# Patient Record
Sex: FEMALE | Race: WHITE | NOT HISPANIC OR LATINO | URBAN - METROPOLITAN AREA
[De-identification: names, ages, dates, MRNs, and addresses within clinical notes are randomized per-mention and may not be internally consistent; named-entity substitution may affect disease eponyms.]

---

## 2017-05-01 ENCOUNTER — OUTPATIENT (OUTPATIENT)
Dept: OUTPATIENT SERVICES | Facility: HOSPITAL | Age: 62
LOS: 1 days | End: 2017-05-01
Payer: MEDICAID

## 2017-05-09 DIAGNOSIS — R69 ILLNESS, UNSPECIFIED: ICD-10-CM

## 2017-07-01 ENCOUNTER — OUTPATIENT (OUTPATIENT)
Dept: OUTPATIENT SERVICES | Facility: HOSPITAL | Age: 62
LOS: 1 days | End: 2017-07-01

## 2018-02-06 DIAGNOSIS — R69 ILLNESS, UNSPECIFIED: ICD-10-CM

## 2018-06-01 PROCEDURE — G9005: CPT

## 2018-07-01 ENCOUNTER — OUTPATIENT (OUTPATIENT)
Dept: OUTPATIENT SERVICES | Facility: HOSPITAL | Age: 63
LOS: 1 days | End: 2018-07-01
Payer: MEDICARE

## 2018-07-13 DIAGNOSIS — Z71.89 OTHER SPECIFIED COUNSELING: ICD-10-CM

## 2019-04-01 ENCOUNTER — OUTPATIENT (OUTPATIENT)
Dept: OUTPATIENT SERVICES | Facility: HOSPITAL | Age: 64
LOS: 1 days | End: 2019-04-01
Payer: MEDICAID

## 2019-08-01 ENCOUNTER — OUTPATIENT (OUTPATIENT)
Dept: OUTPATIENT SERVICES | Facility: HOSPITAL | Age: 64
LOS: 1 days | End: 2019-08-01

## 2019-10-17 DIAGNOSIS — Z71.89 OTHER SPECIFIED COUNSELING: ICD-10-CM

## 2019-12-21 ENCOUNTER — INPATIENT (INPATIENT)
Facility: HOSPITAL | Age: 64
LOS: 2 days | Discharge: NURSING, REHAB OR LONG-TERM CARE | End: 2019-12-24
Attending: STUDENT IN AN ORGANIZED HEALTH CARE EDUCATION/TRAINING PROGRAM | Admitting: STUDENT IN AN ORGANIZED HEALTH CARE EDUCATION/TRAINING PROGRAM
Payer: MEDICARE

## 2019-12-21 VITALS
DIASTOLIC BLOOD PRESSURE: 67 MMHG | OXYGEN SATURATION: 91 % | RESPIRATION RATE: 16 BRPM | SYSTOLIC BLOOD PRESSURE: 107 MMHG | HEART RATE: 114 BPM | TEMPERATURE: 98 F

## 2019-12-21 DIAGNOSIS — R09.02 HYPOXEMIA: ICD-10-CM

## 2019-12-21 LAB
ALBUMIN SERPL ELPH-MCNC: 3.8 G/DL — SIGNIFICANT CHANGE UP (ref 3.3–5)
ALP SERPL-CCNC: 67 U/L — SIGNIFICANT CHANGE UP (ref 40–120)
ALT FLD-CCNC: 24 U/L — SIGNIFICANT CHANGE UP (ref 4–33)
ANION GAP SERPL CALC-SCNC: 16 MMO/L — HIGH (ref 7–14)
APPEARANCE UR: CLEAR — SIGNIFICANT CHANGE UP
AST SERPL-CCNC: 70 U/L — HIGH (ref 4–32)
B PERT DNA SPEC QL NAA+PROBE: NOT DETECTED — SIGNIFICANT CHANGE UP
BACTERIA # UR AUTO: SIGNIFICANT CHANGE UP
BASOPHILS # BLD AUTO: 0.06 K/UL — SIGNIFICANT CHANGE UP (ref 0–0.2)
BASOPHILS NFR BLD AUTO: 0.3 % — SIGNIFICANT CHANGE UP (ref 0–2)
BASOPHILS NFR SPEC: 0 % — SIGNIFICANT CHANGE UP (ref 0–2)
BILIRUB SERPL-MCNC: 0.5 MG/DL — SIGNIFICANT CHANGE UP (ref 0.2–1.2)
BILIRUB UR-MCNC: NEGATIVE — SIGNIFICANT CHANGE UP
BLOOD UR QL VISUAL: HIGH
BUN SERPL-MCNC: 16 MG/DL — SIGNIFICANT CHANGE UP (ref 7–23)
C PNEUM DNA SPEC QL NAA+PROBE: NOT DETECTED — SIGNIFICANT CHANGE UP
CALCIUM SERPL-MCNC: 9.5 MG/DL — SIGNIFICANT CHANGE UP (ref 8.4–10.5)
CHLORIDE SERPL-SCNC: 102 MMOL/L — SIGNIFICANT CHANGE UP (ref 98–107)
CO2 SERPL-SCNC: 21 MMOL/L — LOW (ref 22–31)
COLOR SPEC: YELLOW — SIGNIFICANT CHANGE UP
CREAT SERPL-MCNC: 0.72 MG/DL — SIGNIFICANT CHANGE UP (ref 0.5–1.3)
EOSINOPHIL # BLD AUTO: 0.01 K/UL — SIGNIFICANT CHANGE UP (ref 0–0.5)
EOSINOPHIL NFR BLD AUTO: 0.1 % — SIGNIFICANT CHANGE UP (ref 0–6)
EOSINOPHIL NFR FLD: 0 % — SIGNIFICANT CHANGE UP (ref 0–6)
FLUAV H1 2009 PAND RNA SPEC QL NAA+PROBE: NOT DETECTED — SIGNIFICANT CHANGE UP
FLUAV H1 RNA SPEC QL NAA+PROBE: NOT DETECTED — SIGNIFICANT CHANGE UP
FLUAV H3 RNA SPEC QL NAA+PROBE: NOT DETECTED — SIGNIFICANT CHANGE UP
FLUAV SUBTYP SPEC NAA+PROBE: NOT DETECTED — SIGNIFICANT CHANGE UP
FLUBV RNA SPEC QL NAA+PROBE: NOT DETECTED — SIGNIFICANT CHANGE UP
GLUCOSE SERPL-MCNC: 95 MG/DL — SIGNIFICANT CHANGE UP (ref 70–99)
GLUCOSE UR-MCNC: NEGATIVE — SIGNIFICANT CHANGE UP
HADV DNA SPEC QL NAA+PROBE: NOT DETECTED — SIGNIFICANT CHANGE UP
HCOV PNL SPEC NAA+PROBE: SIGNIFICANT CHANGE UP
HCT VFR BLD CALC: 50.7 % — HIGH (ref 34.5–45)
HGB BLD-MCNC: 15.6 G/DL — HIGH (ref 11.5–15.5)
HMPV RNA SPEC QL NAA+PROBE: NOT DETECTED — SIGNIFICANT CHANGE UP
HPIV1 RNA SPEC QL NAA+PROBE: NOT DETECTED — SIGNIFICANT CHANGE UP
HPIV2 RNA SPEC QL NAA+PROBE: NOT DETECTED — SIGNIFICANT CHANGE UP
HPIV3 RNA SPEC QL NAA+PROBE: NOT DETECTED — SIGNIFICANT CHANGE UP
HPIV4 RNA SPEC QL NAA+PROBE: NOT DETECTED — SIGNIFICANT CHANGE UP
HYALINE CASTS # UR AUTO: SIGNIFICANT CHANGE UP
IMM GRANULOCYTES NFR BLD AUTO: 0.3 % — SIGNIFICANT CHANGE UP (ref 0–1.5)
KETONES UR-MCNC: SIGNIFICANT CHANGE UP
LEUKOCYTE ESTERASE UR-ACNC: SIGNIFICANT CHANGE UP
LYMPHOCYTES # BLD AUTO: 35.3 % — SIGNIFICANT CHANGE UP (ref 13–44)
LYMPHOCYTES # BLD AUTO: 6.64 K/UL — HIGH (ref 1–3.3)
LYMPHOCYTES NFR SPEC AUTO: 44 % — SIGNIFICANT CHANGE UP (ref 13–44)
MAGNESIUM SERPL-MCNC: 1.9 MG/DL — SIGNIFICANT CHANGE UP (ref 1.6–2.6)
MANUAL SMEAR VERIFICATION: SIGNIFICANT CHANGE UP
MCHC RBC-ENTMCNC: 26.7 PG — LOW (ref 27–34)
MCHC RBC-ENTMCNC: 30.8 % — LOW (ref 32–36)
MCV RBC AUTO: 86.7 FL — SIGNIFICANT CHANGE UP (ref 80–100)
MICROCYTES BLD QL: SLIGHT — SIGNIFICANT CHANGE UP
MONOCYTES # BLD AUTO: 1.54 K/UL — HIGH (ref 0–0.9)
MONOCYTES NFR BLD AUTO: 8.2 % — SIGNIFICANT CHANGE UP (ref 2–14)
MONOCYTES NFR BLD: 3 % — SIGNIFICANT CHANGE UP (ref 2–9)
MUCOUS THREADS # UR AUTO: SIGNIFICANT CHANGE UP
NEUTROPHIL AB SER-ACNC: 53 % — SIGNIFICANT CHANGE UP (ref 43–77)
NEUTROPHILS # BLD AUTO: 10.5 K/UL — HIGH (ref 1.8–7.4)
NEUTROPHILS NFR BLD AUTO: 55.8 % — SIGNIFICANT CHANGE UP (ref 43–77)
NITRITE UR-MCNC: NEGATIVE — SIGNIFICANT CHANGE UP
NRBC # BLD: 0 /100WBC — SIGNIFICANT CHANGE UP
NRBC # FLD: 0 K/UL — SIGNIFICANT CHANGE UP (ref 0–0)
PH UR: 6 — SIGNIFICANT CHANGE UP (ref 5–8)
PHOSPHATE SERPL-MCNC: 3.2 MG/DL — SIGNIFICANT CHANGE UP (ref 2.5–4.5)
PLATELET # BLD AUTO: 289 K/UL — SIGNIFICANT CHANGE UP (ref 150–400)
PLATELET COUNT - ESTIMATE: NORMAL — SIGNIFICANT CHANGE UP
PMV BLD: 10.3 FL — SIGNIFICANT CHANGE UP (ref 7–13)
POTASSIUM SERPL-MCNC: 5.3 MMOL/L — SIGNIFICANT CHANGE UP (ref 3.5–5.3)
POTASSIUM SERPL-SCNC: 5.3 MMOL/L — SIGNIFICANT CHANGE UP (ref 3.5–5.3)
PROT SERPL-MCNC: 6.6 G/DL — SIGNIFICANT CHANGE UP (ref 6–8.3)
PROT UR-MCNC: 50 — SIGNIFICANT CHANGE UP
RBC # BLD: 5.85 M/UL — HIGH (ref 3.8–5.2)
RBC # FLD: 13.1 % — SIGNIFICANT CHANGE UP (ref 10.3–14.5)
RBC CASTS # UR COMP ASSIST: SIGNIFICANT CHANGE UP (ref 0–?)
RSV RNA SPEC QL NAA+PROBE: NOT DETECTED — SIGNIFICANT CHANGE UP
RV+EV RNA SPEC QL NAA+PROBE: NOT DETECTED — SIGNIFICANT CHANGE UP
SMUDGE CELLS # BLD: PRESENT — SIGNIFICANT CHANGE UP
SODIUM SERPL-SCNC: 139 MMOL/L — SIGNIFICANT CHANGE UP (ref 135–145)
SP GR SPEC: 1.03 — SIGNIFICANT CHANGE UP (ref 1–1.04)
SQUAMOUS # UR AUTO: SIGNIFICANT CHANGE UP
TROPONIN T, HIGH SENSITIVITY: 18 NG/L — SIGNIFICANT CHANGE UP (ref ?–14)
UROBILINOGEN FLD QL: NORMAL — SIGNIFICANT CHANGE UP
WBC # BLD: 18.81 K/UL — HIGH (ref 3.8–10.5)
WBC # FLD AUTO: 18.81 K/UL — HIGH (ref 3.8–10.5)
WBC UR QL: SIGNIFICANT CHANGE UP (ref 0–?)

## 2019-12-21 PROCEDURE — 70450 CT HEAD/BRAIN W/O DYE: CPT | Mod: 26

## 2019-12-21 PROCEDURE — 99223 1ST HOSP IP/OBS HIGH 75: CPT

## 2019-12-21 PROCEDURE — 71045 X-RAY EXAM CHEST 1 VIEW: CPT | Mod: 26

## 2019-12-21 RX ORDER — DEXTROSE 50 % IN WATER 50 %
25 SYRINGE (ML) INTRAVENOUS ONCE
Refills: 0 | Status: DISCONTINUED | OUTPATIENT
Start: 2019-12-21 | End: 2019-12-23

## 2019-12-21 RX ORDER — SODIUM CHLORIDE 9 MG/ML
1000 INJECTION, SOLUTION INTRAVENOUS
Refills: 0 | Status: DISCONTINUED | OUTPATIENT
Start: 2019-12-21 | End: 2019-12-23

## 2019-12-21 RX ORDER — CEFTRIAXONE 500 MG/1
1000 INJECTION, POWDER, FOR SOLUTION INTRAMUSCULAR; INTRAVENOUS ONCE
Refills: 0 | Status: COMPLETED | OUTPATIENT
Start: 2019-12-21 | End: 2019-12-21

## 2019-12-21 RX ORDER — SODIUM CHLORIDE 9 MG/ML
2000 INJECTION INTRAMUSCULAR; INTRAVENOUS; SUBCUTANEOUS ONCE
Refills: 0 | Status: COMPLETED | OUTPATIENT
Start: 2019-12-21 | End: 2019-12-21

## 2019-12-21 RX ORDER — DEXTROSE 50 % IN WATER 50 %
15 SYRINGE (ML) INTRAVENOUS ONCE
Refills: 0 | Status: DISCONTINUED | OUTPATIENT
Start: 2019-12-21 | End: 2019-12-23

## 2019-12-21 RX ORDER — INSULIN LISPRO 100/ML
VIAL (ML) SUBCUTANEOUS
Refills: 0 | Status: DISCONTINUED | OUTPATIENT
Start: 2019-12-21 | End: 2019-12-23

## 2019-12-21 RX ORDER — AZITHROMYCIN 500 MG/1
500 TABLET, FILM COATED ORAL ONCE
Refills: 0 | Status: COMPLETED | OUTPATIENT
Start: 2019-12-21 | End: 2019-12-21

## 2019-12-21 RX ORDER — TETANUS TOXOID, REDUCED DIPHTHERIA TOXOID AND ACELLULAR PERTUSSIS VACCINE, ADSORBED 5; 2.5; 8; 8; 2.5 [IU]/.5ML; [IU]/.5ML; UG/.5ML; UG/.5ML; UG/.5ML
0.5 SUSPENSION INTRAMUSCULAR ONCE
Refills: 0 | Status: COMPLETED | OUTPATIENT
Start: 2019-12-21 | End: 2019-12-21

## 2019-12-21 RX ORDER — INSULIN LISPRO 100/ML
VIAL (ML) SUBCUTANEOUS AT BEDTIME
Refills: 0 | Status: DISCONTINUED | OUTPATIENT
Start: 2019-12-21 | End: 2019-12-23

## 2019-12-21 RX ORDER — GLUCAGON INJECTION, SOLUTION 0.5 MG/.1ML
1 INJECTION, SOLUTION SUBCUTANEOUS ONCE
Refills: 0 | Status: DISCONTINUED | OUTPATIENT
Start: 2019-12-21 | End: 2019-12-23

## 2019-12-21 RX ORDER — DEXTROSE 50 % IN WATER 50 %
12.5 SYRINGE (ML) INTRAVENOUS ONCE
Refills: 0 | Status: DISCONTINUED | OUTPATIENT
Start: 2019-12-21 | End: 2019-12-23

## 2019-12-21 RX ADMIN — SODIUM CHLORIDE 2000 MILLILITER(S): 9 INJECTION INTRAMUSCULAR; INTRAVENOUS; SUBCUTANEOUS at 20:27

## 2019-12-21 RX ADMIN — CEFTRIAXONE 100 MILLIGRAM(S): 500 INJECTION, POWDER, FOR SOLUTION INTRAMUSCULAR; INTRAVENOUS at 20:21

## 2019-12-21 RX ADMIN — AZITHROMYCIN 255 MILLIGRAM(S): 500 TABLET, FILM COATED ORAL at 20:33

## 2019-12-21 RX ADMIN — CEFTRIAXONE 1000 MILLIGRAM(S): 500 INJECTION, POWDER, FOR SOLUTION INTRAMUSCULAR; INTRAVENOUS at 20:36

## 2019-12-21 RX ADMIN — TETANUS TOXOID, REDUCED DIPHTHERIA TOXOID AND ACELLULAR PERTUSSIS VACCINE, ADSORBED 0.5 MILLILITER(S): 5; 2.5; 8; 8; 2.5 SUSPENSION INTRAMUSCULAR at 20:33

## 2019-12-21 NOTE — H&P ADULT - NSHPSOCIALHISTORY_GEN_ALL_CORE
Lives alone  Independent ambulator  Current smoker, smoker 1/2 PPD since age 17  Denies any current EtOH or illicit substance use

## 2019-12-21 NOTE — H&P ADULT - PROBLEM SELECTOR PROBLEM 5
Type 2 diabetes mellitus without complication, without long-term current use of insulin Schizophrenia, unspecified type

## 2019-12-21 NOTE — H&P ADULT - PROBLEM SELECTOR PLAN 3
- History of COPD 2/2 smoking, still smoking, currently refusing medications for tobacco cessation  - No wheezing currently, was initially hypoxic in ED but on my examination she was sustaining her pulse ox in the mid 90s on RA throughout her interview  - Will restart her home advair (with therapeutic interchange) and proair prn - Leukocytosis to 18k, seems to have a possible baseline of 14-15k based on prior labs, currently no infectious complaints  - Did get empiric CTX/AZT in ED but will hold off on further abx for now  - BCx/UCx sent in ED, f/u results

## 2019-12-21 NOTE — H&P ADULT - PROBLEM SELECTOR PLAN 1
- 2 falls today, unclear etiology, possibly 2/2 cardiac though patient denies any cardiac complaints, will monitor on telemetry for now, will check a TTE, initial trop indeterminant at 18 but patient without any cardiac complaints, will repeat in AM with reattempt at new EKG in AM  - Falls might also be due to worsening dyskinesia from abrupt withdrawal of her geodon and amantadine, will restart and monitor for now

## 2019-12-21 NOTE — ED PROVIDER NOTE - NS ED ROS FT
General: denies fever, chills  HENT: denies nasal congestion, sore throat, rhinorrhea  Eyes: denies vision changes  CV: denies chest pain  Resp: denies difficulty breathing, cough  Abdominal: denies nausea, vomiting, diarrhea, abdominal pain, blood in stool, dark stool  : denies pain with urination  MSK: +recent trauma  Neuro: denies headaches, numbness, tingling, dizziness, lightheadedness.  Skin: denies new rashes  Endocrine: denies recent weight loss

## 2019-12-21 NOTE — H&P ADULT - PROBLEM SELECTOR PLAN 6
- SCDs for DVT ppx  - Ambulate with assistance, fall precautions - On metformin at home -> will hold for now  - VIJI, FS qAC, diabetic diet

## 2019-12-21 NOTE — H&P ADULT - PROBLEM SELECTOR PLAN 9
- Patient unsure of her medications, reconciled from OMR, will also have hospital pharmacy verify meds in AM

## 2019-12-21 NOTE — ED PROVIDER NOTE - PHYSICAL EXAMINATION
CONSTITUTIONAL: unkempt, elderly female, resting comfortably in no acute distress  HEAD: Normocephalic; atraumatic  EYES: Normal inspection, EOMI, PERRLA  ENMT: External appears normal; normal oropharynx  NECK: Supple; non-tender; no cervical lymphadenopathy  CARD: RRR; no audible murmurs, rubs, or gallops  RESP: No respiratory distress, lungs ctab/l  ABD: Soft, non-distended; non-tender; no rebound or guarding  EXT: No LE pitting edema or calf tenderness; distal pulses intact with good capillary refill  SKIN: Warm, dry, abrasions over knees b/l   MSK: no midline TTP, no step offs  NEURO: aaox3, CN II-IX intact, 5/5 strength b/l UE and LE, sensation intact in all extremities, no pronator drift, finger to nose intact, no disdiadokinesia, unsteady on feet

## 2019-12-21 NOTE — ED PROVIDER NOTE - PROGRESS NOTE DETAILS
Pt noted to be tremulous, hypoxic to 89% with good waveform; but w/o subjective complaints. Satting well 97% on 4L NC. Rectal temp afebrile but pt is tachycardic with leukocytosis will draw add'l cultures and give IVF, abx, likely admission. Reports her agency is [facility] [phone] and someone named Eric follows her case; this phone number did not go to any agency as far as I could tell. States her PMD is a  "[facility]" in [facility].  Luis F pgy2.

## 2019-12-21 NOTE — H&P ADULT - PROBLEM SELECTOR PLAN 7
1.  Name of PCP: Dr. Brandt Sanabria  2.  PCP Contacted on Admission: [ ] Y    [ ] N    3.  PCP contacted at Discharge: [ ] Y    [ ] N    [ ] N/A  4.  Post-Discharge Appointment Date and Location:  5.  Summary of Handoff given to PCP: - SCDs for DVT ppx  - Ambulate with assistance, fall precautions

## 2019-12-21 NOTE — H&P ADULT - PROBLEM SELECTOR PLAN 4
- Supposed to be on ziprasidone and amantadine, will restart both  - Consider psych eval in AM to see if her medications needs adjustments given her significant tardive dyskinesia - Supposed to be on ziprasidone and amantadine, will restart both  - Consider psych eval in AM to see if her medications need adjustments given her significant dyskinesia - History of COPD 2/2 smoking, still smoking, currently refusing medications for tobacco cessation  - No wheezing currently, was initially hypoxic in ED but on my examination she was sustaining her pulse ox in the mid 90s on RA throughout her interview  - Will restart her home advair (with therapeutic interchange) and proair prn

## 2019-12-21 NOTE — H&P ADULT - PROBLEM SELECTOR PLAN 2
- Unclear cause of the hypoxia on initial arrival to the ED, has history of COPD but not in exacerbation (no wheezing, no SOB, no new or worsening cough)  - Concern for PNA given leukocytosis but CXR clear and patient without infectious complaints (has a chronic cough but that hasn't worsened, no sputum production, no fevers/chills, no URI complaints)  - Concern for raised for possible PE given her tachycardia, Wells Score 4.5 placing her at a moderate risk, will check a d-dimer for further evaluation - Unclear cause of the hypoxia on initial arrival to the ED, has history of COPD but not in exacerbation (no wheezing, no SOB, no new or worsening cough)  - Concern for PNA given leukocytosis but CXR clear and patient without infectious complaints (has a chronic cough but that hasn't worsened, no sputum production, no fevers/chills, no URI complaints), RVP also negative  - Concern for raised for possible PE given her tachycardia, Wells Score 4.5 placing her at a moderate risk, will check a d-dimer for further evaluation -> D-Dimer resulted at 272 DDU which lower than the patient's age adjusted cut-off making VTE unlikely

## 2019-12-21 NOTE — ED PROVIDER NOTE - OBJECTIVE STATEMENT
Chasity Juarez MD: 63yo F with PMH of schizoaffective disorder, DM BIBEMS for fall x2. Pt is poor historian but states that she had mechanical fall at 3PM while tying her shoes and at 4PM while smoking outside. Neighbors called 911. Pt denies LOC, head trauma, blood thinners. Pt is denying pain, SOB, weakness, numbness, tingling, dysuria, HA, vision changes. States that she lives alone with aid that comes occasionally. Ambulates without assistance.

## 2019-12-21 NOTE — ED ADULT NURSE REASSESSMENT NOTE - NS ED NURSE REASSESS COMMENT FT1
Received report from RN. Pt resting in bed at this time. Breathing even and unlabored. Pt continuing to be placed on 4L/min nasal cannula-oxygen saturation at 97%. Pt denies chest pain and SOB at this time. Sinus tachycardia on the monitor. VS as noted. Will continue to monitor.

## 2019-12-21 NOTE — ED PROVIDER NOTE - CLINICAL SUMMARY MEDICAL DECISION MAKING FREE TEXT BOX
Chasity Juarez MD: 63yo F with PMH of schizoaffective disorder, DM BIBEMS for fall x2. Pt hemodynamically stable, afebrile. Pt endorses mechanical fall with no LOC or head trauma, but as pt is poor historian and unable to ambulate in ED, will obtain CT head, basic labs, EKG, UA/UC.

## 2019-12-21 NOTE — H&P ADULT - NSHPREVIEWOFSYSTEMS_GEN_ALL_CORE
REVIEW OF SYSTEMS:    CONSTITUTIONAL: No weakness, fevers or chills  EYES: No visual changes or eye discharge  ENT: No rhinorrhea or sore throat  NECK: No pain or stiffness  RESPIRATORY: No cough, wheezing, hemoptysis; No shortness of breath  CARDIOVASCULAR: No chest pain or palpitations; No lower extremity edema  GASTROINTESTINAL: No abdominal or epigastric pain. No nausea, vomiting, or hematemesis; No diarrhea or constipation. No melena or hematochezia.  BACK: No back pain, no flank pain  GENITOURINARY: No dysuria, frequency or hematuria  NEUROLOGICAL: No numbness or weakness  SKIN: No itching, burning, rashes, or lesions

## 2019-12-21 NOTE — H&P ADULT - PROBLEM SELECTOR PLAN 8
- Patient unsure of her medications, reconciled from OMR, will also have hospital pharmacy verify meds in AM 1.  Name of PCP: Dr. Brandt Sanabria  2.  PCP Contacted on Admission: [ ] Y    [ ] N    3.  PCP contacted at Discharge: [ ] Y    [ ] N    [ ] N/A  4.  Post-Discharge Appointment Date and Location:  5.  Summary of Handoff given to PCP:

## 2019-12-21 NOTE — H&P ADULT - NSHPPHYSICALEXAM_GEN_ALL_CORE
Vital Signs Last 24 Hrs  T(C): 36.8 (21 Dec 2019 23:50), Max: 37.2 (21 Dec 2019 19:33)  T(F): 98.2 (21 Dec 2019 23:50), Max: 98.9 (21 Dec 2019 19:33)  HR: 97 (21 Dec 2019 23:50) (97 - 114)  BP: 123/70 (21 Dec 2019 23:50) (107/67 - 123/70)  BP(mean): --  RR: 20 (21 Dec 2019 23:50) (16 - 20)  SpO2: 97% (21 Dec 2019 23:50) (91% - 98%)    GENERAL: No acute distress, well-developed  ENT: EOMI, PERRL, conjunctiva and sclera clear, Neck supple, No JVD, dry oral mucosa  CHEST/LUNG: Clear to auscultation bilaterally; No wheeze, equal breath sounds bilaterally   BACK: No spinal tenderness, no CVA TTP  HEART: Regular rate and rhythm; No murmurs, rubs, or gallops  ABDOMEN: Soft, Nontender, Nondistended; Bowel sounds present  EXTREMITIES: No clubbing, cyanosis, or edema  PSYCH: Nl behavior, flat affect  NEUROLOGY: AAOx3, non-focal, baseline dyskinesia of arms and legs that improve with movement  SKIN: Normal color, No rashes or lesions

## 2019-12-21 NOTE — ED PROVIDER NOTE - ATTENDING CONTRIBUTION TO CARE
64F with hx of schizoaffective disorder and DM per records, BIBEMS after neighbors witnessed her fall. patient states she fell twice. limited hx as patient is a very poor historian. denies any injuries but has scrapes on both knees. denies any pain. states she cares for herself and ambulates independently.     ***GEN - NAD; well appearing; A+O x2 ***HEAD - NC/AT ***EYES/NOSE - PERRL, left eye strabismus, EOMI, mucous membranes moist, no discharge ***THROAT: Oral cavity and pharynx normal. No inflammation, swelling, exudate, or lesions.  ***NECK: Neck supple  ***PULMONARY - CTA b/l, symmetric breath sounds. ***CARDIAC -s1s2, tachycardia, no M,G,R  ***ABDOMEN - +BS, ND, NT, soft, no guarding, no rebound, no masses   ***BACK - no CVA tenderness, Normal  spine ***EXTREMITIES - symmetric pulses, 2+ dp, capillary refill < 2 seconds, no clubbing, no cyanosis, no edema ***SKIN - abrasions to b/l knees  ***NEUROLOGIC - alert, CN 2-12 intact, ***PSYCH - calm and cooperative    MDM: 64F s/p fall. limited hx, will d/w SW to obtain more if possible. tachycardiac, hypoxic on RA to 89%. supplemental o2, cxr, cth, labs, rvp, abx.

## 2019-12-21 NOTE — H&P ADULT - NSICDXPASTMEDICALHX_GEN_ALL_CORE_FT
PAST MEDICAL HISTORY:  COPD without exacerbation     Diabetes     Obesity     Schizoaffective disorder

## 2019-12-21 NOTE — H&P ADULT - PROBLEM SELECTOR PLAN 5
- On metformin at home -> will hold for now  - VIJI, FS qAC, diabetic diet - Supposed to be on ziprasidone and amantadine, will restart both  - Consider psych eval in AM to see if her medications need adjustments given her significant dyskinesia

## 2019-12-21 NOTE — ED ADULT TRIAGE NOTE - CHIEF COMPLAINT QUOTE
Pt s/p fall  twice today, in her apartment and outside. with dizziness after falling , now resolved.    Denies head injury, chest pain, LOC

## 2019-12-21 NOTE — ED ADULT NURSE NOTE - OBJECTIVE STATEMENT
Pt to bed 26. Alert ad oriented x 3. Pt states that she had a mechanical fall today. Has no complaints at this time. IVL placed. Bloods drawn. Will continue to monitor.

## 2019-12-22 ENCOUNTER — TRANSCRIPTION ENCOUNTER (OUTPATIENT)
Age: 64
End: 2019-12-22

## 2019-12-22 DIAGNOSIS — Z02.9 ENCOUNTER FOR ADMINISTRATIVE EXAMINATIONS, UNSPECIFIED: ICD-10-CM

## 2019-12-22 DIAGNOSIS — R09.02 HYPOXEMIA: ICD-10-CM
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
## 2019-12-22 DIAGNOSIS — E11.9 TYPE 2 DIABETES MELLITUS WITHOUT COMPLICATIONS: ICD-10-CM

## 2019-12-22 DIAGNOSIS — Z29.9 ENCOUNTER FOR PROPHYLACTIC MEASURES, UNSPECIFIED: ICD-10-CM

## 2019-12-22 DIAGNOSIS — J44.9 CHRONIC OBSTRUCTIVE PULMONARY DISEASE, UNSPECIFIED: ICD-10-CM

## 2019-12-22 DIAGNOSIS — R55 SYNCOPE AND COLLAPSE: ICD-10-CM

## 2019-12-22 DIAGNOSIS — T79.6XXA TRAUMATIC ISCHEMIA OF MUSCLE, INITIAL ENCOUNTER: ICD-10-CM

## 2019-12-22 DIAGNOSIS — D72.829 ELEVATED WHITE BLOOD CELL COUNT, UNSPECIFIED: ICD-10-CM

## 2019-12-22 DIAGNOSIS — Z79.899 OTHER LONG TERM (CURRENT) DRUG THERAPY: ICD-10-CM

## 2019-12-22 DIAGNOSIS — F20.9 SCHIZOPHRENIA, UNSPECIFIED: ICD-10-CM

## 2019-12-22 DIAGNOSIS — R09.89 OTHER SPECIFIED SYMPTOMS AND SIGNS INVOLVING THE CIRCULATORY AND RESPIRATORY SYSTEMS: ICD-10-CM

## 2019-12-22 LAB
ANION GAP SERPL CALC-SCNC: 11 MMO/L — SIGNIFICANT CHANGE UP (ref 7–14)
APTT BLD: 28.3 SEC — SIGNIFICANT CHANGE UP (ref 27.5–36.3)
BUN SERPL-MCNC: 16 MG/DL — SIGNIFICANT CHANGE UP (ref 7–23)
CALCIUM SERPL-MCNC: 8.5 MG/DL — SIGNIFICANT CHANGE UP (ref 8.4–10.5)
CHLORIDE SERPL-SCNC: 104 MMOL/L — SIGNIFICANT CHANGE UP (ref 98–107)
CK MB BLD-MCNC: 1 — SIGNIFICANT CHANGE UP (ref 0–2.5)
CK MB BLD-MCNC: 23.37 NG/ML — HIGH (ref 1–4.7)
CK SERPL-CCNC: 2313 U/L — HIGH (ref 25–170)
CO2 SERPL-SCNC: 26 MMOL/L — SIGNIFICANT CHANGE UP (ref 22–31)
CREAT SERPL-MCNC: 0.77 MG/DL — SIGNIFICANT CHANGE UP (ref 0.5–1.3)
D DIMER BLD IA.RAPID-MCNC: 272 NG/ML — SIGNIFICANT CHANGE UP
GLUCOSE BLDC GLUCOMTR-MCNC: 85 MG/DL — SIGNIFICANT CHANGE UP (ref 70–99)
GLUCOSE BLDC GLUCOMTR-MCNC: 90 MG/DL — SIGNIFICANT CHANGE UP (ref 70–99)
GLUCOSE BLDC GLUCOMTR-MCNC: 96 MG/DL — SIGNIFICANT CHANGE UP (ref 70–99)
GLUCOSE BLDC GLUCOMTR-MCNC: 99 MG/DL — SIGNIFICANT CHANGE UP (ref 70–99)
GLUCOSE SERPL-MCNC: 118 MG/DL — HIGH (ref 70–99)
HBA1C BLD-MCNC: 5.9 % — HIGH (ref 4–5.6)
HCT VFR BLD CALC: 42 % — SIGNIFICANT CHANGE UP (ref 34.5–45)
HGB BLD-MCNC: 13.1 G/DL — SIGNIFICANT CHANGE UP (ref 11.5–15.5)
INR BLD: 1.04 — SIGNIFICANT CHANGE UP (ref 0.88–1.17)
MAGNESIUM SERPL-MCNC: 1.8 MG/DL — SIGNIFICANT CHANGE UP (ref 1.6–2.6)
MCHC RBC-ENTMCNC: 26.7 PG — LOW (ref 27–34)
MCHC RBC-ENTMCNC: 31.2 % — LOW (ref 32–36)
MCV RBC AUTO: 85.5 FL — SIGNIFICANT CHANGE UP (ref 80–100)
NRBC # FLD: 0 K/UL — SIGNIFICANT CHANGE UP (ref 0–0)
PHOSPHATE SERPL-MCNC: 3.6 MG/DL — SIGNIFICANT CHANGE UP (ref 2.5–4.5)
PLATELET # BLD AUTO: 236 K/UL — SIGNIFICANT CHANGE UP (ref 150–400)
PMV BLD: 9.9 FL — SIGNIFICANT CHANGE UP (ref 7–13)
POTASSIUM SERPL-MCNC: 3.3 MMOL/L — LOW (ref 3.5–5.3)
POTASSIUM SERPL-SCNC: 3.3 MMOL/L — LOW (ref 3.5–5.3)
PROTHROM AB SERPL-ACNC: 11.9 SEC — SIGNIFICANT CHANGE UP (ref 9.8–13.1)
RBC # BLD: 4.91 M/UL — SIGNIFICANT CHANGE UP (ref 3.8–5.2)
RBC # FLD: 13.1 % — SIGNIFICANT CHANGE UP (ref 10.3–14.5)
SODIUM SERPL-SCNC: 141 MMOL/L — SIGNIFICANT CHANGE UP (ref 135–145)
SPECIMEN SOURCE: SIGNIFICANT CHANGE UP
SPECIMEN SOURCE: SIGNIFICANT CHANGE UP
TROPONIN T, HIGH SENSITIVITY: 14 NG/L — SIGNIFICANT CHANGE UP (ref ?–14)
WBC # BLD: 11.45 K/UL — HIGH (ref 3.8–10.5)
WBC # FLD AUTO: 11.45 K/UL — HIGH (ref 3.8–10.5)

## 2019-12-22 PROCEDURE — 99233 SBSQ HOSP IP/OBS HIGH 50: CPT

## 2019-12-22 PROCEDURE — 93306 TTE W/DOPPLER COMPLETE: CPT | Mod: 26

## 2019-12-22 RX ORDER — ALBUTEROL 90 UG/1
2 AEROSOL, METERED ORAL EVERY 6 HOURS
Refills: 0 | Status: DISCONTINUED | OUTPATIENT
Start: 2019-12-22 | End: 2019-12-24

## 2019-12-22 RX ORDER — POTASSIUM CHLORIDE 20 MEQ
40 PACKET (EA) ORAL ONCE
Refills: 0 | Status: COMPLETED | OUTPATIENT
Start: 2019-12-22 | End: 2019-12-22

## 2019-12-22 RX ORDER — SODIUM CHLORIDE 9 MG/ML
1000 INJECTION INTRAMUSCULAR; INTRAVENOUS; SUBCUTANEOUS
Refills: 0 | Status: DISCONTINUED | OUTPATIENT
Start: 2019-12-22 | End: 2019-12-24

## 2019-12-22 RX ORDER — AMANTADINE HCL 100 MG
100 CAPSULE ORAL
Refills: 0 | Status: DISCONTINUED | OUTPATIENT
Start: 2019-12-22 | End: 2019-12-24

## 2019-12-22 RX ORDER — ZIPRASIDONE HYDROCHLORIDE 20 MG/1
60 CAPSULE ORAL
Refills: 0 | Status: DISCONTINUED | OUTPATIENT
Start: 2019-12-22 | End: 2019-12-24

## 2019-12-22 RX ORDER — ZIPRASIDONE HYDROCHLORIDE 20 MG/1
1 CAPSULE ORAL
Qty: 0 | Refills: 0 | DISCHARGE

## 2019-12-22 RX ORDER — BUDESONIDE AND FORMOTEROL FUMARATE DIHYDRATE 160; 4.5 UG/1; UG/1
2 AEROSOL RESPIRATORY (INHALATION)
Refills: 0 | Status: DISCONTINUED | OUTPATIENT
Start: 2019-12-22 | End: 2019-12-24

## 2019-12-22 RX ORDER — IPRATROPIUM/ALBUTEROL SULFATE 18-103MCG
3 AEROSOL WITH ADAPTER (GRAM) INHALATION EVERY 6 HOURS
Refills: 0 | Status: DISCONTINUED | OUTPATIENT
Start: 2019-12-22 | End: 2019-12-24

## 2019-12-22 RX ADMIN — ZIPRASIDONE HYDROCHLORIDE 60 MILLIGRAM(S): 20 CAPSULE ORAL at 06:57

## 2019-12-22 RX ADMIN — SODIUM CHLORIDE 100 MILLILITER(S): 9 INJECTION INTRAMUSCULAR; INTRAVENOUS; SUBCUTANEOUS at 15:41

## 2019-12-22 RX ADMIN — Medication 100 MILLIGRAM(S): at 06:57

## 2019-12-22 RX ADMIN — BUDESONIDE AND FORMOTEROL FUMARATE DIHYDRATE 2 PUFF(S): 160; 4.5 AEROSOL RESPIRATORY (INHALATION) at 21:10

## 2019-12-22 RX ADMIN — Medication 40 MILLIEQUIVALENT(S): at 11:37

## 2019-12-22 RX ADMIN — SODIUM CHLORIDE 100 MILLILITER(S): 9 INJECTION INTRAMUSCULAR; INTRAVENOUS; SUBCUTANEOUS at 19:19

## 2019-12-22 RX ADMIN — BUDESONIDE AND FORMOTEROL FUMARATE DIHYDRATE 2 PUFF(S): 160; 4.5 AEROSOL RESPIRATORY (INHALATION) at 11:37

## 2019-12-22 RX ADMIN — ZIPRASIDONE HYDROCHLORIDE 60 MILLIGRAM(S): 20 CAPSULE ORAL at 19:19

## 2019-12-22 RX ADMIN — Medication 100 MILLIGRAM(S): at 19:19

## 2019-12-22 NOTE — DISCHARGE NOTE PROVIDER - NSDCFUADDAPPT_GEN_ALL_CORE_FT
**Please continue all psych medications as directed and follow up with your primary psychiatrist, Dr. Jama Vinson after rehab.    **Please follow up with your primary care provider and have repeat blood work in 1 week (CBC, BMP, Mg, Phos). **Please continue all psych medications as directed and follow up with your psychiatrist, Dr. Jama Vinson after rehab.    **Please follow up with your primary care provider and have repeat blood work in 1 week (CBC, BMP, Mg, Phos).

## 2019-12-22 NOTE — DISCHARGE NOTE PROVIDER - HOSPITAL COURSE
64F with history of Schizophrenia, DM2, and COPD who presents to the hospital with complaints of 2 syncopal episodes. Head CT neagative. 64f w/ schizophrenia on Geodon, Type 2 DM diet controlled p/w fall X2 and admitted for further workup and mild rhabdomyolysis w/ resting tremor of hand- ? if pt w/ parkinsonian side effects d/t psych meds vs. underlying Parkinson's disease. Neurology and Behavioral Health were consulted. 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 Per psych, patient w/ chronic schizophrenia and will continue current medication regimen with out patient follow up and Dr. Jama Vinson (Primary psych). Of note, patient was noted to be hypoxic without a clear cause, d-dimer neg - likely related to COPD 2/2 smoking, will be sent on 2L NC. Her CPK was trended and she was treated with IVF for rhabdomyolysis with improvement. On 12/24 this case was reviewed with Dr. Garrett, the patient is medically stable and optimized for discharge to rehab. All medications were reviewed.

## 2019-12-22 NOTE — PROGRESS NOTE ADULT - PROBLEM SELECTOR PLAN 4
- History of COPD 2/2 smoking, still smoking, currently refusing medications for tobacco cessation  - No wheezing currently  - Will restart her home advair (with therapeutic interchange) and proair prn

## 2019-12-22 NOTE — PROGRESS NOTE ADULT - SUBJECTIVE AND OBJECTIVE BOX
[facility] Division of Hospital Medicine  Lucia Antoine MD  Pager [phone]    Patient is a 64y old  Female who presents with a chief complaint of Syncope x2 (22 Dec 2019 10:58)      SUBJECTIVE / OVERNIGHT EVENTS: seen at 10a- was sleeping but arousable  denied LOC at time of 2 falls- both from sitting positions- one while trying to tie her shoelaces when she tried to stand up she fell and the other fall was when she was sitting on a bench to smoke a cigarette.  no cp/sob/dizziness  +hand tremor at rest noted  ADDITIONAL REVIEW OF SYSTEMS:    MEDICATIONS  (STANDING):  amantadine 100 milliGRAM(s) Oral two times a day  budesonide 160 MICROgram(s)/formoterol 4.5 MICROgram(s) Inhaler 2 Puff(s) Inhalation two times a day  dextrose 5%. 1000 milliLiter(s) (50 mL/Hr) IV Continuous <Continuous>  dextrose 50% Injectable 12.5 Gram(s) IV Push once  dextrose 50% Injectable 25 Gram(s) IV Push once  dextrose 50% Injectable 25 Gram(s) IV Push once  insulin lispro (HumaLOG) corrective regimen sliding scale   SubCutaneous three times a day before meals  insulin lispro (HumaLOG) corrective regimen sliding scale   SubCutaneous at bedtime  sodium chloride 0.9%. 1000 milliLiter(s) (100 mL/Hr) IV Continuous <Continuous>  ziprasidone 60 milliGRAM(s) Oral two times a day    MEDICATIONS  (PRN):  ALBUTerol    90 MICROgram(s) HFA Inhaler 2 Puff(s) Inhalation every 6 hours PRN Shortness of Breath and/or Wheezing  albuterol/ipratropium for Nebulization 3 milliLiter(s) Nebulizer every 6 hours PRN Shortness of Breath and/or Wheezing  dextrose 40% Gel 15 Gram(s) Oral once PRN Blood Glucose LESS THAN 70 milliGRAM(s)/deciliter  glucagon  Injectable 1 milliGRAM(s) IntraMuscular once PRN Glucose LESS THAN 70 milligrams/deciliter      CAPILLARY BLOOD GLUCOSE      POCT Blood Glucose.: 90 mg/dL (22 Dec 2019 11:28)  POCT Blood Glucose.: 96 mg/dL (22 Dec 2019 07:36)    I&O's Summary      PHYSICAL EXAM:  Vital Signs Last 24 Hrs  T(C): 36.7 (22 Dec 2019 14:04), Max: 37.2 (21 Dec 2019 19:33)  T(F): 98.1 (22 Dec 2019 14:04), Max: 98.9 (21 Dec 2019 19:33)  HR: 89 (22 Dec 2019 14:04) (66 - 114)  BP: 122/91 (22 Dec 2019 10:45) (107/67 - 137/63)  BP(mean): --  RR: 20 (22 Dec 2019 14:04) (16 - 22)  SpO2: 95% (22 Dec 2019 14:04) (91% - 98%)  CONSTITUTIONAL: NAD, well-developed, well-groomed  EYES: conjunctiva and sclera clear  NECK: Supple  RESPIRATORY: Normal respiratory effort; lungs are clear to auscultation bilaterally  CARDIOVASCULAR: Regular rate and rhythm, normal S1 and S2, no murmur/rub/gallop; No lower extremity edema;   ABDOMEN: Nontender to palpation, normoactive bowel sounds, not distended;   MUSCULOSKELETAL:  +pill rolling tremor; no clubbing or cyanosis of digits; no joint swelling or tenderness to palpation  PSYCH: decr strength symmetrical LEs 4/5 b/l    LABS:                        13.1   11.45 )-----------( 236      ( 22 Dec 2019 10:00 )             42.0     12-    141  |  104  |  16  ----------------------------<  118<H>  3.3<L>   |  26  |  0.77    Ca    8.5      22 Dec 2019 10:00  Phos  3.6     12-  Mg     1.8     12-    TPro  6.6  /  Alb  3.8  /  TBili  0.5  /  DBili  x   /  AST  70<H>  /  ALT  24  /  AlkPhos  67  12-21    PT/INR - ( 21 Dec 2019 23:40 )   PT: 11.9 SEC;   INR: 1.04          PTT - ( 21 Dec 2019 23:40 )  PTT:28.3 SEC  CARDIAC MARKERS ( 22 Dec 2019 10:00 )  x     / x     / 2313 u/L / 23.37 ng/mL / x          Urinalysis Basic - ( 21 Dec 2019 21:45 )    Color: YELLOW / Appearance: CLEAR / S.027 / pH: 6.0  Gluc: NEGATIVE / Ketone: TRACE  / Bili: NEGATIVE / Urobili: NORMAL   Blood: MODERATE / Protein: 50 / Nitrite: NEGATIVE   Leuk Esterase: TRACE / RBC: 3-5 / WBC 3-5   Sq Epi: OCC / Non Sq Epi: x / Bacteria: FEW          RADIOLOGY & ADDITIONAL TESTS:  Results Reviewed:   Imaging Personally Reviewed:  Electrocardiogram Personally Reviewed:    COORDINATION OF CARE:  Care Discussed with Consultants/Other Providers [Y/N]:  Prior or Outpatient Records Reviewed [Y/N]:

## 2019-12-22 NOTE — BEHAVIORAL HEALTH ASSESSMENT NOTE - SUMMARY
Patient is a 64F with history of Schizophrenia, DM2, and COPD who presents to the hospital with complaints of 2 syncopal episodes.  Patient lives alone, reports no family or peers.  She states she is retired and currently on disability. Patient denies inpt admissions however as per chart review and cvm search patient has what appears to be 4 inpt stays ( last being in 2003 ).  Patient also denied a hx of self harm however it is noted in cvm patient attempted OD at age 15.  Patient is currently on Geodon and amantadine. As per H&P, patient had been without medication x 2 weeks, however patient denied this on interview and reported consistently taking Geodon daily however her pharmacy was not sending the appropriate amount of amantadine (only 1 pill per day and patient was taking 3 prior to this).    Psychiatry was consulted for med management.  patient was seen and assessed at bedside.  Patient is alert and oriented.  Tremor visible throughout assessment.  Patient reports her mood is "im doing fine" and Patient denies any depressive symptoms including depressed mood, anhedonia, changes in energy/concentration/appetite, sleep disturbances, or feelings of guilt. Denies SI and HI.  Patient also reports no justin and no psychosis.  When asked about her diagnoses and why she takes antipsychotic medication, patient was aware she was diagnosed with schizophrenia but denied all symptoms including current and in the past.  On chart review, it was noted patient would talk to self, talk ot others who were not there and "paranoia" documented as well.  Patient was constricted, short, however cooperative.  She is well groomed. tremor noted and discussed with patient.  She reports no change in tremor as of recently. No change in movements.  Denies fall is due to this.  Patient states she has been taking geodon for 15 years, no recent change in dose.    PLAN  - psychiatrist jacqueline tompkins #[phone] - to be called work day hours for collateral as needed  - can c/w geodon 60mg BID - patient reporting daily compliance, denies 2 weeks of missed dosing  - can c/w amantadine 100mg BID  - neuro consult, f/u recs Patient is a 64F with history of Schizophrenia, DM2, and COPD who presents to the hospital with complaints of 2 syncopal episodes.  Patient lives alone, reports no family or peers.  She states she is retired and currently on disability. Patient denies inpt admissions however as per chart review and cvm search patient has what appears to be 4 inpt stays ( last being in 2003 ).  Patient also denied a hx of self harm however it is noted in cvm patient attempted OD at age 15.  Patient is currently on Geodon and amantadine. As per H&P, patient had been without medication x 2 weeks, however patient denied this on interview and reported consistently taking Geodon daily however her pharmacy was not sending the appropriate amount of amantadine (only 1 pill per day and patient was taking 3 prior to this).    Psychiatry was consulted for med management.  patient was seen and assessed at bedside.  Patient is alert and oriented.  Tremor visible throughout assessment.  Patient reports her mood is "im doing fine" and Patient denies any depressive symptoms including depressed mood, anhedonia, changes in energy/concentration/appetite, sleep disturbances, or feelings of guilt. Denies SI and HI.  Patient also reports no justin and no psychosis.  When asked about her diagnoses and why she takes antipsychotic medication, patient was aware she was diagnosed with schizophrenia but denied all symptoms including current and in the past.  On chart review, it was noted patient would talk to self, talk ot others who were not there and "paranoia" documented as well.  Patient was constricted, short, however cooperative.  She is well groomed. tremor noted and discussed with patient.  She reports no change in tremor as of recently. No change in movements.  Denies fall is due to this.  Patient states she has been taking geodon for 15 years, no recent change in dose.    PLAN  - psychiatrist jacqueline tompkins #[id number] - to be called work day hours for collateral as needed  - can c/w geodon 60mg BID - patient reporting daily compliance, denies 2 weeks of missed dosing  - can c/w amantadine 100mg BID  - neuro consult, f/u recs  - Patient is psychiatrically stable for discharge to home, will f/u with outpatient psychiatrist.

## 2019-12-22 NOTE — PROGRESS NOTE ADULT - ASSESSMENT
64f w/ schizophrenia on Geodon, Dm diet controlled here w/ falls X2- mild rhabomyolysis and resting tremor of hand- ? if pt w/ parkinsonian side effects d/t psych meds vs. underlying parkinsons disease

## 2019-12-22 NOTE — PROGRESS NOTE ADULT - PROBLEM SELECTOR PLAN 1
denied LOC  ?d/t proximal muscle weakness  PT eval  Neuro eval to see if pt has underlying Parkinsons vs. side effects from Geodon

## 2019-12-22 NOTE — DISCHARGE NOTE PROVIDER - NSDCMRMEDTOKEN_GEN_ALL_CORE_FT
Advair Diskus 250 mcg-50 mcg inhalation powder: 1 inhaler(s) inhaled 2 times a day  amantadine 100 mg oral capsule: 1 cap(s) orally 2 times a day  Glucophage: 500 milligram(s) orally 2 times a day  ProAir HFA: 2 puff(s) inhaled every 4 hours, As Needed for shortness of breath   ziprasidone 60 mg oral capsule: 1 cap(s) orally 2 times a day Advair Diskus 250 mcg-50 mcg inhalation powder: 1 inhaler(s) inhaled 2 times a day  amantadine 100 mg oral capsule: 1 cap(s) orally 2 times a day  metFORMIN 500 mg oral tablet: 1 tab(s) orally 2 times a day  ProAir HFA 90 mcg/inh inhalation aerosol: 1 puff(s) inhaled 3 times a day, As Needed  ziprasidone 60 mg oral capsule: 1 cap(s) orally 2 times a day

## 2019-12-22 NOTE — PROGRESS NOTE ADULT - PROBLEM SELECTOR PLAN 5
- Supposed to be on ziprasidone and amantadine, will restart both  - Consider psych eval in AM to see if her medications need adjustments given her significant dyskinesia

## 2019-12-22 NOTE — BEHAVIORAL HEALTH ASSESSMENT NOTE - RISK ASSESSMENT
Low Acute Suicide Risk Risk: hx of attempt at age 15, hx inpt stays  Protective: no SI reported, no HI, denies current substance use, reports compliance with medications, has outpt provider, female gender, no access to weapons, domiciled    Patient does remain a chronic risk due to her hx, however at this time no SI is reported.  In my optinion does not meet criteria for inpt admission

## 2019-12-22 NOTE — BEHAVIORAL HEALTH ASSESSMENT NOTE - NSBHCHARTREVIEWLAB_PSY_A_CORE FT
13.1   11.45 )-----------( 236      ( 22 Dec 2019 10:00 )             42.0   12-22    141  |  104  |  16  ----------------------------<  118<H>  3.3<L>   |  26  |  0.77    Ca    8.5      22 Dec 2019 10:00  Phos  3.6     12-22  Mg     1.8     12-22    TPro  6.6  /  Alb  3.8  /  TBili  0.5  /  DBili  x   /  AST  70<H>  /  ALT  24  /  AlkPhos  67  12-21

## 2019-12-22 NOTE — CONSULT NOTE ADULT - ASSESSMENT
Assessment: 65yo woman h/o Schizophrenia (past documentation shows schizoaffective disorder), DMII, COPD admitted to [facility] after falls and syncope workup. Neurology consult for ?underlying tremor from Parkinsons that could be contributing to falls. Neurological examination demonstrates, CTH noncon negative for acute intracranial pathology.      Impression:     Plan:  Imaging/Studies: [x]*  Labs: [x]*  Meds: [x]*  Consults: [x]*  Recommendations:  -*    -Management & disposition to be discussed with neuro attending, Dr. Montes De Oca; d/w Dr. Quach Assessment: 63yo woman h/o Schizophrenia (past documentation shows schizoaffective disorder), DMII, COPD admitted to [facility] after falls and syncope workup. Neurology consult for ?underlying tremor from Parkinsons that could be contributing to falls. Neurological examination demonstrates cogwheeling rigidity that increases upon activation, CTH noncon negative for acute intracranial pathology.      Impression: drug induced dyskinesia, Parkinson's disease may be considered on differential     Recommendations:  [ ] psychiatry for mgmt of her ziprasidone in the setting this is likely chronic drug induced dyskinesia  [ ] may consider MRI brain w/o contrast though can be done as outpatient  [ ] metabolic derangements per primary team    -Management & disposition to be discussed with neuro attending, Dr. Montes De Oca; d/w Dr. Quach

## 2019-12-22 NOTE — CONSULT NOTE ADULT - SUBJECTIVE AND OBJECTIVE BOX
HPI: 63yo woman h/o Schizophrenia, DMII, COPD admitted to [facility] after falls and syncope workup.     HPI:  Please note patient is a poor historian    This is a 64F with history of Schizophrenia, DM2, and COPD who presents to the hospital with complaints of 2 syncopal episodes today. Said that the first episode occurred while she was in her kitchen, fell onto the floor but denied any prodromal symptoms, no cardiac symptoms, and no pulmonary symptoms. A little while later she was outside smoking and then fell again, again denied any prodromal symptoms but did say that she was unable to get up after the second fall and therefore called EMS. Said that she had a similar fall about 2 months ago but otherwise denied any frequent falls. Has long standing dyskinesia 2/2 psychiatric medications but denied that impairing her ability to ambulate. Denied any infectious complaints. Said that she feels well currently.     On arrival to the ED, her vitals were T 97.7, P 114, /67, RR 16, O2 sat 91% RA -> 97% 4L. Her lab work was significant for leukocytosis (seems to have chronic low grade leukocytosis to 14k-15k based on prior labs) without neutrophilia. Her trop was 18 (patient without cardiac/pulmonary complaints. Her CTH and CXR were negative. Her RVP was negative. She was given ceftriaxone 1g IVPB, azithromycin 500mg IVPB, and NS 2L. She was admitted to medicine on telemetry for further evaluation.     Of note, patient said that she gets her medications delivered from her pharmacy but has not received any deliveries in the past 2 weeks. As a result said that she has been without her medications for the past 2 weeks. (21 Dec 2019 23:32)    (Stroke only)  NIHSS:   MRS:   ICH:     REVIEW OF SYSTEMS  General:	  Skin/Breast:	  Ophthalmologic:  ENMT:	  Respiratory and Thorax:	  Cardiovascular:	  Gastrointestinal:	  Genitourinary:	  Musculoskeletal:	  Neurological:	  Psychiatric:	  Hematology/Lymphatics:	  Endocrine:	  Allergic/Immunologic:	    A 10-system ROS was performed and is negative except for those items noted above and/or in the HPI.    PAST MEDICAL & SURGICAL HISTORY:  COPD without exacerbation  Schizoaffective disorder  Obesity  Diabetes  No significant past surgical history    FAMILY HISTORY:  FH: multiple myeloma: in Father    SOCIAL HISTORY:   T/E/D:   Occupation:   Lives with:     MEDICATIONS (HOME):  Home Medications:  Advair Diskus 250 mcg-50 mcg inhalation powder: 1 inhaler(s) inhaled 2 times a day (22 Dec 2019 00:01)  amantadine 100 mg oral capsule: 1 cap(s) orally 2 times a day (22 Dec 2019 00:01)  Glucophage: 500 milligram(s) orally 2 times a day (22 Dec 2019 00:01)  ProAir HFA: 2 puff(s) inhaled every 4 hours, As Needed for shortness of breath  (22 Dec 2019 00:01)  ziprasidone 60 mg oral capsule: 1 cap(s) orally 2 times a day (22 Dec 2019 00:)    MEDICATIONS  (STANDING):  amantadine 100 milliGRAM(s) Oral two times a day  budesonide 160 MICROgram(s)/formoterol 4.5 MICROgram(s) Inhaler 2 Puff(s) Inhalation two times a day  dextrose 5%. 1000 milliLiter(s) (50 mL/Hr) IV Continuous <Continuous>  dextrose 50% Injectable 12.5 Gram(s) IV Push once  dextrose 50% Injectable 25 Gram(s) IV Push once  dextrose 50% Injectable 25 Gram(s) IV Push once  insulin lispro (HumaLOG) corrective regimen sliding scale   SubCutaneous three times a day before meals  insulin lispro (HumaLOG) corrective regimen sliding scale   SubCutaneous at bedtime  ziprasidone 60 milliGRAM(s) Oral two times a day    MEDICATIONS  (PRN):  ALBUTerol    90 MICROgram(s) HFA Inhaler 2 Puff(s) Inhalation every 6 hours PRN Shortness of Breath and/or Wheezing  albuterol/ipratropium for Nebulization 3 milliLiter(s) Nebulizer every 6 hours PRN Shortness of Breath and/or Wheezing  dextrose 40% Gel 15 Gram(s) Oral once PRN Blood Glucose LESS THAN 70 milliGRAM(s)/deciliter  glucagon  Injectable 1 milliGRAM(s) IntraMuscular once PRN Glucose LESS THAN 70 milligrams/deciliter    ALLERGIES/INTOLERANCES:  Allergies  No Known Allergies    Intolerances    VITALS & EXAMINATION:  Vital Signs Last 24 Hrs  T(C): 36.9 (22 Dec 2019 10:45), Max: 37.2 (21 Dec 2019 19:33)  T(F): 98.5 (22 Dec 2019 10:45), Max: 98.9 (21 Dec 2019 19:33)  HR: 90 (22 Dec 2019 10:45) (66 - 114)  BP: 122/91 (22 Dec 2019 10:45) (107/67 - 137/63)  BP(mean): --  RR: 16 (22 Dec 2019 10:45) (16 - 22)  SpO2: 96% (22 Dec 2019 10:45) (91% - 98%)    General:  Constitutional: Obese Female, appears stated age, in no apparent distress including pain  Head: Normocephalic & atraumatic.  ENT: Patent ear canals, intact TM, mucus membranes moist & pink, neck supple, no lymphadenopathy.   Respiratory: Patent airway. All lung fields are clear to auscultation bilaterally.  Extremities: No cyanosis, clubbing, or edema.  Skin: No rashes, bruising, or discoloration.    Cardiovascular (>2): RRR no murmurs. Carotid pulsations symmetric, no bruits. Normal capillary beds refill, 1-2 seconds or less.     Neurological (>12):  MS: Awake, alert, oriented to person, place, situation, time. Normal affect. Follows all commands.    Language: Speech is clear, fluent with good repetition & comprehension (able to name objects___)    CNs: PERRLA (R = 3mm, L = 3mm). VFF. EOMI no nystagmus, no diplopia. V1-3 intact to LT/pinprick, well developed masseter muscles b/l. No facial asymmetry b/l, full eye closure strength b/l. Hearing grossly normal (rubbing fingers) b/l. Symmetric palate elevation in midline. Gag reflex deferred. Head turning & shoulder shrug intact b/l. Tongue midline, normal movements, no atrophy.    Fundoscopic: pale w/ sharp discs margins No vascular changes.      Motor: Normal muscle bulk & tone. No noticeable tremor or seizure. No pronator drift.              Deltoid	Biceps	Triceps	Wrist	Finger ABd	   R	5	5	5	5	5		5 	  L	5	5	5	5	5		5    	H-Flex	H-Ext	H-ABd	H-ADd	K-Flex	K-Ext	D-Flex	P-Flex  R	5	5	5	5	5	5	5	5 	   L	5	5	5	5	5	5	5	5	     Sensation: Intact to LT/PP/Temp/Vibration/Position b/l throughout.     Cortical: Extinction on DSS (neglect): none    Reflexes:              Biceps(C5)       BR(C6)     Triceps(C7)               Patellar(L4)    Achilles(S1)    Plantar Resp  R	2	          2	             2		        2		    2		Down   L	2	          2	             2		        2		    2		Down     Coordination: intact rapid-alt movements. No dysmetria to FTN/HTS    Gait: Normal Romberg. No postural instability. Normal stance and tandem gait.     LABORATORY:  CBC                       13.1   x     )-----------( 236      ( 22 Dec 2019 10:00 )             42.0     Chem     141  |  104  |  16  ----------------------------<  118<H>  3.3<L>   |  26  |  0.77    Ca    8.5      22 Dec 2019 10:00  Phos  3.6       Mg     1.8         TPro  6.6  /  Alb  3.8  /  TBili  0.5  /  DBili  x   /  AST  70<H>  /  ALT  24  /  AlkPhos  67      LFTs LIVER FUNCTIONS - ( 21 Dec 2019 18:40 )  Alb: 3.8 g/dL / Pro: 6.6 g/dL / ALK PHOS: 67 u/L / ALT: 24 u/L / AST: 70 u/L / GGT: x           Coagulopathy PT/INR - ( 21 Dec 2019 23:40 )   PT: 11.9 SEC;   INR: 1.04          PTT - ( 21 Dec 2019 23:40 )  PTT:28.3 SEC  Lipid Panel   A1c  [id number] 5.9    Cardiac enzymes     U/A Urinalysis Basic - ( 21 Dec 2019 21:45 )    Color: YELLOW / Appearance: CLEAR / S.027 / pH: 6.0  Gluc: NEGATIVE / Ketone: TRACE  / Bili: NEGATIVE / Urobili: NORMAL   Blood: MODERATE / Protein: 50 / Nitrite: NEGATIVE   Leuk Esterase: TRACE / RBC: 3-5 / WBC 3-5   Sq Epi: OCC / Non Sq Epi: x / Bacteria: FEW      CSF  Immunological  Other    STUDIES & IMAGING:  Studies (EKG, EEG, EMG, etc):     Radiology (XR, CT, MR, U/S, TTE/SONU): HPI: 65yo woman h/o Schizophrenia (past documentation shows schizoaffective disorder), DMII, COPD admitted to [facility] after falls and syncope workup. Neurology consult for ?underlying tremor from Parkinsons that could be contributing to falls. History is obtained from patient, chart review, primary team. Has history of tardive dyskinesia from longstanding psychiatric medications but denies having had these medications impair her ability to walk. Infection, travel, recent changes to medications, hit her head LOC, vertigo, tinnitus    HPI:  Please note patient is a poor historian    This is a 64F with history of Schizophrenia, DM2, and COPD who presents to the hospital with complaints of 2 syncopal episodes today. Said that the first episode occurred while she was in her kitchen, fell onto the floor but denied any prodromal symptoms, no cardiac symptoms, and no pulmonary symptoms. A little while later she was outside smoking and then fell again, again denied any prodromal symptoms but did say that she was unable to get up after the second fall and therefore called EMS. Said that she had a similar fall about 2 months ago but otherwise denied any frequent falls. Has long standing dyskinesia 2/2 psychiatric medications but denied that impairing her ability to ambulate. Denied any infectious complaints. Said that she feels well currently.     On arrival to the ED, her vitals were T 97.7, P 114, /67, RR 16, O2 sat 91% RA -> 97% 4L. Her lab work was significant for leukocytosis (seems to have chronic low grade leukocytosis to 14k-15k based on prior labs) without neutrophilia. Her trop was 18 (patient without cardiac/pulmonary complaints. Her CTH and CXR were negative. Her RVP was negative. She was given ceftriaxone 1g IVPB, azithromycin 500mg IVPB, and NS 2L. She was admitted to medicine on telemetry for further evaluation.     Of note, patient said that she gets her medications delivered from her pharmacy but has not received any deliveries in the past 2 weeks. As a result said that she has been without her medications for the past 2 weeks. (21 Dec 2019 23:32)    REVIEW OF SYSTEMS  General:	    A 10-system ROS was performed and is negative except for those items noted above and/or in the HPI.    PAST MEDICAL & SURGICAL HISTORY:  COPD without exacerbation  Schizoaffective disorder  Obesity  Diabetes  No significant past surgical history    FAMILY HISTORY:  FH: multiple myeloma: in Father    SOCIAL HISTORY:   T/E/D: smoker current    MEDICATIONS (HOME):  Home Medications:  Advair Diskus 250 mcg-50 mcg inhalation powder: 1 inhaler(s) inhaled 2 times a day (22 Dec 2019 00:01)  amantadine 100 mg oral capsule: 1 cap(s) orally 2 times a day (22 Dec 2019 00:01)  Glucophage: 500 milligram(s) orally 2 times a day (22 Dec 2019 00:01)  ProAir HFA: 2 puff(s) inhaled every 4 hours, As Needed for shortness of breath  (22 Dec 2019 00:01)  ziprasidone 60 mg oral capsule: 1 cap(s) orally 2 times a day (22 Dec 2019 00:01)    MEDICATIONS  (STANDING):  amantadine 100 milliGRAM(s) Oral two times a day  budesonide 160 MICROgram(s)/formoterol 4.5 MICROgram(s) Inhaler 2 Puff(s) Inhalation two times a day  dextrose 5%. 1000 milliLiter(s) (50 mL/Hr) IV Continuous <Continuous>  dextrose 50% Injectable 12.5 Gram(s) IV Push once  dextrose 50% Injectable 25 Gram(s) IV Push once  dextrose 50% Injectable 25 Gram(s) IV Push once  insulin lispro (HumaLOG) corrective regimen sliding scale   SubCutaneous three times a day before meals  insulin lispro (HumaLOG) corrective regimen sliding scale   SubCutaneous at bedtime  ziprasidone 60 milliGRAM(s) Oral two times a day    MEDICATIONS  (PRN):  ALBUTerol    90 MICROgram(s) HFA Inhaler 2 Puff(s) Inhalation every 6 hours PRN Shortness of Breath and/or Wheezing  albuterol/ipratropium for Nebulization 3 milliLiter(s) Nebulizer every 6 hours PRN Shortness of Breath and/or Wheezing  dextrose 40% Gel 15 Gram(s) Oral once PRN Blood Glucose LESS THAN 70 milliGRAM(s)/deciliter  glucagon  Injectable 1 milliGRAM(s) IntraMuscular once PRN Glucose LESS THAN 70 milligrams/deciliter    ALLERGIES/INTOLERANCES:  Allergies  No Known Allergies    VITALS & EXAMINATION:  Vital Signs Last 24 Hrs  T(C): 36.9 (22 Dec 2019 10:45), Max: 37.2 (21 Dec 2019 19:33)  T(F): 98.5 (22 Dec 2019 10:45), Max: 98.9 (21 Dec 2019 19:33)  HR: 90 (22 Dec 2019 10:45) (66 - 114)  BP: 122/91 (22 Dec 2019 10:45) (107/67 - 137/63)  BP(mean): --  RR: 16 (22 Dec 2019 10:45) (16 - 22)  SpO2: 96% (22 Dec 2019 10:45) (91% - 98%)    Neurological (>12):  MS: Awake, alert, oriented to person, place, situation, time. Normal affect. Follows all commands.    Language: Speech is clear, fluent with good repetition & comprehension (able to name objects___)    CNs: PERRLA (R = 3mm, L = 3mm). VFF. EOMI no nystagmus, no diplopia. V1-3 intact to LT/pinprick, well developed masseter muscles b/l. No facial asymmetry b/l, full eye closure strength b/l. Hearing grossly normal (rubbing fingers) b/l. Symmetric palate elevation in midline. Gag reflex deferred. Head turning & shoulder shrug intact b/l. Tongue midline, normal movements, no atrophy.    Fundoscopic: pale w/ sharp discs margins No vascular changes.      Motor: Normal muscle bulk & tone. No noticeable tremor or seizure. No pronator drift.              Deltoid	Biceps	Triceps	Wrist	Finger ABd	   R	5	5	5	5	5		5 	  L	5	5	5	5	5		5    	H-Flex	H-Ext	H-ABd	H-ADd	K-Flex	K-Ext	D-Flex	P-Flex  R	5	5	5	5	5	5	5	5 	   L	5	5	5	5	5	5	5	5	     Sensation: Intact to LT/PP/Temp/Vibration/Position b/l throughout.     Cortical: Extinction on DSS (neglect): none    Reflexes:              Biceps(C5)       BR(C6)     Triceps(C7)               Patellar(L4)    Achilles(S1)    Plantar Resp  R	2	          2	             2		        2		    2		Down   L	2	          2	             2		        2		    2		Down     Coordination: intact rapid-alt movements. No dysmetria to FTN/HTS    Gait: Normal Romberg. No postural instability. Normal stance and tandem gait.     LABORATORY:  CBC                       13.1   x     )-----------( 236      ( 22 Dec 2019 10:00 )             42.0     Chem 12-22    141  |  104  |  16  ----------------------------<  118<H>  3.3<L>   |  26  |  0.77    Ca    8.5      22 Dec 2019 10:00  Phos  3.6     12  Mg     1.8         TPro  6.6  /  Alb  3.8  /  TBili  0.5  /  DBili  x   /  AST  70<H>  /  ALT  24  /  AlkPhos  67      LFTs LIVER FUNCTIONS - ( 21 Dec 2019 18:40 )  Alb: 3.8 g/dL / Pro: 6.6 g/dL / ALK PHOS: 67 u/L / ALT: 24 u/L / AST: 70 u/L / GGT: x           Coagulopathy PT/INR - ( 21 Dec 2019 23:40 )   PT: 11.9 SEC;   INR: 1.04          PTT - ( 21 Dec 2019 23:40 )  PTT:28.3 SEC  Lipid Panel   A1c  [id number] 5.9    Cardiac enzymes     U/A Urinalysis Basic - ( 21 Dec 2019 21:45 )    Color: YELLOW / Appearance: CLEAR / S.027 / pH: 6.0  Gluc: NEGATIVE / Ketone: TRACE  / Bili: NEGATIVE / Urobili: NORMAL   Blood: MODERATE / Protein: 50 / Nitrite: NEGATIVE   Leuk Esterase: TRACE / RBC: 3-5 / WBC 3-5   Sq Epi: OCC / Non Sq Epi: x / Bacteria: FEW      CSF  Immunological  Other    STUDIES & IMAGING:  Studies (EKG, EEG, EMG, etc):     Radiology (XR, CT, MR, U/S, TTE/SONU): HPI: 63yo woman h/o Schizophrenia (past documentation shows schizoaffective disorder), DMII, COPD admitted to [facility] after falls and syncope workup. Neurology consult for ?underlying tremor from Parkinsons that could be contributing to falls. History is obtained from patient, chart review, primary team. Has history of tardive dyskinesia from longstanding psychiatric medications but denies having had these medications impair her ability to walk. Patient reports that her psych med, which she cannot tell me the name of (per chart review seems to be ziprasidone) was recently changed 3 weeks ago from twice daily dosing to once daily dosing. Denies recent infections, when she fell she denies hitting her head or losing consciousness, denies presyncopal symptoms, denies room spinning dizziness prior or after the event, says her appetite is stable.     Patient tells me that she has had the resting tremor for several years and it has not progressed nor changed, she does not believe the tremor made her fall.    HPI from primary team:  Please note patient is a poor historian    This is a 64F with history of Schizophrenia, DM2, and COPD who presents to the hospital with complaints of 2 syncopal episodes today. Said that the first episode occurred while she was in her kitchen, fell onto the floor but denied any prodromal symptoms, no cardiac symptoms, and no pulmonary symptoms. A little while later she was outside smoking and then fell again, again denied any prodromal symptoms but did say that she was unable to get up after the second fall and therefore called EMS. Said that she had a similar fall about 2 months ago but otherwise denied any frequent falls. Has long standing dyskinesia 2/2 psychiatric medications but denied that impairing her ability to ambulate. Denied any infectious complaints. Said that she feels well currently.     On arrival to the ED, her vitals were T 97.7, P 114, /67, RR 16, O2 sat 91% RA -> 97% 4L. Her lab work was significant for leukocytosis (seems to have chronic low grade leukocytosis to 14k-15k based on prior labs) without neutrophilia. Her trop was 18 (patient without cardiac/pulmonary complaints. Her CTH and CXR were negative. Her RVP was negative. She was given ceftriaxone 1g IVPB, azithromycin 500mg IVPB, and NS 2L. She was admitted to medicine on telemetry for further evaluation.     Of note, patient said that she gets her medications delivered from her pharmacy but has not received any deliveries in the past 2 weeks. As a result said that she has been without her medications for the past 2 weeks. (21 Dec 2019 23:32)    REVIEW OF SYSTEMS  General:	    A 10-system ROS was performed and is negative except for those items noted above and/or in the HPI.    PAST MEDICAL & SURGICAL HISTORY:  COPD without exacerbation  Schizoaffective disorder  Obesity  Diabetes  No significant past surgical history    FAMILY HISTORY:  FH: multiple myeloma: in Father    SOCIAL HISTORY:   T/E/D: smoker current    MEDICATIONS (HOME):  Home Medications:  Advair Diskus 250 mcg-50 mcg inhalation powder: 1 inhaler(s) inhaled 2 times a day (22 Dec 2019 00:01)  amantadine 100 mg oral capsule: 1 cap(s) orally 2 times a day (22 Dec 2019 00:01)  Glucophage: 500 milligram(s) orally 2 times a day (22 Dec 2019 00:01)  ProAir HFA: 2 puff(s) inhaled every 4 hours, As Needed for shortness of breath  (22 Dec 2019 00:01)  ziprasidone 60 mg oral capsule: 1 cap(s) orally 2 times a day (22 Dec 2019 00:01)    MEDICATIONS  (STANDING):  amantadine 100 milliGRAM(s) Oral two times a day  budesonide 160 MICROgram(s)/formoterol 4.5 MICROgram(s) Inhaler 2 Puff(s) Inhalation two times a day  dextrose 5%. 1000 milliLiter(s) (50 mL/Hr) IV Continuous <Continuous>  dextrose 50% Injectable 12.5 Gram(s) IV Push once  dextrose 50% Injectable 25 Gram(s) IV Push once  dextrose 50% Injectable 25 Gram(s) IV Push once  insulin lispro (HumaLOG) corrective regimen sliding scale   SubCutaneous three times a day before meals  insulin lispro (HumaLOG) corrective regimen sliding scale   SubCutaneous at bedtime  ziprasidone 60 milliGRAM(s) Oral two times a day    MEDICATIONS  (PRN):  ALBUTerol    90 MICROgram(s) HFA Inhaler 2 Puff(s) Inhalation every 6 hours PRN Shortness of Breath and/or Wheezing  albuterol/ipratropium for Nebulization 3 milliLiter(s) Nebulizer every 6 hours PRN Shortness of Breath and/or Wheezing  dextrose 40% Gel 15 Gram(s) Oral once PRN Blood Glucose LESS THAN 70 milliGRAM(s)/deciliter  glucagon  Injectable 1 milliGRAM(s) IntraMuscular once PRN Glucose LESS THAN 70 milligrams/deciliter    ALLERGIES/INTOLERANCES:  Allergies  No Known Allergies    VITALS & EXAMINATION:  Vital Signs Last 24 Hrs  T(C): 36.9 (22 Dec 2019 10:45), Max: 37.2 (21 Dec 2019 19:33)  T(F): 98.5 (22 Dec 2019 10:45), Max: 98.9 (21 Dec 2019 19:33)  HR: 90 (22 Dec 2019 10:45) (66 - 114)  BP: 122/91 (22 Dec 2019 10:45) (107/67 - 137/63)  BP(mean): --  RR: 16 (22 Dec 2019 10:45) (16 - 22)  SpO2: 96% (22 Dec 2019 10:45) (91% - 98%)    Neurological (>12):  MS: Awake, alert, oriented to person, place, situation, time. Normal affect. Follows all commands.    Language: Speech is clear, fluent with good repetition & comprehension    CNs: PERRLA (R = 3mm, L = 3mm). VFF. upon orthoforic gaze patient has right eye exotropia; EOMI no nystagmus, no diplopia. V1-3 intact to LT/pinprick, well developed masseter muscles b/l. No facial asymmetry b/l, full eye closure strength b/l. Hearing grossly normal (rubbing fingers) b/l. Symmetric palate elevation in midline. Gag reflex deferred. Head turning & shoulder shrug intact b/l. Tongue midline, normal movements, no atrophy.    Motor: noted rigidity that increases with activation in upper extremities b/l, with noted resting tremor in right arm more than left, which re-emerges when asked patient to elevate arms and hold arms, no intention tremor upon FTN, patient is otherwise grossly strong, except has baseline disability of her right hand which she cannot give me more detail, so right  strength was reduced as compared to left hand (she is right handed).	     Sensation: Intact to LT b/l throughout.     Cortical: Extinction on DSS (neglect): none    Reflexes:  difficult to ascertain reflexes with patient's rigidity, but noted right biceps and brachioradialis 2+ brisk as compared to left, which were 2+  1+ patellar b/l, 0 achilles b/l    Coordination: mild discoordination with rapid alternating movements; no dysmetria upon FTN testing    Gait: unable to evaluate at this time given need for PT to evaluate patient's unsteady gait    LABORATORY:  CBC                       13.1   x     )-----------( 236      ( 22 Dec 2019 10:00 )             42.0     Chem     141  |  104  |  16  ----------------------------<  118<H>  3.3<L>   |  26  |  0.77    Ca    8.5      22 Dec 2019 10:00  Phos  3.6       Mg     1.8         TPro  6.6  /  Alb  3.8  /  TBili  0.5  /  DBili  x   /  AST  70<H>  /  ALT  24  /  AlkPhos  67      LFTs LIVER FUNCTIONS - ( 21 Dec 2019 18:40 )  Alb: 3.8 g/dL / Pro: 6.6 g/dL / ALK PHOS: 67 u/L / ALT: 24 u/L / AST: 70 u/L / GGT: x           Coagulopathy PT/INR - ( 21 Dec 2019 23:40 )   PT: 11.9 SEC;   INR: 1.04          PTT - ( 21 Dec 2019 23:40 )  PTT:28.3 SEC  Lipid Panel   A1c  [id number] 5.9    Cardiac enzymes     U/A Urinalysis Basic - ( 21 Dec 2019 21:45 )    Color: YELLOW / Appearance: CLEAR / S.027 / pH: 6.0  Gluc: NEGATIVE / Ketone: TRACE  / Bili: NEGATIVE / Urobili: NORMAL   Blood: MODERATE / Protein: 50 / Nitrite: NEGATIVE   Leuk Esterase: TRACE / RBC: 3-5 / WBC 3-5   Sq Epi: OCC / Non Sq Epi: x / Bacteria: FEW    STUDIES & IMAGING:  Studies (EKG, EEG, EMG, etc):     Radiology (XR, CT, MR, U/S, TTE/SONU):    < from: CT Head No Cont (19 @ 19:22) >  FINDINGS:      There is no acute intracranial mass-effect, hemorrhage, midline shift, or abnormal extra-axial fluid collection.     Ventricles, sulci, and cisterns are normal in size for the patient's age without hydrocephalus. Basal cisterns are patent.     Trace scattered mucosal thickening within the paranasal sinuses. No calvarial fracture. Incidental note of periapical lucency around the right lower first molar.    IMPRESSION:     No acute intracranial bleeding, mass effect, or shift.      < end of copied text >

## 2019-12-22 NOTE — DISCHARGE NOTE PROVIDER - PROVIDER TOKENS
FREE:[LAST:[Your primary care provider],PHONE:[(   )    -],FAX:[(   )    -]],FREE:[LAST:[Dr. Vinson],PHONE:[(   )    -],FAX:[(   )    -]] FREE:[LAST:[Your primary care provider],PHONE:[(   )    -],FAX:[(   )    -]],FREE:[LAST:[Isakov],FIRST:[Jama],PHONE:[phone]],FAX:[(   )    -],ADDRESS:[[address] [place] [address] [address] [place] [address]]]

## 2019-12-22 NOTE — DISCHARGE NOTE PROVIDER - NSDCCPCAREPLAN_GEN_ALL_CORE_FT
PRINCIPAL DISCHARGE DIAGNOSIS  Diagnosis: Drug-induced Parkinsonism  Assessment and Plan of Treatment: You were seen by neurology and psychology who feel that your tremors are related to your medications.  **Please continue all psych medications as directed and follow up with your primary psychiatrist, Dr. Jama Vinson.      SECONDARY DISCHARGE DIAGNOSES  Diagnosis: Fall, initial encounter  Assessment and Plan of Treatment: Your workup was negative thus far. Likely secondary to to your tremors.    Diagnosis: Traumatic rhabdomyolysis, initial encounter  Assessment and Plan of Treatment: You were treated with IVF fluids for muscle breakdown. Your labs were trended and your symptoms improved.  **Please follow up with your primary care provider and have repeat blood work in 1 week (CBC, BMP, Mg, Phos). PRINCIPAL DISCHARGE DIAGNOSIS  Diagnosis: Drug-induced Parkinsonism  Assessment and Plan of Treatment: You were seen by neurology and psychology who feel that your tremors are related to your medications.  **Please continue all psych medications as directed and follow up with your primary psychiatrist, Dr. Jama Vinson.      SECONDARY DISCHARGE DIAGNOSES  Diagnosis: Fall, initial encounter  Assessment and Plan of Treatment: Your workup was negative thus far. Likely secondary to to your tremors.    Diagnosis: Traumatic rhabdomyolysis, initial encounter  Assessment and Plan of Treatment: You were treated with IVF fluids for muscle breakdown. Your labs were trended and your symptoms improved.  **Please follow up with your primary care provider and have repeat blood work in 1 week (CBC, BMP, Mg, Phos).    Diagnosis: COPD without exacerbation  Assessment and Plan of Treatment: Continue inhalers as prescribed and maintain oxygen 2L via NC and wean as tolerated.  **It is recommended that you follow up with a pulmonologist after you are discharged from PRINCIPAL DISCHARGE DIAGNOSIS  Diagnosis: Drug-induced Parkinsonism  Assessment and Plan of Treatment: You were seen by neurology and psychology who feel that your tremors are related to your medications.  **Please continue all psych medications as directed and follow up with your primary psychiatrist, Dr. Jama Vinson.      SECONDARY DISCHARGE DIAGNOSES  Diagnosis: Hypoxemia  Assessment and Plan of Treatment: You were noticed to have oxygen saturation levels in the high 80s-low 90s while on room air, likely 2/2 COPD. You will need to remain on 2L nasal cannula.    Diagnosis: COPD without exacerbation  Assessment and Plan of Treatment: Continue inhalers as prescribed and maintain oxygen 2L via NC and wean as tolerated.  **It is recommended that you follow up with a pulmonologist after you are discharged from     Diagnosis: Traumatic rhabdomyolysis, initial encounter  Assessment and Plan of Treatment: You were treated with IVF fluids for muscle breakdown. Your labs were trended and your symptoms improved.  **Please follow up with your primary care provider and have repeat blood work in 1 week (CBC, BMP, Mg, Phos).

## 2019-12-22 NOTE — PROGRESS NOTE ADULT - PROBLEM SELECTOR PLAN 8
1.  Name of PCP: Dr. Brandt Sanabria  2.  PCP Contacted on Admission: [ ] Y    [ ] N    3.  PCP contacted at Discharge: [ ] Y    [ ] N    [ ] N/A  4.  Post-Discharge Appointment Date and Location:  5.  Summary of Handoff given to PCP:

## 2019-12-22 NOTE — BEHAVIORAL HEALTH ASSESSMENT NOTE - CASE SUMMARY
Patient seen yesterday by psych NP, seen by this MD today. 64F with history of Schizophrenia, h/o 4 inpatient psych admissions last in 2003, DM2, and COPD who presents to the hospital with complaints of 2 syncopal episodes. Patient denies lapse in her psych medications, reports daily compliance. She denies any depressed mood at this time, no SI/HI, no psychotic sx. Impression: chronic schizophrenia. Plan: as above- c/w current doses of geodon and amantadine. No psychiatric contraindication to discharge when medically stable, will f/u with her outpt psychiatrist Dr. Vinson. Message left with Dr. Jama Vinson this afternoon ([phone]).

## 2019-12-22 NOTE — BEHAVIORAL HEALTH ASSESSMENT NOTE - NSBHCHARTREVIEWVS_PSY_A_CORE FT
Vital Signs Last 24 Hrs  T(C): 36.9 (22 Dec 2019 10:45), Max: 37.2 (21 Dec 2019 19:33)  T(F): 98.5 (22 Dec 2019 10:45), Max: 98.9 (21 Dec 2019 19:33)  HR: 90 (22 Dec 2019 10:45) (66 - 114)  BP: 122/91 (22 Dec 2019 10:45) (107/67 - 137/63)  BP(mean): --  RR: 16 (22 Dec 2019 10:45) (16 - 22)  SpO2: 96% (22 Dec 2019 10:45) (91% - 98%)

## 2019-12-22 NOTE — CHART NOTE - NSCHARTNOTEFT_GEN_A_CORE
Called by RN as patient's telemetry is stating patient is tachycardic to 180s-240s but unable to get a reliable reading 2/2 patient's dyskinesia. Patient's HR on manual check is in 90s by me and the RN and by machine. Patient noted to have significant noise on her telemetry due to her baseline tremors. Telemetry will therefore be of little utility in monitoring patient's HR. Will therefore d/c telemetry and place patient on VS q4h.

## 2019-12-22 NOTE — DISCHARGE NOTE PROVIDER - CARE PROVIDER_API CALL
Your primary care provider,   Phone: (   )    -  Fax: (   )    -  Follow Up Time:     Dr. Vinson,   Phone: (   )    -  Fax: (   )    -  Follow Up Time: Your primary care provider,   Phone: (   )    -  Fax: (   )    -  Follow Up Time:     Jama Vinson  [address] [place] [address] [address] [place] [id number]  Phone: [phone]  Fax: (   )    -  Follow Up Time:

## 2019-12-22 NOTE — CHART NOTE - NSCHARTNOTEFT_GEN_A_CORE
Pt w/Schizophrenia, on ziprasidone and amantadine- psych eval called  if her medications need adjustments given her significant dyskinesia    Clare Vaughn ACP [phone]

## 2019-12-22 NOTE — PROGRESS NOTE ADULT - PROBLEM SELECTOR PLAN 9
- Patient unsure of her medications, reconciled from OMR, will also have hospital pharmacy verify meds in AM  Psych consulted as well

## 2019-12-22 NOTE — BEHAVIORAL HEALTH ASSESSMENT NOTE - HPI (INCLUDE ILLNESS QUALITY, SEVERITY, DURATION, TIMING, CONTEXT, MODIFYING FACTORS, ASSOCIATED SIGNS AND SYMPTOMS)
Patient is a 64F with history of Schizophrenia, DM2, and COPD who presents to the hospital with complaints of 2 syncopal episodes.  Patient lives alone, reports no family or peers.  She states she is retired and currently on disability. Patient denies inpt admissions however as per chart review and cvm search patient has what appears to be 4 inpt stays ( last being in 2003 ).  Patient also denied a hx of self harm however it is noted in cvm patient attempted OD at age 15.  Patient is currently on Geodon and amantadine. As per H&P, patient had been without medication x 2 weeks, however patient denied this on interview and reported consistently taking Geodon daily however her pharmacy was not sending the appropriate amount of amantadine (only 1 pill per day and patient was taking 3 prior to this).    Psychiatry was consulted for med management.  patient was seen and assessed at bedside.  Patient is alert and oriented.  Tremor visible throughout assessment.  Patient reports her mood is "im doing fine" and Patient denies any depressive symptoms including depressed mood, anhedonia, changes in energy/concentration/appetite, sleep disturbances, or feelings of guilt. Denies SI and HI.  Patient also reports no justin and no psychosis.  When asked about her diagnoses and why she takes antipsychotic medication, patient was aware she was diagnosed with schizophrenia but denied all symptoms including current and in the past.  On chart review, it was noted patient would talk to self, talk ot others who were not there and "paranoia" documented as well.  Patient was constricted, short, however cooperative.  She is well groomed. tremor noted and discussed with patient.  She reports no change in tremor as of recently. No change in movements.  Denies fall is due to this.  Patient states she has been taking geodon for 15 years, no recent change in dose.    psychiatrist jacqueline tompkins #[phone] - to be called work day hours

## 2019-12-22 NOTE — PROGRESS NOTE ADULT - PROBLEM SELECTOR PLAN 2
- Unclear cause of the hypoxia on initial arrival to the ED,denied history of COPD  - will monitor for now  -  D-Dimer resulted at 272 DDU   - low threshold for CT chest if hypoxia persists  cxr clear

## 2019-12-22 NOTE — CONSULT NOTE ADULT - ATTENDING COMMENTS
Patient seen and examined.  She has long h/o exposure to neuroleptic medications, falls at home.  On exam, she has neck rigidity, UE rigidity with some cogwheeling, stands but has retropulsion.  Resting tremor both hands but more on the right about 5 Hz.  Left eye exophoria due to amblyopia as a child.  Voice normal, no significant mask facies.     Clearly drug induced parkinsonism.  She needs PT evaluation and evaluation for safe discharge to home as she had a very difficult time getting OOB and needed two person assist.  No further neuro w/u needed. Reconsult PRN

## 2019-12-23 LAB
ANION GAP SERPL CALC-SCNC: 6 MMO/L — LOW (ref 7–14)
BACTERIA UR CULT: SIGNIFICANT CHANGE UP
BASOPHILS # BLD AUTO: 0.03 K/UL — SIGNIFICANT CHANGE UP (ref 0–0.2)
BASOPHILS NFR BLD AUTO: 0.3 % — SIGNIFICANT CHANGE UP (ref 0–2)
BUN SERPL-MCNC: 20 MG/DL — SIGNIFICANT CHANGE UP (ref 7–23)
CALCIUM SERPL-MCNC: 8.5 MG/DL — SIGNIFICANT CHANGE UP (ref 8.4–10.5)
CHLORIDE SERPL-SCNC: 108 MMOL/L — HIGH (ref 98–107)
CK SERPL-CCNC: 1276 U/L — HIGH (ref 25–170)
CO2 SERPL-SCNC: 25 MMOL/L — SIGNIFICANT CHANGE UP (ref 22–31)
CREAT SERPL-MCNC: 0.72 MG/DL — SIGNIFICANT CHANGE UP (ref 0.5–1.3)
EOSINOPHIL # BLD AUTO: 0.26 K/UL — SIGNIFICANT CHANGE UP (ref 0–0.5)
EOSINOPHIL NFR BLD AUTO: 2.3 % — SIGNIFICANT CHANGE UP (ref 0–6)
GLUCOSE BLDC GLUCOMTR-MCNC: 91 MG/DL — SIGNIFICANT CHANGE UP (ref 70–99)
GLUCOSE SERPL-MCNC: 101 MG/DL — HIGH (ref 70–99)
HCT VFR BLD CALC: 41.8 % — SIGNIFICANT CHANGE UP (ref 34.5–45)
HGB BLD-MCNC: 12.8 G/DL — SIGNIFICANT CHANGE UP (ref 11.5–15.5)
IMM GRANULOCYTES NFR BLD AUTO: 0.2 % — SIGNIFICANT CHANGE UP (ref 0–1.5)
LYMPHOCYTES # BLD AUTO: 49.1 % — HIGH (ref 13–44)
LYMPHOCYTES # BLD AUTO: 5.49 K/UL — HIGH (ref 1–3.3)
MAGNESIUM SERPL-MCNC: 1.8 MG/DL — SIGNIFICANT CHANGE UP (ref 1.6–2.6)
MCHC RBC-ENTMCNC: 27.2 PG — SIGNIFICANT CHANGE UP (ref 27–34)
MCHC RBC-ENTMCNC: 30.6 % — LOW (ref 32–36)
MCV RBC AUTO: 88.7 FL — SIGNIFICANT CHANGE UP (ref 80–100)
MONOCYTES # BLD AUTO: 0.96 K/UL — HIGH (ref 0–0.9)
MONOCYTES NFR BLD AUTO: 8.6 % — SIGNIFICANT CHANGE UP (ref 2–14)
NEUTROPHILS # BLD AUTO: 4.42 K/UL — SIGNIFICANT CHANGE UP (ref 1.8–7.4)
NEUTROPHILS NFR BLD AUTO: 39.5 % — LOW (ref 43–77)
NRBC # FLD: 0 K/UL — SIGNIFICANT CHANGE UP (ref 0–0)
PHOSPHATE SERPL-MCNC: 3.7 MG/DL — SIGNIFICANT CHANGE UP (ref 2.5–4.5)
PLATELET # BLD AUTO: 211 K/UL — SIGNIFICANT CHANGE UP (ref 150–400)
PMV BLD: 10.3 FL — SIGNIFICANT CHANGE UP (ref 7–13)
POTASSIUM SERPL-MCNC: 4 MMOL/L — SIGNIFICANT CHANGE UP (ref 3.5–5.3)
POTASSIUM SERPL-SCNC: 4 MMOL/L — SIGNIFICANT CHANGE UP (ref 3.5–5.3)
RBC # BLD: 4.71 M/UL — SIGNIFICANT CHANGE UP (ref 3.8–5.2)
RBC # FLD: 13.2 % — SIGNIFICANT CHANGE UP (ref 10.3–14.5)
SODIUM SERPL-SCNC: 139 MMOL/L — SIGNIFICANT CHANGE UP (ref 135–145)
SPECIMEN SOURCE: SIGNIFICANT CHANGE UP
WBC # BLD: 11.18 K/UL — HIGH (ref 3.8–10.5)
WBC # FLD AUTO: 11.18 K/UL — HIGH (ref 3.8–10.5)

## 2019-12-23 PROCEDURE — 99222 1ST HOSP IP/OBS MODERATE 55: CPT

## 2019-12-23 PROCEDURE — 99233 SBSQ HOSP IP/OBS HIGH 50: CPT

## 2019-12-23 PROCEDURE — 99223 1ST HOSP IP/OBS HIGH 75: CPT

## 2019-12-23 RX ADMIN — ZIPRASIDONE HYDROCHLORIDE 60 MILLIGRAM(S): 20 CAPSULE ORAL at 06:35

## 2019-12-23 RX ADMIN — SODIUM CHLORIDE 100 MILLILITER(S): 9 INJECTION INTRAMUSCULAR; INTRAVENOUS; SUBCUTANEOUS at 11:41

## 2019-12-23 RX ADMIN — Medication 100 MILLIGRAM(S): at 17:18

## 2019-12-23 RX ADMIN — Medication 0: at 08:12

## 2019-12-23 RX ADMIN — Medication 100 MILLIGRAM(S): at 06:35

## 2019-12-23 RX ADMIN — ZIPRASIDONE HYDROCHLORIDE 60 MILLIGRAM(S): 20 CAPSULE ORAL at 17:18

## 2019-12-23 RX ADMIN — BUDESONIDE AND FORMOTEROL FUMARATE DIHYDRATE 2 PUFF(S): 160; 4.5 AEROSOL RESPIRATORY (INHALATION) at 11:40

## 2019-12-23 RX ADMIN — BUDESONIDE AND FORMOTEROL FUMARATE DIHYDRATE 2 PUFF(S): 160; 4.5 AEROSOL RESPIRATORY (INHALATION) at 20:49

## 2019-12-23 RX ADMIN — SODIUM CHLORIDE 100 MILLILITER(S): 9 INJECTION INTRAMUSCULAR; INTRAVENOUS; SUBCUTANEOUS at 02:30

## 2019-12-23 NOTE — PROGRESS NOTE ADULT - SUBJECTIVE AND OBJECTIVE BOX
Georgie Garrett  [facility] Medicine  Pager #[phone]    Patient is a 64y old  Female who presents with a chief complaint of Syncope x2 (22 Dec 2019 15:22)      SUBJECTIVE / OVERNIGHT EVENTS: Patient seen and examined at bedside. Patient reports feeling well. States that she has a chronic tremor at baseline. She fell on her knees 2 days ago and scraped her knees.    ADDITIONAL REVIEW OF SYSTEMS:    MEDICATIONS  (STANDING):  amantadine 100 milliGRAM(s) Oral two times a day  budesonide 160 MICROgram(s)/formoterol 4.5 MICROgram(s) Inhaler 2 Puff(s) Inhalation two times a day  sodium chloride 0.9%. 1000 milliLiter(s) (100 mL/Hr) IV Continuous <Continuous>  ziprasidone 60 milliGRAM(s) Oral two times a day    MEDICATIONS  (PRN):  ALBUTerol    90 MICROgram(s) HFA Inhaler 2 Puff(s) Inhalation every 6 hours PRN Shortness of Breath and/or Wheezing  albuterol/ipratropium for Nebulization 3 milliLiter(s) Nebulizer every 6 hours PRN Shortness of Breath and/or Wheezing      CAPILLARY BLOOD GLUCOSE      POCT Blood Glucose.: 91 mg/dL (23 Dec 2019 07:13)  POCT Blood Glucose.: 99 mg/dL (22 Dec 2019 21:47)  POCT Blood Glucose.: 85 mg/dL (22 Dec 2019 16:30)    I&O's Summary    22 Dec 2019 07:01  -  23 Dec 2019 07:00  --------------------------------------------------------  IN: 400 mL / OUT: 1200 mL / NET: -800 mL        PHYSICAL EXAM:    Vital Signs Last 24 Hrs  T(C): 36.6 (23 Dec 2019 13:02), Max: 36.9 (23 Dec 2019 02:53)  T(F): 97.9 (23 Dec 2019 13:02), Max: 98.5 (23 Dec 2019 02:53)  HR: 81 (23 Dec 2019 13:02) (62 - 94)  BP: 108/77 (23 Dec 2019 13:02) (102/62 - 118/83)  BP(mean): --  RR: 18 (23 Dec 2019 13:02) (16 - 18)  SpO2: 99% (23 Dec 2019 13:02) (93% - 99%)    CONSTITUTIONAL: NAD, well-developed  EYES: conjunctiva and sclera clear  ENMT: Moist oral mucosa, no pharyngeal injection or exudates  NECK: Supple, no palpable vivian  RESPIRATORY: Normal respiratory effort; lungs are clear to auscultation bilaterally  CARDIOVASCULAR: Regular rate and rhythm, normal S1 and S2, no murmur/rub/gallop; No lower extremity edema; Peripheral pulses are 2+ bilaterally  ABDOMEN: Nontender to palpation, normoactive bowel sounds, no rebound/guarding  MUSCULOSKELETAL: no clubbing or cyanosis of digits; no joint swelling or tenderness to palpation  PSYCH: Awake and alert; affect appropriate  NEUROLOGY: 4/5 strength b/l lower extremities; no gross sensory deficits   SKIN: Abrasions on b/l knees    LABS:                        12.8   11.18 )-----------( 211      ( 23 Dec 2019 06:00 )             41.8     12    139  |  108<H>  |  20  ----------------------------<  101<H>  4.0   |  25  |  0.72    Ca    8.5      23 Dec 2019 06:00  Phos  3.7     12  Mg     1.8         TPro  6.6  /  Alb  3.8  /  TBili  0.5  /  DBili  x   /  AST  70<H>  /  ALT  24  /  AlkPhos  67  12-21    PT/INR - ( 21 Dec 2019 23:40 )   PT: 11.9 SEC;   INR: 1.04          PTT - ( 21 Dec 2019 23:40 )  PTT:28.3 SEC  CARDIAC MARKERS ( 23 Dec 2019 06:00 )  x     / x     / 1276 u/L / x     / x      CARDIAC MARKERS ( 22 Dec 2019 10:00 )  x     / x     / 2313 u/L / 23.37 ng/mL / x          Urinalysis Basic - ( 21 Dec 2019 21:45 )    Color: YELLOW / Appearance: CLEAR / S.027 / pH: 6.0  Gluc: NEGATIVE / Ketone: TRACE  / Bili: NEGATIVE / Urobili: NORMAL   Blood: MODERATE / Protein: 50 / Nitrite: NEGATIVE   Leuk Esterase: TRACE / RBC: 3-5 / WBC 3-5   Sq Epi: OCC / Non Sq Epi: x / Bacteria: FEW      Culture - Urine (collected 21 Dec 2019 22:30)  Source: URINE MIDSTREAM  Final Report (23 Dec 2019 08:26):    NO GROWTH AT 24 HOURS    Culture - Blood (collected 21 Dec 2019 21:00)  Source: BLOOD  Preliminary Report (22 Dec 2019 21:00):    NO ORGANISMS ISOLATED    NO ORGANISMS ISOLATED AT 24 HOURS    Culture - Blood (collected 21 Dec 2019 20:58)  Source: BLOOD  Preliminary Report (22 Dec 2019 20:59):    NO ORGANISMS ISOLATED    NO ORGANISMS ISOLATED AT 24 HOURS      RADIOLOGY & ADDITIONAL TESTS:  Results Reviewed: Yes  Imaging Personally Reviewed:  Electrocardiogram Personally Reviewed:    COORDINATION OF CARE:  Care Discussed with Consultants/Other Providers [Y/N]:  Prior or Outpatient Records Reviewed [Y/N]:

## 2019-12-23 NOTE — PROGRESS NOTE ADULT - PROBLEM SELECTOR PLAN 6
- C/w ziprasidone and amantadine  - Psych consulted, awaiting recs regarding medical dosing as tremor is thought to be 2/2 psych meds

## 2019-12-23 NOTE — PROGRESS NOTE ADULT - PROBLEM SELECTOR PLAN 2
- Unclear cause of the hypoxia on initial arrival to the ED, denied history of COPD  - will monitor for now  - D-Dimer resulted at 272 DDU   - low threshold for CT chest if hypoxia persists  - cxr clear  - wean off supplemental oxygen

## 2019-12-23 NOTE — PROGRESS NOTE ADULT - ASSESSMENT
64f w/ schizophrenia on Geodon, Type 2 DM diet controlled p/w fall X2- mild rhabdomyolysis and resting tremor of hand- ? if pt w/ parkinsonian side effects d/t psych meds vs. underlying Parkinson's disease

## 2019-12-23 NOTE — PROGRESS NOTE ADULT - PROBLEM SELECTOR PLAN 1
denied LOC  Patient weak on exam  PT recommending rehab  Neuro recs appreciated- high suspicion for drug-induced Parkinsonism

## 2019-12-23 NOTE — PROGRESS NOTE ADULT - PROBLEM SELECTOR PLAN 9
1.  Name of PCP: Dr. Brandt Sanabria  2.  PCP Contacted on Admission: [ ] Y    [X] N    3.  PCP contacted at Discharge: [ ] Y    [ ] N    [ ] N/A  4.  Post-Discharge Appointment Date and Location:  5.  Summary of Handoff given to PCP:

## 2019-12-23 NOTE — PHYSICAL THERAPY INITIAL EVALUATION ADULT - GENERAL OBSERVATIONS, REHAB EVAL
Patient received semi supine in bed, (+) tele monitor , (+) tremors on UE's and dyskinesia, left finger 3rd, 4th and 5th flexion contractures.

## 2019-12-23 NOTE — PROGRESS NOTE ADULT - PROBLEM SELECTOR PLAN 5
- History of COPD 2/2 smoking, still smoking, currently refusing medications for tobacco cessation  - No wheezing on exam  - Will restart her home Advair (with therapeutic interchange) and proair prn

## 2019-12-23 NOTE — PHYSICAL THERAPY INITIAL EVALUATION ADULT - PATIENT PROFILE REVIEW, REHAB EVAL
yes/activity order- ambulate with assistance and increase as tolerated, OK to perform PT evaluation as per DIANA Luciano

## 2019-12-24 ENCOUNTER — TRANSCRIPTION ENCOUNTER (OUTPATIENT)
Age: 64
End: 2019-12-24

## 2019-12-24 VITALS
HEART RATE: 89 BPM | SYSTOLIC BLOOD PRESSURE: 127 MMHG | RESPIRATION RATE: 17 BRPM | TEMPERATURE: 98 F | DIASTOLIC BLOOD PRESSURE: 89 MMHG | OXYGEN SATURATION: 95 %

## 2019-12-24 LAB
ANION GAP SERPL CALC-SCNC: 9 MMO/L — SIGNIFICANT CHANGE UP (ref 7–14)
BUN SERPL-MCNC: 14 MG/DL — SIGNIFICANT CHANGE UP (ref 7–23)
CALCIUM SERPL-MCNC: 8.5 MG/DL — SIGNIFICANT CHANGE UP (ref 8.4–10.5)
CHLORIDE SERPL-SCNC: 105 MMOL/L — SIGNIFICANT CHANGE UP (ref 98–107)
CK SERPL-CCNC: 872 U/L — HIGH (ref 25–170)
CO2 SERPL-SCNC: 26 MMOL/L — SIGNIFICANT CHANGE UP (ref 22–31)
CREAT SERPL-MCNC: 0.67 MG/DL — SIGNIFICANT CHANGE UP (ref 0.5–1.3)
GLUCOSE SERPL-MCNC: 91 MG/DL — SIGNIFICANT CHANGE UP (ref 70–99)
HCT VFR BLD CALC: 39.8 % — SIGNIFICANT CHANGE UP (ref 34.5–45)
HGB BLD-MCNC: 12.1 G/DL — SIGNIFICANT CHANGE UP (ref 11.5–15.5)
MAGNESIUM SERPL-MCNC: 1.7 MG/DL — SIGNIFICANT CHANGE UP (ref 1.6–2.6)
MCHC RBC-ENTMCNC: 26.7 PG — LOW (ref 27–34)
MCHC RBC-ENTMCNC: 30.4 % — LOW (ref 32–36)
MCV RBC AUTO: 87.7 FL — SIGNIFICANT CHANGE UP (ref 80–100)
NRBC # FLD: 0 K/UL — SIGNIFICANT CHANGE UP (ref 0–0)
PHOSPHATE SERPL-MCNC: 3.1 MG/DL — SIGNIFICANT CHANGE UP (ref 2.5–4.5)
PLATELET # BLD AUTO: 209 K/UL — SIGNIFICANT CHANGE UP (ref 150–400)
PMV BLD: 10.3 FL — SIGNIFICANT CHANGE UP (ref 7–13)
POTASSIUM SERPL-MCNC: 3.9 MMOL/L — SIGNIFICANT CHANGE UP (ref 3.5–5.3)
POTASSIUM SERPL-SCNC: 3.9 MMOL/L — SIGNIFICANT CHANGE UP (ref 3.5–5.3)
RBC # BLD: 4.54 M/UL — SIGNIFICANT CHANGE UP (ref 3.8–5.2)
RBC # FLD: 13.1 % — SIGNIFICANT CHANGE UP (ref 10.3–14.5)
SODIUM SERPL-SCNC: 140 MMOL/L — SIGNIFICANT CHANGE UP (ref 135–145)
WBC # BLD: 10.71 K/UL — HIGH (ref 3.8–10.5)
WBC # FLD AUTO: 10.71 K/UL — HIGH (ref 3.8–10.5)

## 2019-12-24 PROCEDURE — 99239 HOSP IP/OBS DSCHRG MGMT >30: CPT

## 2019-12-24 RX ORDER — ZIPRASIDONE HYDROCHLORIDE 20 MG/1
1 CAPSULE ORAL
Qty: 0 | Refills: 0 | DISCHARGE

## 2019-12-24 RX ORDER — ZIPRASIDONE HYDROCHLORIDE 20 MG/1
1 CAPSULE ORAL
Qty: 0 | Refills: 0 | DISCHARGE
Start: 2019-12-24

## 2019-12-24 RX ADMIN — Medication 100 MILLIGRAM(S): at 05:15

## 2019-12-24 RX ADMIN — ZIPRASIDONE HYDROCHLORIDE 60 MILLIGRAM(S): 20 CAPSULE ORAL at 05:14

## 2019-12-24 RX ADMIN — ZIPRASIDONE HYDROCHLORIDE 60 MILLIGRAM(S): 20 CAPSULE ORAL at 17:24

## 2019-12-24 RX ADMIN — Medication 100 MILLIGRAM(S): at 17:24

## 2019-12-24 RX ADMIN — SODIUM CHLORIDE 100 MILLILITER(S): 9 INJECTION INTRAMUSCULAR; INTRAVENOUS; SUBCUTANEOUS at 04:19

## 2019-12-24 RX ADMIN — BUDESONIDE AND FORMOTEROL FUMARATE DIHYDRATE 2 PUFF(S): 160; 4.5 AEROSOL RESPIRATORY (INHALATION) at 10:26

## 2019-12-24 NOTE — PROGRESS NOTE ADULT - PROBLEM SELECTOR PLAN 5
- History of COPD 2/2 smoking, still smoking, currently refusing medications for tobacco cessation  - No wheezing on exam  - C/w Advair (with therapeutic interchange) and proair prn

## 2019-12-24 NOTE — PROGRESS NOTE ADULT - PROBLEM SELECTOR PLAN 9
1.  Name of PCP: Dr. Brandt Sanabria  2.  PCP Contacted on Admission: [ ] Y    [X] N    3.  PCP contacted at Discharge: [ ] Y    [ ] N    [ ] N/A  4.  Post-Discharge Appointment Date and Location:  5.  Summary of Handoff given to PCP: 1.  Name of PCP: Dr. Brandt Sanabria  2.  PCP Contacted on Admission: [ ] Y    [X] N    3.  PCP contacted at Discharge: [ ] Y    [X] No- after office hours    [ ] N/A  4.  Post-Discharge Appointment Date and Location:  5.  Summary of Handoff given to PCP:

## 2019-12-24 NOTE — PROGRESS NOTE ADULT - SUBJECTIVE AND OBJECTIVE BOX
Georgie Lee  [facility] Medicine  Pager #[phone]    Patient is a 64y old  Female who presents with a chief complaint of Syncope x2 (23 Dec 2019 15:44)      SUBJECTIVE / OVERNIGHT EVENTS: Patient seen and examined at bedside. Denies SOB or chest pain. Oxygen saturation level on room air noted to be around 89-90%, patient breathing comfortably.    ADDITIONAL REVIEW OF SYSTEMS:    MEDICATIONS  (STANDING):  amantadine 100 milliGRAM(s) Oral two times a day  budesonide 160 MICROgram(s)/formoterol 4.5 MICROgram(s) Inhaler 2 Puff(s) Inhalation two times a day  ziprasidone 60 milliGRAM(s) Oral two times a day    MEDICATIONS  (PRN):  ALBUTerol    90 MICROgram(s) HFA Inhaler 2 Puff(s) Inhalation every 6 hours PRN Shortness of Breath and/or Wheezing  albuterol/ipratropium for Nebulization 3 milliLiter(s) Nebulizer every 6 hours PRN Shortness of Breath and/or Wheezing      CAPILLARY BLOOD GLUCOSE        I&O's Summary    23 Dec 2019 07:01  -  24 Dec 2019 07:00  --------------------------------------------------------  IN: 1000 mL / OUT: 800 mL / NET: 200 mL    24 Dec 2019 07:01  -  24 Dec 2019 14:58  --------------------------------------------------------  IN: 0 mL / OUT: 600 mL / NET: -600 mL        PHYSICAL EXAM:    Vital Signs Last 24 Hrs  T(C): 36.4 (24 Dec 2019 12:35), Max: 36.8 (23 Dec 2019 17:21)  T(F): 97.6 (24 Dec 2019 12:35), Max: 98.2 (23 Dec 2019 17:21)  HR: 75 (24 Dec 2019 12:35) (75 - 86)  BP: 125/78 (24 Dec 2019 12:35) (115/77 - 134/70)  BP(mean): --  RR: 16 (24 Dec 2019 12:35) (16 - 19)  SpO2: 96% (24 Dec 2019 12:35) (94% - 96%)    CONSTITUTIONAL: NAD, well-developed  EYES: conjunctiva and sclera clear  ENMT: Moist oral mucosa, no pharyngeal injection or exudates  NECK: Supple, no palpable masses  RESPIRATORY: Normal respiratory effort; lungs are clear to auscultation bilaterally  CARDIOVASCULAR: Regular rate and rhythm, normal S1 and S2, no murmur/rub/gallop; No lower extremity edema; Peripheral pulses are 2+ bilaterally  ABDOMEN: Nontender to palpation, normoactive bowel sounds, no rebound/guarding  MUSCULOSKELETAL: no clubbing or cyanosis of digits; no joint swelling or tenderness to palpation  PSYCH: Awake and alert; affect appropriate  NEUROLOGY: 4/5 strength b/l lower extremities; no gross sensory deficits, resting tremor of b/l arms at rest  SKIN: Abrasions on b/l knees    LABS:                        12.1   10.71 )-----------( 209      ( 24 Dec 2019 05:45 )             39.8     12-24    140  |  105  |  14  ----------------------------<  91  3.9   |  26  |  0.67    Ca    8.5      24 Dec 2019 05:45  Phos  3.1     12-24  Mg     1.7     12-24        CARDIAC MARKERS ( 24 Dec 2019 05:45 )  x     / x     / 872 u/L / x     / x      CARDIAC MARKERS ( 23 Dec 2019 06:00 )  x     / x     / 1276 u/L / x     / x          Culture - Urine (collected 21 Dec 2019 22:30)  Source: URINE MIDSTREAM  Final Report (23 Dec 2019 08:26):    NO GROWTH AT 24 HOURS    Culture - Blood (collected 21 Dec 2019 21:00)  Source: BLOOD  Preliminary Report (23 Dec 2019 21:00):    NO ORGANISMS ISOLATED    NO ORGANISMS ISOLATED AT 48 HRS.    Culture - Blood (collected 21 Dec 2019 20:58)  Source: BLOOD  Preliminary Report (23 Dec 2019 20:58):    NO ORGANISMS ISOLATED    NO ORGANISMS ISOLATED AT 48 HRS.    RADIOLOGY & ADDITIONAL TESTS: No new imaging  Results Reviewed: Yes  Imaging Personally Reviewed:  Electrocardiogram Personally Reviewed:    COORDINATION OF CARE:  Care Discussed with Consultants/Other Providers [Y/N]:  Prior or Outpatient Records Reviewed [Y/N]:

## 2019-12-24 NOTE — PROGRESS NOTE ADULT - ATTENDING COMMENTS
D/c planning to [facility] D/c planning to [facility]    40 minutes spent on discharge planning

## 2019-12-24 NOTE — DISCHARGE NOTE NURSING/CASE MANAGEMENT/SOCIAL WORK - NSDCFUADDAPPT_GEN_ALL_CORE_FT
**Please continue all psych medications as directed and follow up with your primary psychiatrist, Dr. Jama Vinson after rehab.    **Please follow up with your primary care provider and have repeat blood work in 1 week (CBC, BMP, Mg, Phos).

## 2019-12-24 NOTE — DISCHARGE NOTE NURSING/CASE MANAGEMENT/SOCIAL WORK - PATIENT PORTAL LINK FT
You can access the FollowMyHealth Patient Portal offered by [facility] by registering at the following website: [email or web address]. By joining [facility]’s FollowMyHealth portal, you will also be able to view your health information using other applications (apps) compatible with our system.

## 2019-12-24 NOTE — PROGRESS NOTE ADULT - PROBLEM SELECTOR PLAN 1
denied LOC, patient weak on exam  PT recommending rehab  Neuro recs appreciated- high suspicion for drug-induced Parkinsonism

## 2019-12-24 NOTE — PROGRESS NOTE ADULT - PROBLEM SELECTOR PLAN 2
Likely 2/2 underlying COPD given extensive smoking history  - Will require 2L oxygen via NC to maintain oxygen saturation >90%  - cxr clear

## 2019-12-26 LAB
BACTERIA BLD CULT: SIGNIFICANT CHANGE UP
BACTERIA BLD CULT: SIGNIFICANT CHANGE UP

## 2020-02-01 PROCEDURE — G9005: CPT

## 2020-02-14 ENCOUNTER — INPATIENT (INPATIENT)
Facility: HOSPITAL | Age: 65
LOS: 4 days | Discharge: HOME HEALTH | End: 2020-02-19
Attending: LEGAL MEDICINE | Admitting: LEGAL MEDICINE
Payer: MEDICARE

## 2020-02-14 VITALS
DIASTOLIC BLOOD PRESSURE: 64 MMHG | RESPIRATION RATE: 24 BRPM | TEMPERATURE: 101 F | OXYGEN SATURATION: 90 % | SYSTOLIC BLOOD PRESSURE: 101 MMHG | HEART RATE: 110 BPM

## 2020-02-14 DIAGNOSIS — E87.0 HYPEROSMOLALITY AND HYPERNATREMIA: ICD-10-CM

## 2020-02-14 DIAGNOSIS — F25.8 OTHER SCHIZOAFFECTIVE DISORDERS: ICD-10-CM

## 2020-02-14 DIAGNOSIS — F25.9 SCHIZOAFFECTIVE DISORDER, UNSPECIFIED: ICD-10-CM

## 2020-02-14 DIAGNOSIS — E11.9 TYPE 2 DIABETES MELLITUS WITHOUT COMPLICATIONS: ICD-10-CM

## 2020-02-14 DIAGNOSIS — Z29.9 ENCOUNTER FOR PROPHYLACTIC MEASURES, UNSPECIFIED: ICD-10-CM

## 2020-02-14 DIAGNOSIS — G93.40 ENCEPHALOPATHY, UNSPECIFIED: ICD-10-CM

## 2020-02-14 DIAGNOSIS — R50.9 FEVER, UNSPECIFIED: ICD-10-CM

## 2020-02-14 DIAGNOSIS — G93.41 METABOLIC ENCEPHALOPATHY: ICD-10-CM

## 2020-02-14 PROBLEM — J44.9 CHRONIC OBSTRUCTIVE PULMONARY DISEASE, UNSPECIFIED: Chronic | Status: ACTIVE | Noted: 2019-12-22

## 2020-02-14 LAB
ALBUMIN SERPL ELPH-MCNC: 4.6 G/DL — SIGNIFICANT CHANGE UP (ref 3.3–5)
ALP SERPL-CCNC: 86 U/L — SIGNIFICANT CHANGE UP (ref 40–120)
ALT FLD-CCNC: 13 U/L — SIGNIFICANT CHANGE UP (ref 4–33)
ANION GAP SERPL CALC-SCNC: 14 MMO/L — SIGNIFICANT CHANGE UP (ref 7–14)
ANISOCYTOSIS BLD QL: SLIGHT — SIGNIFICANT CHANGE UP
APPEARANCE UR: SIGNIFICANT CHANGE UP
APTT BLD: 27.9 SEC — SIGNIFICANT CHANGE UP (ref 27.5–36.3)
AST SERPL-CCNC: 27 U/L — SIGNIFICANT CHANGE UP (ref 4–32)
B PERT DNA SPEC QL NAA+PROBE: NOT DETECTED — SIGNIFICANT CHANGE UP
BACTERIA # UR AUTO: NEGATIVE — SIGNIFICANT CHANGE UP
BASE EXCESS BLDV CALC-SCNC: 2 MMOL/L — SIGNIFICANT CHANGE UP
BASE EXCESS BLDV CALC-SCNC: 2.8 MMOL/L — SIGNIFICANT CHANGE UP
BASOPHILS # BLD AUTO: 0.02 K/UL — SIGNIFICANT CHANGE UP (ref 0–0.2)
BASOPHILS NFR BLD AUTO: 0.1 % — SIGNIFICANT CHANGE UP (ref 0–2)
BASOPHILS NFR SPEC: 0 % — SIGNIFICANT CHANGE UP (ref 0–2)
BILIRUB SERPL-MCNC: 0.5 MG/DL — SIGNIFICANT CHANGE UP (ref 0.2–1.2)
BILIRUB UR-MCNC: NEGATIVE — SIGNIFICANT CHANGE UP
BLASTS # FLD: 0 % — SIGNIFICANT CHANGE UP (ref 0–0)
BLOOD GAS VENOUS - CREATININE: 0.7 MG/DL — SIGNIFICANT CHANGE UP (ref 0.5–1.3)
BLOOD GAS VENOUS - FIO2: 21 — SIGNIFICANT CHANGE UP
BLOOD UR QL VISUAL: NEGATIVE — SIGNIFICANT CHANGE UP
BUN SERPL-MCNC: 31 MG/DL — HIGH (ref 7–23)
C PNEUM DNA SPEC QL NAA+PROBE: NOT DETECTED — SIGNIFICANT CHANGE UP
CALCIUM SERPL-MCNC: 9.7 MG/DL — SIGNIFICANT CHANGE UP (ref 8.4–10.5)
CHLORIDE BLDV-SCNC: 116 MMOL/L — HIGH (ref 96–108)
CHLORIDE SERPL-SCNC: 115 MMOL/L — HIGH (ref 98–107)
CK SERPL-CCNC: 811 U/L — HIGH (ref 25–170)
CO2 SERPL-SCNC: 25 MMOL/L — SIGNIFICANT CHANGE UP (ref 22–31)
COLOR SPEC: YELLOW — SIGNIFICANT CHANGE UP
CREAT SERPL-MCNC: 0.81 MG/DL — SIGNIFICANT CHANGE UP (ref 0.5–1.3)
EOSINOPHIL # BLD AUTO: 0 K/UL — SIGNIFICANT CHANGE UP (ref 0–0.5)
EOSINOPHIL NFR BLD AUTO: 0 % — SIGNIFICANT CHANGE UP (ref 0–6)
EOSINOPHIL NFR FLD: 0 % — SIGNIFICANT CHANGE UP (ref 0–6)
FLUAV H1 2009 PAND RNA SPEC QL NAA+PROBE: NOT DETECTED — SIGNIFICANT CHANGE UP
FLUAV H1 RNA SPEC QL NAA+PROBE: NOT DETECTED — SIGNIFICANT CHANGE UP
FLUAV H3 RNA SPEC QL NAA+PROBE: NOT DETECTED — SIGNIFICANT CHANGE UP
FLUAV SUBTYP SPEC NAA+PROBE: NOT DETECTED — SIGNIFICANT CHANGE UP
FLUBV RNA SPEC QL NAA+PROBE: NOT DETECTED — SIGNIFICANT CHANGE UP
GAS PNL BLDV: 149 MMOL/L — HIGH (ref 136–146)
GAS PNL BLDV: 151 MMOL/L — HIGH (ref 136–146)
GIANT PLATELETS BLD QL SMEAR: PRESENT — SIGNIFICANT CHANGE UP
GLUCOSE BLDC GLUCOMTR-MCNC: 103 MG/DL — HIGH (ref 70–99)
GLUCOSE BLDC GLUCOMTR-MCNC: 110 MG/DL — HIGH (ref 70–99)
GLUCOSE BLDC GLUCOMTR-MCNC: 99 MG/DL — SIGNIFICANT CHANGE UP (ref 70–99)
GLUCOSE BLDV-MCNC: 107 MG/DL — HIGH (ref 70–99)
GLUCOSE BLDV-MCNC: 145 MG/DL — HIGH (ref 70–99)
GLUCOSE SERPL-MCNC: 143 MG/DL — HIGH (ref 70–99)
GLUCOSE UR-MCNC: NEGATIVE — SIGNIFICANT CHANGE UP
HADV DNA SPEC QL NAA+PROBE: NOT DETECTED — SIGNIFICANT CHANGE UP
HCO3 BLDV-SCNC: 26 MMOL/L — SIGNIFICANT CHANGE UP (ref 20–27)
HCO3 BLDV-SCNC: 26 MMOL/L — SIGNIFICANT CHANGE UP (ref 20–27)
HCOV PNL SPEC NAA+PROBE: SIGNIFICANT CHANGE UP
HCT VFR BLD CALC: 51.8 % — HIGH (ref 34.5–45)
HCT VFR BLDV CALC: 42.3 % — SIGNIFICANT CHANGE UP (ref 34.5–45)
HCT VFR BLDV CALC: 48.6 % — HIGH (ref 34.5–45)
HGB BLD-MCNC: 15.3 G/DL — SIGNIFICANT CHANGE UP (ref 11.5–15.5)
HGB BLDV-MCNC: 13.8 G/DL — SIGNIFICANT CHANGE UP (ref 11.5–15.5)
HGB BLDV-MCNC: 15.9 G/DL — HIGH (ref 11.5–15.5)
HMPV RNA SPEC QL NAA+PROBE: NOT DETECTED — SIGNIFICANT CHANGE UP
HPIV1 RNA SPEC QL NAA+PROBE: NOT DETECTED — SIGNIFICANT CHANGE UP
HPIV2 RNA SPEC QL NAA+PROBE: NOT DETECTED — SIGNIFICANT CHANGE UP
HPIV3 RNA SPEC QL NAA+PROBE: NOT DETECTED — SIGNIFICANT CHANGE UP
HPIV4 RNA SPEC QL NAA+PROBE: NOT DETECTED — SIGNIFICANT CHANGE UP
HYALINE CASTS # UR AUTO: SIGNIFICANT CHANGE UP
IMM GRANULOCYTES NFR BLD AUTO: 0.3 % — SIGNIFICANT CHANGE UP (ref 0–1.5)
INR BLD: 1.12 — SIGNIFICANT CHANGE UP (ref 0.88–1.17)
KETONES UR-MCNC: SIGNIFICANT CHANGE UP
LACTATE BLDV-MCNC: 1 MMOL/L — SIGNIFICANT CHANGE UP (ref 0.5–2)
LACTATE SERPL-SCNC: 1.2 MMOL/L — SIGNIFICANT CHANGE UP (ref 0.5–2)
LEUKOCYTE ESTERASE UR-ACNC: NEGATIVE — SIGNIFICANT CHANGE UP
LYMPHOCYTES # BLD AUTO: 39.4 % — SIGNIFICANT CHANGE UP (ref 13–44)
LYMPHOCYTES # BLD AUTO: 5.55 K/UL — HIGH (ref 1–3.3)
LYMPHOCYTES NFR SPEC AUTO: 29.5 % — SIGNIFICANT CHANGE UP (ref 13–44)
MACROCYTES BLD QL: SLIGHT — SIGNIFICANT CHANGE UP
MCHC RBC-ENTMCNC: 26.3 PG — LOW (ref 27–34)
MCHC RBC-ENTMCNC: 29.5 % — LOW (ref 32–36)
MCV RBC AUTO: 89.2 FL — SIGNIFICANT CHANGE UP (ref 80–100)
METAMYELOCYTES # FLD: 0 % — SIGNIFICANT CHANGE UP (ref 0–1)
MONOCYTES # BLD AUTO: 0.39 K/UL — SIGNIFICANT CHANGE UP (ref 0–0.9)
MONOCYTES NFR BLD AUTO: 2.8 % — SIGNIFICANT CHANGE UP (ref 2–14)
MONOCYTES NFR BLD: 0.9 % — LOW (ref 2–9)
MYELOCYTES NFR BLD: 0 % — SIGNIFICANT CHANGE UP (ref 0–0)
NEUTROPHIL AB SER-ACNC: 61.6 % — SIGNIFICANT CHANGE UP (ref 43–77)
NEUTROPHILS # BLD AUTO: 8.09 K/UL — HIGH (ref 1.8–7.4)
NEUTROPHILS NFR BLD AUTO: 57.4 % — SIGNIFICANT CHANGE UP (ref 43–77)
NEUTS BAND # BLD: 0 % — SIGNIFICANT CHANGE UP (ref 0–6)
NITRITE UR-MCNC: NEGATIVE — SIGNIFICANT CHANGE UP
NRBC # FLD: 0 K/UL — SIGNIFICANT CHANGE UP (ref 0–0)
OTHER - HEMATOLOGY %: 0 — SIGNIFICANT CHANGE UP
PCO2 BLDV: 40 MMHG — LOW (ref 41–51)
PCO2 BLDV: 48 MMHG — SIGNIFICANT CHANGE UP (ref 41–51)
PH BLDV: 7.37 PH — SIGNIFICANT CHANGE UP (ref 7.32–7.43)
PH BLDV: 7.42 PH — SIGNIFICANT CHANGE UP (ref 7.32–7.43)
PH UR: 5.5 — SIGNIFICANT CHANGE UP (ref 5–8)
PLATELET # BLD AUTO: 349 K/UL — SIGNIFICANT CHANGE UP (ref 150–400)
PLATELET COUNT - ESTIMATE: NORMAL — SIGNIFICANT CHANGE UP
PMV BLD: 10.3 FL — SIGNIFICANT CHANGE UP (ref 7–13)
PO2 BLDV: 56 MMHG — HIGH (ref 35–40)
PO2 BLDV: 62 MMHG — HIGH (ref 35–40)
POTASSIUM BLDV-SCNC: 3.6 MMOL/L — SIGNIFICANT CHANGE UP (ref 3.4–4.5)
POTASSIUM BLDV-SCNC: 3.9 MMOL/L — SIGNIFICANT CHANGE UP (ref 3.4–4.5)
POTASSIUM SERPL-MCNC: 4 MMOL/L — SIGNIFICANT CHANGE UP (ref 3.5–5.3)
POTASSIUM SERPL-SCNC: 4 MMOL/L — SIGNIFICANT CHANGE UP (ref 3.5–5.3)
PROMYELOCYTES # FLD: 0 % — SIGNIFICANT CHANGE UP (ref 0–0)
PROT SERPL-MCNC: 7.7 G/DL — SIGNIFICANT CHANGE UP (ref 6–8.3)
PROT UR-MCNC: 50 — SIGNIFICANT CHANGE UP
PROTHROM AB SERPL-ACNC: 12.8 SEC — SIGNIFICANT CHANGE UP (ref 9.8–13.1)
RBC # BLD: 5.81 M/UL — HIGH (ref 3.8–5.2)
RBC # FLD: 13.8 % — SIGNIFICANT CHANGE UP (ref 10.3–14.5)
RBC CASTS # UR COMP ASSIST: SIGNIFICANT CHANGE UP (ref 0–?)
REVIEW TO FOLLOW: YES — SIGNIFICANT CHANGE UP
RSV RNA SPEC QL NAA+PROBE: NOT DETECTED — SIGNIFICANT CHANGE UP
RV+EV RNA SPEC QL NAA+PROBE: NOT DETECTED — SIGNIFICANT CHANGE UP
SAO2 % BLDV: 89.4 % — HIGH (ref 60–85)
SAO2 % BLDV: 92.2 % — HIGH (ref 60–85)
SMUDGE CELLS # BLD: PRESENT — SIGNIFICANT CHANGE UP
SODIUM SERPL-SCNC: 154 MMOL/L — HIGH (ref 135–145)
SP GR SPEC: 1.03 — SIGNIFICANT CHANGE UP (ref 1–1.04)
SQUAMOUS # UR AUTO: SIGNIFICANT CHANGE UP
TROPONIN T, HIGH SENSITIVITY: 15 NG/L — SIGNIFICANT CHANGE UP (ref ?–14)
UROBILINOGEN FLD QL: NORMAL — SIGNIFICANT CHANGE UP
VARIANT LYMPHS # BLD: 8 % — SIGNIFICANT CHANGE UP
WBC # BLD: 14.09 K/UL — HIGH (ref 3.8–10.5)
WBC # FLD AUTO: 14.09 K/UL — HIGH (ref 3.8–10.5)
WBC UR QL: SIGNIFICANT CHANGE UP (ref 0–?)

## 2020-02-14 PROCEDURE — 99223 1ST HOSP IP/OBS HIGH 75: CPT | Mod: GC

## 2020-02-14 PROCEDURE — 70450 CT HEAD/BRAIN W/O DYE: CPT | Mod: 26

## 2020-02-14 PROCEDURE — 71045 X-RAY EXAM CHEST 1 VIEW: CPT | Mod: 26

## 2020-02-14 PROCEDURE — 99223 1ST HOSP IP/OBS HIGH 75: CPT

## 2020-02-14 RX ORDER — BUDESONIDE AND FORMOTEROL FUMARATE DIHYDRATE 160; 4.5 UG/1; UG/1
2 AEROSOL RESPIRATORY (INHALATION)
Refills: 0 | Status: DISCONTINUED | OUTPATIENT
Start: 2020-02-14 | End: 2020-02-19

## 2020-02-14 RX ORDER — ACETAMINOPHEN 500 MG
650 TABLET ORAL ONCE
Refills: 0 | Status: COMPLETED | OUTPATIENT
Start: 2020-02-14 | End: 2020-02-14

## 2020-02-14 RX ORDER — SODIUM CHLORIDE 9 MG/ML
1000 INJECTION, SOLUTION INTRAVENOUS
Refills: 0 | Status: DISCONTINUED | OUTPATIENT
Start: 2020-02-14 | End: 2020-02-19

## 2020-02-14 RX ORDER — INSULIN LISPRO 100/ML
VIAL (ML) SUBCUTANEOUS AT BEDTIME
Refills: 0 | Status: DISCONTINUED | OUTPATIENT
Start: 2020-02-14 | End: 2020-02-19

## 2020-02-14 RX ORDER — SODIUM CHLORIDE 9 MG/ML
1000 INJECTION, SOLUTION INTRAVENOUS
Refills: 0 | Status: DISCONTINUED | OUTPATIENT
Start: 2020-02-14 | End: 2020-02-15

## 2020-02-14 RX ORDER — DEXTROSE 50 % IN WATER 50 %
25 SYRINGE (ML) INTRAVENOUS ONCE
Refills: 0 | Status: DISCONTINUED | OUTPATIENT
Start: 2020-02-14 | End: 2020-02-19

## 2020-02-14 RX ORDER — PIPERACILLIN AND TAZOBACTAM 4; .5 G/20ML; G/20ML
3.38 INJECTION, POWDER, LYOPHILIZED, FOR SOLUTION INTRAVENOUS ONCE
Refills: 0 | Status: COMPLETED | OUTPATIENT
Start: 2020-02-14 | End: 2020-02-14

## 2020-02-14 RX ORDER — HEPARIN SODIUM 5000 [USP'U]/ML
5000 INJECTION INTRAVENOUS; SUBCUTANEOUS EVERY 12 HOURS
Refills: 0 | Status: DISCONTINUED | OUTPATIENT
Start: 2020-02-14 | End: 2020-02-19

## 2020-02-14 RX ORDER — SODIUM CHLORIDE 9 MG/ML
2000 INJECTION, SOLUTION INTRAVENOUS ONCE
Refills: 0 | Status: COMPLETED | OUTPATIENT
Start: 2020-02-14 | End: 2020-02-14

## 2020-02-14 RX ORDER — KETOROLAC TROMETHAMINE 30 MG/ML
15 SYRINGE (ML) INJECTION ONCE
Refills: 0 | Status: DISCONTINUED | OUTPATIENT
Start: 2020-02-14 | End: 2020-02-14

## 2020-02-14 RX ORDER — DEXTROSE 50 % IN WATER 50 %
12.5 SYRINGE (ML) INTRAVENOUS ONCE
Refills: 0 | Status: DISCONTINUED | OUTPATIENT
Start: 2020-02-14 | End: 2020-02-19

## 2020-02-14 RX ORDER — ACETAMINOPHEN 500 MG
1000 TABLET ORAL ONCE
Refills: 0 | Status: COMPLETED | OUTPATIENT
Start: 2020-02-14 | End: 2020-02-14

## 2020-02-14 RX ORDER — PIPERACILLIN AND TAZOBACTAM 4; .5 G/20ML; G/20ML
3.38 INJECTION, POWDER, LYOPHILIZED, FOR SOLUTION INTRAVENOUS EVERY 8 HOURS
Refills: 0 | Status: COMPLETED | OUTPATIENT
Start: 2020-02-14 | End: 2020-02-17

## 2020-02-14 RX ORDER — INSULIN LISPRO 100/ML
VIAL (ML) SUBCUTANEOUS
Refills: 0 | Status: DISCONTINUED | OUTPATIENT
Start: 2020-02-14 | End: 2020-02-19

## 2020-02-14 RX ORDER — CEFTRIAXONE 500 MG/1
1000 INJECTION, POWDER, FOR SOLUTION INTRAMUSCULAR; INTRAVENOUS ONCE
Refills: 0 | Status: COMPLETED | OUTPATIENT
Start: 2020-02-14 | End: 2020-02-14

## 2020-02-14 RX ORDER — GLUCAGON INJECTION, SOLUTION 0.5 MG/.1ML
1 INJECTION, SOLUTION SUBCUTANEOUS ONCE
Refills: 0 | Status: DISCONTINUED | OUTPATIENT
Start: 2020-02-14 | End: 2020-02-19

## 2020-02-14 RX ORDER — INFLUENZA VIRUS VACCINE 15; 15; 15; 15 UG/.5ML; UG/.5ML; UG/.5ML; UG/.5ML
0.5 SUSPENSION INTRAMUSCULAR ONCE
Refills: 0 | Status: DISCONTINUED | OUTPATIENT
Start: 2020-02-14 | End: 2020-02-19

## 2020-02-14 RX ORDER — AMANTADINE HCL 100 MG
100 CAPSULE ORAL
Refills: 0 | Status: DISCONTINUED | OUTPATIENT
Start: 2020-02-14 | End: 2020-02-19

## 2020-02-14 RX ORDER — DEXTROSE 50 % IN WATER 50 %
15 SYRINGE (ML) INTRAVENOUS ONCE
Refills: 0 | Status: DISCONTINUED | OUTPATIENT
Start: 2020-02-14 | End: 2020-02-19

## 2020-02-14 RX ADMIN — SODIUM CHLORIDE 100 MILLILITER(S): 9 INJECTION, SOLUTION INTRAVENOUS at 15:00

## 2020-02-14 RX ADMIN — SODIUM CHLORIDE 100 MILLILITER(S): 9 INJECTION, SOLUTION INTRAVENOUS at 20:19

## 2020-02-14 RX ADMIN — Medication 1000 MILLIGRAM(S): at 10:30

## 2020-02-14 RX ADMIN — Medication 15 MILLIGRAM(S): at 13:29

## 2020-02-14 RX ADMIN — Medication 100 MILLIGRAM(S): at 18:46

## 2020-02-14 RX ADMIN — CEFTRIAXONE 1000 MILLIGRAM(S): 500 INJECTION, POWDER, FOR SOLUTION INTRAMUSCULAR; INTRAVENOUS at 10:40

## 2020-02-14 RX ADMIN — Medication 15 MILLIGRAM(S): at 14:30

## 2020-02-14 RX ADMIN — PIPERACILLIN AND TAZOBACTAM 200 GRAM(S): 4; .5 INJECTION, POWDER, LYOPHILIZED, FOR SOLUTION INTRAVENOUS at 20:18

## 2020-02-14 RX ADMIN — Medication 650 MILLIGRAM(S): at 15:30

## 2020-02-14 RX ADMIN — Medication 1000 MILLIGRAM(S): at 09:45

## 2020-02-14 RX ADMIN — BUDESONIDE AND FORMOTEROL FUMARATE DIHYDRATE 2 PUFF(S): 160; 4.5 AEROSOL RESPIRATORY (INHALATION) at 22:04

## 2020-02-14 RX ADMIN — CEFTRIAXONE 100 MILLIGRAM(S): 500 INJECTION, POWDER, FOR SOLUTION INTRAMUSCULAR; INTRAVENOUS at 10:06

## 2020-02-14 RX ADMIN — SODIUM CHLORIDE 2000 MILLILITER(S): 9 INJECTION, SOLUTION INTRAVENOUS at 09:15

## 2020-02-14 RX ADMIN — Medication 400 MILLIGRAM(S): at 09:15

## 2020-02-14 RX ADMIN — SODIUM CHLORIDE 2000 MILLILITER(S): 9 INJECTION, SOLUTION INTRAVENOUS at 13:00

## 2020-02-14 RX ADMIN — HEPARIN SODIUM 5000 UNIT(S): 5000 INJECTION INTRAVENOUS; SUBCUTANEOUS at 18:45

## 2020-02-14 NOTE — CONSULT NOTE ADULT - ASSESSMENT
64y Female with PMH of COPD, DM, PD, Obesity, PPHx schizoaffective disorder on Geodon, medication induced Parkinsonism BIBEMS for fever and AMS.    NMS r/o:  -Patient presenting with fevers, tachycardia, rigidity, and elevated CK.  -Initial presentation likely exacerbation of medication induced Parkinsonism. Lower concern for seizure given that patient called out that she was having a seizure during the episode.   -Given that the collateral information demonstrates that patient is currently at baseline and has tremors and rigidity at baseline, unlikely to be NMS.  -CPK is elevated, likely chronically, and has been higher in the past.  -Would follow up BCx, UCx. If negative and patient still spiking fevers, would consider LP.  -Follow up ID recs and psychiatry recs.     Patient not MICU candidate at this time.

## 2020-02-14 NOTE — BEHAVIORAL HEALTH ASSESSMENT NOTE - NSBHCONSULTFOLLOWDETAILS_PSY_A_CORE FT
This note written by NP, the above impression/plan needs to be reviewed by, this note needs to be cosigned by Dr. Boggs

## 2020-02-14 NOTE — BEHAVIORAL HEALTH ASSESSMENT NOTE - HPI (INCLUDE ILLNESS QUALITY, SEVERITY, DURATION, TIMING, CONTEXT, MODIFYING FACTORS, ASSOCIATED SIGNS AND SYMPTOMS)
63 yo , single, female with PMHX COPD, DM, PD, Obesity, PPHx schizoaffective disorder presents to [facility] ED via EMS for fever and AMS, lethargic on arrival, only opens eyes to voice, not able to answer any questions. Addendum: pt was admitted to [facility] 12/19 for syncope and rigidity, thought to be drug (Geodon) induced. Decision was made to continue Geodon and f/u as outpatient. [facility] psychiatry consulted for questionable NMS.    Chart reviewed. Patient seen and evaluated at bedside accompanied by family friend, Miryam Jimenez and home health aide with permission of patient. Patient A&OX2, states mood is ok, no c/o sleep/appetite disturbances, denies si/sa, denies a/v hallucinations/delusions, patient unable to recall last inpatient hospitalizations, friend supplied name of psychiatric patient sees every other month Dr. Anne [phone] (unable to reach), friend also stated on 2/10/2020 Geodon was increased to BID from daily and noted patient had become slower and more lethargic. On exam patient noted to have visible tremors and rigidity to upper extremities. [facility] contacted to confirm dose of Geodon as 60mg PO BID since 2/10/2020, prior daily for past year. Collateral, Miryam states recent hx si/sa, a/v hallucinations/delusions, states patient is calm and when she does get annoyed it is d/t too many demands on her.     Discussed with medical team recommendations below. Patient to be seen tomorrow by CL attending.

## 2020-02-14 NOTE — ED PROVIDER NOTE - CRITICAL CARE PROVIDED
documentation/consultation with other physicians/interpretation of diagnostic studies/direct patient care (not related to procedure)/additional history taking/consult w/ pt's family directly relating to pts condition

## 2020-02-14 NOTE — ED ADULT NURSE NOTE - CHIEF COMPLAINT QUOTE
Arrives via EMS.  As per EMS pt found to be having seizure like activity.  Pt non-responsive to verbal stimulation.  Right pupil appears deviated and non-reactive, and left eye was sluggish.  Pt with no hx of seizures.  Pt noted to have tremors to right hand.  Given Versed 10 mg IM. Dr. Heredia for stroke evaluation, no code stroke.

## 2020-02-14 NOTE — CONSULT NOTE ADULT - ATTENDING COMMENTS
65 yo woman with schizoaffective disorder on Geodon with recent increase in dose (qd -> bid), presenting with AMS and fever with rigidity; concern for NMS.  Per pt's former HHA and now /friend she appears at baseline - always somewhat tremulous and rigid.  She is awake and alert, oriented to self and place/date, in NAD.  Tmax was 102.2, now afeb, BP range 120-130's/70-80's, HR low 100's Os sat 94% on RA  upper ext mild rigidity but fully extendable   which is unchanged from d/c in dec  Lactate 1.0  WBC 14, RVP neg  psych note appreciated  No evidence for NMS; fever and leucocytosis likely 2/2 infection; no autonomic instability; MS at baseline  Pt does not require MICU admission  agree with holding geodon; agree with infectious w/u, ativan prn agitation  Hypernatremia improved on second vbg
Leah Saldana  Pager: [phone]. If no response or past 5 pm call [phone].     Please call ID service for questions over weekend at [phone].

## 2020-02-14 NOTE — ED PROVIDER NOTE - PHYSICAL EXAMINATION
Gen: AAOx1 - oriented to self, smells of urine  Head: NCAT  HEENT: disconjugate gaze, pupils +4 and minimally reactive bilaterally, mucus membranes dry  Lung: CTAB, no respiratory distress, no wheezes/rhonchi/rales B/L  CV: tachycardic no murmurs, rubs or gallops  Abd: soft, NTND, no guarding  MSK: no visible deformities  Neuro: No focal sensory or motor deficits, generalized tremor of bilateral arms  Skin: Warm, well perfused, diaphoretic  Psych: normal affect.   ~Anastasiya Vital M.D. Resident

## 2020-02-14 NOTE — ED ADULT NURSE NOTE - NSIMPLEMENTINTERV_GEN_ALL_ED
Implemented All Fall Risk Interventions:  [place] to call system. Call bell, personal items and telephone within reach. Instruct patient to call for assistance. Room bathroom lighting operational. Non-slip footwear when patient is off stretcher. Physically safe environment: no spills, clutter or unnecessary equipment. Stretcher in lowest position, wheels locked, appropriate side rails in place. Provide visual cue, wrist band, yellow gown, etc. Monitor gait and stability. Monitor for mental status changes and reorient to person, place, and time. Review medications for side effects contributing to fall risk. Reinforce activity limits and safety measures with patient and family.

## 2020-02-14 NOTE — CHART NOTE - NSCHARTNOTEFT_GEN_A_CORE
Resident: Gurjit Heredia - Called by [facility] for code stroke eval. Pt with possible seizure at home after being heard yelling for yelp. On my rapid exam pt feels very warm, minimally responsive to painful stimuli. BL pupils reactive and equal, concern for pt not protecting airway possible ICH vs profound sepsis. No code stroke at this time feel that patient is too critical to go directly to CT without more thorough evaluation for protection of airway.

## 2020-02-14 NOTE — PATIENT PROFILE ADULT - FALL HARM RISK TYPE OF ASSESSMENT
Bactrim Counseling:  I discussed with the patient the risks of sulfa antibiotics including but not limited to GI upset, allergic reaction, drug rash, diarrhea, dizziness, photosensitivity, and yeast infections.  Rarely, more serious reactions can occur including but not limited to aplastic anemia, agranulocytosis, methemoglobinemia, blood dyscrasias, liver or kidney failure, lung infiltrates or desquamative/blistering drug rashes. Doxycycline Counseling:  Patient counseled regarding possible photosensitivity and increased risk for sunburn.  Patient instructed to avoid sunlight, if possible.  When exposed to sunlight, patients should wear protective clothing, sunglasses, and sunscreen.  The patient was instructed to call the office immediately if the following severe adverse effects occur:  hearing changes, easy bruising/bleeding, severe headache, or vision changes.  The patient verbalized understanding of the proper use and possible adverse effects of doxycycline.  All of the patient's questions and concerns were addressed. Tazorac Pregnancy And Lactation Text: This medication is not safe during pregnancy. It is unknown if this medication is excreted in breast milk. Minocycline Pregnancy And Lactation Text: This medication is Pregnancy Category D and not consider safe during pregnancy. It is also excreted in breast milk. Tazorac Counseling:  Patient advised that medication is irritating and drying.  Patient may need to apply sparingly and wash off after an hour before eventually leaving it on overnight.  The patient verbalized understanding of the proper use and possible adverse effects of tazorac.  All of the patient's questions and concerns were addressed. Azithromycin Pregnancy And Lactation Text: This medication is considered safe during pregnancy and is also secreted in breast milk. Spironolactone Pregnancy And Lactation Text: This medication can cause feminization of the male fetus and should be avoided during pregnancy. The active metabolite is also found in breast milk. High Dose Vitamin A Pregnancy And Lactation Text: High dose vitamin A therapy is contraindicated during pregnancy and breast feeding. Use Enhanced Medication Counseling?: No Birth Control Pills Counseling: Birth Control Pill Counseling: I discussed with the patient the potential side effects of OCPs including but not limited to increased risk of stroke, heart attack, thrombophlebitis, deep venous thrombosis, hepatic adenomas, breast changes, GI upset, headaches, and depression.  The patient verbalized understanding of the proper use and possible adverse effects of OCPs. All of the patient's questions and concerns were addressed. Erythromycin Pregnancy And Lactation Text: This medication is Pregnancy Category B and is considered safe during pregnancy. It is also excreted in breast milk. High Dose Vitamin A Counseling: Side effects reviewed, pt to contact office should one occur. Tetracycline Counseling: Patient counseled regarding possible photosensitivity and increased risk for sunburn.  Patient instructed to avoid sunlight, if possible.  When exposed to sunlight, patients should wear protective clothing, sunglasses, and sunscreen.  The patient was instructed to call the office immediately if the following severe adverse effects occur:  hearing changes, easy bruising/bleeding, severe headache, or vision changes.  The patient verbalized understanding of the proper use and possible adverse effects of tetracycline.  All of the patient's questions and concerns were addressed. Patient understands to avoid pregnancy while on therapy due to potential birth defects. Topical Clindamycin Counseling: Patient counseled that this medication may cause skin irritation or allergic reactions.  In the event of skin irritation, the patient was advised to reduce the amount of the drug applied or use it less frequently.   The patient verbalized understanding of the proper use and possible adverse effects of clindamycin.  All of the patient's questions and concerns were addressed. Benzoyl Peroxide Counseling: Patient counseled that medicine may cause skin irritation and bleach clothing.  In the event of skin irritation, the patient was advised to reduce the amount of the drug applied or use it less frequently.   The patient verbalized understanding of the proper use and possible adverse effects of benzoyl peroxide.  All of the patient's questions and concerns were addressed. Topical Retinoid counseling:  Patient advised to apply a pea-sized amount only at bedtime and wait 30 minutes after washing their face before applying.  If too drying, patient may add a non-comedogenic moisturizer. The patient verbalized understanding of the proper use and possible adverse effects of retinoids.  All of the patient's questions and concerns were addressed. Azithromycin Counseling:  I discussed with the patient the risks of azithromycin including but not limited to GI upset, allergic reaction, drug rash, diarrhea, and yeast infections. Spironolactone Counseling: Patient advised regarding risks of diarrhea, abdominal pain, hyperkalemia, birth defects (for female patients), liver toxicity and renal toxicity. The patient may need blood work to monitor liver and kidney function and potassium levels while on therapy. The patient verbalized understanding of the proper use and possible adverse effects of spironolactone.  All of the patient's questions and concerns were addressed. Doxycycline Pregnancy And Lactation Text: This medication is Pregnancy Category D and not consider safe during pregnancy. It is also excreted in breast milk but is considered safe for shorter treatment courses. Bactrim Pregnancy And Lactation Text: This medication is Pregnancy Category D and is known to cause fetal risk.  It is also excreted in breast milk. Dapsone Counseling: I discussed with the patient the risks of dapsone including but not limited to hemolytic anemia, agranulocytosis, rashes, methemoglobinemia, kidney failure, peripheral neuropathy, headaches, GI upset, and liver toxicity.  Patients who start dapsone require monitoring including baseline LFTs and weekly CBCs for the first month, then every month thereafter.  The patient verbalized understanding of the proper use and possible adverse effects of dapsone.  All of the patient's questions and concerns were addressed. Minocycline Counseling: Patient advised regarding possible photosensitivity and discoloration of the teeth, skin, lips, tongue and gums.  Patient instructed to avoid sunlight, if possible.  When exposed to sunlight, patients should wear protective clothing, sunglasses, and sunscreen.  The patient was instructed to call the office immediately if the following severe adverse effects occur:  hearing changes, easy bruising/bleeding, severe headache, or vision changes.  The patient verbalized understanding of the proper use and possible adverse effects of minocycline.  All of the patient's questions and concerns were addressed. Birth Control Pills Pregnancy And Lactation Text: This medication should be avoided if pregnant and for the first 30 days post-partum. Isotretinoin Pregnancy And Lactation Text: This medication is Pregnancy Category X and is considered extremely dangerous during pregnancy. It is unknown if it is excreted in breast milk. Dapsone Pregnancy And Lactation Text: This medication is Pregnancy Category C and is not considered safe during pregnancy or breast feeding. Topical Sulfur Applications Pregnancy And Lactation Text: This medication is Pregnancy Category C and has an unknown safety profile during pregnancy. It is unknown if this topical medication is excreted in breast milk. Topical Retinoid Pregnancy And Lactation Text: This medication is Pregnancy Category C. It is unknown if this medication is excreted in breast milk. Detail Level: Zone Benzoyl Peroxide Pregnancy And Lactation Text: This medication is Pregnancy Category C. It is unknown if benzoyl peroxide is excreted in breast milk. Erythromycin Counseling:  I discussed with the patient the risks of erythromycin including but not limited to GI upset, allergic reaction, drug rash, diarrhea, increase in liver enzymes, and yeast infections. Topical Clindamycin Pregnancy And Lactation Text: This medication is Pregnancy Category B and is considered safe during pregnancy. It is unknown if it is excreted in breast milk. Isotretinoin Counseling: Patient should get monthly blood tests, not donate blood, not drive at night if vision affected, not share medication, and not undergo elective surgery for 6 months after tx completed. Side effects reviewed, pt to contact office should one occur. Topical Sulfur Applications Counseling: Topical Sulfur Counseling: Patient counseled that this medication may cause skin irritation or allergic reactions.  In the event of skin irritation, the patient was advised to reduce the amount of the drug applied or use it less frequently.   The patient verbalized understanding of the proper use and possible adverse effects of topical sulfur application.  All of the patient's questions and concerns were addressed. admission

## 2020-02-14 NOTE — BEHAVIORAL HEALTH ASSESSMENT NOTE - NSBHCONSFOLLOWNEEDS_PSY_A_CORE
Patient needs further psychiatric safety assessment prior to discharge will need to assess appropriateness of re-starting psychotropics prior to discharge/Patient needs further psychiatric safety assessment prior to discharge

## 2020-02-14 NOTE — H&P ADULT - HISTORY OF PRESENT ILLNESS
HPI:    64y Female with PMH of schizoaffective d/o on Geodon, DMII, COPD BIBEMS for fever and AMS. History limited d/t pt's poor mental status and no family members available for collaterals. As per EMS,  neighbor heard patient screaming for help and stating "I'm having a seizure." Pt was shaking when EMS arrived and she was given 10 mg IM versed. In ER, pt was febrile to 102F, tachy 110-120, mildly hypoxic satting high 80s on RA,  BP stable. Labs notable for Na 154, WBC 14, . CXR and UA negative for infection.  Pt is lethargic, only opens eyes to voice, not able to answer any questions. Pt received 1L LR and ceftriaxone 1g. Admitted for acute metabolic encephalopathy 2/2 NMS vs sepsis of unclear source.   Of note, pt was admitted to [facility] in  for syncope and rigidity, thought to be drug (Geodon) induced. Decision was made to continue Geodon and f/u as outpt at that time.     PAST MEDICAL & SURGICAL HISTORY:  COPD   Schizoaffective disorder  Obesity  Diabetes  No significant past surgical history      Review of Systems:   unable to obtain     Allergies    No Known Allergies    Intolerances        Social History:   unable to obtain     FAMILY HISTORY:  FH: multiple myeloma: in Father      MEDICATIONS  (STANDING):  amantadine 100 milliGRAM(s) Oral two times a day  budesonide 160 MICROgram(s)/formoterol 4.5 MICROgram(s) Inhaler 2 Puff(s) Inhalation two times a day  dextrose 5%. 1000 milliLiter(s) (50 mL/Hr) IV Continuous <Continuous>  dextrose 50% Injectable 12.5 Gram(s) IV Push once  dextrose 50% Injectable 25 Gram(s) IV Push once  dextrose 50% Injectable 25 Gram(s) IV Push once  insulin lispro (HumaLOG) corrective regimen sliding scale   SubCutaneous three times a day before meals  insulin lispro (HumaLOG) corrective regimen sliding scale   SubCutaneous at bedtime  lactated ringers. 1000 milliLiter(s) (100 mL/Hr) IV Continuous <Continuous>    MEDICATIONS  (PRN):  dextrose 40% Gel 15 Gram(s) Oral once PRN Blood Glucose LESS THAN 70 milliGRAM(s)/deciliter  glucagon  Injectable 1 milliGRAM(s) IntraMuscular once PRN Glucose LESS THAN 70 milligrams/deciliter      T(C): 39 (02-14-20 @ 15:15), Max: 39 (20 @ 15:15)  HR: 116 (20 @ 15:15) (106 - 116)  BP: 138/88 (20 @ 15:15) (101/64 - 151/77)  RR: 22 (20 @ 15:15) (20 - 26)  SpO2: 95% (20 @ 15:15) (90% - 97%)    Weight (kg): 66 (20 @ 09:11)  CAPILLARY BLOOD GLUCOSE      POCT Blood Glucose.: 129 mg/dL (2020 08:53)    I&O's Summary      PHYSICAL EXAM:  GENERAL: lethargic, opens eyes to voice, not answering questions/following commands +diaphoresis   HEAD:  Atraumatic, Normocephalic  EYES: EOMI, PERRLA, conjunctiva and sclera clear  NECK: rigid, No elevated JVD  CHEST/LUNG: basilar crackles bilaterally; No wheeze  HEART: tachy, regular; No murmurs, rubs, or gallops  ABDOMEN: Soft, Nontender, Nondistended; Bowel sounds present  EXTREMITIES:  2+ Peripheral Pulses, No clubbing, cyanosis, or edema  PSYCH: AAOx0  NEUROLOGY: All exts rigid, knee reflex sluggish. +b/l UE tremor  SKIN: warm to touch, no lesions     LABS:                        15.3   . )-----------( 349      ( 2020 09:30 )             51.8     02-14    154<H>  |  115<H>  |  31<H>  ----------------------------<  143<H>  4.0   |  25  |  0.81    Ca    9.7      2020 09:30    TPro  7.7  /  Alb  4.6  /  TBili  0.5  /  DBili  x   /  AST  27  /  ALT  13  /  AlkPhos  86  02-14    PT/INR - ( 2020 09:30 )   PT: 12.8 SEC;   INR: 1.12          PTT - ( 2020 09:30 )  PTT:27.9 SEC  CARDIAC MARKERS ( 2020 09:30 )  x     / x     / 811 u/L / x     / x          Urinalysis Basic - ( 2020 09:34 )    Color: YELLOW / Appearance: TURBID / S.031 / pH: 5.5  Gluc: NEGATIVE / Ketone: SMALL  / Bili: NEGATIVE / Urobili: NORMAL   Blood: NEGATIVE / Protein: 50 / Nitrite: NEGATIVE   Leuk Esterase: NEGATIVE / RBC: 0-2 / WBC 0-2   Sq Epi: OCC / Non Sq Epi: x / Bacteria: NEGATIVE          RADIOLOGY & ADDITIONAL TESTS:    ECG Personally Reviewed - sinus tach    Imaging Personally Reviewed: CXR clear    Consultant(s) Notes Reviewed:      Care Discussed with Consultants/Other Providers:

## 2020-02-14 NOTE — BEHAVIORAL HEALTH ASSESSMENT NOTE - COMMENTS ON SUICIDE RISK/PROTECTIVE FACTORS:
positive therapeutic relationship with Dr. Anne, positive relationships with friends, [facility]

## 2020-02-14 NOTE — ED ADULT NURSE NOTE - OBJECTIVE STATEMENT
pt is a 64 yr old female biba from home found on floor. pt aaox 3 with mild tremor noted by ems, enroute pt began to have "full body movements and shakes with side eye gaze". pt treated with 10 mg im versed as per ems. elevated temp and 02 sat 90%, pt given tylenol and nc O2. PIV placed, labs sent, see emar for full treatments. urine sample obtained and sent. vss on monitor, will continue to monitor.

## 2020-02-14 NOTE — ED ADULT TRIAGE NOTE - CHIEF COMPLAINT QUOTE
Arrives via EMS.  As per EMS pt found to be having seizure like activity.  Pt non-responsive to verbal stimulation.  Right pupil appears deviated and non-reactive, and left eye was sluggish.  Pt with no hx of seizures.  Given Versed 10 mg IM. Dr. Heredia for stroke evaluation, no code stroke. Arrives via EMS.  As per EMS pt found to be having seizure like activity.  Pt non-responsive to verbal stimulation.  Right pupil appears deviated and non-reactive, and left eye was sluggish.  Pt with no hx of seizures.  Pt noted to have tremors to right hand.  Given Versed 10 mg IM. Dr. Heredia for stroke evaluation, no code stroke.

## 2020-02-14 NOTE — ED PROVIDER NOTE - CLINICAL SUMMARY MEDICAL DECISION MAKING FREE TEXT BOX
63 yo F PMHx COPD, schizoaffective, DM2 BIBEMs for AMS, febrile and tachycardic on triage vitals, likely sepsis, will pursue infectious workup - labs, EKG, CXR, urine and blood cx, IVF, abx, will consider CTH if mental status does not improve with fluid resuscitation, admit 65 yo F PMHx COPD, schizoaffective, DM2 BIBEMs for AMS, febrile and tachycardic on triage vitals.  Unclear source of fever, consider sepsis vs NMS vs central. Will pursue infectious workup - labs incl CK, EKG, CXR, urine and blood cx, IVF, abx, will consider CTH also.  Pt's meds relatively low risk for NMS, but remains on DDx.

## 2020-02-14 NOTE — ED PROVIDER NOTE - OBJECTIVE STATEMENT
65 yo F PMHx COPD, schizoaffective, DM2 BIBEMs for AMS. Per EMS, neighbor heart patient screaming for help and stating "I'm having a seizure." Pt was shaking when EMS arrived and she was given 10 mg IM versed. On arrival she is responding to voice and is able to say her name. Otherwise patient is not able to provide any history. 63 yo F PMHx COPD, schizoaffective, DM2 BIBEMs for AMS. Per EMS, neighbor heart patient screaming for help and stating "I'm having a seizure." Pt was shaking when EMS arrived and she was given 10 mg IM versed. On arrival she is responding to voice and is able to say her name. Otherwise patient is not able to provide any history.    HCP (Miryam Angela): [phone] 63 yo F PMHx COPD, schizoaffective, DM2 BIBEMS for AMS. Per EMS, neighbor heard patient screaming for help and stating "I'm having a seizure." Pt was shaking when EMS arrived and she was given 10 mg IM versed. On arrival she is responding to voice and is able to say her name. Otherwise patient is not able to provide any history.      HCP (Miryam Angela): [phone]

## 2020-02-14 NOTE — BEHAVIORAL HEALTH ASSESSMENT NOTE - NSBHMEDSOTHERFT_PSY_A_CORE
Home Medications:  Advair Diskus 250 mcg-50 mcg inhalation powder: 1 inhaler(s) inhaled 2 times a day (22 Dec 2019 11:27)  amantadine 100 mg oral capsule: 1 cap(s) orally 2 times a day (22 Dec 2019 11:27)  metformin 500 mg oral tablet: 1 tab(s) orally 2 times a day (22 Dec 2019 11:27)  ProAir HFA 90 mcg/inh inhalation aerosol: 1 puff(s) inhaled 3 times a day, As Needed (22 Dec 2019 11:27)  ziprasidone 60 mg oral capsule: 1 cap(s) orally 2 times a day (24 Dec 2019 10:47)

## 2020-02-14 NOTE — BEHAVIORAL HEALTH ASSESSMENT NOTE - NSBHCONSULTMEDAGITATION_PSY_A_CORE FT
Ativan 1mg PO/IM/IV Q6H PRN AGITATION, may give additional Ativan 1mg PO/IM/IV FOR SEVERE REFRACTORY AGITATION

## 2020-02-14 NOTE — ED ADULT NURSE REASSESSMENT NOTE - NS ED NURSE REASSESS COMMENT FT1
Break relief RN: pt turned and positioned for comfort. Axillary temp 100.2, MD made aware. CM shows ST with a rate of 106. Incontinence care given. Repeat lactate done. will continue to monitor.

## 2020-02-14 NOTE — CONSULT NOTE ADULT - ASSESSMENT
63 y/o Patient with COPD exacerbation, DM, Schizoaffective disorder is brought for ?AMS. Found to have fever in ED- 101, Leukocytosis to 14, x ray chest negative, RVP negative, UA negative, Blood cx pending. Ck pending.   Hypernatremia. Has not received any antibiotics. 65 y/o Patient with COPD exacerbation, DM, Schizoaffective disorder is brought for ?AMS. Found to have fever in ED- 101, Leukocytosis to 14, x ray chest negative, RVP negative, UA negative, Blood cx pending. Ck pending. Patient had recent increase in medication Ziprasidone on Tuesday      SIRS, unclear source  Fever is unlikely to be a side effect of Ziprasidone. Would r/o infectious causes first       Suggest:  *check blood c x  *check CT chest/abdomen and plevis   *switch to Zosyn 3.376 q8  *d/c ceftriaxone   *monitor WBC count and fever curve       d/w team 65 y/o Patient with COPD exacerbation, DM, Schizoaffective disorder is brought for ?AMS. Found to have fever in ED- 101, Leukocytosis to 14, x ray chest negative, RVP negative, UA negative, Blood cx pending. Ck pending. Patient had recent increase in medication Ziprasidone on Tuesday      SIRS, unclear source with fever, leucocytosis, tachycardia, hypernatremia.   Fever is unlikely to be a side effect of Ziprasidone. Would r/o infectious causes first       Suggest:  *check blood c x  *check CT chest/abdomen and pelvis with contrast   *switch to Zosyn 3.376 q8  *d/c ceftriaxone   *monitor WBC count and fever curve   *trend cpk, elevated.   *follow up blood cx.     d/w team

## 2020-02-14 NOTE — CONSULT NOTE ADULT - SUBJECTIVE AND OBJECTIVE BOX
CHIEF COMPLAINT:  Hx obtained from family friend and aide Miryam and from charts.   HPI:  64y Female with PMH of COPD, DM, PD, Obesity, PPHx schizoaffective disorder on Geodon, BIBEMS for fever and AMS. History limited d/t pt's poor mental status. Per note,  neighbor heard patient screaming for help and stating "I'm having a seizure." Pt was shaking when EMS arrived and she was given 10 mg IM versed. In ER, pt was febrile to 102F, tachy 110-120, mildly hypoxic satting high 80s on RA,  BP stable. Labs notable for Na 154, WBC 14, . CXR and UA negative for infection. Pt received 1L LR and ceftriaxone 1g.   Per Miryam, patient's neighbor noticed her medication induced tremors that were worse than usual and called an ambulance. She thinks that the patient got sick from smoking outside in the rain without a coat. Per Miryam, patient is currently at baseline. Patient denies any pain, lightheadedness, dizziness, chest pain, and trouble breathing. Importantly, on 2/10/2020 Geodon was increased to BID from daily and noted patient had become slower and more lethargic.  Of note, pt was admitted to [facility] in  for syncope and rigidity, thought to be drug (Geodon) induced. Decision was made to continue Geodon and f/u as outpt at that time.   ID and psych consulted by primary team. MICU consulted by primary team attending.    PAST MEDICAL & SURGICAL HISTORY:  COPD without exacerbation  Schizoaffective disorder  Obesity  Diabetes  No significant past surgical history      FAMILY HISTORY:  FH: multiple myeloma: in Father      SOCIAL HISTORY:  Smoking: yes  EtOH Use: no  Marital Status: single    Allergies    No Known Allergies    Intolerances        HOME MEDICATIONS:    REVIEW OF SYSTEMS:  Constitutional: denies chills  Eyes: no visual changes  CV: denies chest pain  Resp: denies trouble breathing. +Cough at baseline.  GI: denies abdominal pain  Neurological: +tremulous  Psychiatric: AOx2. Denies hallucinations.    OBJECTIVE:  ICU Vital Signs Last 24 Hrs  T(C): 36.6 (2020 17:30), Max: 39 (2020 15:15)  T(F): 97.8 (2020 17:30), Max: 102.2 (2020 15:15)  HR: 96 (2020 17:30) (96 - 116)  BP: 124/88 (2020 17:30) (101/64 - 151/77)  BP(mean): 95 (2020 11:00) (90 - 95)  RR: 18 (2020 17:30) (18 - 26)  SpO2: 98% (2020 17:30) (90% - 98%)        CAPILLARY BLOOD GLUCOSE      POCT Blood Glucose.: 110 mg/dL (2020 18:13)      PHYSICAL EXAM:  General: patient appears calm, tremulous  HEENT: b/l strabismus. No tongue lacerations  Neck: Rigid (at baseline as per aide)  Respiratory: lungs CTAB. No respiratory distress. Wet audible non-productive cough.  Cardiovascular: Regular rhythm, normal rate  Abdomen: soft, non-tender, obese, +soft reducible umbilical hernia  Extremities: warm, dry. Upper extremities with rigidity on flexion (baseline as per aide)  Neurological: AOx2    HOSPITAL MEDICATIONS:  MEDICATIONS  (STANDING):  amantadine 100 milliGRAM(s) Oral two times a day  budesonide 160 MICROgram(s)/formoterol 4.5 MICROgram(s) Inhaler 2 Puff(s) Inhalation two times a day  dextrose 5%. 1000 milliLiter(s) (50 mL/Hr) IV Continuous <Continuous>  dextrose 50% Injectable 12.5 Gram(s) IV Push once  dextrose 50% Injectable 25 Gram(s) IV Push once  dextrose 50% Injectable 25 Gram(s) IV Push once  heparin  Injectable 5000 Unit(s) SubCutaneous every 12 hours  insulin lispro (HumaLOG) corrective regimen sliding scale   SubCutaneous three times a day before meals  insulin lispro (HumaLOG) corrective regimen sliding scale   SubCutaneous at bedtime  lactated ringers. 1000 milliLiter(s) (100 mL/Hr) IV Continuous <Continuous>  piperacillin/tazobactam IVPB. 3.375 Gram(s) IV Intermittent once  piperacillin/tazobactam IVPB.. 3.375 Gram(s) IV Intermittent every 8 hours    MEDICATIONS  (PRN):  dextrose 40% Gel 15 Gram(s) Oral once PRN Blood Glucose LESS THAN 70 milliGRAM(s)/deciliter  glucagon  Injectable 1 milliGRAM(s) IntraMuscular once PRN Glucose LESS THAN 70 milligrams/deciliter      LABS:                        15.3   14. )-----------( 349      ( 2020 09:30 )             51.8         154<H>  |  115<H>  |  31<H>  ----------------------------<  143<H>  4.0   |  25  |  0.81    Ca    9.7      2020 09:30    TPro  7.7  /  Alb  4.6  /  TBili  0.5  /  DBili  x   /  AST  27  /  ALT  13  /  AlkPhos  86      PT/INR - ( 2020 09:30 )   PT: 12.8 SEC;   INR: 1.12          PTT - ( 2020 09:30 )  PTT:27.9 SEC  Urinalysis Basic - ( 2020 09:34 )    Color: YELLOW / Appearance: TURBID / S.031 / pH: 5.5  Gluc: NEGATIVE / Ketone: SMALL  / Bili: NEGATIVE / Urobili: NORMAL   Blood: NEGATIVE / Protein: 50 / Nitrite: NEGATIVE   Leuk Esterase: NEGATIVE / RBC: 0-2 / WBC 0-2   Sq Epi: OCC / Non Sq Epi: x / Bacteria: NEGATIVE        Venous Blood Gas:   @ 13:15  7.42/40/62/26/92.2  VBG Lactate: 1.0  Venous Blood Gas:   @ 09:30  7.37/48/56/26/89.4  VBG Lactate: --      MICROBIOLOGY:         RADIOLOGY:  [ ] Reviewed and interpreted by me    EKG:

## 2020-02-14 NOTE — H&P ADULT - ASSESSMENT
64y Female with PMH of schizoaffective d/o on Geodon, DMII, COPD BIBEMS for fever and AMS, admitted for acute metabolic encephalopathy 2/2 NMS vs sepsis of unclear source

## 2020-02-14 NOTE — BEHAVIORAL HEALTH ASSESSMENT NOTE - SUMMARY
65 yo , single, female with PMHX COPD, DM, PD, Obesity, PPHx schizoaffective disorder presents to [facility] ED via EMS for fever and AMS, lethargic on arrival, only opens eyes to voice, not able to answer any questions. Addendum: pt was admitted to [facility] 12/19 for syncope and rigidity, thought to be drug (Geodon) induced. Decision was made to continue Geodon and f/u as outpatient. [facility] psychiatry consulted for questionable NMS.    Chart reviewed. Patient A&OX2, states mood is ok, no c/o sleep/appetite disturbances, denies si/sa, denies a/v hallucinations/delusions, patient unable to recall last inpatient hospitalizations, friend supplied name of psychiatric patient sees every other month Dr. Anne [phone] (unable to reach), friend also stated on 2/10/2020 Geodon was increased to BID from daily and noted patient had become slower and more lethargic. On exam patient noted to have visible tremors and rigidity to upper extremities. [facility] contacted to confirm dose of Geodon as 60mg PO BID since 2/10/2020, prior daily for past year. Collateral, Miryam states recent hx si/sa, a/v hallucinations/delusions, states patient is calm and when she does get annoyed it is d/t too many demands on her.     Discussed with medical team recommendations below. Patient to be seen tomorrow by CL attending.    PLAN:  -c/w holding Geodon  -Obtain urine c&s  -may give Ativan 1mg PO/IM/IV Q6H PRN AGITATION, may give additional Ativan 1mg PO/IM/IV for SEVERE REFRACTORY AGITATION

## 2020-02-14 NOTE — CONSULT NOTE ADULT - SUBJECTIVE AND OBJECTIVE BOX
Patient is a 64y old  Female who presents with a chief complaint of     HPI:      prior hospital charts reviewed [  ]  primary team notes reviewed [  ]  other consultant notes reviewed [  ]    PAST MEDICAL & SURGICAL HISTORY:  COPD without exacerbation  Schizoaffective disorder  Obesity  Diabetes  No significant past surgical history      Allergies  No Known Allergies        ANTIMICROBIALS (past 90 days)  MEDICATIONS  (STANDING):  cefTRIAXone   IVPB   100 mL/Hr IV Intermittent (20 @ 10:06)        ANTIMICROBIALS:        OTHER MEDS: MEDICATIONS  (STANDING):      SOCIAL HISTORY:   hx smoking  non-smoker    FAMILY HISTORY:  FH: multiple myeloma: in Father      REVIEW OF SYSTEMS  [  ] ROS unobtainable because:    [  ] All other systems negative except as noted below:	    Constitutional:  [ ] fever [ ] chills  [ ] weight loss  [ ] weakness  Skin:  [ ] rash [ ] phlebitis	  Eyes: [ ] icterus [ ] pain  [ ] discharge	  ENMT: [ ] sore throat  [ ] thrush [ ] ulcers [ ] exudates  Respiratory: [ ] dyspnea [ ] hemoptysis [ ] cough [ ] sputum	  Cardiovascular:  [ ] chest pain [ ] palpitations [ ] edema	  Gastrointestinal:  [ ] nausea [ ] vomiting [ ] diarrhea [ ] constipation [ ] pain	  Genitourinary:  [ ] dysuria [ ] frequency [ ] hematuria [ ] discharge [ ] flank pain  [ ] incontinence  Musculoskeletal:  [ ] myalgias [ ] arthralgias [ ] arthritis  [ ] back pain  Neurological:  [ ] headache [ ] seizures  [ ] confusion/altered mental status  Psychiatric:  [ ] anxiety [ ] depression	  Hematology/Lymphatics:  [ ] lymphadenopathy  Endocrine:  [ ] adrenal [ ] thyroid  Allergic/Immunologic:	 [ ] transplant [ ] seasonal    Vital Signs Last 24 Hrs  T(F): 101.2 (20 @ 13:15), Max: 101.2 (20 @ 13:15)    Vital Signs Last 24 Hrs  HR: 106 (20 @ 13:15) (106 - 113)  BP: 122/79 (20 @ 13:15) (101/64 - 151/77)  RR: 24 (20 @ 13:15)  SpO2: 96% (20 @ 13:15) (90% - 97%)  Wt(kg): --    PHYSICAL EXAM:  Constitutional: non-toxic, no distress  HEAD/EYES: anicteric, no conjunctival injection  ENT:  supple, no thrush  Cardiovascular:   normal S1, S2, no murmur, no edema  Respiratory:  clear BS bilaterally, no wheezes, no rales  GI:  soft, non-tender, normal bowel sounds  :  no arreguin, no CVA tenderness  Musculoskeletal:  no synovitis, normal ROM  Neurologic: awake and alert, normal strength, no focal findings  Skin:  no rash, no erythema, no phlebitis  Heme/Onc: no lymphadenopathy   Psychiatric:  awake, alert, appropriate mood                                15.3   14.09 )-----------( 349      ( 2020 09:30 )             51.8     02-14    154<H>  |  115<H>  |  31<H>  ----------------------------<  143<H>  4.0   |  25  |  0.81    Ca    9.7      2020 09:30    TPro  7.7  /  Alb  4.6  /  TBili  0.5  /  DBili  x   /  AST  27  /  ALT  13  /  AlkPhos  86  02-14      Urinalysis Basic - ( 2020 09:34 )    Color: YELLOW / Appearance: TURBID / S.031 / pH: 5.5  Gluc: NEGATIVE / Ketone: SMALL  / Bili: NEGATIVE / Urobili: NORMAL   Blood: NEGATIVE / Protein: 50 / Nitrite: NEGATIVE   Leuk Esterase: NEGATIVE / RBC: 0-2 / WBC 0-2   Sq Epi: OCC / Non Sq Epi: x / Bacteria: NEGATIVE        MICROBIOLOGY:                            RADIOLOGY:  imaging below personally reviewed Patient is a 64y old  Female who presents with a chief complaint of     HPI:  64y Female with PMH of schizoaffective d/o on Geodon, DMII, COPD BIBEMS for fever and AMS. History limited d/t pt's poor mental status and no family members available for collaterals. As per EMS,  neighbor heard patient screaming for help and stating "I'm having a seizure." Pt was shaking when EMS arrived and she was given 10 mg IM versed. In ER, pt was febrile to 102F, tachy 110-120, mildly hypoxic satting high 80s on RA,  BP stable. Labs notable for Na 154, WBC 14, . CXR and UA negative for infection.  Pt is lethargic, only opens eyes to voice, not able to answer any questions. Pt received 1L LR and ceftriaxone 1g. Admitted for acute metabolic encephalopathy 2/2 NMS vs sepsis of unclear source.   Of note, pt was admitted to [facility] in  for syncope and rigidity, thought to be drug (Geodon) induced. Decision was made to continue Geodon and f/u as outpt at that time.       Above reviewed. Patient's caretaker reports that this is patient's baseline. She never had seizures and tremors and positioning while laying down are baseline from parkinson like adverse effects of risperdone.   Patient had recent increase in medication Ziprasidone on Tuesday. Denied cough, chest pain, abdominal pain, dysuria, diarrhea. NO travel, no pets.       prior hospital charts reviewed [ x ]  primary team notes reviewed [x ]  other consultant notes reviewed [ x ]    PAST MEDICAL & SURGICAL HISTORY:  COPD without exacerbation  Schizoaffective disorder  Obesity  Diabetes  No significant past surgical history      Allergies  No Known Allergies    ANTIMICROBIALS (past 90 days)  MEDICATIONS  (STANDING):  cefTRIAXone   IVPB   100 mL/Hr IV Intermittent (20 @ 10:06)      ANTIMICROBIALS:        OTHER MEDS: MEDICATIONS  (STANDING):      SOCIAL HISTORY:   smokes cigarettes daily, no alcohol use, lives at home with aid     FAMILY HISTORY:  FH: multiple myeloma: in Father    REVIEW OF SYSTEMS  [  ] ROS unobtainable because:    [x] All other systems negative except as noted below:	    Constitutional:  [ x] fever [ ] chills  [ ] weight loss  [ ] weakness  Skin:  [ ] rash [ ] phlebitis	  Eyes: [ ] icterus [ ] pain  [ ] discharge	  ENMT: [ ] sore throat  [ ] thrush [ ] ulcers [ ] exudates  Respiratory: [ ] dyspnea [ ] hemoptysis [ ] cough [ ] sputum	  Cardiovascular:  [ ] chest pain [ ] palpitations [ ] edema	  Gastrointestinal:  [ ] nausea [ ] vomiting [ ] diarrhea [ ] constipation [ ] pain	  Genitourinary:  [ ] dysuria [ ] frequency [ ] hematuria [ ] discharge [ ] flank pain  [ ] incontinence  Musculoskeletal:  [ ] myalgias [ ] arthralgias [ ] arthritis  [ ] back pain  Neurological:  [ ] headache [ ] seizures  [ ] confusion/altered mental status  Psychiatric:  [ ] anxiety [ ] depression	  Hematology/Lymphatics:  [ ] lymphadenopathy  Endocrine:  [ ] adrenal [ ] thyroid  Allergic/Immunologic:	 [ ] transplant [ ] seasonal    Vital Signs Last 24 Hrs  T(F): 101.2 (20 @ 13:15), Max: 101.2 (20 @ 13:15)    Vital Signs Last 24 Hrs  HR: 106 (20 @ 13:15) (106 - 113)  BP: 122/79 (20 @ 13:15) (101/64 - 151/77)  RR: 24 (20 @ 13:15)  SpO2: 96% (20 @ 13:15) (90% - 97%)  Wt(kg): --    PHYSICAL EXAM:  Constitutional: non-toxic, no distress, rigid appearing with b/l hand tremors   HEAD/EYES: anicteric, no conjunctival injection  ENT:  supple, no thrush  Cardiovascular:   normal S1, S2, no murmur, no edema  Respiratory:  clear BS bilaterally, no wheezes, no rales  GI:  soft, non-tender, normal bowel sounds  :  no arreguin, no CVA tenderness  Musculoskeletal:  no synovitis, normal ROM  Neurologic: awake and alert, normal strength, no focal findings  Skin:  no rash, no erythema, no phlebitis  Heme/Onc: no lymphadenopathy   Psychiatric:  awake, alert, appropriate mood                                15.3   14.09 )-----------( 349      ( 2020 09:30 )             51.8         154<H>  |  115<H>  |  31<H>  ----------------------------<  143<H>  4.0   |  25  |  0.81    Ca    9.7      2020 09:30    TPro  7.7  /  Alb  4.6  /  TBili  0.5  /  DBili  x   /  AST  27  /  ALT  13  /  AlkPhos  86  02-14      Urinalysis Basic - ( 2020 09:34 )    Color: YELLOW / Appearance: TURBID / S.031 / pH: 5.5  Gluc: NEGATIVE / Ketone: SMALL  / Bili: NEGATIVE / Urobili: NORMAL   Blood: NEGATIVE / Protein: 50 / Nitrite: NEGATIVE   Leuk Esterase: NEGATIVE / RBC: 0-2 / WBC 0-2   Sq Epi: OCC / Non Sq Epi: x / Bacteria: NEGATIVE        MICROBIOLOGY:  Culture - Urine (19 @ 22:30)    Culture - Urine:   NO GROWTH AT 24 HOURS    Specimen Source: URINE MIDSTREAM      Blood cx pending     RADIOLOGY:  < from: CT Head No Cont (20 @ 12:05) >  Impression: No evidence of acute hemorrhage mass or mass effect.    < from: Xray Chest 1 View AP/PA (20 @ 11:30) >  IMPRESSION:  No acute pulmonary disease. Patient is a 64y old  Female who presents with a chief complaint of     HPI:  64y Female with PMH of schizoaffective d/o on Geodon, DMII, COPD BIBEMS for fever and AMS. History limited d/t pt's poor mental status and no family members available for collaterals. As per EMS,  neighbor heard patient screaming for help and stating "I'm having a seizure." Pt was shaking when EMS arrived and she was given 10 mg IM versed. In ER, pt was febrile to 102F, tachy 110-120, mildly hypoxic satting high 80s on RA,  BP stable. Labs notable for Na 154, WBC 14, . CXR and UA negative for infection.  Pt is lethargic, only opens eyes to voice, not able to answer any questions. Pt received 1L LR and ceftriaxone 1g. Admitted for acute metabolic encephalopathy 2/2 NMS vs sepsis of unclear source.   Of note, pt was admitted to [facility] in  for syncope and rigidity, thought to be drug (Geodon) induced. Decision was made to continue Geodon and f/u as outpt at that time.       Above reviewed. Patient's caretaker reports that this is patient's baseline. She never had seizures and tremors and positioning while laying down are baseline from parkinson like adverse effects of risperdone.   Patient had recent increase in medication Ziprasidone on Tuesday since then the caretaker noticed that she is not completely herself but pt kept denying any symptoms until the day of admission when she was found to be very weak and brought in to the hospital. Denied increased cough, no chest pain, abdominal pain, dysuria, diarrhea. NO travel, no pets.       prior hospital charts reviewed [ x ]  primary team notes reviewed [x ]  other consultant notes reviewed [ x ]    PAST MEDICAL & SURGICAL HISTORY:  COPD without exacerbation  Schizoaffective disorder  Obesity  Diabetes  No significant past surgical history      Allergies  No Known Allergies    ANTIMICROBIALS (past 90 days)  MEDICATIONS  (STANDING):  cefTRIAXone   IVPB   100 mL/Hr IV Intermittent (20 @ 10:06)      ANTIMICROBIALS:        OTHER MEDS: MEDICATIONS  (STANDING):      SOCIAL HISTORY:   smokes cigarettes daily, no alcohol use, lives at home with aid     FAMILY HISTORY:  FH: multiple myeloma: in Father    REVIEW OF SYSTEMS  [  ] ROS unobtainable because:    [x] All other systems negative except as noted below:	    Constitutional:  [ x] fever [ ] chills  [ ] weight loss  [ ] weakness  Skin:  [ ] rash [ ] phlebitis	  Eyes: [ ] icterus [ ] pain  [ ] discharge	  ENMT: [ ] sore throat  [ ] thrush [ ] ulcers [ ] exudates  Respiratory: [ ] dyspnea [ ] hemoptysis [x ] cough [ ] sputum	  Cardiovascular:  [ ] chest pain [ ] palpitations [ ] edema	  Gastrointestinal:  [ ] nausea [ ] vomiting [ ] diarrhea [ ] constipation [ ] pain	  Genitourinary:  [ ] dysuria [ ] frequency [ ] hematuria [ ] discharge [ ] flank pain  [ ] incontinence  Musculoskeletal:  [ ] myalgias [ ] arthralgias [ ] arthritis  [ ] back pain  Neurological:  [ ] headache [ ] seizures  [ ] confusion/altered mental status  Psychiatric:  [ ] anxiety [ ] depression	  Endocrine:  [ ] adrenal [ ] thyroid  Allergic/Immunologic:	 [ ] transplant [ ] seasonal    Vital Signs Last 24 Hrs  T(F): 101.2 (20 @ 13:15), Max: 101.2 (20 @ 13:15)    Vital Signs Last 24 Hrs  HR: 106 (20 @ 13:15) (106 - 113)  BP: 122/79 (20 @ 13:15) (101/64 - 151/77)  RR: 24 (20 @ 13:15)  SpO2: 96% (20 @ 13:15) (90% - 97%)  Wt(kg): --    PHYSICAL EXAM:  Constitutional: non-toxic, no distress, rigid appearing with b/l hand tremors   HEAD/EYES: anicteric, no conjunctival injection, strabismus    ENT:  supple, no thrush  Cardiovascular:   normal S1, S2,   Respiratory:  clear BS bilaterally,   GI:  soft, non-tender, normal bowel sounds  :  no arreguin, no CVA tenderness  Musculoskeletal:  no joint swelling   Neurologic: awake and alert, contracted extremities with tremors b/l upper extremities.   Skin:  no rash, no erythema, no phlebitis  vascular: palpable pulses  Extremities: no edema  Psychiatric:  awake, alert, appropriate mood                            15.3   14.09 )-----------( 349      ( 2020 09:30 )             51.8     -14    154<H>  |  115<H>  |  31<H>  ----------------------------<  143<H>  4.0   |  25  |  0.81    Ca    9.7      2020 09:30    TPro  7.7  /  Alb  4.6  /  TBili  0.5  /  DBili  x   /  AST  27  /  ALT  13  /  AlkPhos  86  02-14      Urinalysis Basic - ( 2020 09:34 )    Color: YELLOW / Appearance: TURBID / S.031 / pH: 5.5  Gluc: NEGATIVE / Ketone: SMALL  / Bili: NEGATIVE / Urobili: NORMAL   Blood: NEGATIVE / Protein: 50 / Nitrite: NEGATIVE   Leuk Esterase: NEGATIVE / RBC: 0-2 / WBC 0-2   Sq Epi: OCC / Non Sq Epi: x / Bacteria: NEGATIVE        MICROBIOLOGY:  Culture - Urine (19 @ 22:30)    Culture - Urine:   NO GROWTH AT 24 HOURS    Specimen Source: URINE MIDSTREAM      Blood cx pending     RADIOLOGY: Imaging reviewed personally and interpretation as mentioned below.   < from: CT Head No Cont (20 @ 12:05) >  Impression: No evidence of acute hemorrhage mass or mass effect.    < from: Xray Chest 1 View AP/PA (20 @ 11:30) >  IMPRESSION:  No acute pulmonary disease.

## 2020-02-14 NOTE — ED PROVIDER NOTE - ATTENDING CONTRIBUTION TO CARE
Attending Attestation: Dr. Hernandez  I have personally performed a history and physical examination of the patient and discussed management with the resident as well as the patient.  I reviewed the resident's note and agree with the documented findings and plan of care.  I have authored and modified critical sections of the Provider Note, including but not limited to HPI, Physical Exam and MDM. 63 yo F PMHx COPD, schizoaffective, DM2 BIBEMs for AMS, febrile and tachycardic on triage vitals.  Unclear source of fever, consider sepsis vs NMS vs central. Will pursue infectious workup - labs incl CK, EKG, CXR, urine and blood cx, IVF, abx, will consider CTH also.  Pt's meds relatively low risk for NMS, but remains on DDx.

## 2020-02-14 NOTE — BEHAVIORAL HEALTH ASSESSMENT NOTE - NSBHCHARTREVIEWVS_PSY_A_CORE FT
Vital Signs Last 24 Hrs  T(C): 36.6 (14 Feb 2020 17:30), Max: 39 (14 Feb 2020 15:15)  T(F): 97.8 (14 Feb 2020 17:30), Max: 102.2 (14 Feb 2020 15:15)  HR: 96 (14 Feb 2020 17:30) (96 - 116)  BP: 124/88 (14 Feb 2020 17:30) (101/64 - 151/77)  BP(mean): 95 (14 Feb 2020 11:00) (90 - 95)  RR: 18 (14 Feb 2020 17:30) (18 - 26)  SpO2: 98% (14 Feb 2020 17:30) (90% - 98%)

## 2020-02-14 NOTE — H&P ADULT - PROBLEM SELECTOR PLAN 1
ddx inc NMS vs. sepsis (CNS infection)   -hold Geodon   -cont LR @100cc/hr  -O2 via NC to maintain SPO2>95  -ID consulted for abx- may need LP  -f/u blood cx, urine cx  -trend CPK  -psychiatry called to assist management NMS  -low threshold for ICU consult if BP becomes unstable (Hypo/hyper) ddx inc NMS vs. sepsis (CNS infection)   -hold Geodon   -cont LR @100cc/hr  -O2 via NC to maintain SPO2>95  -ID consulted for abx- may need LP  -f/u blood cx, urine cx  -trend CPK  -psychiatry called to assist management NMS  -ICU consulted

## 2020-02-14 NOTE — BEHAVIORAL HEALTH ASSESSMENT NOTE - NSBHCHARTREVIEWLAB_PSY_A_CORE FT
CBC Full  -  ( 14 Feb 2020 09:30 )  WBC Count : 14.09 K/uL  RBC Count : 5.81 M/uL  Hemoglobin : 15.3 g/dL  Hematocrit : 51.8 %  Platelet Count - Automated : 349 K/uL  Mean Cell Volume : 89.2 fL  Mean Cell Hemoglobin : 26.3 pg  Mean Cell Hemoglobin Concentration : 29.5 %  Auto Neutrophil # : 8.09 K/uL  Auto Lymphocyte # : 5.55 K/uL  Auto Monocyte # : 0.39 K/uL  Auto Eosinophil # : 0.00 K/uL  Auto Basophil # : 0.02 K/uL  Auto Neutrophil % : 57.4 %  Auto Lymphocyte % : 39.4 %  Auto Monocyte % : 2.8 %  Auto Eosinophil % : 0.0 %  Auto Basophil % : 0.1 %  02-14    154<H>  |  115<H>  |  31<H>  ----------------------------<  143<H>  4.0   |  25  |  0.81    Ca    9.7      14 Feb 2020 09:30    TPro  7.7  /  Alb  4.6  /  TBili  0.5  /  DBili  x   /  AST  27  /  ALT  13  /  AlkPhos  86  02-14

## 2020-02-14 NOTE — BEHAVIORAL HEALTH ASSESSMENT NOTE - RISK ASSESSMENT
Low Acute Suicide Risk Risk Factors: acute medical condition, chronic medical condition, hx schizoaffective disorder, hx non compliance medication, hx OD  Protective Factors: residential stability, supportive friends, has HHA's, no access to firearms, sees psychiatrist regularly, treatment compliant @ present, enjoys reading

## 2020-02-14 NOTE — BEHAVIORAL HEALTH ASSESSMENT NOTE - OTHER
Miryam Stewartormonte, family friend lives with family friends, A 7 days/week 9a-1p tremors r/t PD, rigidity on exam unable to assess- in bed, collateral states patient "shuffles" at times difficult to understand unable to repeat numbers backwards, unable to retrieve words given variable hx OD falls

## 2020-02-15 LAB
ALBUMIN SERPL ELPH-MCNC: 3.3 G/DL — SIGNIFICANT CHANGE UP (ref 3.3–5)
ALP SERPL-CCNC: 58 U/L — SIGNIFICANT CHANGE UP (ref 40–120)
ALT FLD-CCNC: 22 U/L — SIGNIFICANT CHANGE UP (ref 4–33)
ANION GAP SERPL CALC-SCNC: 10 MMO/L — SIGNIFICANT CHANGE UP (ref 7–14)
ANION GAP SERPL CALC-SCNC: 10 MMO/L — SIGNIFICANT CHANGE UP (ref 7–14)
AST SERPL-CCNC: 62 U/L — HIGH (ref 4–32)
BACTERIA UR CULT: SIGNIFICANT CHANGE UP
BASOPHILS # BLD AUTO: 0.05 K/UL — SIGNIFICANT CHANGE UP (ref 0–0.2)
BASOPHILS NFR BLD AUTO: 0.4 % — SIGNIFICANT CHANGE UP (ref 0–2)
BILIRUB SERPL-MCNC: 0.7 MG/DL — SIGNIFICANT CHANGE UP (ref 0.2–1.2)
BUN SERPL-MCNC: 26 MG/DL — HIGH (ref 7–23)
BUN SERPL-MCNC: 28 MG/DL — HIGH (ref 7–23)
CALCIUM SERPL-MCNC: 8.6 MG/DL — SIGNIFICANT CHANGE UP (ref 8.4–10.5)
CALCIUM SERPL-MCNC: 8.8 MG/DL — SIGNIFICANT CHANGE UP (ref 8.4–10.5)
CHLORIDE SERPL-SCNC: 105 MMOL/L — SIGNIFICANT CHANGE UP (ref 98–107)
CHLORIDE SERPL-SCNC: 108 MMOL/L — HIGH (ref 98–107)
CK SERPL-CCNC: 1655 U/L — HIGH (ref 25–170)
CO2 SERPL-SCNC: 25 MMOL/L — SIGNIFICANT CHANGE UP (ref 22–31)
CO2 SERPL-SCNC: 25 MMOL/L — SIGNIFICANT CHANGE UP (ref 22–31)
CREAT SERPL-MCNC: 0.7 MG/DL — SIGNIFICANT CHANGE UP (ref 0.5–1.3)
CREAT SERPL-MCNC: 0.72 MG/DL — SIGNIFICANT CHANGE UP (ref 0.5–1.3)
EOSINOPHIL # BLD AUTO: 0.03 K/UL — SIGNIFICANT CHANGE UP (ref 0–0.5)
EOSINOPHIL NFR BLD AUTO: 0.2 % — SIGNIFICANT CHANGE UP (ref 0–6)
GLUCOSE BLDC GLUCOMTR-MCNC: 152 MG/DL — HIGH (ref 70–99)
GLUCOSE BLDC GLUCOMTR-MCNC: 75 MG/DL — SIGNIFICANT CHANGE UP (ref 70–99)
GLUCOSE BLDC GLUCOMTR-MCNC: 76 MG/DL — SIGNIFICANT CHANGE UP (ref 70–99)
GLUCOSE BLDC GLUCOMTR-MCNC: 80 MG/DL — SIGNIFICANT CHANGE UP (ref 70–99)
GLUCOSE SERPL-MCNC: 102 MG/DL — HIGH (ref 70–99)
GLUCOSE SERPL-MCNC: 92 MG/DL — SIGNIFICANT CHANGE UP (ref 70–99)
HBA1C BLD-MCNC: 5.6 % — SIGNIFICANT CHANGE UP (ref 4–5.6)
HCT VFR BLD CALC: 40.2 % — SIGNIFICANT CHANGE UP (ref 34.5–45)
HGB BLD-MCNC: 12 G/DL — SIGNIFICANT CHANGE UP (ref 11.5–15.5)
IMM GRANULOCYTES NFR BLD AUTO: 0.2 % — SIGNIFICANT CHANGE UP (ref 0–1.5)
LYMPHOCYTES # BLD AUTO: 48.7 % — HIGH (ref 13–44)
LYMPHOCYTES # BLD AUTO: 6.28 K/UL — HIGH (ref 1–3.3)
MAGNESIUM SERPL-MCNC: 2 MG/DL — SIGNIFICANT CHANGE UP (ref 1.6–2.6)
MAGNESIUM SERPL-MCNC: 2 MG/DL — SIGNIFICANT CHANGE UP (ref 1.6–2.6)
MCHC RBC-ENTMCNC: 26.5 PG — LOW (ref 27–34)
MCHC RBC-ENTMCNC: 29.9 % — LOW (ref 32–36)
MCV RBC AUTO: 88.9 FL — SIGNIFICANT CHANGE UP (ref 80–100)
MONOCYTES # BLD AUTO: 1.07 K/UL — HIGH (ref 0–0.9)
MONOCYTES NFR BLD AUTO: 8.3 % — SIGNIFICANT CHANGE UP (ref 2–14)
NEUTROPHILS # BLD AUTO: 5.44 K/UL — SIGNIFICANT CHANGE UP (ref 1.8–7.4)
NEUTROPHILS NFR BLD AUTO: 42.2 % — LOW (ref 43–77)
NRBC # FLD: 0 K/UL — SIGNIFICANT CHANGE UP (ref 0–0)
PHOSPHATE SERPL-MCNC: 3.4 MG/DL — SIGNIFICANT CHANGE UP (ref 2.5–4.5)
PHOSPHATE SERPL-MCNC: 3.5 MG/DL — SIGNIFICANT CHANGE UP (ref 2.5–4.5)
PLATELET # BLD AUTO: 247 K/UL — SIGNIFICANT CHANGE UP (ref 150–400)
PMV BLD: 10.1 FL — SIGNIFICANT CHANGE UP (ref 7–13)
POTASSIUM SERPL-MCNC: 3.3 MMOL/L — LOW (ref 3.5–5.3)
POTASSIUM SERPL-MCNC: 3.4 MMOL/L — LOW (ref 3.5–5.3)
POTASSIUM SERPL-SCNC: 3.3 MMOL/L — LOW (ref 3.5–5.3)
POTASSIUM SERPL-SCNC: 3.4 MMOL/L — LOW (ref 3.5–5.3)
PROT SERPL-MCNC: 5.6 G/DL — LOW (ref 6–8.3)
RBC # BLD: 4.52 M/UL — SIGNIFICANT CHANGE UP (ref 3.8–5.2)
RBC # FLD: 13.8 % — SIGNIFICANT CHANGE UP (ref 10.3–14.5)
SODIUM SERPL-SCNC: 140 MMOL/L — SIGNIFICANT CHANGE UP (ref 135–145)
SODIUM SERPL-SCNC: 143 MMOL/L — SIGNIFICANT CHANGE UP (ref 135–145)
SPECIMEN SOURCE: SIGNIFICANT CHANGE UP
WBC # BLD: 12.9 K/UL — HIGH (ref 3.8–10.5)
WBC # FLD AUTO: 12.9 K/UL — HIGH (ref 3.8–10.5)

## 2020-02-15 PROCEDURE — 99232 SBSQ HOSP IP/OBS MODERATE 35: CPT

## 2020-02-15 PROCEDURE — 99231 SBSQ HOSP IP/OBS SF/LOW 25: CPT

## 2020-02-15 RX ORDER — POTASSIUM CHLORIDE 20 MEQ
40 PACKET (EA) ORAL EVERY 4 HOURS
Refills: 0 | Status: COMPLETED | OUTPATIENT
Start: 2020-02-15 | End: 2020-02-15

## 2020-02-15 RX ORDER — SODIUM CHLORIDE 9 MG/ML
1000 INJECTION, SOLUTION INTRAVENOUS
Refills: 0 | Status: DISCONTINUED | OUTPATIENT
Start: 2020-02-15 | End: 2020-02-19

## 2020-02-15 RX ADMIN — BUDESONIDE AND FORMOTEROL FUMARATE DIHYDRATE 2 PUFF(S): 160; 4.5 AEROSOL RESPIRATORY (INHALATION) at 20:28

## 2020-02-15 RX ADMIN — Medication 100 MILLIGRAM(S): at 05:32

## 2020-02-15 RX ADMIN — PIPERACILLIN AND TAZOBACTAM 25 GRAM(S): 4; .5 INJECTION, POWDER, LYOPHILIZED, FOR SOLUTION INTRAVENOUS at 11:35

## 2020-02-15 RX ADMIN — Medication 40 MILLIEQUIVALENT(S): at 17:43

## 2020-02-15 RX ADMIN — SODIUM CHLORIDE 150 MILLILITER(S): 9 INJECTION, SOLUTION INTRAVENOUS at 17:43

## 2020-02-15 RX ADMIN — SODIUM CHLORIDE 150 MILLILITER(S): 9 INJECTION, SOLUTION INTRAVENOUS at 20:28

## 2020-02-15 RX ADMIN — Medication 40 MILLIEQUIVALENT(S): at 11:36

## 2020-02-15 RX ADMIN — PIPERACILLIN AND TAZOBACTAM 25 GRAM(S): 4; .5 INJECTION, POWDER, LYOPHILIZED, FOR SOLUTION INTRAVENOUS at 20:28

## 2020-02-15 RX ADMIN — SODIUM CHLORIDE 150 MILLILITER(S): 9 INJECTION, SOLUTION INTRAVENOUS at 08:37

## 2020-02-15 RX ADMIN — PIPERACILLIN AND TAZOBACTAM 25 GRAM(S): 4; .5 INJECTION, POWDER, LYOPHILIZED, FOR SOLUTION INTRAVENOUS at 03:51

## 2020-02-15 RX ADMIN — HEPARIN SODIUM 5000 UNIT(S): 5000 INJECTION INTRAVENOUS; SUBCUTANEOUS at 17:43

## 2020-02-15 RX ADMIN — BUDESONIDE AND FORMOTEROL FUMARATE DIHYDRATE 2 PUFF(S): 160; 4.5 AEROSOL RESPIRATORY (INHALATION) at 08:37

## 2020-02-15 RX ADMIN — Medication 100 MILLIGRAM(S): at 17:43

## 2020-02-15 RX ADMIN — HEPARIN SODIUM 5000 UNIT(S): 5000 INJECTION INTRAVENOUS; SUBCUTANEOUS at 05:32

## 2020-02-15 NOTE — PROGRESS NOTE ADULT - PROBLEM SELECTOR PLAN 1
ddx inc NMS vs. sepsis (CNS infection)   -hold Geodon   -cont LR @100cc/hr  -O2 via NC to maintain SPO2>95  -ID consult appreciated.f/u cultures.CT ABD/PELVIS-will need 2 PC  -f/u blood cx, urine cx  -trend CPK-UPTRENDING  -psychiatry called to assist management NMS

## 2020-02-15 NOTE — PROGRESS NOTE ADULT - SUBJECTIVE AND OBJECTIVE BOX
Follow Up:  Patient is seen for follow up of fever      Interval History/ROS: Patient is afebrile today. Denies cough, chest pain, abdominal pain, diarrhea, dysuria.     Allergies  No Known Allergies        ANTIMICROBIALS:  piperacillin/tazobactam IVPB.. 3.375 every 8 hours      OTHER MEDS:  MEDICATIONS  (STANDING):  amantadine 100 two times a day  budesonide 160 MICROgram(s)/formoterol 4.5 MICROgram(s) Inhaler 2 two times a day  dextrose 40% Gel 15 once PRN  dextrose 50% Injectable 12.5 once  dextrose 50% Injectable 25 once  dextrose 50% Injectable 25 once  glucagon  Injectable 1 once PRN  heparin  Injectable 5000 every 12 hours  influenza   Vaccine 0.5 once  insulin lispro (HumaLOG) corrective regimen sliding scale  three times a day before meals  insulin lispro (HumaLOG) corrective regimen sliding scale  at bedtime      Vital Signs Last 24 Hrs  T(C): 36.6 (15 Feb 2020 05:30), Max: 39 (2020 15:15)  T(F): 97.9 (15 Feb 2020 05:30), Max: 102.2 (2020 15:15)  HR: 91 (15 Feb 2020 05:30) (88 - 116)  BP: 125/82 (15 Feb 2020 05:30) (122/79 - 138/88)  BP(mean): --  RR: 18 (15 Feb 2020 05:30) (16 - 24)  SpO2: 94% (15 Feb 2020 05:30) (94% - 98%)    PHYSICAL EXAM:  Constitutional: non-toxic, no distress, rigid appearing with b/l hand tremors   HEAD/EYES: anicteric, no conjunctival injection, strabismus    ENT:  supple, no thrush  Cardiovascular:   normal S1, S2,   Respiratory:  clear BS bilaterally,   GI:  soft, non-tender, normal bowel sounds  :  no arreguin, no CVA tenderness  Musculoskeletal:  no joint swelling   Neurologic: awake and alert, contracted extremities with tremors b/l upper extremities.   Skin:  no rash, no erythema, no phlebitis  vascular: palpable pulses  Extremities: no edema  Psychiatric:  awake, alert                            12.0   12.90 )-----------( 247      ( 15 Feb 2020 06:05 )             40.2       02-15    143  |  108<H>  |  26<H>  ----------------------------<  92  3.3<L>   |  25  |  0.70    Ca    8.6      15 Feb 2020 06:05  Phos  3.5     02-15  Mg     2.0     02-15    TPro  5.6<L>  /  Alb  3.3  /  TBili  0.7  /  DBili  x   /  AST  62<H>  /  ALT  22  /  AlkPhos  58  02-15      Urinalysis Basic - ( 2020 09:34 )    Color: YELLOW / Appearance: TURBID / S.031 / pH: 5.5  Gluc: NEGATIVE / Ketone: SMALL  / Bili: NEGATIVE / Urobili: NORMAL   Blood: NEGATIVE / Protein: 50 / Nitrite: NEGATIVE   Leuk Esterase: NEGATIVE / RBC: 0-2 / WBC 0-2   Sq Epi: OCC / Non Sq Epi: x / Bacteria: NEGATIVE        MICROBIOLOGY:  v  Peripheral Site 1  20 --  --  --      URINE MIDSTREAM  20 --  --  --                RADIOLOGY: Follow Up:  Patient is seen for follow up of fever      Interval History/ROS: Patient is afebrile today. Denies cough, chest pain, abdominal pain, diarrhea, dysuria.     Allergies  No Known Allergies    ANTIMICROBIALS:  piperacillin/tazobactam IVPB.. 3.375 every 8 hours      OTHER MEDS:  MEDICATIONS  (STANDING):  amantadine 100 two times a day  budesonide 160 MICROgram(s)/formoterol 4.5 MICROgram(s) Inhaler 2 two times a day  dextrose 40% Gel 15 once PRN  dextrose 50% Injectable 12.5 once  dextrose 50% Injectable 25 once  dextrose 50% Injectable 25 once  glucagon  Injectable 1 once PRN  heparin  Injectable 5000 every 12 hours  influenza   Vaccine 0.5 once  insulin lispro (HumaLOG) corrective regimen sliding scale  three times a day before meals  insulin lispro (HumaLOG) corrective regimen sliding scale  at bedtime      Vital Signs Last 24 Hrs  T(C): 36.6 (15 Feb 2020 05:30), Max: 39 (2020 15:15)  T(F): 97.9 (15 Feb 2020 05:30), Max: 102.2 (2020 15:15)  HR: 91 (15 Feb 2020 05:30) (88 - 116)  BP: 125/82 (15 Feb 2020 05:30) (122/79 - 138/88)  BP(mean): --  RR: 18 (15 Feb 2020 05:30) (16 - 24)  SpO2: 94% (15 Feb 2020 05:30) (94% - 98%)    PHYSICAL EXAM:  Constitutional: non-toxic, no distress, rigid appearing with b/l hand tremors   HEAD/EYES: anicteric, no conjunctival injection, strabismus    ENT:  supple, no thrush  Cardiovascular:   normal S1, S2  Respiratory:  clear BS bilaterally,   GI:  soft, non-tender, normal bowel sounds  :  no arreguin, no CVA tenderness  Neurologic: awake and alert, contracted extremities with tremors b/l upper extremities.   Skin:  no rash, no erythema, no phlebitis  vascular: palpable pulses  Extremities: no edema  Psychiatric:  awake, alert                            12.0   12.90 )-----------( 247      ( 15 Feb 2020 06:05 )             40.2       02-15    143  |  108<H>  |  26<H>  ----------------------------<  92  3.3<L>   |  25  |  0.70    Ca    8.6      15 Feb 2020 06:05  Phos  3.5     02-15  Mg     2.0     02-15    TPro  5.6<L>  /  Alb  3.3  /  TBili  0.7  /  DBili  x   /  AST  62<H>  /  ALT  22  /  AlkPhos  58  02-15      Urinalysis Basic - ( 2020 09:34 )    Color: YELLOW / Appearance: TURBID / S.031 / pH: 5.5  Gluc: NEGATIVE / Ketone: SMALL  / Bili: NEGATIVE / Urobili: NORMAL   Blood: NEGATIVE / Protein: 50 / Nitrite: NEGATIVE   Leuk Esterase: NEGATIVE / RBC: 0-2 / WBC 0-2   Sq Epi: OCC / Non Sq Epi: x / Bacteria: NEGATIVE    MICROBIOLOGY:  Culture - Blood (collected 20 @ 09:50)  Source: Peripheral Site 2  Preliminary Report (02-15-20 @ 09:50):    NO ORGANISMS ISOLATED    NO ORGANISMS ISOLATED AT 24 HOURS    Culture - Blood (collected 20 @ 09:50)  Source: Peripheral Site 1  Preliminary Report (02-15-20 @ 09:50):    NO ORGANISMS ISOLATED    NO ORGANISMS ISOLATED AT 24 HOURS    Culture - Urine (collected 20 @ 09:30)  Source: URINE MIDSTREAM  Final Report (02-15-20 @ 08:18):    NO GROWTH AT 24 HOURS    Rapid Respiratory Viral Panel (20 @ 10:31)    Adenovirus (RapRVP): Not Detected    Influenza A (RapRVP): Not Detected    Influenza AH1 2009 (RapRVP): Not Detected    Influenza AH1 (RapRVP): Not Detected    Influenza AH3 (RapRVP): Not Detected    Influenza B (RapRVP): Not Detected    Parainfluenza 1 (RapRVP): Not Detected    Parainfluenza 2 (RapRVP): Not Detected    Parainfluenza 3 (RapRVP): Not Detected    Parainfluenza 4 (RapRVP): Not Detected    Resp Syncytial Virus (RapRVP): Not Detected    Chlamydia pneumoniae (RapRVP): Not Detected    Mycoplasma pneumoniae (RapRVP): Not Detected    Entero/Rhinovirus (RapRVP): Not Detected    hMPV (RapRVP): Not Detected    Coronavirus (229E,HKU1,NL63,OC43): Not Detected     RADIOLOGY:  CT Head No Cont (20 @ 12:05)   No evidence of acute hemorrhage mass or mass effect.    Xray Chest 1 View AP/PA (20 @ 11:30)   No acute pulmonary disease.

## 2020-02-15 NOTE — CONSULT NOTE ADULT - ASSESSMENT
64y Female with PMH of COPD, DM, PD, Obesity, PPHx schizoaffective disorder on Geodon, BIBEMS for fever and AMS.     Hypernatremia  sec to free water loss  Improved  Monitor serum Na    Hypokalemia  sec to decreased po intake  Repleted by primary team   Monitor serum K    Rhabdomylosis  CK trending up  Check TSH  COntinue IVF at present   MOnitor CK daily   *Planned for CT with contrast today-- continue IVF

## 2020-02-15 NOTE — PROGRESS NOTE ADULT - SUBJECTIVE AND OBJECTIVE BOX
HOANG PORTILLO  64y  Female      Patient is a 64y old  Female who presents with a chief complaint of fever, AMS (15 Feb 2020 12:02)  Patient seen and examined.chart reviewed.Agree with H and P.Admitted with fever,etio unknown?    REVIEW OF SYSTEMS:  CONSTITUTIONAL:  fever,   RESPIRATORY: No cough, wheezing, hemoptysis or shortness of breath  CARDIOVASCULAR: No chest pain, palpitations, dizziness, or leg swelling  GASTROINTESTINAL: No abdominal pain. nausea, vomiting, hematemesis; diarrhea, constipation. No melena or hematochezia.  GENITOURINARY: No dysuria, frequency, hematuria, or incontinence            INTERVAL HPI/OVERNIGHT EVENTS:  T(C): 36.5 (02-15-20 @ 15:11), Max: 37.1 (02-15-20 @ 03:45)  HR: 88 (02-15-20 @ 15:11) (88 - 91)  BP: 132/80 (02-15-20 @ 15:11) (125/82 - 136/80)  RR: 16 (02-15-20 @ 15:11) (16 - 18)  SpO2: 96% (02-15-20 @ 15:11) (94% - 96%)  Wt(kg): --  I&O's Summary    2020 07:01  -  15 Feb 2020 07:00  --------------------------------------------------------  IN: 1000 mL / OUT: 400 mL / NET: 600 mL      T(C): 36.5 (02-15-20 @ 15:11), Max: 37.1 (02-15-20 @ 03:45)  HR: 88 (02-15-20 @ 15:11) (88 - 91)  BP: 132/80 (02-15-20 @ 15:11) (125/82 - 136/80)  RR: 16 (02-15-20 @ 15:11) (16 - 18)  SpO2: 96% (02-15-20 @ 15:11) (94% - 96%)  Wt(kg): --Vital Signs Last 24 Hrs  T(C): 36.5 (15 Feb 2020 15:11), Max: 37.1 (15 Feb 2020 03:45)  T(F): 97.7 (15 Feb 2020 15:11), Max: 98.7 (15 Feb 2020 03:45)  HR: 88 (15 Feb 2020 15:11) (88 - 91)  BP: 132/80 (15 Feb 2020 15:11) (125/82 - 136/80)  BP(mean): --  RR: 16 (15 Feb 2020 15:11) (16 - 18)  SpO2: 96% (15 Feb 2020 15:11) (94% - 96%)    LABS:                        12.0   12.90 )-----------( 247      ( 15 Feb 2020 06:05 )             40.2     02-15    143  |  108<H>  |  26<H>  ----------------------------<  92  3.3<L>   |  25  |  0.70    Ca    8.6      15 Feb 2020 06:05  Phos  3.5     02-15  Mg     2.0     02-15    TPro  5.6<L>  /  Alb  3.3  /  TBili  0.7  /  DBili  x   /  AST  62<H>  /  ALT  22  /  AlkPhos  58  02-15    PT/INR - ( 2020 09:30 )   PT: 12.8 SEC;   INR: 1.12          PTT - ( 2020 09:30 )  PTT:27.9 SEC  Urinalysis Basic - ( 2020 09:34 )    Color: YELLOW / Appearance: TURBID / S.031 / pH: 5.5  Gluc: NEGATIVE / Ketone: SMALL  / Bili: NEGATIVE / Urobili: NORMAL   Blood: NEGATIVE / Protein: 50 / Nitrite: NEGATIVE   Leuk Esterase: NEGATIVE / RBC: 0-2 / WBC 0-2   Sq Epi: OCC / Non Sq Epi: x / Bacteria: NEGATIVE      CAPILLARY BLOOD GLUCOSE      POCT Blood Glucose.: 75 mg/dL (15 Feb 2020 17:34)  POCT Blood Glucose.: 76 mg/dL (15 Feb 2020 11:47)  POCT Blood Glucose.: 80 mg/dL (15 Feb 2020 08:24)  POCT Blood Glucose.: 99 mg/dL (2020 21:49)        Urinalysis Basic - ( 2020 09:34 )    Color: YELLOW / Appearance: TURBID / S.031 / pH: 5.5  Gluc: NEGATIVE / Ketone: SMALL  / Bili: NEGATIVE / Urobili: NORMAL   Blood: NEGATIVE / Protein: 50 / Nitrite: NEGATIVE   Leuk Esterase: NEGATIVE / RBC: 0-2 / WBC 0-2   Sq Epi: OCC / Non Sq Epi: x / Bacteria: NEGATIVE        PAST MEDICAL & SURGICAL HISTORY:  COPD without exacerbation  Schizoaffective disorder  Obesity  Diabetes  No significant past surgical history      MEDICATIONS  (STANDING):  amantadine 100 milliGRAM(s) Oral two times a day  budesonide 160 MICROgram(s)/formoterol 4.5 MICROgram(s) Inhaler 2 Puff(s) Inhalation two times a day  dextrose 5%. 1000 milliLiter(s) (50 mL/Hr) IV Continuous <Continuous>  dextrose 50% Injectable 12.5 Gram(s) IV Push once  dextrose 50% Injectable 25 Gram(s) IV Push once  dextrose 50% Injectable 25 Gram(s) IV Push once  heparin  Injectable 5000 Unit(s) SubCutaneous every 12 hours  influenza   Vaccine 0.5 milliLiter(s) IntraMuscular once  insulin lispro (HumaLOG) corrective regimen sliding scale   SubCutaneous three times a day before meals  insulin lispro (HumaLOG) corrective regimen sliding scale   SubCutaneous at bedtime  lactated ringers. 1000 milliLiter(s) (150 mL/Hr) IV Continuous <Continuous>  piperacillin/tazobactam IVPB.. 3.375 Gram(s) IV Intermittent every 8 hours    MEDICATIONS  (PRN):  dextrose 40% Gel 15 Gram(s) Oral once PRN Blood Glucose LESS THAN 70 milliGRAM(s)/deciliter  glucagon  Injectable 1 milliGRAM(s) IntraMuscular once PRN Glucose LESS THAN 70 milligrams/deciliter        RADIOLOGY & ADDITIONAL TESTS:    Imaging Personally Reviewed:  [ ] YES  [ ] NO    Consultant(s) Notes Reviewed:  [ x] YES  [ ] NO    PHYSICAL EXAM:  GENERAL: Alert and awake lying in bed in no distress  HEAD:  Atraumatic, Normocephalic  EYES: EOMI, MIGUEL ANGEL, conjunctiva and sclera clear  NECK: Supple, No JVD, Normal thyroid  NERVOUS SYSTEM:  Alert & Oriented X1, speech clear,confused.quadruplegia   CHEST/LUNG: Bilateral clear breath sounds, no rhochi, no wheezing, no crepitations,  HEART: Regular rate and rhythm; No murmurs, rubs, or gallops  ABDOMEN: Soft, Nontender, Nondistended; Bowel sounds present  EXTREMITIES:   Peripheral Pulses are palpable, no  edema        Care Discussed with Consultants/Other Providers [ ] YES  [ ] NO      Code Status: [] Full Code [] DNR [] DNI [] Goals of Care:   Disposition: [] ICU [] Stroke Unit [] RCU []PCU []Floor [] Discharge Home         KRYSTIN Castillo.FACP

## 2020-02-15 NOTE — CONSULT NOTE ADULT - SUBJECTIVE AND OBJECTIVE BOX
[facility]   MD EBENEZER LORENZO MD RUORU WONG, PA    TEL:  OFFICE: [phone]  DR MANNING CELL: [phone]  TORIBIO BARRETT CELL: [phone]  DR. JONES CELL: [phone]  DR. DE ANDA CELl: [phone]    FROM 5 PM- 7 AM PLEASE CALL ANSWERING SERVICE AT [phone]    -- INITIAL RENAL CONSULT NOTE  --------------------------------------------------------------------------------  HPI:  64y Female with PMH of COPD, DM, PD, Obesity, PPHx schizoaffective disorder on Geodon, BIBEMS for fever and AMS.   Nephrology consulted for elevated ck, hypernatremia      PAST HISTORY  --------------------------------------------------------------------------------  PAST MEDICAL & SURGICAL HISTORY:  COPD without exacerbation  Schizoaffective disorder  Obesity  Diabetes  No significant past surgical history    FAMILY HISTORY:  FH: multiple myeloma: in Father    PAST SOCIAL HISTORY:    ALLERGIES & MEDICATIONS  --------------------------------------------------------------------------------  Allergies    No Known Allergies    Intolerances      Standing Inpatient Medications  amantadine 100 milliGRAM(s) Oral two times a day  budesonide 160 MICROgram(s)/formoterol 4.5 MICROgram(s) Inhaler 2 Puff(s) Inhalation two times a day  dextrose 5%. 1000 milliLiter(s) IV Continuous <Continuous>  dextrose 50% Injectable 12.5 Gram(s) IV Push once  dextrose 50% Injectable 25 Gram(s) IV Push once  dextrose 50% Injectable 25 Gram(s) IV Push once  heparin  Injectable 5000 Unit(s) SubCutaneous every 12 hours  influenza   Vaccine 0.5 milliLiter(s) IntraMuscular once  insulin lispro (HumaLOG) corrective regimen sliding scale   SubCutaneous three times a day before meals  insulin lispro (HumaLOG) corrective regimen sliding scale   SubCutaneous at bedtime  lactated ringers. 1000 milliLiter(s) IV Continuous <Continuous>  piperacillin/tazobactam IVPB.. 3.375 Gram(s) IV Intermittent every 8 hours  potassium chloride    Tablet ER 40 milliEquivalent(s) Oral every 4 hours    PRN Inpatient Medications  dextrose 40% Gel 15 Gram(s) Oral once PRN  glucagon  Injectable 1 milliGRAM(s) IntraMuscular once PRN      REVIEW OF SYSTEMS  --------------------------------------------------------------------------------  Constitutional: denies chills  Eyes: no visual changes  CV: denies chest pain  Resp: denies trouble breathing. +Cough at baseline.  GI: denies abdominal pain  Neurological: +tremulous  Psychiatric: AOx2. Denies hallucinations.    VITALS/PHYSICAL EXAM  --------------------------------------------------------------------------------  T(C): 36.6 (02-15-20 @ 05:30), Max: 39 (02-14-20 @ 15:15)  HR: 91 (02-15-20 @ 05:30) (88 - 116)  BP: 125/82 (02-15-20 @ 05:30) (122/79 - 138/88)  RR: 18 (02-15-20 @ 05:30) (16 - 24)  SpO2: 94% (02-15-20 @ 05:30) (94% - 98%)  Wt(kg): --  Height (cm): 149.9 (02-14-20 @ 19:44)  Weight (kg): 64.1 (02-14-20 @ 19:44)  BMI (kg/m2): 28.5 (02-14-20 @ 19:44)  BSA (m2): 1.59 (02-14-20 @ 19:44)      02-14-20 @ 07:01  -  02-15-20 @ 07:00  --------------------------------------------------------  IN: 1000 mL / OUT: 400 mL / NET: 600 mL      Physical Exam:  	Gen: NAD  	HEENT: MMM  	Pulm: Coarse breath sounds B/L  	CV: S1S2  	Abd: Soft, +BS   	Ext: No LE edema B/L  	Neuro: Awake  	Skin: Warm and dry  	 no kallie    LABS/STUDIES  --------------------------------------------------------------------------------              12.0   12.90 >-----------<  247      [02-15-20 @ 06:05]              40.2     143  |  108  |  26  ----------------------------<  92      [02-15-20 @ 06:05]  3.3   |  25  |  0.70        Ca     8.6     [02-15-20 @ 06:05]      Mg     2.0     [02-15-20 @ 06:05]      Phos  3.5     [02-15-20 @ 06:05]    TPro  5.6  /  Alb  3.3  /  TBili  0.7  /  DBili  x   /  AST  62  /  ALT  22  /  AlkPhos  58  [02-15-20 @ 06:05]    PT/INR: PT 12.8 , INR 1.12       [02-14-20 @ 09:30]  PTT: 27.9       [02-14-20 @ 09:30]    CK 1655      [02-15-20 @ 06:05]    Creatinine Trend:  SCr 0.70 [02-15 @ 06:05]  SCr 0.72 [02-15 @ 01:15]  SCr 0.81 [02-14 @ 09:30]    Urinalysis - [02-14-20 @ 09:34]      Color YELLOW / Appearance TURBID / SG 1.031 / pH 5.5      Gluc NEGATIVE / Ketone SMALL  / Bili NEGATIVE / Urobili NORMAL       Blood NEGATIVE / Protein 50 / Leuk Est NEGATIVE / Nitrite NEGATIVE      RBC 0-2 / WBC 0-2 / Hyaline 1+ / Gran  / Sq Epi OCC / Non Sq Epi  / Bacteria NEGATIVE      HbA1c 5.6      [02-15-20 @ 06:05]

## 2020-02-15 NOTE — PROGRESS NOTE ADULT - ASSESSMENT
65 y/o Patient with COPD exacerbation, DM, Schizoaffective disorder is brought for ?AMS. Found to have fever in ED- 101, Leukocytosis to 14, x ray chest negative, RVP negative, UA negative, Blood cx pending. Ck pending. Patient had recent increase in medication Ziprasidone on Tuesday      SIRS, unclear source with fever, leucocytosis, tachycardia, hypernatremia.   Fever is unlikely to be a side effect of Ziprasidone.  Would r/o infectious causes first       Suggest:  *f/u blood c x  *f/u CT chest/abdomen and pelvis with contrast   *c/w  Zosyn 3.376 q8   *monitor WBC count and fever curve   *trend cpk, elevated. 64F active smoker with COPD, DM, Schizoaffective disorder, admitted for 2/14/20 acute fevers.   Nonfocal exam, at baseline (tremors and behavior). Infectious workup negative to date - CXR, RVP, UA, blood cultures.   Leukocytosis appears chronic (since 2016). Lymphocytosis could suggest viral infection.   Recent increase in Ziprasidone but lower on the differential.     Suggest:  -f/u blood cultures  -CT chest/abdomen and pelvis with contrast   -continue empiric Zosyn 3.376 q8h   -trend WBC and differential, if persists and other workup negative may check viral or lymphoproliferative process   -trend CPK elevated    Kole Ruffin MD   Infectious Disease   Pager [phone]   After 5PM and on weekends please page fellow on call or call [phone] 64F active smoker with COPD, DM, Schizoaffective disorder, admitted for 2/14/20 acute fevers.   Nonfocal exam, at baseline (tremors and behavior). Infectious workup negative to date - CXR, RVP, UA, blood cultures.   Leukocytosis appears chronic (since 2016). Lymphocytosis could suggest viral infection.   Recent increase in Ziprasidone but lower on the differential.     Suggest:  -f/u blood cultures  -CT chest/abdomen and pelvis with contrast if able to   -continue empiric Zosyn 3.376 q8h   -trend WBC and differential, if persists and other workup negative may check viral or lymphoproliferative process   -trend CPK elevated    Spoke with primary team     Kole Ruffin MD   Infectious Disease   Pager [phone]   After 5PM and on weekends please page fellow on call or call [phone]

## 2020-02-16 LAB
ALBUMIN SERPL ELPH-MCNC: 3 G/DL — LOW (ref 3.3–5)
ALP SERPL-CCNC: 50 U/L — SIGNIFICANT CHANGE UP (ref 40–120)
ALT FLD-CCNC: 24 U/L — SIGNIFICANT CHANGE UP (ref 4–33)
ANION GAP SERPL CALC-SCNC: 10 MMO/L — SIGNIFICANT CHANGE UP (ref 7–14)
AST SERPL-CCNC: 48 U/L — HIGH (ref 4–32)
BASOPHILS # BLD AUTO: 0.05 K/UL — SIGNIFICANT CHANGE UP (ref 0–0.2)
BASOPHILS NFR BLD AUTO: 0.5 % — SIGNIFICANT CHANGE UP (ref 0–2)
BILIRUB SERPL-MCNC: 0.6 MG/DL — SIGNIFICANT CHANGE UP (ref 0.2–1.2)
BUN SERPL-MCNC: 20 MG/DL — SIGNIFICANT CHANGE UP (ref 7–23)
CALCIUM SERPL-MCNC: 8.5 MG/DL — SIGNIFICANT CHANGE UP (ref 8.4–10.5)
CHLORIDE SERPL-SCNC: 107 MMOL/L — SIGNIFICANT CHANGE UP (ref 98–107)
CK SERPL-CCNC: 730 U/L — HIGH (ref 25–170)
CO2 SERPL-SCNC: 24 MMOL/L — SIGNIFICANT CHANGE UP (ref 22–31)
CREAT SERPL-MCNC: 0.68 MG/DL — SIGNIFICANT CHANGE UP (ref 0.5–1.3)
EOSINOPHIL # BLD AUTO: 0.38 K/UL — SIGNIFICANT CHANGE UP (ref 0–0.5)
EOSINOPHIL NFR BLD AUTO: 3.9 % — SIGNIFICANT CHANGE UP (ref 0–6)
GLUCOSE BLDC GLUCOMTR-MCNC: 116 MG/DL — HIGH (ref 70–99)
GLUCOSE BLDC GLUCOMTR-MCNC: 122 MG/DL — HIGH (ref 70–99)
GLUCOSE BLDC GLUCOMTR-MCNC: 88 MG/DL — SIGNIFICANT CHANGE UP (ref 70–99)
GLUCOSE BLDC GLUCOMTR-MCNC: 89 MG/DL — SIGNIFICANT CHANGE UP (ref 70–99)
GLUCOSE SERPL-MCNC: 92 MG/DL — SIGNIFICANT CHANGE UP (ref 70–99)
HCT VFR BLD CALC: 38.3 % — SIGNIFICANT CHANGE UP (ref 34.5–45)
HCV AB S/CO SERPL IA: 0.18 S/CO — SIGNIFICANT CHANGE UP (ref 0–0.99)
HCV AB SERPL-IMP: SIGNIFICANT CHANGE UP
HGB BLD-MCNC: 11.4 G/DL — LOW (ref 11.5–15.5)
IMM GRANULOCYTES NFR BLD AUTO: 0.2 % — SIGNIFICANT CHANGE UP (ref 0–1.5)
LYMPHOCYTES # BLD AUTO: 5.48 K/UL — HIGH (ref 1–3.3)
LYMPHOCYTES # BLD AUTO: 56.7 % — HIGH (ref 13–44)
MAGNESIUM SERPL-MCNC: 1.8 MG/DL — SIGNIFICANT CHANGE UP (ref 1.6–2.6)
MCHC RBC-ENTMCNC: 26.5 PG — LOW (ref 27–34)
MCHC RBC-ENTMCNC: 29.8 % — LOW (ref 32–36)
MCV RBC AUTO: 88.9 FL — SIGNIFICANT CHANGE UP (ref 80–100)
MONOCYTES # BLD AUTO: 0.73 K/UL — SIGNIFICANT CHANGE UP (ref 0–0.9)
MONOCYTES NFR BLD AUTO: 7.6 % — SIGNIFICANT CHANGE UP (ref 2–14)
NEUTROPHILS # BLD AUTO: 3 K/UL — SIGNIFICANT CHANGE UP (ref 1.8–7.4)
NEUTROPHILS NFR BLD AUTO: 31.1 % — LOW (ref 43–77)
NRBC # FLD: 0 K/UL — SIGNIFICANT CHANGE UP (ref 0–0)
PHOSPHATE SERPL-MCNC: 3 MG/DL — SIGNIFICANT CHANGE UP (ref 2.5–4.5)
PLATELET # BLD AUTO: 229 K/UL — SIGNIFICANT CHANGE UP (ref 150–400)
PMV BLD: 10.2 FL — SIGNIFICANT CHANGE UP (ref 7–13)
POTASSIUM SERPL-MCNC: 4.7 MMOL/L — SIGNIFICANT CHANGE UP (ref 3.5–5.3)
POTASSIUM SERPL-SCNC: 4.7 MMOL/L — SIGNIFICANT CHANGE UP (ref 3.5–5.3)
PROT SERPL-MCNC: 5.3 G/DL — LOW (ref 6–8.3)
RBC # BLD: 4.31 M/UL — SIGNIFICANT CHANGE UP (ref 3.8–5.2)
RBC # FLD: 13.6 % — SIGNIFICANT CHANGE UP (ref 10.3–14.5)
SODIUM SERPL-SCNC: 141 MMOL/L — SIGNIFICANT CHANGE UP (ref 135–145)
TSH SERPL-MCNC: 2.02 UIU/ML — SIGNIFICANT CHANGE UP (ref 0.27–4.2)
WBC # BLD: 9.66 K/UL — SIGNIFICANT CHANGE UP (ref 3.8–10.5)
WBC # FLD AUTO: 9.66 K/UL — SIGNIFICANT CHANGE UP (ref 3.8–10.5)

## 2020-02-16 RX ADMIN — PIPERACILLIN AND TAZOBACTAM 25 GRAM(S): 4; .5 INJECTION, POWDER, LYOPHILIZED, FOR SOLUTION INTRAVENOUS at 21:12

## 2020-02-16 RX ADMIN — SODIUM CHLORIDE 100 MILLILITER(S): 9 INJECTION, SOLUTION INTRAVENOUS at 21:13

## 2020-02-16 RX ADMIN — HEPARIN SODIUM 5000 UNIT(S): 5000 INJECTION INTRAVENOUS; SUBCUTANEOUS at 17:12

## 2020-02-16 RX ADMIN — PIPERACILLIN AND TAZOBACTAM 25 GRAM(S): 4; .5 INJECTION, POWDER, LYOPHILIZED, FOR SOLUTION INTRAVENOUS at 03:09

## 2020-02-16 RX ADMIN — BUDESONIDE AND FORMOTEROL FUMARATE DIHYDRATE 2 PUFF(S): 160; 4.5 AEROSOL RESPIRATORY (INHALATION) at 09:01

## 2020-02-16 RX ADMIN — Medication 100 MILLIGRAM(S): at 17:12

## 2020-02-16 RX ADMIN — HEPARIN SODIUM 5000 UNIT(S): 5000 INJECTION INTRAVENOUS; SUBCUTANEOUS at 05:07

## 2020-02-16 RX ADMIN — PIPERACILLIN AND TAZOBACTAM 25 GRAM(S): 4; .5 INJECTION, POWDER, LYOPHILIZED, FOR SOLUTION INTRAVENOUS at 12:15

## 2020-02-16 RX ADMIN — SODIUM CHLORIDE 100 MILLILITER(S): 9 INJECTION, SOLUTION INTRAVENOUS at 17:12

## 2020-02-16 RX ADMIN — Medication 100 MILLIGRAM(S): at 05:07

## 2020-02-16 RX ADMIN — SODIUM CHLORIDE 150 MILLILITER(S): 9 INJECTION, SOLUTION INTRAVENOUS at 12:15

## 2020-02-16 RX ADMIN — BUDESONIDE AND FORMOTEROL FUMARATE DIHYDRATE 2 PUFF(S): 160; 4.5 AEROSOL RESPIRATORY (INHALATION) at 21:13

## 2020-02-16 NOTE — PROGRESS NOTE ADULT - ASSESSMENT
64y Female with PMH of COPD, DM, PD, Obesity, PPHx schizoaffective disorder on Geodon, BIBEMS for fever and AMS.     Hypernatremia  sec to free water loss  Improved  Monitor serum Na    Hypokalemia  sec to decreased po intake  improved  Monitor serum K    Rhabdomylosis  CK improving today  TSH WNL  COntinue IVF at present   MOnitor CK daily   *Planned for CT with contrast today-- continue IVF

## 2020-02-16 NOTE — SWALLOW BEDSIDE ASSESSMENT ADULT - SWALLOW EVAL: DIAGNOSIS
1. Functional oral phase for puree consistency, mechanical soft solids, regular solids and thin liquids via cup/ straw (per patient preference) marked by adequate mastication for solids, adequate bolus manipulation and functional oral transit time 2. Functional pharyngeal phase for all consistencies presented marked by adequate laryngeal elevation upon digital palpation with NO overt s/s of laryngeal penetration/aspiration noted

## 2020-02-16 NOTE — SWALLOW BEDSIDE ASSESSMENT ADULT - COMMENTS
H&P: 64y Female with PMH of schizoaffective d/o on Geodon, DMII, COPD BIBEMS for fever and AMS, admitted for acute metabolic encephalopathy 2/2 NMS vs sepsis of unclear source    CTH 2/14: no evidence of hemorrhage, mass or mass effect  CXR 2/14: no acute pulmonary disease    Patient seen seated upright in chair. Patient was alert/awake and verbally responsive to simple questions. Patient able to follow simple directions. Patient denies dysphagia symptoms and reports consuming regular solids (i.e. "pizza takeout") prior to admission.

## 2020-02-16 NOTE — CHART NOTE - NSCHARTNOTEFT_GEN_A_CORE
Discussed case with ID attending and Psych consult. Per Psych, since patient is able to understand risk vs benefit of CT w/ contrast , the patient appears to have capacity and can sign her own consent forms for contrast use. Dr. Castillo and RN team updated.    Chip Bernstein PA-C

## 2020-02-16 NOTE — PROGRESS NOTE ADULT - PROBLEM SELECTOR PLAN 1
ddx inc NMS vs. sepsis (CNS infection) -IMPROVING  -hold Geodon   -cont LR @100cc/hr  -O2 via NC to maintain SPO2>95  -ID consult appreciated.f/u cultures.CT ABD/PELVIS-will need 2 PC  -f/u blood cx, urine cx  -trend CPK-UPTRENDING  -psychiatry called to assist management NMS

## 2020-02-16 NOTE — PROGRESS NOTE ADULT - SUBJECTIVE AND OBJECTIVE BOX
[facility]   MD EBENEZER LORENZO MD RUORU WONG, PA    TEL:  OFFICE: [phone]  DR MANNING CELL: [phone]  TORIBIO BARRETT CELL: [phone]  DR. JONES CELL: [phone]  DR. DE ANDA CELL: [phone]    FROM 5 PM - 7 AM PLEASE CALL ANSWERING SERVICE: [phone]    RENAL FOLLOW UP NOTE  --------------------------------------------------------------------------------  HPI:      Pt seen and examined at bedside.       PAST HISTORY  --------------------------------------------------------------------------------  No significant changes to PMH, PSH, FHx, SHx, unless otherwise noted    ALLERGIES & MEDICATIONS  --------------------------------------------------------------------------------  Allergies    No Known Allergies    Intolerances      Standing Inpatient Medications  amantadine 100 milliGRAM(s) Oral two times a day  budesonide 160 MICROgram(s)/formoterol 4.5 MICROgram(s) Inhaler 2 Puff(s) Inhalation two times a day  dextrose 5%. 1000 milliLiter(s) IV Continuous <Continuous>  dextrose 50% Injectable 12.5 Gram(s) IV Push once  dextrose 50% Injectable 25 Gram(s) IV Push once  dextrose 50% Injectable 25 Gram(s) IV Push once  heparin  Injectable 5000 Unit(s) SubCutaneous every 12 hours  influenza   Vaccine 0.5 milliLiter(s) IntraMuscular once  insulin lispro (HumaLOG) corrective regimen sliding scale   SubCutaneous three times a day before meals  insulin lispro (HumaLOG) corrective regimen sliding scale   SubCutaneous at bedtime  lactated ringers. 1000 milliLiter(s) IV Continuous <Continuous>  piperacillin/tazobactam IVPB.. 3.375 Gram(s) IV Intermittent every 8 hours    PRN Inpatient Medications  dextrose 40% Gel 15 Gram(s) Oral once PRN  glucagon  Injectable 1 milliGRAM(s) IntraMuscular once PRN      REVIEW OF SYSTEMS  --------------------------------------------------------------------------------  General: no fever    MSK: no edema     VITALS/PHYSICAL EXAM  --------------------------------------------------------------------------------  T(C): 36.6 (02-16-20 @ 05:07), Max: 36.7 (02-15-20 @ 21:14)  HR: 74 (02-16-20 @ 05:07) (70 - 88)  BP: 118/82 (02-16-20 @ 05:07) (109/70 - 132/80)  RR: 16 (02-16-20 @ 05:07) (16 - 16)  SpO2: 95% (02-16-20 @ 05:07) (95% - 96%)  Wt(kg): --  Height (cm): 149.9 (02-14-20 @ 19:44)  Weight (kg): 64.1 (02-14-20 @ 19:44)  BMI (kg/m2): 28.5 (02-14-20 @ 19:44)  BSA (m2): 1.59 (02-14-20 @ 19:44)      02-15-20 @ 07:01  -  02-16-20 @ 07:00  --------------------------------------------------------  IN: 3965 mL / OUT: 400 mL / NET: 3565 mL      Physical Exam:  	Gen: NAD  	HEENT: MMM  	Pulm: Coarse breath sounds  B/L  	CV: S1S2  	Abd: Soft, +BS  	Ext: No LE edema B/L                      Neuro: Awake   	Skin: Warm and Dry   	 no kallie    LABS/STUDIES  --------------------------------------------------------------------------------              11.4   9.66  >-----------<  229      [02-16-20 @ 06:00]              38.3     141  |  107  |  20  ----------------------------<  92      [02-16-20 @ 06:00]  4.7   |  24  |  0.68        Ca     8.5     [02-16-20 @ 06:00]      Mg     1.8     [02-16-20 @ 06:00]      Phos  3.0     [02-16-20 @ 06:00]    TPro  5.3  /  Alb  3.0  /  TBili  0.6  /  DBili  x   /  AST  48  /  ALT  24  /  AlkPhos  50  [02-16-20 @ 06:00]              [02-16-20 @ 06:00]    Creatinine Trend:  SCr 0.68 [02-16 @ 06:00]  SCr 0.70 [02-15 @ 06:05]  SCr 0.72 [02-15 @ 01:15]  SCr 0.81 [02-14 @ 09:30]    Urinalysis - [02-14-20 @ 09:34]      Color YELLOW / Appearance TURBID / SG 1.031 / pH 5.5      Gluc NEGATIVE / Ketone SMALL  / Bili NEGATIVE / Urobili NORMAL       Blood NEGATIVE / Protein 50 / Leuk Est NEGATIVE / Nitrite NEGATIVE      RBC 0-2 / WBC 0-2 / Hyaline 1+ / Gran  / Sq Epi OCC / Non Sq Epi  / Bacteria NEGATIVE      HbA1c 5.6      [02-15-20 @ 06:05]  TSH 2.02      [02-16-20 @ 06:00]    HCV 0.18, Nonreactive Hepatitis C AB  S/CO Ratio                        Interpretation  < 1.00                                   Non-Reactive  1.00 - 4.99                         Weakly-Reactive  >= 5.00                                Reactive  Non-Reactive: Aperson with a non-reactive HCV antibody  result is considered uninfected.  No further action is  needed unless recent infection is suspected.  In these  cases, consider repeat testing later to detect  seroconversion..  Weakly-Reactive: HCV antibody test is abnormal, HCV RNA  Qualitative test will follow.  Reactive: HCV antibody test is abnormal, HCV RNA  Qualitative test will follow.  Note: HCV antibody testing is performed on the Abbott   system.      [02-15-20 @ 06:05]

## 2020-02-16 NOTE — PROGRESS NOTE ADULT - SUBJECTIVE AND OBJECTIVE BOX
HOANG PORTILLO  64y  Female      Patient is a 64y old  Female who presents with a chief complaint of fever, AMS (16 Feb 2020 10:10)  no fever,no sob,no cp,no cough,awaiting CT aBDOMEN/PELVIS    REVIEW OF SYSTEMS:  AS ABOVE      INTERVAL HPI/OVERNIGHT EVENTS:  T(C): 36.6 (02-16-20 @ 12:18), Max: 36.7 (02-15-20 @ 21:14)  HR: 82 (02-16-20 @ 12:18) (70 - 82)  BP: 109/73 (02-16-20 @ 12:18) (109/70 - 118/82)  RR: 16 (02-16-20 @ 12:18) (16 - 16)  SpO2: 95% (02-16-20 @ 12:18) (95% - 96%)  Wt(kg): --  I&O's Summary    15 Feb 2020 07:01  -  16 Feb 2020 07:00  --------------------------------------------------------  IN: 3965 mL / OUT: 400 mL / NET: 3565 mL    16 Feb 2020 07:01  -  16 Feb 2020 20:33  --------------------------------------------------------  IN: 1500 mL / OUT: 800 mL / NET: 700 mL      T(C): 36.6 (02-16-20 @ 12:18), Max: 36.7 (02-15-20 @ 21:14)  HR: 82 (02-16-20 @ 12:18) (70 - 82)  BP: 109/73 (02-16-20 @ 12:18) (109/70 - 118/82)  RR: 16 (02-16-20 @ 12:18) (16 - 16)  SpO2: 95% (02-16-20 @ 12:18) (95% - 96%)  Wt(kg): --Vital Signs Last 24 Hrs  T(C): 36.6 (16 Feb 2020 12:18), Max: 36.7 (15 Feb 2020 21:14)  T(F): 97.8 (16 Feb 2020 12:18), Max: 98.1 (15 Feb 2020 21:14)  HR: 82 (16 Feb 2020 12:18) (70 - 82)  BP: 109/73 (16 Feb 2020 12:18) (109/70 - 118/82)  BP(mean): --  RR: 16 (16 Feb 2020 12:18) (16 - 16)  SpO2: 95% (16 Feb 2020 12:18) (95% - 96%)    LABS:                        11.4   9.66  )-----------( 229      ( 16 Feb 2020 06:00 )             38.3     02-16    141  |  107  |  20  ----------------------------<  92  4.7   |  24  |  0.68    Ca    8.5      16 Feb 2020 06:00  Phos  3.0     02-16  Mg     1.8     02-16    TPro  5.3<L>  /  Alb  3.0<L>  /  TBili  0.6  /  DBili  x   /  AST  48<H>  /  ALT  24  /  AlkPhos  50  02-16        CAPILLARY BLOOD GLUCOSE      POCT Blood Glucose.: 89 mg/dL (16 Feb 2020 17:11)  POCT Blood Glucose.: 122 mg/dL (16 Feb 2020 12:16)  POCT Blood Glucose.: 88 mg/dL (16 Feb 2020 08:18)  POCT Blood Glucose.: 152 mg/dL (15 Feb 2020 21:33)            PAST MEDICAL & SURGICAL HISTORY:  COPD without exacerbation  Schizoaffective disorder  Obesity  Diabetes  No significant past surgical history      MEDICATIONS  (STANDING):  amantadine 100 milliGRAM(s) Oral two times a day  budesonide 160 MICROgram(s)/formoterol 4.5 MICROgram(s) Inhaler 2 Puff(s) Inhalation two times a day  dextrose 5%. 1000 milliLiter(s) (50 mL/Hr) IV Continuous <Continuous>  dextrose 50% Injectable 12.5 Gram(s) IV Push once  dextrose 50% Injectable 25 Gram(s) IV Push once  dextrose 50% Injectable 25 Gram(s) IV Push once  heparin  Injectable 5000 Unit(s) SubCutaneous every 12 hours  influenza   Vaccine 0.5 milliLiter(s) IntraMuscular once  insulin lispro (HumaLOG) corrective regimen sliding scale   SubCutaneous three times a day before meals  insulin lispro (HumaLOG) corrective regimen sliding scale   SubCutaneous at bedtime  lactated ringers. 1000 milliLiter(s) (100 mL/Hr) IV Continuous <Continuous>  piperacillin/tazobactam IVPB.. 3.375 Gram(s) IV Intermittent every 8 hours    MEDICATIONS  (PRN):  dextrose 40% Gel 15 Gram(s) Oral once PRN Blood Glucose LESS THAN 70 milliGRAM(s)/deciliter  glucagon  Injectable 1 milliGRAM(s) IntraMuscular once PRN Glucose LESS THAN 70 milligrams/deciliter        RADIOLOGY & ADDITIONAL TESTS:    Imaging Personally Reviewed:  [ ] YES  [ ] NO    Consultant(s) Notes Reviewed:  [ ] YES  [ ] NO    PHYSICAL EXAM:  GENERAL: Alert and awake lying in bed in no distress  HEAD:  Atraumatic, Normocephalic  EYES: EOMI, MIGUEL ANGEL, conjunctiva and sclera clear  NECK: Supple, No JVD, Normal thyroid  NERVOUS SYSTEM:  Alert & Oriented X2, Motor and sensory systems are intact,   CHEST/LUNG: Bilateral clear breath sounds, no rhochi, no wheezing, no crepitations,  HEART: Regular rate and rhythm; No murmurs, rubs, or gallops  ABDOMEN: Soft, Nontender, Nondistended; Bowel sounds present  EXTREMITIES:   Peripheral Pulses are palpable, no  edema        Care Discussed with Consultants/Other Providers [ ] YES  [ ] NO      Code Status: [] Full Code [] DNR [] DNI [] Goals of Care:   Disposition: [] ICU [] Stroke Unit [] RCU []PCU []Floor [] Discharge Home         LONG CastilloP

## 2020-02-17 ENCOUNTER — TRANSCRIPTION ENCOUNTER (OUTPATIENT)
Age: 65
End: 2020-02-17

## 2020-02-17 LAB
ALBUMIN SERPL ELPH-MCNC: 3 G/DL — LOW (ref 3.3–5)
ALP SERPL-CCNC: 54 U/L — SIGNIFICANT CHANGE UP (ref 40–120)
ALT FLD-CCNC: 20 U/L — SIGNIFICANT CHANGE UP (ref 4–33)
ANION GAP SERPL CALC-SCNC: 9 MMO/L — SIGNIFICANT CHANGE UP (ref 7–14)
AST SERPL-CCNC: 33 U/L — HIGH (ref 4–32)
BASOPHILS # BLD AUTO: 0.04 K/UL — SIGNIFICANT CHANGE UP (ref 0–0.2)
BASOPHILS NFR BLD AUTO: 0.4 % — SIGNIFICANT CHANGE UP (ref 0–2)
BILIRUB SERPL-MCNC: 0.4 MG/DL — SIGNIFICANT CHANGE UP (ref 0.2–1.2)
BUN SERPL-MCNC: 11 MG/DL — SIGNIFICANT CHANGE UP (ref 7–23)
CALCIUM SERPL-MCNC: 8.6 MG/DL — SIGNIFICANT CHANGE UP (ref 8.4–10.5)
CHLORIDE SERPL-SCNC: 107 MMOL/L — SIGNIFICANT CHANGE UP (ref 98–107)
CK SERPL-CCNC: 398 U/L — HIGH (ref 25–170)
CO2 SERPL-SCNC: 26 MMOL/L — SIGNIFICANT CHANGE UP (ref 22–31)
CREAT SERPL-MCNC: 0.6 MG/DL — SIGNIFICANT CHANGE UP (ref 0.5–1.3)
EOSINOPHIL # BLD AUTO: 0.47 K/UL — SIGNIFICANT CHANGE UP (ref 0–0.5)
EOSINOPHIL NFR BLD AUTO: 4.9 % — SIGNIFICANT CHANGE UP (ref 0–6)
GLUCOSE BLDC GLUCOMTR-MCNC: 117 MG/DL — HIGH (ref 70–99)
GLUCOSE BLDC GLUCOMTR-MCNC: 71 MG/DL — SIGNIFICANT CHANGE UP (ref 70–99)
GLUCOSE BLDC GLUCOMTR-MCNC: 90 MG/DL — SIGNIFICANT CHANGE UP (ref 70–99)
GLUCOSE BLDC GLUCOMTR-MCNC: 98 MG/DL — SIGNIFICANT CHANGE UP (ref 70–99)
GLUCOSE SERPL-MCNC: 99 MG/DL — SIGNIFICANT CHANGE UP (ref 70–99)
HCT VFR BLD CALC: 37.6 % — SIGNIFICANT CHANGE UP (ref 34.5–45)
HGB BLD-MCNC: 11.4 G/DL — LOW (ref 11.5–15.5)
IMM GRANULOCYTES NFR BLD AUTO: 0.1 % — SIGNIFICANT CHANGE UP (ref 0–1.5)
LYMPHOCYTES # BLD AUTO: 5.05 K/UL — HIGH (ref 1–3.3)
LYMPHOCYTES # BLD AUTO: 52.2 % — HIGH (ref 13–44)
MAGNESIUM SERPL-MCNC: 1.8 MG/DL — SIGNIFICANT CHANGE UP (ref 1.6–2.6)
MCHC RBC-ENTMCNC: 26.3 PG — LOW (ref 27–34)
MCHC RBC-ENTMCNC: 30.3 % — LOW (ref 32–36)
MCV RBC AUTO: 86.8 FL — SIGNIFICANT CHANGE UP (ref 80–100)
MONOCYTES # BLD AUTO: 0.69 K/UL — SIGNIFICANT CHANGE UP (ref 0–0.9)
MONOCYTES NFR BLD AUTO: 7.1 % — SIGNIFICANT CHANGE UP (ref 2–14)
NEUTROPHILS # BLD AUTO: 3.42 K/UL — SIGNIFICANT CHANGE UP (ref 1.8–7.4)
NEUTROPHILS NFR BLD AUTO: 35.3 % — LOW (ref 43–77)
NRBC # FLD: 0 K/UL — SIGNIFICANT CHANGE UP (ref 0–0)
PHOSPHATE SERPL-MCNC: 3.4 MG/DL — SIGNIFICANT CHANGE UP (ref 2.5–4.5)
PLATELET # BLD AUTO: 214 K/UL — SIGNIFICANT CHANGE UP (ref 150–400)
PMV BLD: 10.5 FL — SIGNIFICANT CHANGE UP (ref 7–13)
POTASSIUM SERPL-MCNC: 4 MMOL/L — SIGNIFICANT CHANGE UP (ref 3.5–5.3)
POTASSIUM SERPL-SCNC: 4 MMOL/L — SIGNIFICANT CHANGE UP (ref 3.5–5.3)
PROT SERPL-MCNC: 5.1 G/DL — LOW (ref 6–8.3)
RBC # BLD: 4.33 M/UL — SIGNIFICANT CHANGE UP (ref 3.8–5.2)
RBC # FLD: 13.4 % — SIGNIFICANT CHANGE UP (ref 10.3–14.5)
SODIUM SERPL-SCNC: 142 MMOL/L — SIGNIFICANT CHANGE UP (ref 135–145)
WBC # BLD: 9.68 K/UL — SIGNIFICANT CHANGE UP (ref 3.8–10.5)
WBC # FLD AUTO: 9.68 K/UL — SIGNIFICANT CHANGE UP (ref 3.8–10.5)

## 2020-02-17 PROCEDURE — 74177 CT ABD & PELVIS W/CONTRAST: CPT | Mod: 26

## 2020-02-17 PROCEDURE — 99232 SBSQ HOSP IP/OBS MODERATE 35: CPT

## 2020-02-17 RX ORDER — POLYETHYLENE GLYCOL 3350 17 G/17G
17 POWDER, FOR SOLUTION ORAL DAILY
Refills: 0 | Status: DISCONTINUED | OUTPATIENT
Start: 2020-02-17 | End: 2020-02-19

## 2020-02-17 RX ADMIN — PIPERACILLIN AND TAZOBACTAM 25 GRAM(S): 4; .5 INJECTION, POWDER, LYOPHILIZED, FOR SOLUTION INTRAVENOUS at 12:37

## 2020-02-17 RX ADMIN — SODIUM CHLORIDE 100 MILLILITER(S): 9 INJECTION, SOLUTION INTRAVENOUS at 08:45

## 2020-02-17 RX ADMIN — Medication 100 MILLIGRAM(S): at 05:18

## 2020-02-17 RX ADMIN — BUDESONIDE AND FORMOTEROL FUMARATE DIHYDRATE 2 PUFF(S): 160; 4.5 AEROSOL RESPIRATORY (INHALATION) at 21:14

## 2020-02-17 RX ADMIN — SODIUM CHLORIDE 100 MILLILITER(S): 9 INJECTION, SOLUTION INTRAVENOUS at 17:44

## 2020-02-17 RX ADMIN — PIPERACILLIN AND TAZOBACTAM 25 GRAM(S): 4; .5 INJECTION, POWDER, LYOPHILIZED, FOR SOLUTION INTRAVENOUS at 21:14

## 2020-02-17 RX ADMIN — HEPARIN SODIUM 5000 UNIT(S): 5000 INJECTION INTRAVENOUS; SUBCUTANEOUS at 17:44

## 2020-02-17 RX ADMIN — PIPERACILLIN AND TAZOBACTAM 25 GRAM(S): 4; .5 INJECTION, POWDER, LYOPHILIZED, FOR SOLUTION INTRAVENOUS at 05:18

## 2020-02-17 RX ADMIN — SODIUM CHLORIDE 100 MILLILITER(S): 9 INJECTION, SOLUTION INTRAVENOUS at 12:36

## 2020-02-17 RX ADMIN — HEPARIN SODIUM 5000 UNIT(S): 5000 INJECTION INTRAVENOUS; SUBCUTANEOUS at 05:18

## 2020-02-17 RX ADMIN — BUDESONIDE AND FORMOTEROL FUMARATE DIHYDRATE 2 PUFF(S): 160; 4.5 AEROSOL RESPIRATORY (INHALATION) at 08:38

## 2020-02-17 RX ADMIN — Medication 100 MILLIGRAM(S): at 17:44

## 2020-02-17 NOTE — PROGRESS NOTE ADULT - SUBJECTIVE AND OBJECTIVE BOX
Follow Up: Fever    Interval History/ROS: Afebrile. Feels fine. Denies pain, cough, rhinorrhea, sore throat, diarrhea, vomiting, dysuria. Eating lunch, says the food is pretty good.     Allergies  No Known Allergies    ANTIMICROBIALS:  piperacillin/tazobactam IVPB.. 3.375 every 8 hours      OTHER MEDS:  amantadine 100 milliGRAM(s) Oral two times a day  budesonide 160 MICROgram(s)/formoterol 4.5 MICROgram(s) Inhaler 2 Puff(s) Inhalation two times a day  dextrose 40% Gel 15 Gram(s) Oral once PRN  dextrose 5%. 1000 milliLiter(s) IV Continuous <Continuous>  dextrose 50% Injectable 12.5 Gram(s) IV Push once  dextrose 50% Injectable 25 Gram(s) IV Push once  dextrose 50% Injectable 25 Gram(s) IV Push once  glucagon  Injectable 1 milliGRAM(s) IntraMuscular once PRN  heparin  Injectable 5000 Unit(s) SubCutaneous every 12 hours  influenza   Vaccine 0.5 milliLiter(s) IntraMuscular once  insulin lispro (HumaLOG) corrective regimen sliding scale   SubCutaneous three times a day before meals  insulin lispro (HumaLOG) corrective regimen sliding scale   SubCutaneous at bedtime  lactated ringers. 1000 milliLiter(s) IV Continuous <Continuous>      Vital Signs Last 24 Hrs  T(C): 36.4 (17 Feb 2020 12:26), Max: 36.7 (17 Feb 2020 05:17)  T(F): 97.6 (17 Feb 2020 12:26), Max: 98.1 (17 Feb 2020 05:17)  HR: 74 (17 Feb 2020 12:26) (70 - 74)  BP: 121/86 (17 Feb 2020 12:26) (112/83 - 128/70)  BP(mean): --  RR: 17 (17 Feb 2020 12:26) (16 - 17)  SpO2: 94% (17 Feb 2020 12:26) (94% - 96%)    Physical Exam:  General: non toxic, eating lunch   Head: atraumatic, normocephalic  Eye: right eye deviated laterally. normal sclera and conjunctiva  Cardio: regular rate and rhythm   Respiratory: nonlabored on room air, clear bilaterally, no wheezing  abd: soft, bowel sounds present, no tenderness  : no CVAT, no suprapubic tenderness, no  arreguin  vascular: no phlebitis   Skin: no rash  Neurologic: awake, alert, feeding herself   psych: normal affect, answers all questions appropriately                           11.4   9.68  )-----------( 214      ( 17 Feb 2020 05:15 )             37.6       02-17    142  |  107  |  11  ----------------------------<  99  4.0   |  26  |  0.60    Ca    8.6      17 Feb 2020 05:15  Phos  3.4     02-17  Mg     1.8     02-17    TPro  5.1<L>  /  Alb  3.0<L>  /  TBili  0.4  /  DBili  x   /  AST  33<H>  /  ALT  20  /  AlkPhos  54  02-17      MICROBIOLOGY:  Culture - Blood (collected 02-14-20 @ 09:50)  Source: Peripheral Site 2  Preliminary Report (02-17-20 @ 09:50):    NO ORGANISMS ISOLATED    NO ORGANISMS ISOLATED AT 72 HRS.    Culture - Blood (collected 02-14-20 @ 09:50)  Source: Peripheral Site 1  Preliminary Report (02-17-20 @ 09:50):    NO ORGANISMS ISOLATED    NO ORGANISMS ISOLATED AT 72 HRS.    Culture - Urine (collected 02-14-20 @ 09:30)  Source: URINE MIDSTREAM  Final Report (02-15-20 @ 08:18):    NO GROWTH AT 24 HOURS    Rapid Respiratory Viral Panel (02.14.20 @ 10:31)    Adenovirus (RapRVP): Not Detected    Influenza A (RapRVP): Not Detected    Influenza AH1 2009 (RapRVP): Not Detected    Influenza AH1 (RapRVP): Not Detected    Influenza AH3 (RapRVP): Not Detected    Influenza B (RapRVP): Not Detected    Parainfluenza 1 (RapRVP): Not Detected    Parainfluenza 2 (RapRVP): Not Detected    Parainfluenza 3 (RapRVP): Not Detected    Parainfluenza 4 (RapRVP): Not Detected    Resp Syncytial Virus (RapRVP): Not Detected    Chlamydia pneumoniae (RapRVP): Not Detected    Mycoplasma pneumoniae (RapRVP): Not Detected    Entero/Rhinovirus (RapRVP): Not Detected    hMPV (RapRVP): Not Detected    Coronavirus (229E,HKU1,NL63,OC43): Not Detected     RADIOLOGY:  Images below reviewed personally  CT Abdomen and Pelvis w/ IV Cont (02.17.20 @ 12:00)   Stool-filled rectum. Cannot exclude minimal rectal wall thickening with surrounding minimal to mild hazy change makes it difficult to exclude stercoral colitis. Please clinically correlate.  Mildly prominent adnexa bilaterally, left greater than right in this 64-year-old female patient which deserves further evaluation with pelvic sonography. There is no previous pelvic imaging for comparison.

## 2020-02-17 NOTE — DISCHARGE NOTE PROVIDER - PROVIDER TOKENS
FREE:[LAST:[Isakov],FIRST:[Jama],PHONE:[phone]],FAX:[(   )    -],FOLLOWUP:[2 weeks],ESTABLISHEDPATIENT:[T]],FREE:[LAST:[Your primary care provider],PHONE:[(   )    -],FAX:[(   )    -],FOLLOWUP:[2 weeks]] FREE:[LAST:[Alisson],FIRST:[Jama],PHONE:[phone]],FAX:[(   )    -],FOLLOWUP:[2 weeks],ESTABLISHEDPATIENT:[T]],FREE:[LAST:[Your primary care provider],PHONE:[(   )    -],FAX:[(   )    -],FOLLOWUP:[2 weeks],ESTABLISHEDPATIENT:[T]],FREE:[LAST:[Dr. Vinson],PHONE:[phone]],FAX:[(   )    -],SCHEDULEDAPPT:[02/29/2020],SCHEDULEDAPPTTIME:[03:00 PM],ESTABLISHEDPATIENT:[T]],PROVIDER:[TOKEN:[[id number]],FOLLOWUP:[1 month]]

## 2020-02-17 NOTE — PROGRESS NOTE ADULT - SUBJECTIVE AND OBJECTIVE BOX
[facility]   MD CY LORENZO DO ANAM SIDDIQUI ANGELA WONG, PA    TEL:  OFFICE: [phone]  DR MANINNG CELL: [phone]  DR. DE ANDA CELL: [phone]  DR. JONES CELL: [phone]  KRYSTYNA BARRETT CELL: [phone]        Patient is a 64y old  Female who presents with a chief complaint of fever, AMS (16 Feb 2020 20:32)      Patient seen and examined at bedside. No chest pain/sob    VITALS:  T(F): 98.1 (02-17-20 @ 05:17), Max: 98.1 (02-17-20 @ 05:17)  HR: 70 (02-17-20 @ 05:17)  BP: 112/83 (02-17-20 @ 05:17)  RR: 16 (02-17-20 @ 05:17)  SpO2: 96% (02-17-20 @ 05:17)  Wt(kg): --    02-16 @ 07:01  -  02-17 @ 07:00  --------------------------------------------------------  IN: 3060 mL / OUT: 1850 mL / NET: 1210 mL          PHYSICAL EXAM:  Constitutional: NAD  Neck: No JVD  Respiratory: CTAB, no wheezes, rales or rhonchi  Cardiovascular: S1, S2, RRR  Gastrointestinal: BS+, soft, NT/ND  Extremities: No peripheral edema    Hospital Medications:   MEDICATIONS  (STANDING):  amantadine 100 milliGRAM(s) Oral two times a day  budesonide 160 MICROgram(s)/formoterol 4.5 MICROgram(s) Inhaler 2 Puff(s) Inhalation two times a day  dextrose 5%. 1000 milliLiter(s) (50 mL/Hr) IV Continuous <Continuous>  dextrose 50% Injectable 12.5 Gram(s) IV Push once  dextrose 50% Injectable 25 Gram(s) IV Push once  dextrose 50% Injectable 25 Gram(s) IV Push once  heparin  Injectable 5000 Unit(s) SubCutaneous every 12 hours  influenza   Vaccine 0.5 milliLiter(s) IntraMuscular once  insulin lispro (HumaLOG) corrective regimen sliding scale   SubCutaneous three times a day before meals  insulin lispro (HumaLOG) corrective regimen sliding scale   SubCutaneous at bedtime  lactated ringers. 1000 milliLiter(s) (100 mL/Hr) IV Continuous <Continuous>  piperacillin/tazobactam IVPB.. 3.375 Gram(s) IV Intermittent every 8 hours      LABS:  02-17    142  |  107  |  11  ----------------------------<  99  4.0   |  26  |  0.60    Ca    8.6      17 Feb 2020 05:15  Phos  3.4     02-17  Mg     1.8     02-17    TPro  5.1<L>  /  Alb  3.0<L>  /  TBili  0.4  /  DBili      /  AST  33<H>  /  ALT  20  /  AlkPhos  54  02-17    Creatinine Trend: 0.60 <--, 0.68 <--, 0.70 <--, 0.72 <--, 0.81 <--    Phosphorus Level, Serum: 3.4 mg/dL (02-17 @ 05:15)  Albumin, Serum: 3.0 g/dL (02-17 @ 05:15)                              11.4   9.68  )-----------( 214      ( 17 Feb 2020 05:15 )             37.6     Urine Studies:  Urinalysis - [02-14-20 @ 09:34]      Color YELLOW / Appearance TURBID / SG 1.031 / pH 5.5      Gluc NEGATIVE / Ketone SMALL  / Bili NEGATIVE / Urobili NORMAL       Blood NEGATIVE / Protein 50 / Leuk Est NEGATIVE / Nitrite NEGATIVE      RBC 0-2 / WBC 0-2 / Hyaline 1+ / Gran  / Sq Epi OCC / Non Sq Epi  / Bacteria NEGATIVE      HbA1c 5.6      [02-15-20 @ 06:05]  TSH 2.02      [02-16-20 @ 06:00]    HCV 0.18, Nonreactive Hepatitis C AB  S/CO Ratio                        Interpretation  < 1.00                                   Non-Reactive  1.00 - 4.99                         Weakly-Reactive  >= 5.00                                Reactive  Non-Reactive: Aperson with a non-reactive HCV antibody  result is considered uninfected.  No further action is  needed unless recent infection is suspected.  In these  cases, consider repeat testing later to detect  seroconversion..  Weakly-Reactive: HCV antibody test is abnormal, HCV RNA  Qualitative test will follow.  Reactive: HCV antibody test is abnormal, HCV RNA  Qualitative test will follow.  Note: HCV antibody testing is performed on the Abbott   system.      [02-15-20 @ 06:05]      RADIOLOGY & ADDITIONAL STUDIES:

## 2020-02-17 NOTE — DISCHARGE NOTE PROVIDER - HOSPITAL COURSE
64y Female with PMH of schizoaffective d/o on Geodon, DMII, COPD BIBEMS for fever and AMS, admitted for acute metabolic encephalopathy 2/2 NMS vs sepsis of unclear source.             AMS    - NMS vs SIRS, unclear source with fever    - Code Stroke in ED --> CTH negative    - MICU consulted --> Not a candidate at this time    - Psych consulted --> HOLD Geodon    - S&S eval --> Regular w/ thin liquids    - Resolved        Leukocytosis    - Unclear cause --> Infection vs reactive    - ID --> Unlikely infection at this time    - CT AP w/ possible stercoral colitis    - BCx, UCx remain NTD    - Bowel regimen    - Resolved s/p 4 days ABx        Other schizoaffective disorders    - Psych consulted as above    - c/w amantadine for drug induced parkinsonism     - HOLD Geodon        Rhabdomyolysis    - CPK peaked 1655    - Likely 2/2 underlying parkinsonian tremor exacerbated by agitation    - c/w IVF     - Resolved        Hypernatremia    - Likely 2/2 dehydration    - Renal consulted --> c/w IVF    - Resolved        Type 2 diabetes mellitus    - HgbA1c 5.6    - Hold metformin    - ISS & FS        Prophylactic measure    - DVT ppx: SQH        On ___ this case was reviewed with Dr. Castillo, the patient is medically stable and optimized for discharge. All medications were reviewed and prescriptions were sent to mutually agreed upon pharmacy. 64y Female with PMH of schizoaffective d/o on Geodon, DMII, COPD BIBEMS for fever and AMS, admitted for acute metabolic encephalopathy 2/2 NMS vs sepsis of unclear source.             AMS    - NMS vs SIRS, unclear source with fever    - Infection workup negative    - Code Stroke in ED --> CTH negative    - MICU consulted --> Not a candidate at this time    - Psych consulted --> HOLD Geodon    - S&S eval --> Regular w/ thin liquids    - Resolved        Lymphocytosis    - Unclear cause --> Infection vs reactive    - ID --> Unlikely infection at this time, s/p ABx    - CT AP w/ possible stercoral colitis    - Heme/Onc --> Pelvic US neg, assess for CLL    - BCx, UCx remain NTD    - Bowel regimen    - Outpatient follow up        Other schizoaffective disorders    - Psych consulted as above    - c/w amantadine for drug induced parkinsonism     - HOLD Geodon    - Outpatient psych (Dr. Vinson [phone]) would like to hold Geodon and will evaluate outpatient on 2/29/2020 @ 3PM        Rhabdomyolysis    - CPK peaked 1655    - Likely 2/2 underlying parkinsonian tremor exacerbated by agitation    - c/w IVF     - Resolved        Hypernatremia    - Likely 2/2 dehydration    - Renal consulted --> c/w IVF    - Resolved        Type 2 diabetes mellitus    - HgbA1c 5.6    - Hold metformin    - ISS & FS        Prophylactic measure    - DVT ppx: SQH        On 2/19 this case was reviewed with Dr. Castillo, the patient is medically stable and optimized for discharge. All medications were reviewed and prescriptions were sent to mutually agreed upon pharmacy.

## 2020-02-17 NOTE — PROGRESS NOTE ADULT - ASSESSMENT
64F active smoker with COPD, DM, Schizoaffective disorder, admitted for 2/14/20 acute fevers.   Nonfocal exam, at baseline (tremors and behavior).   CXR, RVP, UA, blood cultures all negative.   CT abdomen with possible stercoral colitis and prominent adnexa but no localizing symptoms in these areas.   Unclear significance of chronic lymphocytosis (since 2016).   Recent increase in Ziprasidone but lower on the differential as a cause for fevers.   Improved, fevers stopped and WBC normalized.     Suggest:  -bowel regimen   -continue empiric Zosyn 3.376 q8h, day 4, likely stop soon   -consider pelvic US to evaluate adnexa     Kole Ruffin MD   Infectious Disease   Pager [phone]   After 5PM and on weekends please page fellow on call or call [phone] 64F active smoker with COPD, DM, Schizoaffective disorder, admitted for 2/14/20 acute fevers.   Nonfocal exam, at baseline (tremors and behavior).   CXR, RVP, UA, blood cultures all negative.   CT abdomen with possible stercoral colitis, but denies GI symptoms. Perhaps inflammation improved with antibiotics.   Unclear significance of adnexa prominence. No localizing pain.   Unclear significance of chronic lymphocytosis (since 2016). Lymphoproliferative disorder?   Recent increase in Ziprasidone but lower on the differential as a cause for fevers.   Improved, fevers stopped and WBC normalized.     Suggest:  -bowel regimen   -stop Zosyn 3.376 q8h, day 4 and she's doing fine   -consider pelvic US to evaluate adnexa   -consider flow cytometry and/or hematology evaluation of lymphocytosis     Kole Ruffin MD   Infectious Disease   Pager [phone]   After 5PM and on weekends please page fellow on call or call [phone]

## 2020-02-17 NOTE — DISCHARGE NOTE PROVIDER - NSDCCPCAREPLAN_GEN_ALL_CORE_FT
PRINCIPAL DISCHARGE DIAGNOSIS  Diagnosis: Metabolic encephalopathy  Assessment and Plan of Treatment: Unclear cause of altered mental status at this time. Psych was consulted for evaluation and Geodon was held during admission. Infectious disease was consulted and patient was treated with 4 days of antiobtics empirically with negative infectious workup. Mental status improved back to baseline.  **Follow up with primary care provider and psychiatrist for continued evaluation and management recommendations within 2 weeks.      SECONDARY DISCHARGE DIAGNOSES  Diagnosis: Rhabdomyolysis  Assessment and Plan of Treatment: You were treated with IVF fluids for muscle breakdown. Your labs were trended and your symptoms improved.  **Please follow up with your primary care provider and have repeat blood work in 2 week (CBC, BMP, Mg, Phos, CPK).    Diagnosis: Leukocytosis  Assessment and Plan of Treatment: Unclear source at this time. Resolved after short course of antibiotics.    Diagnosis: Type 2 diabetes mellitus without complication, without long-term current use of insulin  Assessment and Plan of Treatment: Continue to monitor blood sugars and follow up with primary care provider within 2 weeks for further management recommendations.    Diagnosis: Hypernatremia  Assessment and Plan of Treatment: Nephrology was consulted, suspect secondary to dehydration. You were treated with IV fluids and your sodium levels returned back to baseline.  **Follow up with primary care provider within 2 weeks for further management recommendations. Repeat blood work at time of visit (CBC, BMP, Mg, Phos).    Diagnosis: Chronic schizoaffective disorder  Assessment and Plan of Treatment: Continue your medications as directed and follow up with your PCP and psychiatrist for further evaluation and medical management. If you are ever in need of immediate psychiatric assistance you may reach out to the [facility] [phone]. PRINCIPAL DISCHARGE DIAGNOSIS  Diagnosis: Metabolic encephalopathy  Assessment and Plan of Treatment: Unclear cause of altered mental status at this time. Psych was consulted for evaluation and Geodon was held during admission. Infectious disease was consulted and patient was treated with 4 days of antiobtics empirically with negative infectious workup. Mental status improved back to baseline.  **Follow up with primary care provider and psychiatrist for continued evaluation and management recommendations within 2 weeks.  **You have an appointment with Dr. Vinson in the office scheduled for 2/29/2020 @ 3PM.      SECONDARY DISCHARGE DIAGNOSES  Diagnosis: Rhabdomyolysis  Assessment and Plan of Treatment: You were treated with IVF fluids for muscle breakdown. Your labs were trended and your symptoms improved.  **Please follow up with your primary care provider and have repeat blood work in 2 week (CBC, BMP, Mg, Phos, CPK).    Diagnosis: Leukocytosis  Assessment and Plan of Treatment: Unclear source at this time. Resolved after short course of antibiotics. You were evaluated by hematology and it is recommended that you follow up with Dr. Zamora (Hematology/Oncology) in the office within 1 month. Please call to make an appointment, [phone].    Diagnosis: Type 2 diabetes mellitus without complication, without long-term current use of insulin  Assessment and Plan of Treatment: Continue to monitor blood sugars and follow up with primary care provider within 2 weeks for further management recommendations.    Diagnosis: Hypernatremia  Assessment and Plan of Treatment: Nephrology was consulted, suspect secondary to dehydration. You were treated with IV fluids and your sodium levels returned back to baseline.  **Follow up with primary care provider within 2 weeks for further management recommendations. Repeat blood work at time of visit (CBC, BMP, Mg, Phos).    Diagnosis: Chronic schizoaffective disorder  Assessment and Plan of Treatment: Continue your medications as directed and follow up with your PCP and psychiatrist for further evaluation and medical management. If you are ever in need of immediate psychiatric assistance you may reach out to the [facility] [phone].

## 2020-02-17 NOTE — DISCHARGE NOTE PROVIDER - NSDCMRMEDTOKEN_GEN_ALL_CORE_FT
Advair Diskus 250 mcg-50 mcg inhalation powder: 1 inhaler(s) inhaled 2 times a day  amantadine 100 mg oral capsule: 1 cap(s) orally 2 times a day  metFORMIN 500 mg oral tablet: 1 tab(s) orally 2 times a day  ProAir HFA 90 mcg/inh inhalation aerosol: 1 puff(s) inhaled 3 times a day, As Needed  ziprasidone 60 mg oral capsule: 1 cap(s) orally 2 times a day Advair Diskus 250 mcg-50 mcg inhalation powder: 1 inhaler(s) inhaled 2 times a day  amantadine 100 mg oral capsule: 1 cap(s) orally 2 times a day  metFORMIN 500 mg oral tablet: 1 tab(s) orally 2 times a day  ProAir HFA 90 mcg/inh inhalation aerosol: 1 puff(s) inhaled 3 times a day, As Needed

## 2020-02-17 NOTE — PROGRESS NOTE ADULT - PROBLEM SELECTOR PLAN 1
. sepsis (CNS infection) -ruled out.likely colitis.s/p zosyn.ID f/u noted  -hold Geodon   -cont LR @100cc/hr  CT abd-colitis/ prominent adnexa?-suggest pelvic u/s  -O2 via NC to maintain SPO2>95  -ID consult appreciated.f/u cultures.CT ABD/PELVIS-will need 2 PC  -f/u blood cx, urine cx  -trend CPK--cont ivf  -psychiatry called to assist management NMS

## 2020-02-17 NOTE — DISCHARGE NOTE PROVIDER - CARE PROVIDER_API CALL
Jama Vinson  Phone: [phone]  Fax: (   )    -  Established Patient  Follow Up Time: 2 weeks    Your primary care provider,   Phone: (   )    -  Fax: (   )    -  Follow Up Time: 2 weeks Jama Vinson  Phone: [phone]  Fax: (   )    -  Established Patient  Follow Up Time: 2 weeks    Your primary care provider,   Phone: (   )    -  Fax: (   )    -  Established Patient  Follow Up Time: 2 weeks    Dr. Vinson,   Phone: [phone]  Fax: (   )    -  Established Patient  Scheduled Appointment: 02/29/2020 03:00 PM    Umesh Zamora)  Internal Medicine  [address], [place] [address]  Phone: [phone]  Fax: [phone]  Follow Up Time: 1 month

## 2020-02-17 NOTE — PROGRESS NOTE ADULT - SUBJECTIVE AND OBJECTIVE BOX
HOANG PORTILLO  64y  Female      Patient is a 64y old  Female who presents with a chief complaint of fever, AMS (17 Feb 2020 15:43)  comfortable,no sob,no cp,no fever,no cough,no abd.pain    REVIEW OF SYSTEMS:  as above        INTERVAL HPI/OVERNIGHT EVENTS:  T(C): 36.4 (02-17-20 @ 12:26), Max: 36.7 (02-17-20 @ 05:17)  HR: 74 (02-17-20 @ 12:26) (70 - 74)  BP: 121/86 (02-17-20 @ 12:26) (112/83 - 128/70)  RR: 17 (02-17-20 @ 12:26) (16 - 17)  SpO2: 94% (02-17-20 @ 12:26) (94% - 96%)  Wt(kg): --  I&O's Summary    16 Feb 2020 07:01  -  17 Feb 2020 07:00  --------------------------------------------------------  IN: 3060 mL / OUT: 1850 mL / NET: 1210 mL    17 Feb 2020 07:01  -  17 Feb 2020 18:00  --------------------------------------------------------  IN: 1340 mL / OUT: 1900 mL / NET: -560 mL      T(C): 36.4 (02-17-20 @ 12:26), Max: 36.7 (02-17-20 @ 05:17)  HR: 74 (02-17-20 @ 12:26) (70 - 74)  BP: 121/86 (02-17-20 @ 12:26) (112/83 - 128/70)  RR: 17 (02-17-20 @ 12:26) (16 - 17)  SpO2: 94% (02-17-20 @ 12:26) (94% - 96%)  Wt(kg): --Vital Signs Last 24 Hrs  T(C): 36.4 (17 Feb 2020 12:26), Max: 36.7 (17 Feb 2020 05:17)  T(F): 97.6 (17 Feb 2020 12:26), Max: 98.1 (17 Feb 2020 05:17)  HR: 74 (17 Feb 2020 12:26) (70 - 74)  BP: 121/86 (17 Feb 2020 12:26) (112/83 - 128/70)  BP(mean): --  RR: 17 (17 Feb 2020 12:26) (16 - 17)  SpO2: 94% (17 Feb 2020 12:26) (94% - 96%)    LABS:                        11.4   9.68  )-----------( 214      ( 17 Feb 2020 05:15 )             37.6     02-17    142  |  107  |  11  ----------------------------<  99  4.0   |  26  |  0.60    Ca    8.6      17 Feb 2020 05:15  Phos  3.4     02-17  Mg     1.8     02-17    TPro  5.1<L>  /  Alb  3.0<L>  /  TBili  0.4  /  DBili  x   /  AST  33<H>  /  ALT  20  /  AlkPhos  54  02-17        CAPILLARY BLOOD GLUCOSE      POCT Blood Glucose.: 90 mg/dL (17 Feb 2020 17:16)  POCT Blood Glucose.: 71 mg/dL (17 Feb 2020 12:22)  POCT Blood Glucose.: 98 mg/dL (17 Feb 2020 08:25)  POCT Blood Glucose.: 116 mg/dL (16 Feb 2020 22:12)            PAST MEDICAL & SURGICAL HISTORY:  COPD without exacerbation  Schizoaffective disorder  Obesity  Diabetes  No significant past surgical history      MEDICATIONS  (STANDING):  amantadine 100 milliGRAM(s) Oral two times a day  budesonide 160 MICROgram(s)/formoterol 4.5 MICROgram(s) Inhaler 2 Puff(s) Inhalation two times a day  dextrose 5%. 1000 milliLiter(s) (50 mL/Hr) IV Continuous <Continuous>  dextrose 50% Injectable 12.5 Gram(s) IV Push once  dextrose 50% Injectable 25 Gram(s) IV Push once  dextrose 50% Injectable 25 Gram(s) IV Push once  heparin  Injectable 5000 Unit(s) SubCutaneous every 12 hours  influenza   Vaccine 0.5 milliLiter(s) IntraMuscular once  insulin lispro (HumaLOG) corrective regimen sliding scale   SubCutaneous three times a day before meals  insulin lispro (HumaLOG) corrective regimen sliding scale   SubCutaneous at bedtime  lactated ringers. 1000 milliLiter(s) (100 mL/Hr) IV Continuous <Continuous>  piperacillin/tazobactam IVPB.. 3.375 Gram(s) IV Intermittent every 8 hours  polyethylene glycol 3350 17 Gram(s) Oral daily    MEDICATIONS  (PRN):  dextrose 40% Gel 15 Gram(s) Oral once PRN Blood Glucose LESS THAN 70 milliGRAM(s)/deciliter  glucagon  Injectable 1 milliGRAM(s) IntraMuscular once PRN Glucose LESS THAN 70 milligrams/deciliter        RADIOLOGY & ADDITIONAL TESTS:    Imaging Personally Reviewed:  [ ] YES  [ ] NO    Consultant(s) Notes Reviewed:  [x ] YES  [ ] NO    PHYSICAL EXAM:  GENERAL: Alert and awake lying in bed in no distress  HEAD:  Atraumatic, Normocephalic  EYES: EOMI, MIGUEL ANGEL, conjunctiva and sclera clear  NECK: Supple, No JVD, Normal thyroid  NERVOUS SYSTEM:  Alert & Oriented X3, Motor and sensory systems are intact,   CHEST/LUNG: Bilateral clear breath sounds, no rhochi, no wheezing, no crepitations,  HEART: Regular rate and rhythm; No murmurs, rubs, or gallops  ABDOMEN: Soft, Nontender, Nondistended; Bowel sounds present  EXTREMITIES:   Peripheral Pulses are palpable, no  edema        Care Discussed with Consultants/Other Providers [ ] YES  [ ] NO      Code Status: [] Full Code [] DNR [] DNI [] Goals of Care:   Disposition: [] ICU [] Stroke Unit [] RCU []PCU []Floor [] Discharge Home         EDITH Castillo

## 2020-02-17 NOTE — DISCHARGE NOTE PROVIDER - NSDCFUADDAPPT_GEN_ALL_CORE_FT
**Please continue all psych medications as directed and follow up with your psychiatrist, Dr. Jama Vinson after rehab.    **Please follow up with your primary care provider and have repeat blood work within 2 weeks (CBC, BMP, Mg, Phos). **Please continue all psych medications as directed and follow up with your psychiatrist, Dr. Jama Vinson on February 29 @ 3PM.    **Please follow up with your primary care provider and have repeat blood work within 2 weeks (CBC, BMP, Mg, Phos).

## 2020-02-18 LAB
ALBUMIN SERPL ELPH-MCNC: 2.9 G/DL — LOW (ref 3.3–5)
ALP SERPL-CCNC: 53 U/L — SIGNIFICANT CHANGE UP (ref 40–120)
ALT FLD-CCNC: 23 U/L — SIGNIFICANT CHANGE UP (ref 4–33)
ANION GAP SERPL CALC-SCNC: 8 MMO/L — SIGNIFICANT CHANGE UP (ref 7–14)
AST SERPL-CCNC: 26 U/L — SIGNIFICANT CHANGE UP (ref 4–32)
BASOPHILS # BLD AUTO: 0.04 K/UL — SIGNIFICANT CHANGE UP (ref 0–0.2)
BASOPHILS NFR BLD AUTO: 0.4 % — SIGNIFICANT CHANGE UP (ref 0–2)
BILIRUB SERPL-MCNC: 0.3 MG/DL — SIGNIFICANT CHANGE UP (ref 0.2–1.2)
BUN SERPL-MCNC: 10 MG/DL — SIGNIFICANT CHANGE UP (ref 7–23)
CALCIUM SERPL-MCNC: 8.7 MG/DL — SIGNIFICANT CHANGE UP (ref 8.4–10.5)
CHLORIDE SERPL-SCNC: 105 MMOL/L — SIGNIFICANT CHANGE UP (ref 98–107)
CK SERPL-CCNC: 200 U/L — HIGH (ref 25–170)
CO2 SERPL-SCNC: 27 MMOL/L — SIGNIFICANT CHANGE UP (ref 22–31)
CREAT SERPL-MCNC: 0.72 MG/DL — SIGNIFICANT CHANGE UP (ref 0.5–1.3)
EOSINOPHIL # BLD AUTO: 0.43 K/UL — SIGNIFICANT CHANGE UP (ref 0–0.5)
EOSINOPHIL NFR BLD AUTO: 4.8 % — SIGNIFICANT CHANGE UP (ref 0–6)
GLUCOSE BLDC GLUCOMTR-MCNC: 103 MG/DL — HIGH (ref 70–99)
GLUCOSE BLDC GLUCOMTR-MCNC: 109 MG/DL — HIGH (ref 70–99)
GLUCOSE BLDC GLUCOMTR-MCNC: 72 MG/DL — SIGNIFICANT CHANGE UP (ref 70–99)
GLUCOSE BLDC GLUCOMTR-MCNC: 97 MG/DL — SIGNIFICANT CHANGE UP (ref 70–99)
GLUCOSE SERPL-MCNC: 87 MG/DL — SIGNIFICANT CHANGE UP (ref 70–99)
HCT VFR BLD CALC: 37 % — SIGNIFICANT CHANGE UP (ref 34.5–45)
HGB BLD-MCNC: 11.4 G/DL — LOW (ref 11.5–15.5)
IMM GRANULOCYTES NFR BLD AUTO: 0.1 % — SIGNIFICANT CHANGE UP (ref 0–1.5)
LYMPHOCYTES # BLD AUTO: 4.91 K/UL — HIGH (ref 1–3.3)
LYMPHOCYTES # BLD AUTO: 55.2 % — HIGH (ref 13–44)
MAGNESIUM SERPL-MCNC: 1.8 MG/DL — SIGNIFICANT CHANGE UP (ref 1.6–2.6)
MCHC RBC-ENTMCNC: 26.8 PG — LOW (ref 27–34)
MCHC RBC-ENTMCNC: 30.8 % — LOW (ref 32–36)
MCV RBC AUTO: 87.1 FL — SIGNIFICANT CHANGE UP (ref 80–100)
MONOCYTES # BLD AUTO: 0.69 K/UL — SIGNIFICANT CHANGE UP (ref 0–0.9)
MONOCYTES NFR BLD AUTO: 7.8 % — SIGNIFICANT CHANGE UP (ref 2–14)
NEUTROPHILS # BLD AUTO: 2.82 K/UL — SIGNIFICANT CHANGE UP (ref 1.8–7.4)
NEUTROPHILS NFR BLD AUTO: 31.7 % — LOW (ref 43–77)
NRBC # FLD: 0 K/UL — SIGNIFICANT CHANGE UP (ref 0–0)
PHOSPHATE SERPL-MCNC: 3.3 MG/DL — SIGNIFICANT CHANGE UP (ref 2.5–4.5)
PLATELET # BLD AUTO: 200 K/UL — SIGNIFICANT CHANGE UP (ref 150–400)
PMV BLD: 10.7 FL — SIGNIFICANT CHANGE UP (ref 7–13)
POTASSIUM SERPL-MCNC: 4 MMOL/L — SIGNIFICANT CHANGE UP (ref 3.5–5.3)
POTASSIUM SERPL-SCNC: 4 MMOL/L — SIGNIFICANT CHANGE UP (ref 3.5–5.3)
PROT SERPL-MCNC: 5.1 G/DL — LOW (ref 6–8.3)
RBC # BLD: 4.25 M/UL — SIGNIFICANT CHANGE UP (ref 3.8–5.2)
RBC # FLD: 13.6 % — SIGNIFICANT CHANGE UP (ref 10.3–14.5)
SODIUM SERPL-SCNC: 140 MMOL/L — SIGNIFICANT CHANGE UP (ref 135–145)
WBC # BLD: 8.9 K/UL — SIGNIFICANT CHANGE UP (ref 3.8–10.5)
WBC # FLD AUTO: 8.9 K/UL — SIGNIFICANT CHANGE UP (ref 3.8–10.5)

## 2020-02-18 PROCEDURE — 99232 SBSQ HOSP IP/OBS MODERATE 35: CPT

## 2020-02-18 PROCEDURE — 88189 FLOWCYTOMETRY/READ 16 & >: CPT

## 2020-02-18 PROCEDURE — 76856 US EXAM PELVIC COMPLETE: CPT | Mod: 26

## 2020-02-18 PROCEDURE — 99233 SBSQ HOSP IP/OBS HIGH 50: CPT

## 2020-02-18 RX ADMIN — BUDESONIDE AND FORMOTEROL FUMARATE DIHYDRATE 2 PUFF(S): 160; 4.5 AEROSOL RESPIRATORY (INHALATION) at 09:01

## 2020-02-18 RX ADMIN — SODIUM CHLORIDE 100 MILLILITER(S): 9 INJECTION, SOLUTION INTRAVENOUS at 05:30

## 2020-02-18 RX ADMIN — SODIUM CHLORIDE 100 MILLILITER(S): 9 INJECTION, SOLUTION INTRAVENOUS at 09:02

## 2020-02-18 RX ADMIN — Medication 100 MILLIGRAM(S): at 17:23

## 2020-02-18 RX ADMIN — SODIUM CHLORIDE 100 MILLILITER(S): 9 INJECTION, SOLUTION INTRAVENOUS at 17:24

## 2020-02-18 RX ADMIN — BUDESONIDE AND FORMOTEROL FUMARATE DIHYDRATE 2 PUFF(S): 160; 4.5 AEROSOL RESPIRATORY (INHALATION) at 20:33

## 2020-02-18 RX ADMIN — HEPARIN SODIUM 5000 UNIT(S): 5000 INJECTION INTRAVENOUS; SUBCUTANEOUS at 05:30

## 2020-02-18 RX ADMIN — HEPARIN SODIUM 5000 UNIT(S): 5000 INJECTION INTRAVENOUS; SUBCUTANEOUS at 17:24

## 2020-02-18 RX ADMIN — POLYETHYLENE GLYCOL 3350 17 GRAM(S): 17 POWDER, FOR SOLUTION ORAL at 12:51

## 2020-02-18 RX ADMIN — Medication 100 MILLIGRAM(S): at 05:31

## 2020-02-18 NOTE — PROGRESS NOTE BEHAVIORAL HEALTH - NSBHCONSFOLLOWNEEDS_PSY_A_CORE
Patient needs further psychiatric safety assessment prior to discharge/will need to assess appropriateness of re-starting psychotropics prior to discharge

## 2020-02-18 NOTE — PROGRESS NOTE ADULT - SUBJECTIVE AND OBJECTIVE BOX
HOANG PORTILLO  64y  Female      Patient is a 64y old  Female who presents with a chief complaint of fever, AMS (18 Feb 2020 15:45)  feels fine.no sob,no cp,no abd.pain,no fever    REVIEW OF SYSTEMS:  as above        INTERVAL HPI/OVERNIGHT EVENTS:  T(C): 36.7 (02-18-20 @ 12:48), Max: 36.7 (02-18-20 @ 05:30)  HR: 73 (02-18-20 @ 12:48) (72 - 78)  BP: 128/79 (02-18-20 @ 12:48) (112/74 - 128/82)  RR: 17 (02-18-20 @ 12:48) (17 - 17)  SpO2: 98% (02-18-20 @ 12:48) (95% - 98%)  Wt(kg): --  I&O's Summary    17 Feb 2020 07:01  -  18 Feb 2020 07:00  --------------------------------------------------------  IN: 2880 mL / OUT: 3000 mL / NET: -120 mL    18 Feb 2020 07:01  -  18 Feb 2020 19:44  --------------------------------------------------------  IN: 1150 mL / OUT: 1200 mL / NET: -50 mL      T(C): 36.7 (02-18-20 @ 12:48), Max: 36.7 (02-18-20 @ 05:30)  HR: 73 (02-18-20 @ 12:48) (72 - 78)  BP: 128/79 (02-18-20 @ 12:48) (112/74 - 128/82)  RR: 17 (02-18-20 @ 12:48) (17 - 17)  SpO2: 98% (02-18-20 @ 12:48) (95% - 98%)  Wt(kg): --Vital Signs Last 24 Hrs  T(C): 36.7 (18 Feb 2020 12:48), Max: 36.7 (18 Feb 2020 05:30)  T(F): 98 (18 Feb 2020 12:48), Max: 98.1 (18 Feb 2020 05:30)  HR: 73 (18 Feb 2020 12:48) (72 - 78)  BP: 128/79 (18 Feb 2020 12:48) (112/74 - 128/82)  BP(mean): --  RR: 17 (18 Feb 2020 12:48) (17 - 17)  SpO2: 98% (18 Feb 2020 12:48) (95% - 98%)    LABS:                        11.4   8.90  )-----------( 200      ( 18 Feb 2020 06:45 )             37.0     02-18    140  |  105  |  10  ----------------------------<  87  4.0   |  27  |  0.72    Ca    8.7      18 Feb 2020 06:45  Phos  3.3     02-18  Mg     1.8     02-18    TPro  5.1<L>  /  Alb  2.9<L>  /  TBili  0.3  /  DBili  x   /  AST  26  /  ALT  23  /  AlkPhos  53  02-18        CAPILLARY BLOOD GLUCOSE      POCT Blood Glucose.: 72 mg/dL (18 Feb 2020 17:12)  POCT Blood Glucose.: 103 mg/dL (18 Feb 2020 12:30)  POCT Blood Glucose.: 97 mg/dL (18 Feb 2020 08:26)  POCT Blood Glucose.: 117 mg/dL (17 Feb 2020 22:12)            PAST MEDICAL & SURGICAL HISTORY:  COPD without exacerbation  Schizoaffective disorder  Obesity  Diabetes  No significant past surgical history      MEDICATIONS  (STANDING):  amantadine 100 milliGRAM(s) Oral two times a day  budesonide 160 MICROgram(s)/formoterol 4.5 MICROgram(s) Inhaler 2 Puff(s) Inhalation two times a day  dextrose 5%. 1000 milliLiter(s) (50 mL/Hr) IV Continuous <Continuous>  dextrose 50% Injectable 12.5 Gram(s) IV Push once  dextrose 50% Injectable 25 Gram(s) IV Push once  dextrose 50% Injectable 25 Gram(s) IV Push once  heparin  Injectable 5000 Unit(s) SubCutaneous every 12 hours  influenza   Vaccine 0.5 milliLiter(s) IntraMuscular once  insulin lispro (HumaLOG) corrective regimen sliding scale   SubCutaneous three times a day before meals  insulin lispro (HumaLOG) corrective regimen sliding scale   SubCutaneous at bedtime  lactated ringers. 1000 milliLiter(s) (100 mL/Hr) IV Continuous <Continuous>  polyethylene glycol 3350 17 Gram(s) Oral daily    MEDICATIONS  (PRN):  dextrose 40% Gel 15 Gram(s) Oral once PRN Blood Glucose LESS THAN 70 milliGRAM(s)/deciliter  glucagon  Injectable 1 milliGRAM(s) IntraMuscular once PRN Glucose LESS THAN 70 milligrams/deciliter        RADIOLOGY & ADDITIONAL TESTS:    Imaging Personally Reviewed:  [ ] YES  [ ] NO    Consultant(s) Notes Reviewed:  [ x] YES  [ ] NO    PHYSICAL EXAM:  GENERAL: Alert and awake lying in bed in no distress  HEAD:  Atraumatic, Normocephalic  EYES: EOMI, MIGUEL ANGEL, conjunctiva and sclera clear  NECK: Supple, No JVD, Normal thyroid  NERVOUS SYSTEM:  Alert & Oriented X3, Motor and sensory systems are intact,   CHEST/LUNG: Bilateral clear breath sounds, no rhochi, no wheezing, no crepitations,  HEART: Regular rate and rhythm; No murmurs, rubs, or gallops  ABDOMEN: Soft, Nontender, Nondistended; Bowel sounds present  EXTREMITIES:   Peripheral Pulses are palpable, no  edema        Care Discussed with Consultants/Other Providers [ ] YES  [ ] NO      Code Status: [] Full Code [] DNR [] DNI [] Goals of Care:   Disposition: [] ICU [] Stroke Unit [] RCU []PCU []Floor [] Discharge Home         SANDY CastilloFACP

## 2020-02-18 NOTE — CONSULT NOTE ADULT - ASSESSMENT
64y Female with PMH of schizoaffective d/o on Geodon, DMII, COPD BIBEMS for fever and AMS.      Noted during admission to have persistent lymphocytosis.  Pt denies h/o fevers, sweats, wt loss.  No h/o lymphadenopathy.    1)  Lymphocytosis - reactive vs. CLL  -- check Periph blood flow  -- check LDH    2)  Anemia  -- check iron studies, b12, folate, haptoglobin, retic ct    3) CT w ?adnexal lesions  -- pelvic US pending    d/w NATI Zamora MD  Hematology/Oncology  Cell:  [phone]  Office Phone: [phone]  Office Fax:  [phone] [address] [place] [address]

## 2020-02-18 NOTE — PROGRESS NOTE BEHAVIORAL HEALTH - NSBHFUPINTERVALHXFT_PSY_A_CORE
Patient seen today in follow up, chart reviewed. 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 Has Ativan prns written but has not received any. No events over weekend. Pt seen just after returning from ultrasound test. Pt calm, cooperative, speaks loudly when responds to questions. Says she is doing "fine," denies any mood or psychotic sx, no SI/HI/PI/AH/VH. Pt knows she is in [facility] and able to say today's date. When asked why she's here she says "I don't know." When asked about her psychiatric medications she says "I don't know, speak with my psychiatrist." Patient seen today in follow up, chart reviewed. 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 Has Ativan prns written but has not received any. No events over weekend. Pt seen just after returning from ultrasound test. Pt calm, cooperative, speaks loudly when responds to questions. Says she is doing "fine," denies any mood or psychotic sx, no SI/HI/PI/AH/VH. Pt knows she is in [place] and able to say today's date. When asked why she's here she says "I don't know." When asked about her psychiatric medications she says "I don't know, speak with my psychiatrist."    Called again to reach pt's psychiatrist Dr. Lou ([phone]),  gives me his cell # ([phone]) however he doesn't answer and mailbox is full so unable to leave a message.

## 2020-02-18 NOTE — CONSULT NOTE ADULT - SUBJECTIVE AND OBJECTIVE BOX
HPI:    64y Female with PMH of schizoaffective d/o on Geodon, DMII, COPD BIBEMS for fever and AMS. History limited d/t pt's poor mental status and no family members available for collaterals. As per EMS,  neighbor heard patient screaming for help and stating "I'm having a seizure." Pt was shaking when EMS arrived and she was given 10 mg IM versed. In ER, pt was febrile to 102F, tachy 110-120, mildly hypoxic satting high 80s on RA,  BP stable. Labs notable for Na 154, WBC 14, . CXR and UA negative for infection.  Pt lethargic, only opens eyes to voice, not able to answer any questions. Pt received 1L LR and ceftriaxone 1g. Admitted for acute metabolic encephalopathy 2/2 NMS vs sepsis of unclear source.   Of note, pt was admitted to [facility] in 12/19 for syncope and rigidity, thought to be drug (Geodon) induced. Decision was made to continue Geodon and f/u as outpt at that time.       Noted during admission to have persistent lymphocytosis.  Pt denies h/o fevers, sweats, wt loss.  No h/o lymphadenopathy.    PAST MEDICAL & SURGICAL HISTORY:  COPD   Schizoaffective disorder  Obesity  Diabetes  No significant past surgical history      Social History:   unable to obtain     FAMILY HISTORY:  FH: multiple myeloma: in Father    amantadine 100 milliGRAM(s) Oral two times a day  budesonide 160 MICROgram(s)/formoterol 4.5 MICROgram(s) Inhaler 2 Puff(s) Inhalation two times a day  dextrose 40% Gel 15 Gram(s) Oral once PRN  dextrose 5%. 1000 milliLiter(s) IV Continuous <Continuous>  dextrose 50% Injectable 12.5 Gram(s) IV Push once  dextrose 50% Injectable 25 Gram(s) IV Push once  dextrose 50% Injectable 25 Gram(s) IV Push once  glucagon  Injectable 1 milliGRAM(s) IntraMuscular once PRN  heparin  Injectable 5000 Unit(s) SubCutaneous every 12 hours  influenza   Vaccine 0.5 milliLiter(s) IntraMuscular once  insulin lispro (HumaLOG) corrective regimen sliding scale   SubCutaneous three times a day before meals  insulin lispro (HumaLOG) corrective regimen sliding scale   SubCutaneous at bedtime  lactated ringers. 1000 milliLiter(s) IV Continuous <Continuous>  polyethylene glycol 3350 17 Gram(s) Oral daily      No Known Allergies      ROS otherwise negative     T(C): 36.7 (02-18-20 @ 12:48), Max: 36.7 (02-18-20 @ 05:30)  HR: 73 (02-18-20 @ 12:48) (72 - 78)  BP: 128/79 (02-18-20 @ 12:48) (112/74 - 128/82)  RR: 17 (02-18-20 @ 12:48) (17 - 17)  SpO2: 98% (02-18-20 @ 12:48) (95% - 98%)  PHYSICAL EXAM  Gen:  sitting in chair, flat affect,   H:  anicteric  CV:  RRR, S1, S2  Lungs:  CTA b/l  Ab soft/nt/nd  Ext:  no edema                          11.4   8.90  )-----------( 200      ( 18 Feb 2020 06:45 )             37.0                         11.4   9.68  )-----------( 214      ( 17 Feb 2020 05:15 )             37.6                         11.4   9.66  )-----------( 229      ( 16 Feb 2020 06:00 )             38.3     Auto Lymphocyte #: 4.91 K/uL (02.18.20 @ 06:45)          02-18    140  |  105  |  10  ----------------------------<  87  4.0   |  27  |  0.72    Ca    8.7      18 Feb 2020 06:45  Phos  3.3     02-18  Mg     1.8     02-18    TPro  5.1<L>  /  Alb  2.9<L>  /  TBili  0.3  /  DBili  x   /  AST  26  /  ALT  23  /  AlkPhos  53  02-18    < from: CT Abdomen and Pelvis w/ IV Cont (02.17.20 @ 12:00) >  IMPRESSION:    Stool-filled rectum. Cannot exclude minimal rectal wall thickening with surrounding minimal to mild hazy change makes it difficult to exclude stercoral colitis. Please clinically correlate.    Mildly prominent adnexa bilaterally, left greater than right in this 64-year-old female patient which deserves further evaluation with pelvic sonography. There is no previous pelvic imaging for comparison.    < end of copied text >

## 2020-02-18 NOTE — PROGRESS NOTE ADULT - ASSESSMENT
64y Female with PMH of COPD, DM, PD, Obesity, PPHx schizoaffective disorder on Geodon, BIBEMS for fever and AMS.     Hypernatremia  sec to free water loss  Improved  Monitor serum Na    Hypokalemia  sec to decreased po intake  improved  Monitor serum K    Rhabdomylosis  CK improving today  TSH WNL  MOnitor CK daily   s/p CT with contrast 2/17  on LR, continue at present. monitor fluid status.

## 2020-02-18 NOTE — PROGRESS NOTE ADULT - ATTENDING COMMENTS
Leah Saldana  Pager: [phone]. If no response or past 5 pm call [phone].     Will sign off, please call with questions.
-Rhabdomyolysis-cont.ivf.Renal consult appreciated.  -Lymphocytosis-viral infection,-ID f/u.f/u cultures
-Rhabdomyolysis-cont.ivf.Renal f/u noted.  -Lymphocytosis-etio?-will get hematology consult
-Rhabdomyolysis-cont.ivf.Renal f/u noted.  -Lymphocytosis-viral infection,-ID f/u.f/u cultures
-Rhabdomyolysis-cont.ivf.Renal f/u noted.Improving  -Lymphocytosis-etio?- hematology consult appreciated.-reactive vs CLL-f/u workup

## 2020-02-18 NOTE — PROGRESS NOTE ADULT - SUBJECTIVE AND OBJECTIVE BOX
64y old  Female who presents with a chief complaint of fever, AMS (18 Feb 2020 12:51)      Interval history:  Afebrile, sitting in chair, denies any cough, no abdominal pain.     Allergies:   No Known Allergies    Antimicrobials:      REVIEW OF SYSTEMS:  No chest pain   No dysuria   No rash.       Vital Signs Last 24 Hrs  T(C): 36.7 (02-18-20 @ 12:48), Max: 36.7 (02-18-20 @ 05:30)  T(F): 98 (02-18-20 @ 12:48), Max: 98.1 (02-18-20 @ 05:30)  HR: 73 (02-18-20 @ 12:48) (72 - 78)  BP: 128/79 (02-18-20 @ 12:48) (112/74 - 128/82)  BP(mean): --  RR: 17 (02-18-20 @ 12:48) (17 - 17)  SpO2: 98% (02-18-20 @ 12:48) (95% - 98%)      PHYSICAL EXAM:  Patient in no acute distress. Alert, awake.   tremors at baseline   No icterus, no oral ulcers.  Cardiovascular: S1S2 normal.  Lungs: + air entry B/L lung fields.  Gastrointestinal: soft, nontender, nondistended.  Extremities: no edema. contracted.   IV sites not inflamed.                           11.4   8.90  )-----------( 200      ( 18 Feb 2020 06:45 )             37.0   02-18    140  |  105  |  10  ----------------------------<  87  4.0   |  27  |  0.72    Ca    8.7      18 Feb 2020 06:45  Phos  3.3     02-18  Mg     1.8     02-18    TPro  5.1<L>  /  Alb  2.9<L>  /  TBili  0.3  /  DBili  x   /  AST  26  /  ALT  23  /  AlkPhos  53  02-18      LIVER FUNCTIONS - ( 18 Feb 2020 06:45 )  Alb: 2.9 g/dL / Pro: 5.1 g/dL / ALK PHOS: 53 u/L / ALT: 23 u/L / AST: 26 u/L / GGT: x             < from: US Pelvis Complete (02.18.20 @ 13:26) >  IMPRESSION:    Normal pelvic sonogram.      < from: CT Abdomen and Pelvis w/ IV Cont (02.17.20 @ 12:00) >  IMPRESSION:    Stool-filled rectum. Cannot exclude minimal rectal wall thickening with surrounding minimal to mild hazy change makes it difficult to exclude stercoral colitis. Please clinically correlate.    Mildly prominent adnexa bilaterally, left greater than right in this 64-year-old female patient which deserves further evaluation with pelvic sonography. There is no previous pelvic imaging for comparison.        Culture - Blood (02.14.20 @ 09:50)    Culture - Blood:   NO ORGANISMS ISOLATED  NO ORGANISMS ISOLATED AT 96 HOURS    Specimen Source: Peripheral Site 2

## 2020-02-18 NOTE — PROGRESS NOTE ADULT - ASSESSMENT
63 y/o Patient with COPD exacerbation, DM, Schizoaffective disorder is brought for ?AMS. Found to have fever in ED- 101, Leukocytosis to 14, x ray chest negative, RVP negative, UA negative, Blood cx pending. Ck pending. Patient had recent increase in medication Ziprasidone on Tuesday      SIRS, unclear source with fever, leucocytosis, tachycardia, hypernatremia.   Fever likely non infectious in etiology. resolved SIRS.   all the w/u has been negative so far.         Suggest:  *blood cx, NTD   * CT abdomen and pelvis with no source   *monitor off abx    *trend cpk, elevated.   *resolved leucocytosis.

## 2020-02-18 NOTE — PROGRESS NOTE ADULT - PROBLEM SELECTOR PLAN 1
. sepsis (CNS infection) -ruled out.likely colitis.s/p zosyn.ID f/u noted  -hold Geodon   -cont LR @100cc/hr  CT abd-colitis/ prominent adnexa?-suggest pelvic u/s-no acute pathology  -O2 via NC to maintain SPO2>95  -ID consult appreciated.f/u cultures.CT ABD/PELVIS-will need 2 PC  -f/u blood cx, urine cx-neg  -trend CPK--cont ivf,downtrending

## 2020-02-18 NOTE — PROGRESS NOTE ADULT - SUBJECTIVE AND OBJECTIVE BOX
[facility]ACT[facility]   MD CY LORENZO DO ANAM SIDDIQUI ANGELA WONG, PA    TEL:  OFFICE: [phone]  DR MANNING CELL: [phone]  DR. DE ANDA CELL: [phone]  DR. JONES CELL: [phone]  KRYSTYNA BARRETT CELL: [phone]        Patient is a 64y old  Female who presents with a chief complaint of fever, AMS (17 Feb 2020 18:00)      Patient seen and examined at bedside. No chest pain/sob    VITALS:  T(F): 98.1 (02-18-20 @ 05:30), Max: 98.1 (02-18-20 @ 05:30)  HR: 72 (02-18-20 @ 05:30)  BP: 128/82 (02-18-20 @ 05:30)  RR: 17 (02-18-20 @ 05:30)  SpO2: 96% (02-18-20 @ 05:30)  Wt(kg): --    02-17 @ 07:01  -  02-18 @ 07:00  --------------------------------------------------------  IN: 2880 mL / OUT: 3000 mL / NET: -120 mL    02-18 @ 07:01  -  02-18 @ 12:39  --------------------------------------------------------  IN: 420 mL / OUT: 700 mL / NET: -280 mL          PHYSICAL EXAM:  Constitutional: NAD  Neck: No JVD  Respiratory: CTAB, no wheezes, rales or rhonchi  Cardiovascular: S1, S2, RRR  Gastrointestinal: BS+, soft, NT/ND  Extremities: No peripheral edema    Hospital Medications:   MEDICATIONS  (STANDING):  amantadine 100 milliGRAM(s) Oral two times a day  budesonide 160 MICROgram(s)/formoterol 4.5 MICROgram(s) Inhaler 2 Puff(s) Inhalation two times a day  dextrose 5%. 1000 milliLiter(s) (50 mL/Hr) IV Continuous <Continuous>  dextrose 50% Injectable 12.5 Gram(s) IV Push once  dextrose 50% Injectable 25 Gram(s) IV Push once  dextrose 50% Injectable 25 Gram(s) IV Push once  heparin  Injectable 5000 Unit(s) SubCutaneous every 12 hours  influenza   Vaccine 0.5 milliLiter(s) IntraMuscular once  insulin lispro (HumaLOG) corrective regimen sliding scale   SubCutaneous three times a day before meals  insulin lispro (HumaLOG) corrective regimen sliding scale   SubCutaneous at bedtime  lactated ringers. 1000 milliLiter(s) (100 mL/Hr) IV Continuous <Continuous>  polyethylene glycol 3350 17 Gram(s) Oral daily      LABS:  02-18    140  |  105  |  10  ----------------------------<  87  4.0   |  27  |  0.72    Ca    8.7      18 Feb 2020 06:45  Phos  3.3     02-18  Mg     1.8     02-18    TPro  5.1<L>  /  Alb  2.9<L>  /  TBili  0.3  /  DBili      /  AST  26  /  ALT  23  /  AlkPhos  53  02-18    Creatinine Trend: 0.72 <--, 0.60 <--, 0.68 <--, 0.70 <--, 0.72 <--, 0.81 <--    Phosphorus Level, Serum: 3.3 mg/dL (02-18 @ 06:45)  Albumin, Serum: 2.9 g/dL (02-18 @ 06:45)                              11.4   8.90  )-----------( 200      ( 18 Feb 2020 06:45 )             37.0     Urine Studies:  Urinalysis - [02-14-20 @ 09:34]      Color YELLOW / Appearance TURBID / SG 1.031 / pH 5.5      Gluc NEGATIVE / Ketone SMALL  / Bili NEGATIVE / Urobili NORMAL       Blood NEGATIVE / Protein 50 / Leuk Est NEGATIVE / Nitrite NEGATIVE      RBC 0-2 / WBC 0-2 / Hyaline 1+ / Gran  / Sq Epi OCC / Non Sq Epi  / Bacteria NEGATIVE      HbA1c 5.6      [02-15-20 @ 06:05]  TSH 2.02      [02-16-20 @ 06:00]    HCV 0.18, Nonreactive Hepatitis C AB  S/CO Ratio                        Interpretation  < 1.00                                   Non-Reactive  1.00 - 4.99                         Weakly-Reactive  >= 5.00                                Reactive  Non-Reactive: Aperson with a non-reactive HCV antibody  result is considered uninfected.  No further action is  needed unless recent infection is suspected.  In these  cases, consider repeat testing later to detect  seroconversion..  Weakly-Reactive: HCV antibody test is abnormal, HCV RNA  Qualitative test will follow.  Reactive: HCV antibody test is abnormal, HCV RNA  Qualitative test will follow.  Note: HCV antibody testing is performed on the [employer]   system.      [02-15-20 @ 06:05]      RADIOLOGY & ADDITIONAL STUDIES:

## 2020-02-18 NOTE — PROGRESS NOTE BEHAVIORAL HEALTH - OTHER
tremors r/t PD, rigidity on exam unable to assess- in bed at times difficult to understand unable to repeat numbers backwards, unable to retrieve words given variable

## 2020-02-19 ENCOUNTER — TRANSCRIPTION ENCOUNTER (OUTPATIENT)
Age: 65
End: 2020-02-19

## 2020-02-19 VITALS
TEMPERATURE: 99 F | RESPIRATION RATE: 17 BRPM | HEART RATE: 74 BPM | DIASTOLIC BLOOD PRESSURE: 82 MMHG | SYSTOLIC BLOOD PRESSURE: 118 MMHG | OXYGEN SATURATION: 97 %

## 2020-02-19 LAB
BACTERIA BLD CULT: SIGNIFICANT CHANGE UP
BACTERIA BLD CULT: SIGNIFICANT CHANGE UP
BASOPHILS # BLD AUTO: 0.02 K/UL — SIGNIFICANT CHANGE UP (ref 0–0.2)
BASOPHILS NFR BLD AUTO: 0.2 % — SIGNIFICANT CHANGE UP (ref 0–2)
EOSINOPHIL # BLD AUTO: 0.45 K/UL — SIGNIFICANT CHANGE UP (ref 0–0.5)
EOSINOPHIL NFR BLD AUTO: 5.5 % — SIGNIFICANT CHANGE UP (ref 0–6)
FERRITIN SERPL-MCNC: 90.85 NG/ML — SIGNIFICANT CHANGE UP (ref 15–150)
FOLATE SERPL-MCNC: 10.2 NG/ML — SIGNIFICANT CHANGE UP (ref 4.7–20)
GLUCOSE BLDC GLUCOMTR-MCNC: 103 MG/DL — HIGH (ref 70–99)
GLUCOSE BLDC GLUCOMTR-MCNC: 86 MG/DL — SIGNIFICANT CHANGE UP (ref 70–99)
HAPTOGLOB SERPL-MCNC: 191 MG/DL — SIGNIFICANT CHANGE UP (ref 34–200)
HCT VFR BLD CALC: 38.3 % — SIGNIFICANT CHANGE UP (ref 34.5–45)
HGB BLD-MCNC: 11.4 G/DL — LOW (ref 11.5–15.5)
IMM GRANULOCYTES NFR BLD AUTO: 0.1 % — SIGNIFICANT CHANGE UP (ref 0–1.5)
IRON SATN MFR SERPL: 216 UG/DL — SIGNIFICANT CHANGE UP (ref 140–530)
IRON SATN MFR SERPL: 50 UG/DL — SIGNIFICANT CHANGE UP (ref 30–160)
LDH SERPL L TO P-CCNC: 179 U/L — SIGNIFICANT CHANGE UP (ref 135–225)
LYMPHOCYTES # BLD AUTO: 4.43 K/UL — HIGH (ref 1–3.3)
LYMPHOCYTES # BLD AUTO: 54 % — HIGH (ref 13–44)
MCHC RBC-ENTMCNC: 26.6 PG — LOW (ref 27–34)
MCHC RBC-ENTMCNC: 29.8 % — LOW (ref 32–36)
MCV RBC AUTO: 89.3 FL — SIGNIFICANT CHANGE UP (ref 80–100)
MONOCYTES # BLD AUTO: 0.58 K/UL — SIGNIFICANT CHANGE UP (ref 0–0.9)
MONOCYTES NFR BLD AUTO: 7.1 % — SIGNIFICANT CHANGE UP (ref 2–14)
NEUTROPHILS # BLD AUTO: 2.71 K/UL — SIGNIFICANT CHANGE UP (ref 1.8–7.4)
NEUTROPHILS NFR BLD AUTO: 33.1 % — LOW (ref 43–77)
NRBC # FLD: 0 K/UL — SIGNIFICANT CHANGE UP (ref 0–0)
PLATELET # BLD AUTO: 206 K/UL — SIGNIFICANT CHANGE UP (ref 150–400)
PMV BLD: 10.7 FL — SIGNIFICANT CHANGE UP (ref 7–13)
RBC # BLD: 4.29 M/UL — SIGNIFICANT CHANGE UP (ref 3.8–5.2)
RBC # FLD: 13.5 % — SIGNIFICANT CHANGE UP (ref 10.3–14.5)
RETICS #: 71 K/UL — SIGNIFICANT CHANGE UP (ref 25–125)
RETICS/RBC NFR: 1.7 % — SIGNIFICANT CHANGE UP (ref 0.5–2.5)
TM INTERPRETATION: SIGNIFICANT CHANGE UP
UIBC SERPL-MCNC: 166.2 UG/DL — SIGNIFICANT CHANGE UP (ref 110–370)
VIT B12 SERPL-MCNC: 577 PG/ML — SIGNIFICANT CHANGE UP (ref 200–900)
WBC # BLD: 8.2 K/UL — SIGNIFICANT CHANGE UP (ref 3.8–10.5)
WBC # FLD AUTO: 8.2 K/UL — SIGNIFICANT CHANGE UP (ref 3.8–10.5)

## 2020-02-19 PROCEDURE — 93010 ELECTROCARDIOGRAM REPORT: CPT

## 2020-02-19 PROCEDURE — 99232 SBSQ HOSP IP/OBS MODERATE 35: CPT

## 2020-02-19 RX ADMIN — SODIUM CHLORIDE 100 MILLILITER(S): 9 INJECTION, SOLUTION INTRAVENOUS at 10:21

## 2020-02-19 RX ADMIN — HEPARIN SODIUM 5000 UNIT(S): 5000 INJECTION INTRAVENOUS; SUBCUTANEOUS at 05:20

## 2020-02-19 RX ADMIN — SODIUM CHLORIDE 100 MILLILITER(S): 9 INJECTION, SOLUTION INTRAVENOUS at 05:20

## 2020-02-19 RX ADMIN — POLYETHYLENE GLYCOL 3350 17 GRAM(S): 17 POWDER, FOR SOLUTION ORAL at 12:16

## 2020-02-19 RX ADMIN — BUDESONIDE AND FORMOTEROL FUMARATE DIHYDRATE 2 PUFF(S): 160; 4.5 AEROSOL RESPIRATORY (INHALATION) at 10:21

## 2020-02-19 RX ADMIN — Medication 100 MILLIGRAM(S): at 05:20

## 2020-02-19 NOTE — PROGRESS NOTE BEHAVIORAL HEALTH - OTHER
visible tremors, cogwheel rigidity on exam in bed, unable to assess fluctuates between normal to loud at times difficult to understand variable

## 2020-02-19 NOTE — PHYSICAL THERAPY INITIAL EVALUATION ADULT - ADDITIONAL COMMENTS
Patient lives in an independent living senior facility and has a home health aide at home (as per LEONOR Qureshi, patient's power of  will be increasing the number of hours of home health aide upo d/c)

## 2020-02-19 NOTE — DISCHARGE NOTE NURSING/CASE MANAGEMENT/SOCIAL WORK - NSDCFUADDAPPT_GEN_ALL_CORE_FT
**Please continue all psych medications as directed and follow up with your psychiatrist, Dr. Jama Vinson on February 29 @ 3PM.    **Please follow up with your primary care provider and have repeat blood work within 2 weeks (CBC, BMP, Mg, Phos).

## 2020-02-19 NOTE — PROGRESS NOTE BEHAVIORAL HEALTH - CASE SUMMARY
Chart reviewed. I personally saw the patient with MURPHY Coffman. I agree with her history, MSE, A/P with below additions. Patient seen this AM, she denies SI/HI, denies AH/VH. Voice increases in volume with certain answers. She has at times an irritable affect, other times blunted. She does not appear to be internally preoccupied.  I spoke with her outpatient psychiatrist- he says that for now, hold medications until her next appointment with him.  There is no psychiatric contraindication to discharge.

## 2020-02-19 NOTE — CHART NOTE - NSCHARTNOTEFT_GEN_A_CORE
On 2/19 this case was reviewed with Dr. Castillo, the patient is medically stable and optimized for discharge. All medications were reviewed and discussed with outpatient psychiatrist (Dr. Vinson - [phone]) that he would like to HOLD ALL GEODON and follow her in the office on February 29th.      Chip Bernstein PA-C

## 2020-02-19 NOTE — PROGRESS NOTE BEHAVIORAL HEALTH - NSBHFUPINTERVALHXFT_PSY_A_CORE
Chart reviewed: no PRNs overnight.     Patient was lying in bed calmly during the interview and was cooperative with answering questions. She stated that her mood was good. She knew she was at [facility] and knew the date, however she did not know why she was in the hospital. She denied SI/HI. She denied any delusions or hallucinations but began to raise her voice while responding that she was not hearing/seeing anything and responded "I don't ever hear anything like that."   She knew who her psychiatrist was but was not sure of her medications or the time of future psychiatric appointments as this is organized by her home health aid. She reported that she lives alone and gets daily help from a home health aid.

## 2020-02-19 NOTE — PROGRESS NOTE ADULT - REASON FOR ADMISSION
fever, AMS

## 2020-02-19 NOTE — PROGRESS NOTE ADULT - SUBJECTIVE AND OBJECTIVE BOX
Pt seen, feeling fine, limited interactions, no complaints    MEDICATIONS  (STANDING):  amantadine 100 milliGRAM(s) Oral two times a day  budesonide 160 MICROgram(s)/formoterol 4.5 MICROgram(s) Inhaler 2 Puff(s) Inhalation two times a day  dextrose 5%. 1000 milliLiter(s) (50 mL/Hr) IV Continuous <Continuous>  dextrose 50% Injectable 12.5 Gram(s) IV Push once  dextrose 50% Injectable 25 Gram(s) IV Push once  dextrose 50% Injectable 25 Gram(s) IV Push once  heparin  Injectable 5000 Unit(s) SubCutaneous every 12 hours  influenza   Vaccine 0.5 milliLiter(s) IntraMuscular once  insulin lispro (HumaLOG) corrective regimen sliding scale   SubCutaneous three times a day before meals  insulin lispro (HumaLOG) corrective regimen sliding scale   SubCutaneous at bedtime  lactated ringers. 1000 milliLiter(s) (100 mL/Hr) IV Continuous <Continuous>  polyethylene glycol 3350 17 Gram(s) Oral daily    MEDICATIONS  (PRN):  dextrose 40% Gel 15 Gram(s) Oral once PRN Blood Glucose LESS THAN 70 milliGRAM(s)/deciliter  glucagon  Injectable 1 milliGRAM(s) IntraMuscular once PRN Glucose LESS THAN 70 milligrams/deciliter      ROS  no pain/CP/SOB/n/v/d/abd pain/bleeding  limited 2/2 participation    Vital Signs Last 24 Hrs  T(C): 37.1 (19 Feb 2020 12:55), Max: 37.1 (19 Feb 2020 12:55)  T(F): 98.7 (19 Feb 2020 12:55), Max: 98.7 (19 Feb 2020 12:55)  HR: 74 (19 Feb 2020 12:55) (66 - 74)  BP: 118/82 (19 Feb 2020 12:55) (118/82 - 124/78)  BP(mean): --  RR: 17 (19 Feb 2020 12:55) (16 - 17)  SpO2: 97% (19 Feb 2020 12:55) (96% - 97%)    PE  NAD  Awake, alert  RRR  CTAB minimal effort  Abd soft, NT, ND  No edema  remainder of exam limited 2/2 participation                          11.4   8.20  )-----------( 206      ( 19 Feb 2020 06:00 )             38.3       02-18    140  |  105  |  10  ----------------------------<  87  4.0   |  27  |  0.72    Ca    8.7      18 Feb 2020 06:45  Phos  3.3     02-18  Mg     1.8     02-18    TPro  5.1<L>  /  Alb  2.9<L>  /  TBili  0.3  /  DBili  x   /  AST  26  /  ALT  23  /  AlkPhos  53  02-18

## 2020-02-19 NOTE — PROGRESS NOTE BEHAVIORAL HEALTH - SUMMARY
65 yo , single, female with PMHX COPD, DM, PD, Obesity, PPHx schizoaffective disorder presents to [facility] ED via EMS for fever and AMS, lethargic on arrival, only opens eyes to voice, not able to answer any questions. Addendum: pt was admitted to [facility] 12/19 for syncope and rigidity, thought to be drug (Geodon) induced. Decision was made to continue Geodon and f/u as outpatient.  psychiatry consulted for questionable NMS.    Chart reviewed. Patient A&OX2, states mood is ok, no c/o sleep/appetite disturbances, denies si/sa, denies a/v hallucinations/delusions, patient unable to recall last inpatient hospitalizations, friend supplied name of psychiatric patient sees every other month Dr. Anne [phone] (unable to reach), friend also stated on 2/10/2020 Geodon was increased to BID from daily and noted patient had become slower and more lethargic. On exam patient noted to have visible tremors and rigidity to upper extremities. [facility] contacted to confirm dose of Geodon as 60mg PO BID since 2/10/2020, prior daily for past year. Collateral, Miryam states recent hx si/sa, a/v hallucinations/delusions, states patient is calm and when she does get annoyed it is d/t too many demands on her.       PLAN:  -c/w holding Geodon  -may give Ativan 1mg PO/IM/IV Q6H PRN AGITATION, may give additional Ativan 1mg PO/IM/IV for SEVERE REFRACTORY AGITATION
63 yo , single, female with PMHX COPD, DM, PD, Obesity, PPHx schizoaffective disorder brought in by EMS for AMS and fever.  psychiatry consulted for questionable NMS.    Of note, pt was admitted to [facility] in 12/19 for syncope and rigidity, thought to be drug (Geodon) induced. Decision was made to continue Geodon and f/u as outpatient at that time.     Patient is cooperative with no agitation and has required no PRNs. She became irritable when asked about delusions/hallucinations but is otherwise calm, alert and oriented. She denied any SI/HI. She has mild cogwheel rigidity with visible tremors, but has been afebrile with stable BPs and no tachycardia.      Plan   [] c/w holding Geodon  [] may give Ativan 1mg PO/IM/IV Q6H PRN AGITATION, may give additional Ativan 1mg PO/IM/IV for SEVERE REFRACTORY AGITATION.

## 2020-02-19 NOTE — PROGRESS NOTE BEHAVIORAL HEALTH - NSBHCONSULTFOLLOWDETAILS_PSY_A_CORE FT
On discharge, pt will f/u with current psychiatrist Dr. Anne [phone]
Patient needs further psychiatric safety assessment prior to discharge  will need to assess appropriateness of re-starting psychotropics prior to discharge  On discharge, pt will f/u with current psychiatrist Dr. Anne [phone]

## 2020-02-19 NOTE — PROGRESS NOTE BEHAVIORAL HEALTH - NSBHCHARTREVIEWLAB_PSY_A_CORE FT
CBC Full  -  ( 18 Feb 2020 06:45 )  WBC Count : 8.90 K/uL  RBC Count : 4.25 M/uL  Hemoglobin : 11.4 g/dL  Hematocrit : 37.0 %  Platelet Count - Automated : 200 K/uL  Mean Cell Volume : 87.1 fL  Mean Cell Hemoglobin : 26.8 pg  Mean Cell Hemoglobin Concentration : 30.8 %  Auto Neutrophil # : 2.82 K/uL  Auto Lymphocyte # : 4.91 K/uL  Auto Monocyte # : 0.69 K/uL  Auto Eosinophil # : 0.43 K/uL  Auto Basophil # : 0.04 K/uL  Auto Neutrophil % : 31.7 %  Auto Lymphocyte % : 55.2 %  Auto Monocyte % : 7.8 %  Auto Eosinophil % : 4.8 %  Auto Basophil % : 0.4 %    02-18    140  |  105  |  10  ----------------------------<  87  4.0   |  27  |  0.72    Ca    8.7      18 Feb 2020 06:45  Phos  3.3     02-18  Mg     1.8     02-18    TPro  5.1<L>  /  Alb  2.9<L>  /  TBili  0.3  /  DBili  x   /  AST  26  /  ALT  23  /  AlkPhos  53  02-18    LIVER FUNCTIONS - ( 18 Feb 2020 06:45 )  Alb: 2.9 g/dL / Pro: 5.1 g/dL / ALK PHOS: 53 u/L / ALT: 23 u/L / AST: 26 u/L / GGT: x
11.4   8.20  )-----------( 206      ( 19 Feb 2020 06:00 )             38.3   02-18    140  |  105  |  10  ----------------------------<  87  4.0   |  27  |  0.72    Ca    8.7      18 Feb 2020 06:45  Phos  3.3     02-18  Mg     1.8     02-18    TPro  5.1<L>  /  Alb  2.9<L>  /  TBili  0.3  /  DBili  x   /  AST  26  /  ALT  23  /  AlkPhos  53  02-18

## 2020-02-19 NOTE — PROGRESS NOTE BEHAVIORAL HEALTH - NSBHCONSULTMEDAGITATION_PSY_A_CORE FT
Ativan 1mg PO/IM/IV Q6H PRN AGITATION, may give additional Ativan 1mg PO/IM/IV FOR SEVERE REFRACTORY AGITATION
Ativan 1mg PO/IM/IV Q6H PRN AGITATION,

## 2020-02-19 NOTE — PROGRESS NOTE BEHAVIORAL HEALTH - RISK ASSESSMENT
no history of or current thoughts of SI/HI. Has frequent f/u with outpatient psychiatrist, gets help from a home health aid. no history of or current thoughts of SI/HI. Has frequent f/u with outpatient psychiatrist, gets help from a home health aid. Imminent risk for self harm is deemed to be low.

## 2020-02-19 NOTE — PROGRESS NOTE BEHAVIORAL HEALTH - NSBHCHARTREVIEWVS_PSY_A_CORE FT
Vital Signs Last 24 Hrs  T(C): 36.7 (18 Feb 2020 12:48), Max: 36.7 (18 Feb 2020 05:30)  T(F): 98 (18 Feb 2020 12:48), Max: 98.1 (18 Feb 2020 05:30)  HR: 73 (18 Feb 2020 12:48) (72 - 78)  BP: 128/79 (18 Feb 2020 12:48) (112/74 - 128/82)  BP(mean): --  RR: 17 (18 Feb 2020 12:48) (17 - 17)  SpO2: 98% (18 Feb 2020 12:48) (95% - 98%)
ICU Vital Signs Last 24 Hrs  T(C): 36.6 (19 Feb 2020 05:20), Max: 36.8 (18 Feb 2020 21:05)  T(F): 97.9 (19 Feb 2020 05:20), Max: 98.2 (18 Feb 2020 21:05)  HR: 66 (19 Feb 2020 05:20) (66 - 73)  BP: 124/78 (19 Feb 2020 05:20) (119/84 - 128/79)  BP(mean): --  ABP: --  ABP(mean): --  RR: 16 (19 Feb 2020 05:20) (16 - 18)  SpO2: 96% (19 Feb 2020 05:20) (96% - 98%)

## 2020-02-19 NOTE — PROGRESS NOTE ADULT - ASSESSMENT
64y Female with PMH of schizoaffective d/o on Geodon, DMII, COPD BIBEMS for fever and AMS.      Noted during admission to have persistent lymphocytosis.  Pt denies h/o fevers, sweats, wt loss.  No h/o lymphadenopathy.    1)  Lymphocytosis - reactive vs. CLL  -- f/u Periph blood flow  -- stable  -- smudge cells noted on diff    2)  Anemia  -- neg iron studies, b12, folate, haptoglobin, retic ct  -- may be ACD, monitor     3) CT w ?adnexal lesions  -- pelvic US neg    Will follow, pls call with questions, [phone]

## 2020-02-19 NOTE — PROGRESS NOTE ADULT - SUBJECTIVE AND OBJECTIVE BOX
[facility]   MD CY LORENZO DO ANAM SIDDIQUI ANGELA WONG, PA    TEL:  OFFICE: [phone]  DR MANNING CELL: [phone]  DR. DE ANDA CELL: [phone]  DR. JONES CELL: [phone]  KRYSTYNA BARRETT CELL: [phone]        Patient is a 64y old  Female who presents with a chief complaint of fever, AMS (18 Feb 2020 19:43)      Patient seen and examined at bedside. No chest pain/sob    VITALS:  T(F): 98.7 (02-19-20 @ 12:55), Max: 98.7 (02-19-20 @ 12:55)  HR: 74 (02-19-20 @ 12:55)  BP: 118/82 (02-19-20 @ 12:55)  RR: 17 (02-19-20 @ 12:55)  SpO2: 97% (02-19-20 @ 12:55)  Wt(kg): --    02-18 @ 07:01  -  02-19 @ 07:00  --------------------------------------------------------  IN: 1700 mL / OUT: 2150 mL / NET: -450 mL    02-19 @ 07:01  -  02-19 @ 13:30  --------------------------------------------------------  IN: 500 mL / OUT: 200 mL / NET: 300 mL          PHYSICAL EXAM:  Constitutional: NAD  Neck: No JVD  Respiratory: CTAB, no wheezes, rales or rhonchi  Cardiovascular: S1, S2, RRR  Gastrointestinal: BS+, soft, NT/ND  Extremities: No peripheral edema    Hospital Medications:   MEDICATIONS  (STANDING):  amantadine 100 milliGRAM(s) Oral two times a day  budesonide 160 MICROgram(s)/formoterol 4.5 MICROgram(s) Inhaler 2 Puff(s) Inhalation two times a day  dextrose 5%. 1000 milliLiter(s) (50 mL/Hr) IV Continuous <Continuous>  dextrose 50% Injectable 12.5 Gram(s) IV Push once  dextrose 50% Injectable 25 Gram(s) IV Push once  dextrose 50% Injectable 25 Gram(s) IV Push once  heparin  Injectable 5000 Unit(s) SubCutaneous every 12 hours  influenza   Vaccine 0.5 milliLiter(s) IntraMuscular once  insulin lispro (HumaLOG) corrective regimen sliding scale   SubCutaneous three times a day before meals  insulin lispro (HumaLOG) corrective regimen sliding scale   SubCutaneous at bedtime  lactated ringers. 1000 milliLiter(s) (100 mL/Hr) IV Continuous <Continuous>  polyethylene glycol 3350 17 Gram(s) Oral daily      LABS:  02-18    140  |  105  |  10  ----------------------------<  87  4.0   |  27  |  0.72    Ca    8.7      18 Feb 2020 06:45  Phos  3.3     02-18  Mg     1.8     02-18    TPro  5.1<L>  /  Alb  2.9<L>  /  TBili  0.3  /  DBili      /  AST  26  /  ALT  23  /  AlkPhos  53  02-18    Creatinine Trend: 0.72 <--, 0.60 <--, 0.68 <--, 0.70 <--, 0.72 <--, 0.81 <--    Iron Total, Serum: 50 ug/dL (02-19 @ 06:00)  Ferritin, Serum: 90.85 ng/mL (02-19 @ 06:00)                              11.4   8.20  )-----------( 206      ( 19 Feb 2020 06:00 )             38.3     Urine Studies:  Urinalysis - [02-14-20 @ 09:34]      Color YELLOW / Appearance TURBID / SG 1.031 / pH 5.5      Gluc NEGATIVE / Ketone SMALL  / Bili NEGATIVE / Urobili NORMAL       Blood NEGATIVE / Protein 50 / Leuk Est NEGATIVE / Nitrite NEGATIVE      RBC 0-2 / WBC 0-2 / Hyaline 1+ / Gran  / Sq Epi OCC / Non Sq Epi  / Bacteria NEGATIVE      Iron 50, TIBC 216, %sat --      [02-19-20 @ 06:00]  Ferritin 90.85      [02-19-20 @ 06:00]  HbA1c 5.6      [02-15-20 @ 06:05]  TSH 2.02      [02-16-20 @ 06:00]    HCV 0.18, Nonreactive Hepatitis C AB  S/CO Ratio                        Interpretation  < 1.00                                   Non-Reactive  1.00 - 4.99                         Weakly-Reactive  >= 5.00                                Reactive  Non-Reactive: Aperson with a non-reactive HCV antibody  result is considered uninfected.  No further action is  needed unless recent infection is suspected.  In these  cases, consider repeat testing later to detect  seroconversion..  Weakly-Reactive: HCV antibody test is abnormal, HCV RNA  Qualitative test will follow.  Reactive: HCV antibody test is abnormal, HCV RNA  Qualitative test will follow.  Note: HCV antibody testing is performed on the [employer]   system.      [02-15-20 @ 06:05]      RADIOLOGY & ADDITIONAL STUDIES:

## 2020-02-19 NOTE — DISCHARGE NOTE NURSING/CASE MANAGEMENT/SOCIAL WORK - PATIENT PORTAL LINK FT
You can access the FollowMyHealth Patient Portal offered by [facility] by registering at the following website: [email or web address]. By joining [employer]’s FollowMyHealth portal, you will also be able to view your health information using other applications (apps) compatible with our system.

## 2020-03-18 DIAGNOSIS — Z71.89 OTHER SPECIFIED COUNSELING: ICD-10-CM

## 2020-05-05 NOTE — BEHAVIORAL HEALTH ASSESSMENT NOTE - CURRENT ACTIVE IDEATION
Patient states has fever with swollen tonsils, with patches, swollen lymph nodes.  Hurts to turn head, barely can talk, body chills, vomiting.       Please call pt [phone]      Pharmacy Station [place]    None known

## 2020-07-08 NOTE — PROGRESS NOTE BEHAVIORAL HEALTH - NSBHATTESTSEENBY_PSY_A_CORE
36.6
attending Psychiatrist without NP/Trainee
Attending Psychiatrist supervising NP/Trainee, meeting pt...

## 2020-08-20 ENCOUNTER — EMERGENCY (EMERGENCY)
Facility: HOSPITAL | Age: 65
LOS: 1 days | Discharge: HOME | End: 2020-08-20
Attending: EMERGENCY MEDICINE | Admitting: EMERGENCY MEDICINE
Payer: MEDICARE

## 2020-08-20 VITALS
RESPIRATION RATE: 20 BRPM | DIASTOLIC BLOOD PRESSURE: 75 MMHG | OXYGEN SATURATION: 100 % | SYSTOLIC BLOOD PRESSURE: 161 MMHG | HEART RATE: 99 BPM

## 2020-08-20 VITALS
HEART RATE: 107 BPM | DIASTOLIC BLOOD PRESSURE: 82 MMHG | TEMPERATURE: 98 F | OXYGEN SATURATION: 99 % | SYSTOLIC BLOOD PRESSURE: 104 MMHG | RESPIRATION RATE: 18 BRPM

## 2020-08-20 LAB
ALBUMIN SERPL ELPH-MCNC: 4.6 G/DL — SIGNIFICANT CHANGE UP (ref 3.3–5)
ALP SERPL-CCNC: 77 U/L — SIGNIFICANT CHANGE UP (ref 40–120)
ALT FLD-CCNC: 20 U/L — SIGNIFICANT CHANGE UP (ref 4–33)
ANION GAP SERPL CALC-SCNC: 12 MMO/L — SIGNIFICANT CHANGE UP (ref 7–14)
APPEARANCE UR: CLEAR — SIGNIFICANT CHANGE UP
APTT BLD: 29.6 SEC — SIGNIFICANT CHANGE UP (ref 27–36.3)
AST SERPL-CCNC: 19 U/L — SIGNIFICANT CHANGE UP (ref 4–32)
BACTERIA # UR AUTO: NEGATIVE — SIGNIFICANT CHANGE UP
BASE EXCESS BLDV CALC-SCNC: 2.7 MMOL/L — SIGNIFICANT CHANGE UP
BASE EXCESS BLDV CALC-SCNC: 6.4 MMOL/L — SIGNIFICANT CHANGE UP
BASOPHILS # BLD AUTO: 0.04 K/UL — SIGNIFICANT CHANGE UP (ref 0–0.2)
BASOPHILS NFR BLD AUTO: 0.3 % — SIGNIFICANT CHANGE UP (ref 0–2)
BILIRUB SERPL-MCNC: 0.5 MG/DL — SIGNIFICANT CHANGE UP (ref 0.2–1.2)
BILIRUB UR-MCNC: NEGATIVE — SIGNIFICANT CHANGE UP
BLOOD GAS VENOUS - CREATININE: 0.75 MG/DL — SIGNIFICANT CHANGE UP (ref 0.5–1.3)
BLOOD GAS VENOUS - CREATININE: 0.9 MG/DL — SIGNIFICANT CHANGE UP (ref 0.5–1.3)
BLOOD UR QL VISUAL: NEGATIVE — SIGNIFICANT CHANGE UP
BUN SERPL-MCNC: 29 MG/DL — HIGH (ref 7–23)
CALCIUM SERPL-MCNC: 10.2 MG/DL — SIGNIFICANT CHANGE UP (ref 8.4–10.5)
CHLORIDE BLDV-SCNC: 106 MMOL/L — SIGNIFICANT CHANGE UP (ref 96–108)
CHLORIDE BLDV-SCNC: 109 MMOL/L — HIGH (ref 96–108)
CHLORIDE SERPL-SCNC: 104 MMOL/L — SIGNIFICANT CHANGE UP (ref 98–107)
CO2 SERPL-SCNC: 29 MMOL/L — SIGNIFICANT CHANGE UP (ref 22–31)
COLOR SPEC: YELLOW — SIGNIFICANT CHANGE UP
CREAT SERPL-MCNC: 0.84 MG/DL — SIGNIFICANT CHANGE UP (ref 0.5–1.3)
EOSINOPHIL # BLD AUTO: 0.07 K/UL — SIGNIFICANT CHANGE UP (ref 0–0.5)
EOSINOPHIL NFR BLD AUTO: 0.5 % — SIGNIFICANT CHANGE UP (ref 0–6)
GAS PNL BLDV: 143 MMOL/L — SIGNIFICANT CHANGE UP (ref 136–146)
GAS PNL BLDV: 146 MMOL/L — SIGNIFICANT CHANGE UP (ref 136–146)
GLUCOSE BLDV-MCNC: 71 MG/DL — SIGNIFICANT CHANGE UP (ref 70–99)
GLUCOSE BLDV-MCNC: 89 MG/DL — SIGNIFICANT CHANGE UP (ref 70–99)
GLUCOSE SERPL-MCNC: 70 MG/DL — SIGNIFICANT CHANGE UP (ref 70–99)
GLUCOSE UR-MCNC: NEGATIVE — SIGNIFICANT CHANGE UP
HCO3 BLDV-SCNC: 26 MMOL/L — SIGNIFICANT CHANGE UP (ref 20–27)
HCO3 BLDV-SCNC: 26 MMOL/L — SIGNIFICANT CHANGE UP (ref 20–27)
HCT VFR BLD CALC: 49.3 % — HIGH (ref 34.5–45)
HCT VFR BLDV CALC: 44.2 % — SIGNIFICANT CHANGE UP (ref 34.5–45)
HCT VFR BLDV CALC: 48.4 % — HIGH (ref 34.5–45)
HGB BLD-MCNC: 14.8 G/DL — SIGNIFICANT CHANGE UP (ref 11.5–15.5)
HGB BLDV-MCNC: 14.4 G/DL — SIGNIFICANT CHANGE UP (ref 11.5–15.5)
HGB BLDV-MCNC: 15.8 G/DL — HIGH (ref 11.5–15.5)
HYALINE CASTS # UR AUTO: HIGH
IMM GRANULOCYTES NFR BLD AUTO: 0.4 % — SIGNIFICANT CHANGE UP (ref 0–1.5)
INR BLD: 1.07 — SIGNIFICANT CHANGE UP (ref 0.88–1.16)
KETONES UR-MCNC: NEGATIVE — SIGNIFICANT CHANGE UP
LACTATE BLDV-MCNC: 0.9 MMOL/L — SIGNIFICANT CHANGE UP (ref 0.5–2)
LACTATE BLDV-MCNC: 2.8 MMOL/L — HIGH (ref 0.5–2)
LEUKOCYTE ESTERASE UR-ACNC: NEGATIVE — SIGNIFICANT CHANGE UP
LYMPHOCYTES # BLD AUTO: 29.8 % — SIGNIFICANT CHANGE UP (ref 13–44)
LYMPHOCYTES # BLD AUTO: 4.24 K/UL — HIGH (ref 1–3.3)
MCHC RBC-ENTMCNC: 25.4 PG — LOW (ref 27–34)
MCHC RBC-ENTMCNC: 30 % — LOW (ref 32–36)
MCV RBC AUTO: 84.7 FL — SIGNIFICANT CHANGE UP (ref 80–100)
MONOCYTES # BLD AUTO: 1.17 K/UL — HIGH (ref 0–0.9)
MONOCYTES NFR BLD AUTO: 8.2 % — SIGNIFICANT CHANGE UP (ref 2–14)
NEUTROPHILS # BLD AUTO: 8.64 K/UL — HIGH (ref 1.8–7.4)
NEUTROPHILS NFR BLD AUTO: 60.8 % — SIGNIFICANT CHANGE UP (ref 43–77)
NITRITE UR-MCNC: NEGATIVE — SIGNIFICANT CHANGE UP
NRBC # FLD: 0 K/UL — SIGNIFICANT CHANGE UP (ref 0–0)
PCO2 BLDV: 49 MMHG — SIGNIFICANT CHANGE UP (ref 41–51)
PCO2 BLDV: 64 MMHG — HIGH (ref 41–51)
PH BLDV: 7.33 PH — SIGNIFICANT CHANGE UP (ref 7.32–7.43)
PH BLDV: 7.37 PH — SIGNIFICANT CHANGE UP (ref 7.32–7.43)
PH UR: 6 — SIGNIFICANT CHANGE UP (ref 5–8)
PLATELET # BLD AUTO: 244 K/UL — SIGNIFICANT CHANGE UP (ref 150–400)
PMV BLD: 10.6 FL — SIGNIFICANT CHANGE UP (ref 7–13)
PO2 BLDV: 51 MMHG — HIGH (ref 35–40)
PO2 BLDV: < 24 MMHG — LOW (ref 35–40)
POTASSIUM BLDV-SCNC: 3.7 MMOL/L — SIGNIFICANT CHANGE UP (ref 3.4–4.5)
POTASSIUM BLDV-SCNC: 4.5 MMOL/L — SIGNIFICANT CHANGE UP (ref 3.4–4.5)
POTASSIUM SERPL-MCNC: 4.6 MMOL/L — SIGNIFICANT CHANGE UP (ref 3.5–5.3)
POTASSIUM SERPL-SCNC: 4.6 MMOL/L — SIGNIFICANT CHANGE UP (ref 3.5–5.3)
PROT SERPL-MCNC: 7.2 G/DL — SIGNIFICANT CHANGE UP (ref 6–8.3)
PROT UR-MCNC: 50 — SIGNIFICANT CHANGE UP
PROTHROM AB SERPL-ACNC: 12.3 SEC — SIGNIFICANT CHANGE UP (ref 10.6–13.6)
RBC # BLD: 5.82 M/UL — HIGH (ref 3.8–5.2)
RBC # FLD: 13.3 % — SIGNIFICANT CHANGE UP (ref 10.3–14.5)
RBC CASTS # UR COMP ASSIST: SIGNIFICANT CHANGE UP (ref 0–?)
SAO2 % BLDV: 24.6 % — LOW (ref 60–85)
SAO2 % BLDV: 82.8 % — SIGNIFICANT CHANGE UP (ref 60–85)
SARS-COV-2 RNA SPEC QL NAA+PROBE: SIGNIFICANT CHANGE UP
SODIUM SERPL-SCNC: 145 MMOL/L — SIGNIFICANT CHANGE UP (ref 135–145)
SP GR SPEC: 1.03 — SIGNIFICANT CHANGE UP (ref 1–1.04)
SQUAMOUS # UR AUTO: SIGNIFICANT CHANGE UP
UROBILINOGEN FLD QL: NORMAL — SIGNIFICANT CHANGE UP
WBC # BLD: 14.22 K/UL — HIGH (ref 3.8–10.5)
WBC # FLD AUTO: 14.22 K/UL — HIGH (ref 3.8–10.5)
WBC UR QL: SIGNIFICANT CHANGE UP (ref 0–?)

## 2020-08-20 PROCEDURE — 70450 CT HEAD/BRAIN W/O DYE: CPT | Mod: 26

## 2020-08-20 PROCEDURE — 99231 SBSQ HOSP IP/OBS SF/LOW 25: CPT

## 2020-08-20 PROCEDURE — 99284 EMERGENCY DEPT VISIT MOD MDM: CPT

## 2020-08-20 PROCEDURE — 71045 X-RAY EXAM CHEST 1 VIEW: CPT | Mod: 26

## 2020-08-20 PROCEDURE — 72170 X-RAY EXAM OF PELVIS: CPT | Mod: 26

## 2020-08-20 RX ORDER — DILTIAZEM HCL 120 MG
20 CAPSULE, EXT RELEASE 24 HR ORAL ONCE
Refills: 0 | Status: COMPLETED | OUTPATIENT
Start: 2020-08-20 | End: 2020-08-20

## 2020-08-20 RX ORDER — SODIUM CHLORIDE 9 MG/ML
1000 INJECTION INTRAMUSCULAR; INTRAVENOUS; SUBCUTANEOUS ONCE
Refills: 0 | Status: COMPLETED | OUTPATIENT
Start: 2020-08-20 | End: 2020-08-20

## 2020-08-20 RX ADMIN — SODIUM CHLORIDE 1000 MILLILITER(S): 9 INJECTION INTRAMUSCULAR; INTRAVENOUS; SUBCUTANEOUS at 13:05

## 2020-08-20 RX ADMIN — Medication 20 MILLIGRAM(S): at 12:37

## 2020-08-20 NOTE — ED PROVIDER NOTE - PATIENT PORTAL LINK FT
You can access the FollowMyHealth Patient Portal offered by [facility] by registering at the following website: [email or web address]. By joining "[employer]"’s FollowMyHealth portal, you will also be able to view your health information using other applications (apps) compatible with our system.

## 2020-08-20 NOTE — ED PROVIDER NOTE - ATTENDING CONTRIBUTION TO CARE
Pt presents after unwitnessed mechanical fall, states she was walking w/o walker and floor was slippery and she fell backward. No prodrome to fall. Pt has baseline tremor per HHA, cardiac monitor in room ranging from 150s-220s, initially EKG concerning for afib with rvr and given diltiazem w/o improvement, rpt EKG performed and showed most likley baseline tremor causing artifact. Pending pelvis imaging, head ct, likely dispo if no traumatic injury.

## 2020-08-20 NOTE — ED ADULT NURSE NOTE - OBJECTIVE STATEMENT
Receive pt. in ER room 21 alert and oriented x 4, presenting to the ER with complaints of falling. Pt. have a history of Schizophrenia, DM, COPD. Pt. stated " I was in the bathroom and I bend over to  something and I fell on my butt".  Home health aide Michael at bedside with patient. No c/o pain no respiratory distress. Both knee with redness left knee more than right knee, pt. stated " I was trying to get up off the floor so I was using my knees". Labs sent.

## 2020-08-20 NOTE — ED PROVIDER NOTE - OBJECTIVE STATEMENT
66 y/o female with pmhx of DM and schizophrenia s/p fall. Patient BIBEMS and nurse aid at bedside but did not witness fall. Patient was outside bathroom, not using her walker, where she tripped and fell backwards. Patient denies head trauma, LOC, or amnesia. Patient also denies that she is on any blood thinners. Patient denies feeling faint or weak before fall. She also denies fevers/chills, SOB, cough, abdominal pain, or dysuria. 66 y/o female with pmhx of DM, COPD, and schizoaffective disorder s/p fall. Patient BIBEMS and nurse aid at bedside but did not witness fall. Patient was outside bathroom, not using her walker, where she tripped and fell backwards. Patient denies head trauma, LOC, or amnesia. Patient also denies that she is on any blood thinners or feeling faint/weak before fall. Patient tachycardic on monitor in room ranging from 150s-220s. She also denies fevers/chills, SOB, cough, abdominal pain, or dysuria. 66 y/o female with pmhx of DM, COPD, and schizoaffective disorder s/p fall. Patient BIBEMS and nurse aid at bedside but did not witness fall. Patient was outside bathroom, not using her walker, where she tripped and fell backwards and fell onto her buttock. Patient denies head trauma, LOC, or amnesia. Patient also denies that she is on any blood thinners or feeling faint/weak before fall, no preceding cp, palpitations, sob, dizziness.  She also denies fevers/chills, SOB, cough, abdominal pain, or dysuria.    KAUR

## 2020-08-20 NOTE — ED PROVIDER NOTE - CLINICAL SUMMARY MEDICAL DECISION MAKING FREE TEXT BOX
64 y/o female pmhx of DM, COPD, and schizoaffective disorder s/p fall not on blood thinners with baseline resting tremor. Patient tachycardic on monitor ranging from 150s-220s and warm to touch. Sepsis workup for undifferentiated tachycardia and possible febrile presentation. EKG difficult to interpret true tachycardia vs. tremor artifact. CT head as fall was unwitnessed. Cardizem trial for undifferentiated tachycardia.

## 2020-08-20 NOTE — CONSULT NOTE ADULT - ASSESSMENT
66 y/o F with pmhx of DM, COPD, tremor, and schizoaffective disorder s/p mechanical fall. Cardiology consulted due to abnormal ECG.    #Abnormal ECG: likely artifact 2/2 tremor. Patient diffusely shaking, a condition which she has had for several years. Pulse palpated and measured at ~100. Sinus rhythm present on the ECG rhythm strip confirming rates ~100. No arrhythmia noted. Patient w/o arrhythmia or SCD hx.   -c/w managing patient's mechanical fall     Prem Carbajal MD  Cardiology fellow, F1  [phone]

## 2020-08-20 NOTE — ED ADULT NURSE REASSESSMENT NOTE - NS ED NURSE REASSESS COMMENT FT1
Break RN: A&Ox3, no c/o pain/discomfort. Pt. has baseline tremors, unable to get accurate cardiac rhythm on monitor. EKG performed in ER. MD Alexi benoit to remove pt. from cardiac monitor.

## 2020-08-20 NOTE — CONSULT NOTE ADULT - SUBJECTIVE AND OBJECTIVE BOX
Patient seen and evaluated at bedside    Chief Complaint: fall    HPI: 64 y/o F with pmhx of DM, COPD, tremor, and schizoaffective disorder s/p fall. Patient tachycardic on monitor in room ranging from 150s-220s. She denies CP, dyspnea, fevers/chills, SOB, cough, abdominal pain, or dysuria.      PMHx:   COPD without exacerbation  Schizoaffective disorder  Obesity  Diabetes      PSHx:   No significant past surgical history      Allergies:  No Known Allergies    Home Medications:  Advair Diskus 250 mcg-50 mcg inhalation powder: 1 inhaler(s) inhaled 2 times a day (22 Dec 2019 11:27)  amantadine 100 mg oral capsule: 1 cap(s) orally 2 times a day (22 Dec 2019 11:27)  metFORMIN 500 mg oral tablet: 1 tab(s) orally 2 times a day (22 Dec 2019 11:27)  ProAir HFA 90 mcg/inh inhalation aerosol: 1 puff(s) inhaled 3 times a day, As Needed (22 Dec 2019 11:27)      Current Medications:       FAMILY HISTORY:  FH: multiple myeloma: in Father      Social History:  Smoking History: denies  Alcohol Use: denies  Drug Use: denies    REVIEW OF SYSTEMS:  as above      Physical Exam:  T(F): 99.2 (08-20), Max: 99.2 (08-20)  HR: 170 (08-20) (107 - 170)  BP: 178/94 (08-20) (104/82 - 178/94)  RR: 19 (08-20)  SpO2: 100% (08-20)  GENERAL: No acute distress, well-developed  HEAD:  Atraumatic, Normocephalic  ENT: EOMI, PERRLA, conjunctiva and sclera clear, Neck supple, No JVD, moist mucosa  CHEST/LUNG: Clear to auscultation bilaterally; No wheeze, equal breath sounds bilaterally   BACK: No spinal tenderness  HEART: Tachycardia, regular rhythm; No murmurs, rubs, or gallops  ABDOMEN: Soft, Nontender, Nondistended; Bowel sounds present  EXTREMITIES:  No clubbing, cyanosis, or edema  PSYCH: Nl behavior, nl affect  NEUROLOGY: AAOx3, non-focal, cranial nerves intact  SKIN: Normal color, No rashes or lesions    Cardiovascular Diagnostic Testing:    ECG: Personally reviewed    Echo: Personally reviewed    Stress Testing: none    Cath: none    Imaging: reviewed    CXR: Personally reviewed    Labs: Personally reviewed                        14.8   14.22 )-----------( 244      ( 20 Aug 2020 11:41 )             49.3     08-20    145  |  104  |  29<H>  ----------------------------<  70  4.6   |  29  |  0.84    Ca    10.2      20 Aug 2020 11:41    TPro  7.2  /  Alb  4.6  /  TBili  0.5  /  DBili  x   /  AST  19  /  ALT  20  /  AlkPhos  77  08-20    PT/INR - ( 20 Aug 2020 11:52 )   PT: 12.3 SEC;   INR: 1.07          PTT - ( 20 Aug 2020 11:52 )  PTT:29.6 SEC Patient seen and evaluated at bedside    Chief Complaint: fall    HPI: 64 y/o F with pmhx of DM, COPD, tremor, and schizoaffective disorder s/p mechanical fall. Patient slipped in her bathroom. Denies hitting her head. Patient tachycardic on monitor in room ranging from 150s-220s. She denies CP, dyspnea, fevers/chills, SOB, cough, abdominal pain, dysuria, hx of arrhythmia, presyncope/syncope, palpitations.      PMHx:   COPD without exacerbation  Schizoaffective disorder  Obesity  Diabetes      PSHx:   No significant past surgical history      Allergies:  No Known Allergies    Home Medications:  Advair Diskus 250 mcg-50 mcg inhalation powder: 1 inhaler(s) inhaled 2 times a day (22 Dec 2019 11:27)  amantadine 100 mg oral capsule: 1 cap(s) orally 2 times a day (22 Dec 2019 11:27)  metFORMIN 500 mg oral tablet: 1 tab(s) orally 2 times a day (22 Dec 2019 11:27)  ProAir HFA 90 mcg/inh inhalation aerosol: 1 puff(s) inhaled 3 times a day, As Needed (22 Dec 2019 11:27)      Current Medications:       FAMILY HISTORY:  FH: multiple myeloma: in Father      Social History:  Smoking History: denies  Alcohol Use: denies  Drug Use: denies    REVIEW OF SYSTEMS:  as above      Physical Exam:  T(F): 99.2 (08-20), Max: 99.2 (08-20)  HR: 170 (08-20) (107 - 170)  BP: 178/94 (08-20) (104/82 - 178/94)  RR: 19 (08-20)  SpO2: 100% (08-20)  GENERAL: No acute distress, well-developed  HEAD:  Atraumatic, Normocephalic  ENT: EOMI, PERRLA, conjunctiva and sclera clear, Neck supple, No JVD, moist mucosa  CHEST/LUNG: Clear to auscultation bilaterally; No wheeze, equal breath sounds bilaterally   BACK: No spinal tenderness  HEART: Tachycardia, regular rhythm; No murmurs, rubs, or gallops  ABDOMEN: Soft, Nontender, Nondistended; Bowel sounds present  EXTREMITIES:  No clubbing, cyanosis, or edema  PSYCH: Nl behavior, nl affect  NEUROLOGY: non-focal, tremulous  SKIN: Normal color, No rashes or lesions    Cardiovascular Diagnostic Testing:    ECG: Personally reviewed    Echo: Personally reviewed    Stress Testing: none    Cath: none    Imaging: reviewed    CXR: Personally reviewed    Labs: Personally reviewed                        14.8   14.22 )-----------( 244      ( 20 Aug 2020 11:41 )             49.3     08-20    145  |  104  |  29<H>  ----------------------------<  70  4.6   |  29  |  0.84    Ca    10.2      20 Aug 2020 11:41    TPro  7.2  /  Alb  4.6  /  TBili  0.5  /  DBili  x   /  AST  19  /  ALT  20  /  AlkPhos  77  08-20    PT/INR - ( 20 Aug 2020 11:52 )   PT: 12.3 SEC;   INR: 1.07          PTT - ( 20 Aug 2020 11:52 )  PTT:29.6 SEC

## 2020-08-20 NOTE — ED PROVIDER NOTE - NSFOLLOWUPINSTRUCTIONS_ED_ALL_ED_FT
You were seen today in ED for a fall. We did basics labs, CT scan of your head, and an x-ray of your pelvis which came back normal.     Please return to ED if:   You have neck pain that gets worse or has not improved after 1 week. You have a fever. Severe. Dizziness or balance problems. Nausea. Vision problems. Increased sensitivity to noise or light. Memory problems. Trouble concentrating or paying attention. Numbness, tingling, or weakness in your arms or legs. Changes in bowel or bladder control. Increasing pain in any area of your body. Swelling in any area of your body, especially your legs. Shortness of breath or light-headedness. Chest pain. Blood in your urine, stool, or vomit. Severe pain in your abdomen or your back. Severe or worsening headaches. Sudden vision loss or double vision. Your eye suddenly becomes red. Your pupil is an odd shape or size. You were seen today in ED for a fall. We did basic labs, CT scan of your head, and an x-ray of your pelvis and no abnormalities were seen. Please follow up with PCP and take over the counter pain medications as needed for pain.     Please return to ED if:   You have neck pain that gets worse or has not improved after 1 week. You have a fever. Severe. Dizziness or balance problems. Nausea. Vision problems. Increased sensitivity to noise or light. Memory problems. Trouble concentrating or paying attention. Numbness, tingling, or weakness in your arms or legs. Changes in bowel or bladder control. Increasing pain in any area of your body. Swelling in any area of your body, especially your legs. Shortness of breath or light-headedness. Chest pain. Blood in your urine, stool, or vomit. Severe pain in your abdomen or your back. Severe or worsening headaches. Sudden vision loss or double vision. Your eye suddenly becomes red. Your pupil is an odd shape or size.

## 2020-08-20 NOTE — ED PROVIDER NOTE - PHYSICAL EXAMINATION
Gen: WDWN, NAD. Basline b/l resting tremors.  HEENT: Atraumatic head, No periorbital/periauricular ecchymoses, no hemotympanum, EOMI, no nasal discharge, mucous membranes moist  CV: RRR, +S1/S2, no M/R/G  Resp: Bilateral diffuse expiratory wheezes, no R/R  GI: Abdomen soft non-distended, NTTP, no masses  MSK: No open wounds, no bruising, no LE edema  Skin: Warm to touch. B/l knee erythema   Neuro: A&Ox4, following commands, moving all four extremities spontaneously  Psych: appropriate mood, denies any visual or auditory hallucinations at this time. Gen: WDWN, NAD. Basline b/l resting tremors.  HEENT: Atraumatic head, No periorbital/periauricular ecchymoses, no hemotympanum, EOMI, no nasal discharge, mucous membranes moist  CV: RRR, +S1/S2, no M/R/G  Resp: Bilateral diffuse expiratory wheezes, no R/R  GI: Abdomen soft non-distended, NTTP, no masses  MSK: No open wounds, no bruising, no LE edema  Skin: Warm to touch. B/l knee erythema   Neuro: A&Ox4, following commands, moving all four extremities spontaneously  Psych: appropriate mood, denies any visual or auditory hallucinations at this time.    KAUR

## 2020-08-20 NOTE — CONSULT NOTE ADULT - ATTENDING COMMENTS
The patient was seen and examined with the cardiology consultation team.     She is a 65-year-old woman with diabetes, COPD, tremor and schizoaffective disorder who presented after a mechanical fall. We are consulted regarding tachycardia noted on ECG and monitors. The patient reports that she fell while trying to get up from the toilet. There was no loss of consciousness. She did not hit her head, and fell on her behind. She has no chest pain, dyspnea, orthopnea, palpitations or dizziness. She has never fainted.     She is comfortable-appearing and in no acute distress.  She has a resting tremor which she states is long-standing.  Her monitor indeed reads 150s-200s.  Her heart rate may be slightly tachycardic, but not to this degree.  Her lungs are clear, and she does not appear to be in decompensated heart failure.  ECG demonstrates sinus rhythm with borderline tachycardia at about 100, with significant artifact likely related to her tremor.     Atrial arrhythmia is not present.  Defer further care to the ER team.    Lino Vasquez MD  Cardiology  [phone]

## 2020-08-20 NOTE — ED PROVIDER NOTE - NS ED ROS FT
Gen: Denies fever/chills  CV: Denies chest pain, palpitations  Skin: Denies rash, erythema, color changes  Resp: Denies SOB, cough  Endo: Denies sensitivity to heat, cold, increased urination  GI: Denies diarrhea, constipation, nausea, vomiting  Msk: Denies back pain, LE swelling, extremity pain  : Denies dysuria, increased frequency  Neuro: Denies LOC, weakness, seizures  Psych: Denies any visual or auditory hallucinations at this time

## 2020-08-20 NOTE — ED ADULT TRIAGE NOTE - CHIEF COMPLAINT QUOTE
Pt with schizophrenia with tremor .pt s/p fall was found on the floor. Pt denies LOC , co pain to right hand , pt with falls in the past arrives with aide. pt on multiple psychiatric medications.

## 2020-08-21 LAB
CULTURE RESULTS: NO GROWTH — SIGNIFICANT CHANGE UP
SPECIMEN SOURCE: SIGNIFICANT CHANGE UP

## 2020-08-25 LAB
CULTURE RESULTS: SIGNIFICANT CHANGE UP
CULTURE RESULTS: SIGNIFICANT CHANGE UP
SPECIMEN SOURCE: SIGNIFICANT CHANGE UP
SPECIMEN SOURCE: SIGNIFICANT CHANGE UP

## 2020-12-01 PROCEDURE — G9005: CPT

## 2021-01-01 ENCOUNTER — OUTPATIENT (OUTPATIENT)
Dept: OUTPATIENT SERVICES | Facility: HOSPITAL | Age: 66
LOS: 1 days | End: 2021-01-01
Payer: MEDICARE

## 2021-02-19 DIAGNOSIS — Z71.89 OTHER SPECIFIED COUNSELING: ICD-10-CM

## 2021-06-09 NOTE — PROGRESS NOTE ADULT - PROBLEM SELECTOR PROBLEM 5
Quality 226: Preventive Care And Screening: Tobacco Use: Screening And Cessation Intervention: Patient screened for tobacco use, is a smoker AND received Cessation Counseling Quality 130: Documentation Of Current Medications In The Medical Record: Current Medications Documented Quality 431: Preventive Care And Screening: Unhealthy Alcohol Use - Screening: Patient screened for unhealthy alcohol use using a single question and scores less than 2 times per year Detail Level: Detailed Prophylactic measure

## 2021-09-10 NOTE — DISCHARGE NOTE PROVIDER - NS AS DC PROVIDER CONTACT Y/N MULTI
Problem: Falls - Risk of:  Goal: Will remain free from falls  Description: Will remain free from falls  9/10/2021 0049 by Antoine Crews  Outcome: Met This Shift  9/9/2021 1620 by Mala Bingham RN  Outcome: Met This Shift  Goal: Absence of physical injury  Description: Absence of physical injury  Outcome: Met This Shift     Problem: Skin Integrity:  Goal: Will show no infection signs and symptoms  Description: Will show no infection signs and symptoms  9/10/2021 0049 by Antoine Crews  Outcome: Met This Shift  9/9/2021 1620 by Mala Bingham RN  Outcome: Met This Shift  Goal: Absence of new skin breakdown  Description: Absence of new skin breakdown  Outcome: Met This Shift Yes

## 2021-12-01 PROCEDURE — G9005: CPT

## 2022-04-01 NOTE — DISCHARGE NOTE NURSING/CASE MANAGEMENT/SOCIAL WORK - CAREGIVER ADDRESS
[address] [facility] [address], [place] [address]
<-- Click to add NO significant Past Surgical History

## 2022-07-23 ENCOUNTER — INPATIENT (INPATIENT)
Facility: HOSPITAL | Age: 67
LOS: 17 days | Discharge: NURSING, REHAB OR LONG-TERM CARE | End: 2022-08-10
Attending: HOSPITALIST | Admitting: HOSPITALIST

## 2022-07-23 VITALS
OXYGEN SATURATION: 96 % | HEIGHT: 59 IN | TEMPERATURE: 101 F | RESPIRATION RATE: 24 BRPM | SYSTOLIC BLOOD PRESSURE: 11 MMHG | DIASTOLIC BLOOD PRESSURE: 86 MMHG | HEART RATE: 110 BPM

## 2022-07-23 DIAGNOSIS — R74.01 ELEVATION OF LEVELS OF LIVER TRANSAMINASE LEVELS: ICD-10-CM

## 2022-07-23 DIAGNOSIS — J18.9 PNEUMONIA, UNSPECIFIED ORGANISM: ICD-10-CM

## 2022-07-23 DIAGNOSIS — E87.2 ACIDOSIS: ICD-10-CM

## 2022-07-23 DIAGNOSIS — M62.82 RHABDOMYOLYSIS: ICD-10-CM

## 2022-07-23 DIAGNOSIS — S01.80XA UNSPECIFIED OPEN WOUND OF OTHER PART OF HEAD, INITIAL ENCOUNTER: ICD-10-CM

## 2022-07-23 DIAGNOSIS — J44.9 CHRONIC OBSTRUCTIVE PULMONARY DISEASE, UNSPECIFIED: ICD-10-CM

## 2022-07-23 DIAGNOSIS — A41.9 SEPSIS, UNSPECIFIED ORGANISM: ICD-10-CM

## 2022-07-23 DIAGNOSIS — R82.90 UNSPECIFIED ABNORMAL FINDINGS IN URINE: ICD-10-CM

## 2022-07-23 DIAGNOSIS — U07.1 COVID-19: ICD-10-CM

## 2022-07-23 DIAGNOSIS — Z29.9 ENCOUNTER FOR PROPHYLACTIC MEASURES, UNSPECIFIED: ICD-10-CM

## 2022-07-23 DIAGNOSIS — G20 PARKINSON'S DISEASE: ICD-10-CM

## 2022-07-23 DIAGNOSIS — E11.9 TYPE 2 DIABETES MELLITUS WITHOUT COMPLICATIONS: ICD-10-CM

## 2022-07-23 DIAGNOSIS — F25.9 SCHIZOAFFECTIVE DISORDER, UNSPECIFIED: ICD-10-CM

## 2022-07-23 DIAGNOSIS — J44.1 CHRONIC OBSTRUCTIVE PULMONARY DISEASE WITH (ACUTE) EXACERBATION: ICD-10-CM

## 2022-07-23 DIAGNOSIS — R29.6 REPEATED FALLS: ICD-10-CM

## 2022-07-23 LAB
ALBUMIN SERPL ELPH-MCNC: 4.6 G/DL — SIGNIFICANT CHANGE UP (ref 3.3–5)
ALP SERPL-CCNC: 82 U/L — SIGNIFICANT CHANGE UP (ref 40–120)
ALT FLD-CCNC: 88 U/L — HIGH (ref 4–33)
ANION GAP SERPL CALC-SCNC: 20 MMOL/L — HIGH (ref 7–14)
APPEARANCE UR: ABNORMAL
APTT BLD: 32.6 SEC — SIGNIFICANT CHANGE UP (ref 27–36.3)
AST SERPL-CCNC: 141 U/L — HIGH (ref 4–32)
B PERT DNA SPEC QL NAA+PROBE: SIGNIFICANT CHANGE UP
B PERT+PARAPERT DNA PNL SPEC NAA+PROBE: SIGNIFICANT CHANGE UP
BACTERIA # UR AUTO: ABNORMAL
BASE EXCESS BLDV CALC-SCNC: -4.2 MMOL/L — LOW (ref -2–3)
BASOPHILS # BLD AUTO: 0 K/UL — SIGNIFICANT CHANGE UP (ref 0–0.2)
BASOPHILS NFR BLD AUTO: 0 % — SIGNIFICANT CHANGE UP (ref 0–2)
BILIRUB SERPL-MCNC: 0.3 MG/DL — SIGNIFICANT CHANGE UP (ref 0.2–1.2)
BILIRUB UR-MCNC: ABNORMAL
BORDETELLA PARAPERTUSSIS (RAPRVP): SIGNIFICANT CHANGE UP
BUN SERPL-MCNC: 36 MG/DL — HIGH (ref 7–23)
C PNEUM DNA SPEC QL NAA+PROBE: SIGNIFICANT CHANGE UP
CA-I SERPL-SCNC: 1.23 MMOL/L — SIGNIFICANT CHANGE UP (ref 1.15–1.33)
CALCIUM SERPL-MCNC: 9.5 MG/DL — SIGNIFICANT CHANGE UP (ref 8.4–10.5)
CHLORIDE BLDV-SCNC: 106 MMOL/L — SIGNIFICANT CHANGE UP (ref 96–108)
CHLORIDE SERPL-SCNC: 103 MMOL/L — SIGNIFICANT CHANGE UP (ref 98–107)
CK SERPL-CCNC: 4354 U/L — HIGH (ref 25–170)
CO2 BLDV-SCNC: 25 MMOL/L — SIGNIFICANT CHANGE UP (ref 22–26)
CO2 SERPL-SCNC: 20 MMOL/L — LOW (ref 22–31)
COLOR SPEC: YELLOW — SIGNIFICANT CHANGE UP
CREAT SERPL-MCNC: 0.99 MG/DL — SIGNIFICANT CHANGE UP (ref 0.5–1.3)
DIFF PNL FLD: ABNORMAL
EGFR: 63 ML/MIN/1.73M2 — SIGNIFICANT CHANGE UP
EOSINOPHIL # BLD AUTO: 0 K/UL — SIGNIFICANT CHANGE UP (ref 0–0.5)
EOSINOPHIL NFR BLD AUTO: 0 % — SIGNIFICANT CHANGE UP (ref 0–6)
EPI CELLS # UR: 26 /HPF — HIGH (ref 0–5)
FLUAV SUBTYP SPEC NAA+PROBE: SIGNIFICANT CHANGE UP
FLUBV RNA SPEC QL NAA+PROBE: SIGNIFICANT CHANGE UP
GAS PNL BLDV: 138 MMOL/L — SIGNIFICANT CHANGE UP (ref 136–145)
GAS PNL BLDV: SIGNIFICANT CHANGE UP
GAS PNL BLDV: SIGNIFICANT CHANGE UP
GLUCOSE BLDV-MCNC: 129 MG/DL — HIGH (ref 70–99)
GLUCOSE SERPL-MCNC: 127 MG/DL — HIGH (ref 70–99)
GLUCOSE UR QL: NEGATIVE — SIGNIFICANT CHANGE UP
GRAN CASTS # UR COMP ASSIST: SIGNIFICANT CHANGE UP /LPF
HADV DNA SPEC QL NAA+PROBE: SIGNIFICANT CHANGE UP
HCO3 BLDV-SCNC: 23 MMOL/L — SIGNIFICANT CHANGE UP (ref 22–29)
HCOV 229E RNA SPEC QL NAA+PROBE: SIGNIFICANT CHANGE UP
HCOV HKU1 RNA SPEC QL NAA+PROBE: SIGNIFICANT CHANGE UP
HCOV NL63 RNA SPEC QL NAA+PROBE: SIGNIFICANT CHANGE UP
HCOV OC43 RNA SPEC QL NAA+PROBE: SIGNIFICANT CHANGE UP
HCT VFR BLD CALC: 51.8 % — HIGH (ref 34.5–45)
HCT VFR BLDA CALC: 50 % — HIGH (ref 34.5–46.5)
HGB BLD CALC-MCNC: 16.5 G/DL — HIGH (ref 11.5–15.5)
HGB BLD-MCNC: 16.1 G/DL — HIGH (ref 11.5–15.5)
HMPV RNA SPEC QL NAA+PROBE: SIGNIFICANT CHANGE UP
HPIV1 RNA SPEC QL NAA+PROBE: SIGNIFICANT CHANGE UP
HPIV2 RNA SPEC QL NAA+PROBE: SIGNIFICANT CHANGE UP
HPIV3 RNA SPEC QL NAA+PROBE: SIGNIFICANT CHANGE UP
HPIV4 RNA SPEC QL NAA+PROBE: SIGNIFICANT CHANGE UP
HYALINE CASTS # UR AUTO: >145 /LPF — HIGH (ref 0–7)
IANC: 7.71 K/UL — HIGH (ref 1.8–7.4)
INR BLD: 1.16 RATIO — SIGNIFICANT CHANGE UP (ref 0.88–1.16)
KETONES UR-MCNC: ABNORMAL
LACTATE BLDV-MCNC: 1.6 MMOL/L — SIGNIFICANT CHANGE UP (ref 0.5–2)
LEUKOCYTE ESTERASE UR-ACNC: NEGATIVE — SIGNIFICANT CHANGE UP
LYMPHOCYTES # BLD AUTO: 22.3 % — SIGNIFICANT CHANGE UP (ref 13–44)
LYMPHOCYTES # BLD AUTO: 3.14 K/UL — SIGNIFICANT CHANGE UP (ref 1–3.3)
M PNEUMO DNA SPEC QL NAA+PROBE: SIGNIFICANT CHANGE UP
MANUAL SMEAR VERIFICATION: SIGNIFICANT CHANGE UP
MCHC RBC-ENTMCNC: 26.1 PG — LOW (ref 27–34)
MCHC RBC-ENTMCNC: 31.1 GM/DL — LOW (ref 32–36)
MCV RBC AUTO: 84 FL — SIGNIFICANT CHANGE UP (ref 80–100)
MONOCYTES # BLD AUTO: 0.51 K/UL — SIGNIFICANT CHANGE UP (ref 0–0.9)
MONOCYTES NFR BLD AUTO: 3.6 % — SIGNIFICANT CHANGE UP (ref 2–14)
NEUTROPHILS # BLD AUTO: 9.44 K/UL — HIGH (ref 1.8–7.4)
NEUTROPHILS NFR BLD AUTO: 67 % — SIGNIFICANT CHANGE UP (ref 43–77)
NITRITE UR-MCNC: NEGATIVE — SIGNIFICANT CHANGE UP
NT-PROBNP SERPL-SCNC: 1818 PG/ML — HIGH
PCO2 BLDV: 51 MMHG — HIGH (ref 39–42)
PH BLDV: 7.27 — LOW (ref 7.32–7.43)
PH UR: 5.5 — SIGNIFICANT CHANGE UP (ref 5–8)
PLAT MORPH BLD: NORMAL — SIGNIFICANT CHANGE UP
PLATELET # BLD AUTO: 249 K/UL — SIGNIFICANT CHANGE UP (ref 150–400)
PLATELET COUNT - ESTIMATE: NORMAL — SIGNIFICANT CHANGE UP
PO2 BLDV: 65 MMHG — SIGNIFICANT CHANGE UP
POTASSIUM BLDV-SCNC: 4.3 MMOL/L — SIGNIFICANT CHANGE UP (ref 3.5–5.1)
POTASSIUM SERPL-MCNC: 4.2 MMOL/L — SIGNIFICANT CHANGE UP (ref 3.5–5.3)
POTASSIUM SERPL-SCNC: 4.2 MMOL/L — SIGNIFICANT CHANGE UP (ref 3.5–5.3)
PROCALCITONIN SERPL-MCNC: 0.75 NG/ML — HIGH (ref 0.02–0.1)
PROT SERPL-MCNC: 7.6 G/DL — SIGNIFICANT CHANGE UP (ref 6–8.3)
PROT UR-MCNC: ABNORMAL
PROTHROM AB SERPL-ACNC: 13.5 SEC — HIGH (ref 10.5–13.4)
RAPID RVP RESULT: DETECTED
RBC # BLD: 6.17 M/UL — HIGH (ref 3.8–5.2)
RBC # FLD: 14.6 % — HIGH (ref 10.3–14.5)
RBC BLD AUTO: NORMAL — SIGNIFICANT CHANGE UP
RBC CASTS # UR COMP ASSIST: 4 /HPF — SIGNIFICANT CHANGE UP (ref 0–4)
RSV RNA SPEC QL NAA+PROBE: SIGNIFICANT CHANGE UP
RV+EV RNA SPEC QL NAA+PROBE: SIGNIFICANT CHANGE UP
SAO2 % BLDV: 90.2 % — SIGNIFICANT CHANGE UP
SARS-COV-2 RNA SPEC QL NAA+PROBE: DETECTED
SMUDGE CELLS # BLD: PRESENT — SIGNIFICANT CHANGE UP
SODIUM SERPL-SCNC: 143 MMOL/L — SIGNIFICANT CHANGE UP (ref 135–145)
SP GR SPEC: 1.03 — SIGNIFICANT CHANGE UP (ref 1–1.05)
TROPONIN T, HIGH SENSITIVITY RESULT: 30 NG/L — SIGNIFICANT CHANGE UP
TROPONIN T, HIGH SENSITIVITY RESULT: 30 NG/L — SIGNIFICANT CHANGE UP
UROBILINOGEN FLD QL: ABNORMAL
VARIANT LYMPHS # BLD: 7.1 % — HIGH (ref 0–6)
WBC # BLD: 14.09 K/UL — HIGH (ref 3.8–10.5)
WBC # FLD AUTO: 14.09 K/UL — HIGH (ref 3.8–10.5)
WBC UR QL: 79 /HPF — HIGH (ref 0–5)

## 2022-07-23 PROCEDURE — 70498 CT ANGIOGRAPHY NECK: CPT | Mod: 26,MA

## 2022-07-23 PROCEDURE — 72125 CT NECK SPINE W/O DYE: CPT | Mod: 26,MA

## 2022-07-23 PROCEDURE — 70496 CT ANGIOGRAPHY HEAD: CPT | Mod: 26,MA

## 2022-07-23 PROCEDURE — 99285 EMERGENCY DEPT VISIT HI MDM: CPT | Mod: CS

## 2022-07-23 PROCEDURE — 99223 1ST HOSP IP/OBS HIGH 75: CPT | Mod: GC,AI

## 2022-07-23 PROCEDURE — 71250 CT THORAX DX C-: CPT | Mod: 26,MA

## 2022-07-23 RX ORDER — PIPERACILLIN AND TAZOBACTAM 4; .5 G/20ML; G/20ML
3.38 INJECTION, POWDER, LYOPHILIZED, FOR SOLUTION INTRAVENOUS ONCE
Refills: 0 | Status: COMPLETED | OUTPATIENT
Start: 2022-07-23 | End: 2022-07-23

## 2022-07-23 RX ORDER — FLUTICASONE PROPIONATE AND SALMETEROL 50; 250 UG/1; UG/1
1 POWDER ORAL; RESPIRATORY (INHALATION)
Qty: 0 | Refills: 0 | DISCHARGE

## 2022-07-23 RX ORDER — DEXTROSE 50 % IN WATER 50 %
25 SYRINGE (ML) INTRAVENOUS ONCE
Refills: 0 | Status: DISCONTINUED | OUTPATIENT
Start: 2022-07-23 | End: 2022-08-10

## 2022-07-23 RX ORDER — DEXAMETHASONE 0.5 MG/5ML
6 ELIXIR ORAL DAILY
Refills: 0 | Status: DISCONTINUED | OUTPATIENT
Start: 2022-07-24 | End: 2022-07-31

## 2022-07-23 RX ORDER — ALBUTEROL 90 UG/1
1 AEROSOL, METERED ORAL THREE TIMES A DAY
Refills: 0 | Status: DISCONTINUED | OUTPATIENT
Start: 2022-07-23 | End: 2022-07-23

## 2022-07-23 RX ORDER — ACETAMINOPHEN 500 MG
975 TABLET ORAL EVERY 6 HOURS
Refills: 0 | Status: DISCONTINUED | OUTPATIENT
Start: 2022-07-23 | End: 2022-07-23

## 2022-07-23 RX ORDER — REMDESIVIR 5 MG/ML
100 INJECTION INTRAVENOUS EVERY 24 HOURS
Refills: 0 | Status: COMPLETED | OUTPATIENT
Start: 2022-07-24 | End: 2022-07-27

## 2022-07-23 RX ORDER — REMDESIVIR 5 MG/ML
200 INJECTION INTRAVENOUS EVERY 24 HOURS
Refills: 0 | Status: COMPLETED | OUTPATIENT
Start: 2022-07-23 | End: 2022-07-23

## 2022-07-23 RX ORDER — SODIUM CHLORIDE 9 MG/ML
1000 INJECTION INTRAMUSCULAR; INTRAVENOUS; SUBCUTANEOUS ONCE
Refills: 0 | Status: COMPLETED | OUTPATIENT
Start: 2022-07-23 | End: 2022-07-23

## 2022-07-23 RX ORDER — ENOXAPARIN SODIUM 100 MG/ML
40 INJECTION SUBCUTANEOUS EVERY 12 HOURS
Refills: 0 | Status: DISCONTINUED | OUTPATIENT
Start: 2022-07-23 | End: 2022-07-26

## 2022-07-23 RX ORDER — ACETAMINOPHEN 500 MG
1000 TABLET ORAL ONCE
Refills: 0 | Status: COMPLETED | OUTPATIENT
Start: 2022-07-23 | End: 2022-07-23

## 2022-07-23 RX ORDER — IPRATROPIUM/ALBUTEROL SULFATE 18-103MCG
3 AEROSOL WITH ADAPTER (GRAM) INHALATION ONCE
Refills: 0 | Status: COMPLETED | OUTPATIENT
Start: 2022-07-23 | End: 2022-07-23

## 2022-07-23 RX ORDER — AMANTADINE HCL 100 MG
100 CAPSULE ORAL
Refills: 0 | Status: DISCONTINUED | OUTPATIENT
Start: 2022-07-23 | End: 2022-07-27

## 2022-07-23 RX ORDER — AMANTADINE HCL 100 MG
1 CAPSULE ORAL
Qty: 0 | Refills: 0 | DISCHARGE

## 2022-07-23 RX ORDER — INSULIN LISPRO 100/ML
VIAL (ML) SUBCUTANEOUS
Refills: 0 | Status: DISCONTINUED | OUTPATIENT
Start: 2022-07-23 | End: 2022-07-26

## 2022-07-23 RX ORDER — ALBUTEROL 90 UG/1
1 AEROSOL, METERED ORAL
Qty: 0 | Refills: 0 | DISCHARGE

## 2022-07-23 RX ORDER — INSULIN LISPRO 100/ML
VIAL (ML) SUBCUTANEOUS AT BEDTIME
Refills: 0 | Status: DISCONTINUED | OUTPATIENT
Start: 2022-07-23 | End: 2022-07-26

## 2022-07-23 RX ORDER — BUDESONIDE AND FORMOTEROL FUMARATE DIHYDRATE 160; 4.5 UG/1; UG/1
2 AEROSOL RESPIRATORY (INHALATION)
Refills: 0 | Status: DISCONTINUED | OUTPATIENT
Start: 2022-07-23 | End: 2022-07-27

## 2022-07-23 RX ORDER — SODIUM CHLORIDE 9 MG/ML
1000 INJECTION, SOLUTION INTRAVENOUS
Refills: 0 | Status: DISCONTINUED | OUTPATIENT
Start: 2022-07-23 | End: 2022-07-23

## 2022-07-23 RX ORDER — HEPARIN SODIUM 5000 [USP'U]/ML
5000 INJECTION INTRAVENOUS; SUBCUTANEOUS EVERY 12 HOURS
Refills: 0 | Status: DISCONTINUED | OUTPATIENT
Start: 2022-07-23 | End: 2022-07-23

## 2022-07-23 RX ORDER — DEXTROSE 50 % IN WATER 50 %
15 SYRINGE (ML) INTRAVENOUS ONCE
Refills: 0 | Status: DISCONTINUED | OUTPATIENT
Start: 2022-07-23 | End: 2022-08-04

## 2022-07-23 RX ORDER — METFORMIN HYDROCHLORIDE 850 MG/1
1 TABLET ORAL
Qty: 0 | Refills: 0 | DISCHARGE

## 2022-07-23 RX ORDER — ACETAMINOPHEN 500 MG
650 TABLET ORAL EVERY 6 HOURS
Refills: 0 | Status: DISCONTINUED | OUTPATIENT
Start: 2022-07-23 | End: 2022-08-10

## 2022-07-23 RX ORDER — BUDESONIDE AND FORMOTEROL FUMARATE DIHYDRATE 160; 4.5 UG/1; UG/1
2 AEROSOL RESPIRATORY (INHALATION)
Refills: 0 | Status: DISCONTINUED | OUTPATIENT
Start: 2022-07-23 | End: 2022-07-23

## 2022-07-23 RX ORDER — ALBUTEROL 90 UG/1
2 AEROSOL, METERED ORAL EVERY 6 HOURS
Refills: 0 | Status: DISCONTINUED | OUTPATIENT
Start: 2022-07-23 | End: 2022-07-27

## 2022-07-23 RX ORDER — CEFTRIAXONE 500 MG/1
1000 INJECTION, POWDER, FOR SOLUTION INTRAMUSCULAR; INTRAVENOUS EVERY 24 HOURS
Refills: 0 | Status: DISCONTINUED | OUTPATIENT
Start: 2022-07-23 | End: 2022-07-24

## 2022-07-23 RX ORDER — SODIUM CHLORIDE 9 MG/ML
2400 INJECTION, SOLUTION INTRAVENOUS
Refills: 0 | Status: DISCONTINUED | OUTPATIENT
Start: 2022-07-23 | End: 2022-07-24

## 2022-07-23 RX ORDER — AZITHROMYCIN 500 MG/1
500 TABLET, FILM COATED ORAL EVERY 24 HOURS
Refills: 0 | Status: COMPLETED | OUTPATIENT
Start: 2022-07-23 | End: 2022-07-25

## 2022-07-23 RX ORDER — GLUCAGON INJECTION, SOLUTION 0.5 MG/.1ML
1 INJECTION, SOLUTION SUBCUTANEOUS ONCE
Refills: 0 | Status: DISCONTINUED | OUTPATIENT
Start: 2022-07-23 | End: 2022-07-23

## 2022-07-23 RX ORDER — DEXAMETHASONE 0.5 MG/5ML
6 ELIXIR ORAL ONCE
Refills: 0 | Status: COMPLETED | OUTPATIENT
Start: 2022-07-23 | End: 2022-07-23

## 2022-07-23 RX ORDER — REMDESIVIR 5 MG/ML
INJECTION INTRAVENOUS
Refills: 0 | Status: COMPLETED | OUTPATIENT
Start: 2022-07-23 | End: 2022-07-27

## 2022-07-23 RX ORDER — DEXTROSE 50 % IN WATER 50 %
12.5 SYRINGE (ML) INTRAVENOUS ONCE
Refills: 0 | Status: DISCONTINUED | OUTPATIENT
Start: 2022-07-23 | End: 2022-08-04

## 2022-07-23 RX ADMIN — CEFTRIAXONE 100 MILLIGRAM(S): 500 INJECTION, POWDER, FOR SOLUTION INTRAMUSCULAR; INTRAVENOUS at 19:11

## 2022-07-23 RX ADMIN — BUDESONIDE AND FORMOTEROL FUMARATE DIHYDRATE 2 PUFF(S): 160; 4.5 AEROSOL RESPIRATORY (INHALATION) at 23:22

## 2022-07-23 RX ADMIN — REMDESIVIR 200 MILLIGRAM(S): 5 INJECTION INTRAVENOUS at 23:21

## 2022-07-23 RX ADMIN — SODIUM CHLORIDE 100 MILLILITER(S): 9 INJECTION, SOLUTION INTRAVENOUS at 18:08

## 2022-07-23 RX ADMIN — Medication 3 MILLILITER(S): at 12:21

## 2022-07-23 RX ADMIN — SODIUM CHLORIDE 1000 MILLILITER(S): 9 INJECTION INTRAMUSCULAR; INTRAVENOUS; SUBCUTANEOUS at 12:21

## 2022-07-23 RX ADMIN — AZITHROMYCIN 255 MILLIGRAM(S): 500 TABLET, FILM COATED ORAL at 18:07

## 2022-07-23 RX ADMIN — ENOXAPARIN SODIUM 40 MILLIGRAM(S): 100 INJECTION SUBCUTANEOUS at 18:08

## 2022-07-23 RX ADMIN — Medication 6 MILLIGRAM(S): at 13:09

## 2022-07-23 RX ADMIN — Medication 400 MILLIGRAM(S): at 11:11

## 2022-07-23 RX ADMIN — PIPERACILLIN AND TAZOBACTAM 200 GRAM(S): 4; .5 INJECTION, POWDER, LYOPHILIZED, FOR SOLUTION INTRAVENOUS at 11:12

## 2022-07-23 NOTE — H&P ADULT - PROBLEM SELECTOR PLAN 1
- febrile, leukocytosis, - febrile, tachycardiac, tachypneic, hypoxic, leukocytosis, positive COVID19, positive lung findings in CT chest  - Dx: COVID-19 infection, community acquired bacterial PNA, or mixture of both  - zosyn x1 at ED  - start remidisivir, decadron for COVID pneumonia, azithryomycin + ceftriaxone for community acquired - febrile, tachycardiac, tachypneic, hypoxic, leukocytosis, positive COVID19, equivocal lung findings in CT chest  - Lactate normal, elevated procal.  - Dx: infection from COVID-19 infection, community acquired bacterial PNA, or mixture of both  - zosyn x1 at ED and finished one litter of NS.   - start remidisivir, decadron for COVID pneumonia, azithryomycin + ceftriaxone for community acquired PNA  - start chest PT/PD and monitor Dimer every 48-72 hours for AC dosage adjustment, as a part of COVID-19 treatment protocol.  - CW  LR  100ml/hr for 24 hours   - Start tele monitoring  - Trend CBC AM, vitals q4, Dimer every other day, blood gas AM, cmp AM

## 2022-07-23 NOTE — ED PROVIDER NOTE - OBJECTIVE STATEMENT
63 yo F PMHx of schizoaffective disorder on Geodon, DMII, COPD, presenting to ED after unwitnessed fall last night at home. Pt has aide with her during day. Found on ground this AM next to bed. Brought in by EMS w/ hypoxia to 70s. 65 yo F PMHx of schizoaffective disorder on Geodon, DMII, COPD, presenting to ED after unwitnessed fall last night at home. Pt has aide with her during day. Found on ground this AM next to bed. Brought in by EMS w/ hypoxia to 70s. Pt denies any complaints, although reliability of pt unclear. She denies CP, fevers, chills, SOB, bad pain, or N/V. Not on AC.

## 2022-07-23 NOTE — ED PROVIDER NOTE - CARE PLAN
1 Principal Discharge DX:	Rhabdomyolysis  Secondary Diagnosis:	2019 novel coronavirus disease (COVID-19)

## 2022-07-23 NOTE — RAPID RESPONSE TEAM SUMMARY - NSOTHERINTERVENTIONSRRT_GEN_ALL_CORE
Measure HR when not tremulous  Wean O2 as tolerated  Infectious work up per primary team. Primary team to continue zosyn for possible asp pna, empiric coverage. BCx, UCx sent before RRT

## 2022-07-23 NOTE — H&P ADULT - NSHPPHYSICALEXAM_GEN_ALL_CORE
VITALS:   T(C): 37.4 (07-23-22 @ 15:15), Max: 38.3 (07-23-22 @ 10:35)  HR: 107 (07-23-22 @ 15:15) (100 - 110)  BP: 142/92 (07-23-22 @ 15:15) (11/86 - 142/92)  RR: 24 (07-23-22 @ 15:15) (24 - 26)  SpO2: 94% (07-23-22 @ 15:15) (94% - 100%)    GENERAL: lying in bed, moderate respiratory distress with non-rebreather  HEAD:  Atraumatic, Normocephalic, facial superficial open wound due to fall injury  EYES: EOMI, PERRLA, conjunctiva and sclera clear  ENT: Moist mucous membranes  NECK: Supple, No JVD  CHEST/LUNG: CTABL; course breathing sound bilaterally especially right side.  HEART: RRR. No M/R/G  ABDOMEN: Soft, nontender, protuberant, normoactive BS. No hepatomegaly  EXTREMITIES:  2+ Peripheral Pulses, brisk capillary refill. No clubbing, cyanosis, or edema. Persistent plantar flexion at the baseline. Bilateral hands tremor at the baseline.   NERVOUS SYSTEM:  Alert & Oriented X2, speech unclear due to non-rebreather   MSK: deferred VITALS:   T(C): 37.4 (07-23-22 @ 15:15), Max: 38.3 (07-23-22 @ 10:35)  HR: 107 (07-23-22 @ 15:15) (100 - 110)  BP: 142/92 (07-23-22 @ 15:15) (11/86 - 142/92)  RR: 24 (07-23-22 @ 15:15) (24 - 26)  SpO2: 94% (07-23-22 @ 15:15) (94% - 100%)    GENERAL: lying in bed, moderate respiratory distress with non-rebreather, morbid obesity  HEAD:  Atraumatic, Normocephalic, facial superficial open wound due to fall injury  EYES: EOMI, PERRLA, conjunctiva and sclera clear  ENT: Moist mucous membranes  NECK: Supple, No JVD  CHEST/LUNG: CTABL; course breathing sound bilaterally especially right side.  HEART: RRR. No M/R/G  ABDOMEN: Soft, nontender, protuberant, normoactive BS. No hepatomegaly  EXTREMITIES:  2+ Peripheral Pulses, brisk capillary refill. No clubbing, cyanosis, or edema. Persistent plantar flexion at the baseline. Bilateral hands tremor at the baseline.   NERVOUS SYSTEM:  Alert & Oriented X2, speech unclear due to non-rebreather   MSK: deferred

## 2022-07-23 NOTE — H&P ADULT - PROBLEM SELECTOR PLAN 11
DVT ppx: Lovenox 40mg BID  DIET: NPO for now pending improvement of respiratory status DVT ppx: Lovenox 40mg BID  DIET: NPO for now pending improvement of respiratory status. Bedside swallow studies order before starting diet. - 2/2 to fall with face downwards to ground  - Consult wound care - Unclear baseline and ordered A1C  - low level sliding scale given NPO now  - Monitor glucose as she is given decadron

## 2022-07-23 NOTE — CHART NOTE - NSCHARTNOTEFT_GEN_A_CORE
Called to bedside by nurse in ER for HR 230s. Patient is ill-appearing with facial abrasions from recent fall, with tremulous upper extremities (likely due to known parkinsonism), able to follow commands, say one word answers, AOx2, non-focal. This is patient's baseline per nurse who admitted patient, and collateral from patient's aide via primary team. Elevated HR likely due to patient's tremors. EKG stickers placed on patient's back, which then showed HR 97 with NSR, which correlated with HR counted from pulse. 12 lead EKG shows NSR. VS: afebrile (99F rectal), HR 97, /92, SpO2 100% on NRB 15 L. Then, weaned down to 6L NC, with SpO2 92-93%.     Jeanine Mackay, PGY-3, MAR  Internal Medicine  Pager: [phone] ([facility])/ [phone] ([place])

## 2022-07-23 NOTE — ED ADULT NURSE REASSESSMENT NOTE - NS ED NURSE REASSESS COMMENT FT1
Pt received in ED. Trauma A. Alert and lethargic. Unable to assess orientation at this time. Presents after being found faced down after a fall. HHA at bedside. Pt noted to have laceration to head and feet. Bilateral upper extremity contraction noted. Facilitator RN's  caring for pt as well. IV #20 to both R and L AC. Labs drawn and sent as ordered. Medicated as ordered. Straight catheterization performed on patient, urine sample obtained and labs sent as ordered. Placed on bedside cardiac monitor - sinus tachy. Bilateral crackles lung sounds auscultated. Skin is warm to the touch. Rectal temp 100.2. Bed in lowest position, call bell in reach, wheels locked, side rails up, safety maintained. Awaiting further orders.

## 2022-07-23 NOTE — ED ADULT NURSE REASSESSMENT NOTE - NS ED NURSE REASSESS COMMENT FT1
Pt straight-cathed for urine, drained approx 250mL foul-smelling yellow urine; pt tolerated well; IV placed Left AC, 20G.

## 2022-07-23 NOTE — H&P ADULT - ASSESSMENT
64F with hx of Parkinsons, COPD, T2DM, COPD, Schizoaffective disorder BIBEMS after unwitnessed fall, found to be hypoxic to 70s with EMS, a/w sepsis and acute hypoxic respiratory failure likely multifactorial from COVID-19, superimposed PNA, and COPD exacerbation. 64F with hx of Parkinsons, COPD, T2DM, COPD, Schizoaffective disorder BIBEMS after unwitnessed fall, found to be hypoxic to 70s with EMS, a/w sepsis and acute hypoxic respiratory failure likely multifactorial from COVID-19, superimposed PNA, and COPD exacerbation. She was also found to develop rhabdomyolysis secondary to long time immobility after fall.

## 2022-07-23 NOTE — ED ADULT TRIAGE NOTE - HISTORY OF COVID-19 VACCINATION
Incision well approximated, free of drainage, and redness. Ecchymosis and swelling improving. Pressure dressing applied to wound. Instructed patient to keep area dry and call the office for any fever, chill, or drainage. Patient and his wife verbalized understanding. See picture in media. Plan: wound check on 1/19/22 when Dr. Michelle Viera is in the office.     Nadia Comer RN, BSN  [facility] Yes

## 2022-07-23 NOTE — ED ADULT NURSE NOTE - OBJECTIVE STATEMENT
Facilitator RN, Pt received notification EMS, pt found stuck @ home, unwitnessed fall, O2 sat 70s% placed on non-rebreather in the field. Pt lethargic & awake, unable to assess A&O status. Pt crackles bilateral lung fields on auscultation, responds to verbal commands, abd soft & non tender, contracted bilateral upper extremities. Pt PMH of schizoaffective disorder, & parkinson's disease. IV 20G, R AC, labs collected & sent, meds given. Pt placed on cardiac monitor, maintained on non-rebreather 15L, EKG completed, provider @ bedside to assess pt. Pt home aid @ bedside. Facilitator RN, Pt received notification EMS, pt found stuck @ home, unwitnessed fall, O2 sat 70s% placed on non-rebreather in the field. Pt lethargic & awake, unable to assess A&O status. Pt crackles bilateral lung fields on auscultation, responds to verbal commands, abd soft & non tender, contracted bilateral upper extremities. Pt skin noted to have facial Lac L upper forehead & skin tear R 2nd toe. Pt PMH of schizoaffective disorder, & parkinson's disease. IV 20G, R AC, labs collected & sent, meds given. Pt placed on cardiac monitor, maintained on non-rebreather 15L, EKG completed, provider @ bedside to assess pt. Pt home aid @ bedside.

## 2022-07-23 NOTE — ED ADULT TRIAGE NOTE - CHIEF COMPLAINT QUOTE
Patient states" I fell " HHA states I found the patient face down between the night stand and bed". unknown LOC. patient has bruises on face and dry blood in the mouth. patient is very congested tachypneic. As per EMS patient room air sat was 78% went up to 96% with NRB. febrile in triage.

## 2022-07-23 NOTE — H&P ADULT - PROBLEM SELECTOR PLAN 4
- COPD likely due to chronic smoking history, no home oxygen need at home, never been intubated for Exacerbation  - CW advair diskus standing and proAIR PRN  - Monitor O2 sat with a goal of > 88 and less than 93

## 2022-07-23 NOTE — H&P ADULT - PROBLEM SELECTOR PLAN 8
-Elevated AST,  88  -Unclear etiologies:  fatty liver disease, medication, alcohol use ?  -Will monitor CMP as pt is given remidisivir

## 2022-07-23 NOTE — ED PROVIDER NOTE - PHYSICAL EXAMINATION
Gen: A&Ox4   HEENT: Abrasion w/o bleeding over left anterior cheek. facial bones stable. Right upper lip laceration, small, bleeding controlled. Dentition intact. Mucous membranes dry, no scleral icterus.  CV: Tachy. No significant lower extremity edema.   Resp: Respirations unlabored. Crackles bilat. No wheezing.   GI: Abdomen non tender to palpation, soft and non-distended.   Skin/MSK: C, T, and L spine non ttp in midline. Extremities atraumatic x 4. Moving all extremities spontaneously.   Neuro: Following commands. Pupils ERRL. CN2-12 grossly intact, w/ exception of inability to move right eye medially (baseline per pt care giver). Motor strength +5/5 throughout upper and lower extremities, w/ exception of question of RUE/RLE weakness (care giver reports right sided weakness at baseline). Sensation intact throughout upper and lower extremities.  Psych: Appropriate mood, cooperative

## 2022-07-23 NOTE — H&P ADULT - PROBLEM SELECTOR PLAN 3
- Positive COVID-19 test and positive exam findings of course lung sound  - Severe hypoxia   - Severe COVID-19 syndrome  - CW COVID-19 protocol listed above - Positive COVID-19 test and positive exam findings of course lung sound  - Severe hypoxia   - Severe COVID-19 syndrome  - Order ferritin as prognostic marker  -  COVID-19 protocol listed above

## 2022-07-23 NOTE — H&P ADULT - NSHPREVIEWOFSYSTEMS_GEN_ALL_CORE
REVIEW OF SYSTEMS:    CONSTITUTIONAL: No weakness, + fevers - chills  EYES: No visual changes; no sclera icterics, no pain or drainage  ENT:  No vertigo or throat pain   NECK: No pain or stiffness  RESPIRATORY: No cough, wheezing, hemoptysis; No shortness of breath.   CARDIOVASCULAR: No chest pain or palpitations  GASTROINTESTINAL: No abdominal or epigastric pain. No nausea, vomiting, or hematemesis; No diarrhea or constipation. No melena or hematochezia.  GENITOURINARY: No dysuria, frequency or hematuria  NEUROLOGICAL: No numbness or weakness. No headache  SKIN: No itching, rashes.  + left cheek open wound  Psych: No anxiety or depression

## 2022-07-23 NOTE — H&P ADULT - NSHPLABSRESULTS_GEN_ALL_CORE
16.1   14.09 )-----------( 249      ( 2022 10:55 )             51.8         143  |  103  |  36<H>  ----------------------------<  127<H>  4.2   |  20<L>  |  0.99    Ca    9.5      2022 10:55    TPro  7.6  /  Alb  4.6  /  TBili  0.3  /  DBili  x   /  AST  141<H>  /  ALT  88<H>  /  AlkPhos  82      Creatine Kinase, Serum (22 @ 10:55)    Creatine Kinase, Serum: 4354 U/L    Blood Gas Profile - Venous (22 @ 10:55)    pH, Venous: 7.27    pCO2, Venous: 51 mmHg    pO2, Venous: 65 mmHg    HCO3, Venous: 23 mmol/L    Base Excess, Venous: -4.2 mmol/L    Oxygen Saturation, Venous: 90.2 %    Total CO2, Venous: 25.0 mmol/L    Blood Gas Source Venous: Venous    Urinalysis Basic - ( 2022 10:59 )    Color: Yellow / Appearance: Slightly Turbid / S.031 / pH: x  Gluc: x / Ketone: Small  / Bili: Small / Urobili: 3 mg/dL   Blood: x / Protein: 300 mg/dL / Nitrite: Negative   Leuk Esterase: Negative / RBC: 4 /HPF / WBC 79 /HPF   Sq Epi: x / Non Sq Epi: 26 /HPF / Bacteria: Few    Respiratory Viral Panel with COVID-19 by AMADOU (22 @ 10:55)    Rapid RVP Result: Detected COVID-19 positive    < from: CT Angio Neck w/ IV Cont (22 @ 11:47) >    IMPRESSION:      1. Involutional changes with volume loss in both cerebral hemispheres. No   CT evidence of an acute infarct, hemorrhage, or space-occupying lesion.  2. Tortuous but widely patent head and neck vasculature. No significant   stenosis, dissection, or occlusion noted.  3. Degenerative changes cervical spine most prevalent at C5-6 and C6-7.   No acute fracture suggested.    < from: CT Head No Cont (22 @ 11:47) >    IMPRESSION:      1. Involutional changes with volume loss in both cerebral hemispheres. No   CT evidence of an acute infarct, hemorrhage, or space-occupying lesion.  2. Tortuous but widely patent head and neck vasculature. No significant   stenosis, dissection, or occlusion noted.  3. Degenerative changes cervical spine most prevalent at C5-6 and C6-7.   No acute fracture suggested. 16.1   14.09 )-----------( 249      ( 2022 10:55 )             51.8         143  |  103  |  36<H>  ----------------------------<  127<H>  4.2   |  20<L>  |  0.99    Ca    9.5      2022 10:55    TPro  7.6  /  Alb  4.6  /  TBili  0.3  /  DBili  x   /  AST  141<H>  /  ALT  88<H>  /  AlkPhos  82      Creatine Kinase, Serum (22 @ 10:55)    Creatine Kinase, Serum: 4354 U/L    Blood Gas Profile - Venous (22 @ 10:55)    pH, Venous: 7.27    pCO2, Venous: 51 mmHg    pO2, Venous: 65 mmHg    HCO3, Venous: 23 mmol/L    Base Excess, Venous: -4.2 mmol/L    Oxygen Saturation, Venous: 90.2 %    Total CO2, Venous: 25.0 mmol/L    Blood Gas Source Venous: Venous    Urinalysis Basic - ( 2022 10:59 )    Color: Yellow / Appearance: Slightly Turbid / S.031 / pH: x  Gluc: x / Ketone: Small  / Bili: Small / Urobili: 3 mg/dL   Blood: x / Protein: 300 mg/dL / Nitrite: Negative   Leuk Esterase: Negative / RBC: 4 /HPF / WBC 79 /HPF   Sq Epi: x / Non Sq Epi: 26 /HPF / Bacteria: Few    Respiratory Viral Panel with COVID-19 by AMADOU (22 @ 10:55)    Rapid RVP Result: Detected COVID-19 positive    < from: CT Angio Neck w/ IV Cont (22 @ 11:47) >    IMPRESSION:      1. Involutional changes with volume loss in both cerebral hemispheres. No   CT evidence of an acute infarct, hemorrhage, or space-occupying lesion.  2. Tortuous but widely patent head and neck vasculature. No significant   stenosis, dissection, or occlusion noted.  3. Degenerative changes cervical spine most prevalent at C5-6 and C6-7.   No acute fracture suggested.    < from: CT Chest No Cont (22 @ 11:47) >    IMPRESSION:    Right middle lobe atelectasis. Superimposed pneumonia is a possibility.

## 2022-07-23 NOTE — ED PROVIDER NOTE - PROGRESS NOTE DETAILS
Jj Finley PGY2: CT scans obtained. Reads pending. Pt remains HDS. 100% on NRB. Labs pending. Will reassess shortly. Jj Finley PGY2: Diagnostic testing thus far consistent w/ COVID+, rhabdo, dehydration, possible UTI. Pt remains HDS, HR improving. Fluids ordered. Pt accepted for admission for further management.

## 2022-07-23 NOTE — ED PROVIDER NOTE - ATTENDING CONTRIBUTION TO CARE
64F with hx of Parkinsons, COPD, T2DM, COPD, Schizoaffective disorder BIBEMS after unwitnessed fall, found to be hypoxic to 70s with EMS, a/w sepsis and acute hypoxic respiratory failure likely multifactorial from COVID-19, superimposed PNA, and COPD exacerbation. She was also found to develop rhabdomyolysis secondary to long time immobility after fall.      Problem/Plan - 1:  ·  Problem: Sepsis with acute hypoxic respiratory failure.   ·  Plan: - febrile, tachycardiac, tachypneic, hypoxic, leukocytosis, positive COVID19, equivocal lung findings in CT chest  - Lactate normal, elevated procal.  - Dx: infection from COVID-19 infection, community acquired bacterial PNA, or mixture of both  - zosyn x1 at ED and finished one litter of NS 64F with hx of Parkinsons, COPD, T2DM, COPD, Schizoaffective disorder BIBEMS after unwitnessed fall, found to be hypoxic to 70s with EMS and placed on non-rebreather in the ED a/w sepsis and acute hypoxic respiratory failure likely multifactorial from COVID-19, superimposed PNA, and COPD exacerbation. 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 The pt was found to be in rhabdomyolysis with a CK of 4354.     -will give one dose of zysyn for possible pneumonia/aspiration pna  -the pt is also covid positive   -fluids for rhabdo and admission to medicine     I performed a history and physical exam of the patient and discussed their management with the resident. I reviewed the resident's note and agree with the documented findings and plan of care. My medical decision making and observations are found above.

## 2022-07-23 NOTE — H&P ADULT - PROBLEM SELECTOR PLAN 9
- baseline of bilateral hand tremor and plantar flexion  - oriented x2-3, follow the command  - cw on amantadine home - baseline of bilateral hand tremor and plantar flexion  - oriented x2-3, follow the command  - cw amantadine home - proteinuria +3, pyuria 79, bacteria unremarkable  - No urinary symptoms noted  - Ddx: DM, sepsis, or both  - Will monitor

## 2022-07-23 NOTE — H&P ADULT - NSHPSOCIALHISTORY_GEN_ALL_CORE
BRIEF SW NOTE:     Medical w/u ongoing. Per pt chart, anticipate RHC during admission and discharge home when medically ready. PT ordered and pending discharge recommendations. Pt known to this writer. See SW assessment from 3/18/19. Pt lives in an apt alone. At time of last discharge, pt declined HHC services and D/C'd home with no SW needs. Pt [facility] to [facility] in the past.    SW will remain available and continue to follow. Contact weekend/floor SWer if needs arise.     Sangeeta Esquivel, [facility], [facility]  Acute Care Inpatient Clinical   6D-Observation Unit  Phone: [phone]  I   Pager: [phone]     She lives at home on her own with home aide everyday 4 hours. She is able to ambulate with walker, manage taking her own medication.  She smoked one pack day for many year but quit two years ago.

## 2022-07-23 NOTE — H&P ADULT - PROBLEM SELECTOR PLAN 6
- likely 2/2 muscle compression and ischemia due to prolonged immobility  - elevation of CK > 4000, Cr wnl, elevated BUN, low bicarb.  - Monitor urine output, electrolytes, and kidney function - likely 2/2 muscle compression and ischemia due to prolonged immobility  - elevation of CK > 4000, Cr wnl, elevated BUN, elevated urobilinogen  - Fluids resuscitation LR  100ml/hr for 24 hours   - Monitor urine output, electrolytes, and kidney function

## 2022-07-23 NOTE — H&P ADULT - PROBLEM SELECTOR PLAN 7
- elevated AG, low bicarb, acidemia  - normal lactate  - Dx: rhabdomyolysis, - ABG: high PaCO2, acidemia, low O2  - dx: respiration failure from COVID-19 or bacterial infection in the setting of COPD  - Will monitor BMP, VBG, lactate AM, vitals Q4

## 2022-07-23 NOTE — H&P ADULT - PROBLEM SELECTOR PLAN 2
- equivocal lung findings on CT chest   - Dx: COVID-19 infection vs community acquire PNA vs exacerbation of COPD  - CW empirical treatment for COVID-19 and community acquired PNA listed above

## 2022-07-23 NOTE — H&P ADULT - PROBLEM SELECTOR PLAN 12
DVT ppx: Lovenox 40mg BID  DIET: NPO for now pending improvement of respiratory status DVT ppx: Lovenox 40mg BID  DIET: NPO for now pending improvement of respiratory status. Bedside swallow studies before starting diet. - 2/2 to fall with face downwards to ground  - Consult wound care

## 2022-07-23 NOTE — ED ADULT NURSE NOTE - NSIMPLEMENTINTERV_GEN_ALL_ED
Implemented All Fall with Harm Risk Interventions:  [place] to call system. Call bell, personal items and telephone within reach. Instruct patient to call for assistance. Room bathroom lighting operational. Non-slip footwear when patient is off stretcher. Physically safe environment: no spills, clutter or unnecessary equipment. Stretcher in lowest position, wheels locked, appropriate side rails in place. Provide visual cue, wrist band, yellow gown, etc. Monitor gait and stability. Monitor for mental status changes and reorient to person, place, and time. Review medications for side effects contributing to fall risk. Reinforce activity limits and safety measures with patient and family. Provide visual clues: red socks.

## 2022-07-23 NOTE — H&P ADULT - ATTENDING COMMENTS
64F with hx of Parkinsons, COPD, T2DM, COPD, Schizoaffective disorder BIBEMS after unwitnessed fall, found to be hypoxic to 70s with EMS, a/w sepsis and acute hypoxic respiratory failure likely multifactorial from COVID-19, superimposed PNA, and COPD exacerbation. VS - febrile to 100.9, , /78, RR 26, 100% on NRB. Labs reviewed - leukocytosis, high anion gap, BUN elevated, CK 4K, Lactate wnl. RVP positive for COVID, CT H/N with no acute findings, CT chest with possible PNA. Start remdesevir and decadron given symptomatic covid infection and hypoxia with likely concurrent COPD exacerbation. Monitor for steroid induced hyperglycemia given T2DM. Monitor LFTs and renal function while on remdesevir. Possible superimposed PNA with elevated procalcitonin - empiric abx for now. Follow up cultures, trend fever curve. CK/BUN/Anion gap elevated, lactate wnl - iso dehydration, fall, sepsis - gentle IV hydration, repeat labs. Rest of care as above.

## 2022-07-23 NOTE — H&P ADULT - NSICDXPASTMEDICALHX_GEN_ALL_CORE_FT
PAST MEDICAL HISTORY:  COPD without exacerbation     Diabetes     Obesity     Parkinsonian tremor     Schizoaffective disorder

## 2022-07-23 NOTE — H&P ADULT - PROBLEM SELECTOR PLAN 5
-  fall vs non- fall  - Pt's home aid denied her prior history of seizure, unclear history of syncope  - Ordered TTE to rule out cardiogenic cause of syncope

## 2022-07-23 NOTE — H&P ADULT - PROBLEM SELECTOR PLAN 10
- low level sliding scale given NPO now  - Monitor glucose as she is given decadron - Unclear baseline and order A1C  - low level sliding scale given NPO now  - Monitor glucose as she is given decadron - Unclear baseline and ordered A1C  - low level sliding scale given NPO now  - Monitor glucose as she is given decadron - baseline of bilateral hand tremor and plantar flexion  - oriented x2-3, follow the command  - cw amantadine home

## 2022-07-23 NOTE — ED PROVIDER NOTE - CLINICAL SUMMARY MEDICAL DECISION MAKING FREE TEXT BOX
65 yo F PMHx of schizoaffective disorder on Ge[place], DMII, COPD, presenting to ED after unwitnessed fall last night at home. Pt has aide with her during day. Found on ground this AM next to bed. Brought in by EMS w/ hypoxia to 70s. Pt denies any complaints, although reliability of pt unclear. She denies CP, fevers, chills, SOB, bad pain, or N/V. Not on AC. Exam as above. Appears to be at baseline neurological exam. Given fall, consider syncope/LOC. Consider ICH, subdural, dehydration, RODERICK, rhabdo, cardiac arrhythmia, electrolyte abnormality, mechanical fall, occult fracture. Febrile to 100.9. Consider infectious process, UTI, sepsis, pna viral vs bacterial. Labs, imaging, abx, acetaminophen, will reassess.

## 2022-07-24 DIAGNOSIS — G20 PARKINSON'S DISEASE: ICD-10-CM

## 2022-07-24 LAB
-  COAGULASE NEGATIVE STAPHYLOCOCCUS: SIGNIFICANT CHANGE UP
ALBUMIN SERPL ELPH-MCNC: 3.8 G/DL — SIGNIFICANT CHANGE UP (ref 3.3–5)
ALP SERPL-CCNC: 67 U/L — SIGNIFICANT CHANGE UP (ref 40–120)
ALT FLD-CCNC: 96 U/L — HIGH (ref 4–33)
ANION GAP SERPL CALC-SCNC: 15 MMOL/L — HIGH (ref 7–14)
AST SERPL-CCNC: 169 U/L — HIGH (ref 4–32)
BASE EXCESS BLDV CALC-SCNC: 0.7 MMOL/L — SIGNIFICANT CHANGE UP (ref -2–3)
BILIRUB SERPL-MCNC: 0.3 MG/DL — SIGNIFICANT CHANGE UP (ref 0.2–1.2)
BLOOD GAS VENOUS COMPREHENSIVE RESULT: SIGNIFICANT CHANGE UP
BUN SERPL-MCNC: 30 MG/DL — HIGH (ref 7–23)
CALCIUM SERPL-MCNC: 8.8 MG/DL — SIGNIFICANT CHANGE UP (ref 8.4–10.5)
CHLORIDE SERPL-SCNC: 105 MMOL/L — SIGNIFICANT CHANGE UP (ref 98–107)
CK MB BLD-MCNC: 0.3 % — SIGNIFICANT CHANGE UP (ref 0–2.5)
CK MB CFR SERPL CALC: 11.9 NG/ML — HIGH
CK SERPL-CCNC: 3508 U/L — HIGH (ref 25–170)
CK SERPL-CCNC: 4386 U/L — HIGH (ref 25–170)
CO2 BLDV-SCNC: 25.4 MMOL/L — SIGNIFICANT CHANGE UP (ref 22–26)
CO2 SERPL-SCNC: 19 MMOL/L — LOW (ref 22–31)
CREAT SERPL-MCNC: 0.71 MG/DL — SIGNIFICANT CHANGE UP (ref 0.5–1.3)
CULTURE RESULTS: SIGNIFICANT CHANGE UP
D DIMER BLD IA.RAPID-MCNC: 346 NG/ML DDU — HIGH
EGFR: 94 ML/MIN/1.73M2 — SIGNIFICANT CHANGE UP
GAS PNL BLDV: SIGNIFICANT CHANGE UP
GLUCOSE SERPL-MCNC: 109 MG/DL — HIGH (ref 70–99)
GRAM STN FLD: SIGNIFICANT CHANGE UP
HCO3 BLDV-SCNC: 24 MMOL/L — SIGNIFICANT CHANGE UP (ref 22–29)
HCT VFR BLD CALC: 48.8 % — HIGH (ref 34.5–45)
HGB BLD-MCNC: 15.2 G/DL — SIGNIFICANT CHANGE UP (ref 11.5–15.5)
HOROWITZ INDEX BLDV+IHG-RTO: SIGNIFICANT CHANGE UP
LACTATE BLDV-MCNC: 1.5 MMOL/L — SIGNIFICANT CHANGE UP (ref 0.5–2)
MAGNESIUM SERPL-MCNC: 2 MG/DL — SIGNIFICANT CHANGE UP (ref 1.6–2.6)
MCHC RBC-ENTMCNC: 26 PG — LOW (ref 27–34)
MCHC RBC-ENTMCNC: 31.1 GM/DL — LOW (ref 32–36)
MCV RBC AUTO: 83.4 FL — SIGNIFICANT CHANGE UP (ref 80–100)
METHOD TYPE: SIGNIFICANT CHANGE UP
NRBC # BLD: 0 /100 WBCS — SIGNIFICANT CHANGE UP
NRBC # FLD: 0 K/UL — SIGNIFICANT CHANGE UP
PCO2 BLDV: 35 MMHG — LOW (ref 39–42)
PH BLDV: 7.45 — HIGH (ref 7.32–7.43)
PHOSPHATE SERPL-MCNC: 2.5 MG/DL — SIGNIFICANT CHANGE UP (ref 2.5–4.5)
PLATELET # BLD AUTO: 280 K/UL — SIGNIFICANT CHANGE UP (ref 150–400)
PO2 BLDV: 59 MMHG — SIGNIFICANT CHANGE UP
POTASSIUM SERPL-MCNC: 5.3 MMOL/L — SIGNIFICANT CHANGE UP (ref 3.5–5.3)
POTASSIUM SERPL-SCNC: 5.3 MMOL/L — SIGNIFICANT CHANGE UP (ref 3.5–5.3)
PROT SERPL-MCNC: 6.8 G/DL — SIGNIFICANT CHANGE UP (ref 6–8.3)
RBC # BLD: 5.85 M/UL — HIGH (ref 3.8–5.2)
RBC # FLD: 14.2 % — SIGNIFICANT CHANGE UP (ref 10.3–14.5)
SAO2 % BLDV: 90.1 % — SIGNIFICANT CHANGE UP
SODIUM SERPL-SCNC: 139 MMOL/L — SIGNIFICANT CHANGE UP (ref 135–145)
SPECIMEN SOURCE: SIGNIFICANT CHANGE UP
TROPONIN T, HIGH SENSITIVITY RESULT: 29 NG/L — SIGNIFICANT CHANGE UP
WBC # BLD: 18.2 K/UL — HIGH (ref 3.8–10.5)
WBC # FLD AUTO: 18.2 K/UL — HIGH (ref 3.8–10.5)

## 2022-07-24 PROCEDURE — 93010 ELECTROCARDIOGRAM REPORT: CPT | Mod: 76

## 2022-07-24 PROCEDURE — 99222 1ST HOSP IP/OBS MODERATE 55: CPT | Mod: GC

## 2022-07-24 PROCEDURE — 99233 SBSQ HOSP IP/OBS HIGH 50: CPT | Mod: GC

## 2022-07-24 RX ORDER — PIPERACILLIN AND TAZOBACTAM 4; .5 G/20ML; G/20ML
3.38 INJECTION, POWDER, LYOPHILIZED, FOR SOLUTION INTRAVENOUS EVERY 8 HOURS
Refills: 0 | Status: DISCONTINUED | OUTPATIENT
Start: 2022-07-24 | End: 2022-08-01

## 2022-07-24 RX ORDER — SODIUM CHLORIDE 9 MG/ML
2400 INJECTION, SOLUTION INTRAVENOUS
Refills: 0 | Status: DISCONTINUED | OUTPATIENT
Start: 2022-07-25 | End: 2022-07-26

## 2022-07-24 RX ORDER — ACETAMINOPHEN 500 MG
1000 TABLET ORAL ONCE
Refills: 0 | Status: COMPLETED | OUTPATIENT
Start: 2022-07-24 | End: 2022-07-24

## 2022-07-24 RX ORDER — VANCOMYCIN HCL 1 G
1250 VIAL (EA) INTRAVENOUS EVERY 12 HOURS
Refills: 0 | Status: DISCONTINUED | OUTPATIENT
Start: 2022-07-24 | End: 2022-07-24

## 2022-07-24 RX ADMIN — ENOXAPARIN SODIUM 40 MILLIGRAM(S): 100 INJECTION SUBCUTANEOUS at 06:09

## 2022-07-24 RX ADMIN — ENOXAPARIN SODIUM 40 MILLIGRAM(S): 100 INJECTION SUBCUTANEOUS at 18:43

## 2022-07-24 RX ADMIN — AZITHROMYCIN 255 MILLIGRAM(S): 500 TABLET, FILM COATED ORAL at 23:04

## 2022-07-24 RX ADMIN — BUDESONIDE AND FORMOTEROL FUMARATE DIHYDRATE 2 PUFF(S): 160; 4.5 AEROSOL RESPIRATORY (INHALATION) at 10:02

## 2022-07-24 RX ADMIN — BUDESONIDE AND FORMOTEROL FUMARATE DIHYDRATE 2 PUFF(S): 160; 4.5 AEROSOL RESPIRATORY (INHALATION) at 23:03

## 2022-07-24 RX ADMIN — Medication 63.75 MILLIMOLE(S): at 13:34

## 2022-07-24 RX ADMIN — Medication 100 MILLIGRAM(S): at 18:43

## 2022-07-24 RX ADMIN — SODIUM CHLORIDE 100 MILLILITER(S): 9 INJECTION, SOLUTION INTRAVENOUS at 22:56

## 2022-07-24 RX ADMIN — Medication 6 MILLIGRAM(S): at 06:08

## 2022-07-24 RX ADMIN — REMDESIVIR 500 MILLIGRAM(S): 5 INJECTION INTRAVENOUS at 22:56

## 2022-07-24 RX ADMIN — PIPERACILLIN AND TAZOBACTAM 25 GRAM(S): 4; .5 INJECTION, POWDER, LYOPHILIZED, FOR SOLUTION INTRAVENOUS at 18:48

## 2022-07-24 RX ADMIN — Medication 400 MILLIGRAM(S): at 23:22

## 2022-07-24 NOTE — PROVIDER CONTACT NOTE (CRITICAL VALUE NOTIFICATION) - RECOMMENDATIONS
HOSPITALIST DAILY PROGRESS NOTE      ADMISSION DATE:  12/28/2020  DATE:  12/30/2020  CURRENT HOSPITAL DAY:  Hospital Day: 3  ATTENDING PHYSICIAN:  Eloy Russo MD      Chief Complaint: R hip pain    History:  68 year old female with a PMH remote right hip arthroplasty, iron deficiency anemia, hypertension, osteoarthritis, depression, anxiety who presented with right hip pain.    SUBJECTIVE:   No new complaints.  Pain is controlled.  Scheduled for surgery today.    Complete review of systems done, pertinent positives and negatives are mentioned above.    MEDICATIONS:    • [MAR Hold] aspirin  81 mg Oral Daily   • [MAR Hold] Doxylamine Succinate (Sleep)  100 mg Oral QHS   • [MAR Hold] gabapentin  600 mg Oral TID   • [MAR Hold] lisinopril  10 mg Oral Daily   • [MAR Hold] melatonin  6 mg Oral Nightly   • [MAR Hold] pantoprazole  40 mg Oral QAM AC   • [MAR Hold] pravastatin  20 mg Oral Daily   • [MAR Hold] sertraline  50 mg Oral Daily   • [MAR Hold] tiZANidine  4 mg Oral TID   • [MAR Hold] sodium chloride (PF)  2 mL Intracatheter 2 times per day   • [MAR Hold] enoxaparin  40 mg Subcutaneous Daily   • [MAR Hold] Magnesium Standard Replacement Protocol   Does not apply See Admin Instructions   • [MAR Hold] Potassium Standard Replacement Protocol   Does not apply See Admin Instructions       OBJECTIVE:   VITAL SIGNS:     Vitals Last Value 24-Hour Range   Temperature 98.5 °F (36.9 °C) (12/30/20 0548) Temp  Min: 98.3 °F (36.8 °C)  Max: 98.5 °F (36.9 °C)   Pulse 79 (12/30/20 0832) Pulse  Min: 79  Max: 79   Respiratory 18 (12/30/20 0548) Resp  Min: 16  Max: 18   Non-Invasive  Blood Pressure (!) 158/85 (12/30/20 0832) BP  Min: 100/64  Max: 163/77   Pulse Oximetry 95 % (12/30/20 0548) SpO2  Min: 94 %  Max: 95 %     INTAKE/OUTPUT:    I/O last 3 completed shifts:  In: 2550 [P.O.:500; I.V.:600; Blood:700; IV Piggyback:750]  Out: 900 [Urine:400; Blood:500]      Blood pressure (!) 158/85, pulse 79, temperature 98.5 °F (36.9 °C), 
temperature source Oral, resp. rate 18, height 5' 4\" (1.626 m), weight 64.5 kg, SpO2 95 %.  PHYSICAL EXAM:    General Appearance:  Awake and alert, no distress.  HEENT:  Normocephalic, pupils are round and equally reactive to light, moist oral mucosa.  Neck:  Supple, no jugular venous distention.  Lungs:  Clear to auscultation bilaterally, respirations unlabored.  Heart:  Normal rate and rhythm, S1 and S2 normal, no murmurs.  Abdomen:  Soft, nondistended, nontender, normal bowel tones.  Extremities:  Right hip pain with passive motion, no swelling or deformities noted  Skin:  Color, texture, turgor normal.  No rashes or lesions.  Neuro:  Awake and alert, no focal neurologic deficit.    LABORATORY DATA:    Recent Labs   Lab 12/30/20  0618 12/29/20  0539 12/28/20  1415 12/28/20  1410   WBC 3.8* 3.6*  --  4.0*   HCT 32.5* 30.9*  --  30.5*   HGB 10.1* 9.7*  --  9.7*    292  --  262   SODIUM  --  141  --   --    POTASSIUM  --  4.4  --   --    CHLORIDE  --  110*  --   --    CO2  --  25  --   --    CALCIUM  --  8.8  --   --    GLUCOSE  --  83  --   --    BUN  --  11  --   --    CREATININE  --  0.51 0.70  --        IMAGING STUDIES:  Xr Pelvis 1 Or 2 Views    Result Date: 12/28/2020  XR PELVIS 1 OR 2 VW, XR HIP 2 VW RIGHT HISTORY:  fall COMPARISONS:  10/29/2020     FINDINGS/IMPRESSION: There is a right hip arthroplasty. As on the prior examination there is a displaced right acetabular fracture. There is mild increased displacement at the fracture. Additionally, the acetabular component of arthroplasty appears more displaced and rotated medially and inferiorly compared to the prior examination. This may limit motion of the hip particularly medial motions of the thigh. No new fracture. No dislocation. Moderate-severe left hip osteoarthritis is unchanged.    Xr Hip 2 Vw Right    Result Date: 12/28/2020  XR PELVIS 1 OR 2 VW, XR HIP 2 VW RIGHT HISTORY:  fall COMPARISONS:  10/29/2020     FINDINGS/IMPRESSION: There is a 
right hip arthroplasty. As on the prior examination there is a displaced right acetabular fracture. There is mild increased displacement at the fracture. Additionally, the acetabular component of arthroplasty appears more displaced and rotated medially and inferiorly compared to the prior examination. This may limit motion of the hip particularly medial motions of the thigh. No new fracture. No dislocation. Moderate-severe left hip osteoarthritis is unchanged.    Ct Hip Right    Result Date: 12/28/2020  CT HIP WO CONTRAST RIGHT HISTORY:  Fracture, hip TECHNIQUE:  Axial images are obtained through the right hip without contrast. Sagittal and coronal reconstructions are performed. Metal artifact reduction algorithm was used. COMPARISON:  X-rays 12/28/2020, 10/29/2020 FINDINGS: There is a right hip arthroplasty. There is still some streak artifact in the area of the arthroplasty. As on the prior radiographs, there is a transversely oriented fracture through the right acetabulum. This appears mildly more displaced than on the radiographs from 10/29/2020. There are some calcification in the area of the fracture which may be due to early healing. However no solid bony bridging is identified. As on the recent radiographs, the acetabular component of the arthroplasty appears more rotated than on the radiographs from 10/29/2020 and has likely loosened or moved. No dislocation. There is a 8.3 x 4.6 x 3.6 cm area of low density which contains some peripheral calcification posterior to the proximal right femur and arthroplasty (series 2 image 48, series 10 image 36). There is a 4.3 x 3.6 x 4.7 cm area of central low density with some peripheral calcification seen anterior to the right hip arthroplasty (series 2 image 43, series 10 image 15).     IMPRESSION: Right hip arthroplasty. Transverse fracture through the right acetabulum appears mildly more displaced than on the radiograph from 10/29/2020. There are some calcification 
at the fracture site indicating early healing. However no solid bony bridging is yet identified. As on the recent radiographs but new compared to 10/29/2020, the acetabular component of arthroplasty appears rotated and has likely loosened and moved. There is an 8.3 x 4.6 x 3.6 cm area of central low density with peripheral calcification seen posterior to the arthroplasty. There is a 4.3 x 3.6 x 4.7 cm area of central low density with some peripheral calcification seen anterior to the right hip arthroplasty. These findings may be due to fluid and small fracture fragments within the synovial recesses or bursa around the hip. However, the finding can also be due to pseudotumor seen with particle disease. Clinical correlation is recommended.                                ASSESSMENT AND PLAN:     1. Right acetabulum fracture; remote right hip replacement  -orthopedic surgery input appreciated.  -surgery with Dr. Lazo today  2. Chronic iron deficiency anemia - at baseline.  Monitor counts postop and transfuse as needed. Verbal consent obtained.  3. Essential hypertension - continue lisinopril  4. Hyperlipidemia - on statin  5. Depression - on sertraline    DVT/VTE Prophylaxis:  Lovenox  CODE STATUS:  Full Resuscitation    DISCHARGE PLAN:  To be determined    Eloy Russo MD  [facility] [place]  [phone]              
Retake blood cultures
Retake blood cultures

## 2022-07-24 NOTE — PROGRESS NOTE ADULT - ASSESSMENT
64F with hx of Parkinsons, COPD, T2DM, COPD, Schizoaffective disorder BIBEMS after unwitnessed fall, found to be hypoxic to 70s with EMS, a/w sepsis and acute hypoxic respiratory failure likely multifactorial from COVID-19, superimposed PNA, and COPD exacerbation. She was also found to develop rhabdomyolysis secondary to long time immobility after fall.

## 2022-07-24 NOTE — PROGRESS NOTE ADULT - PROBLEM SELECTOR PLAN 11
- Unclear baseline and ordered A1C  - low level sliding scale given NPO now  - Monitor glucose as she is given decadron - 2/2 to fall with face downwards to ground  - Consult wound care

## 2022-07-24 NOTE — PROGRESS NOTE ADULT - PROBLEM SELECTOR PLAN 3
- Positive COVID-19 test and positive exam findings of course lung sound  - Severe hypoxia   - Severe COVID-19 syndrome  - Order ferritin as prognostic marker  -  COVID-19 protocol listed above

## 2022-07-24 NOTE — PROGRESS NOTE ADULT - PROBLEM SELECTOR PLAN 1
- febrile, tachycardiac, tachypneic, hypoxic, leukocytosis, positive COVID19, equivocal lung findings in CT chest  - Lactate normal, elevated procal.  - Dx: infection from COVID-19 infection, community acquired bacterial PNA, or mixture of both  - zosyn x1 at ED and finished one litter of NS.   - start remidisivir, decadron for COVID pneumonia, azithryomycin + ceftriaxone for community acquired PNA  - start chest PT/PD and monitor Dimer every 48-72 hours for AC dosage adjustment, as a part of COVID-19 treatment protocol.  - CW  LR  100ml/hr for 24 hours   - Start tele monitoring  - Trend CBC AM, vitals q4, Dimer every other day, blood gas AM, cmp AM - febrile, tachycardiac, tachypneic, hypoxic, leukocytosis, positive COVID19, equivocal lung findings in CT chest  - Lactate normal, elevated procal.  - Dx: infection from COVID-19 infection, community acquired bacterial PNA, or mixture of both  - zosyn x1 at ED and finished one litter of NS.   - start remidisivir, decadron for COVID pneumonia, azithryomycin + zosyn (switched from CTX 07/24 due to concern for anaerobic ) for community acquired PNA  - start chest PT/PD and monitor Dimer every 48-72 hours for AC dosage adjustment, as a part of COVID-19 treatment protocol.  - CW  LR  100ml/hr for 24 hours   - Start tele monitoring  - Trend CBC AM, vitals q4, Dimer every other day, blood gas AM, cmp AM  - O2 sat stable with 6l NC and will consider weaning down to 4L tomorrow  - Start incentive spirometry

## 2022-07-24 NOTE — CHART NOTE - NSCHARTNOTEFT_GEN_A_CORE
HOANG PORTILLO  64F with hx of Parkinsons, COPD, T2DM, COPD, Schizoaffective disorder BIBEMS after unwitnessed fall, found to be hypoxic to 70s with EMS, a/w sepsis and acute hypoxic respiratory failure likely multifactorial from COVID-19, superimposed PNA, and COPD exacerbation. She was also found to develop rhabdomyolysis secondary to long time immobility after fall.     Contacted by RN for HR 120s-130s on tele. Notably, patient is shaking uncontrollable, but per nurse she has was like this at baseline earlier today. Patient is AOx1 currently (baseline is 1-2, minimally verbal). Patient is now 110s-120s on tele. Patient does not answer any yes or no questions for ROS, aside from responding yes to chest pain. On exam, patient is diaphoretic, sinus tachycardia, CTABL anteriorly, shaking uncontrollably throughout, no apparent distress but difficult to assess given patient's baseline.    Difficulty obtaining ECG earlier given patient is shaking.     T(C): 37.1 (07-24-22 @ 17:00), Max: 37.1 (07-24-22 @ 01:00)  HR: 128 (07-24-22 @ 20:30) (77 - 128)  BP: 156/98 (07-24-22 @ 17:00) (109/73 - 156/98)  RR: 17 (07-24-22 @ 17:00) (17 - 19)  SpO2: 92% (07-24-22 @ 17:00) (92% - 97%) on 6L NC    Plan:  Given new onset CP and patient being diaphoretic, will do a cardiac workup  1) Cardiac enzymes and ECG   2) VBG comprehensive  3) Continue to monitor  4) Continue with antibiotic therapy HOANG PORTILLO  64F with hx of Parkinsons, COPD, T2DM, COPD, Schizoaffective disorder BIBEMS after unwitnessed fall, found to be hypoxic to 70s with EMS, a/w sepsis and acute hypoxic respiratory failure likely multifactorial from COVID-19, superimposed PNA, and COPD exacerbation. She was also found to develop rhabdomyolysis secondary to long time immobility after fall.     Contacted by RN for HR 120s-130s on tele. Notably, patient is shaking uncontrollable, but per nurse she has was like this at baseline earlier today. Patient is AOx1 currently (baseline is 1-2, minimally verbal). Patient is now 110s-120s on tele. Patient does not answer any yes or no questions for ROS, aside from responding yes to chest pain. On exam, patient is diaphoretic, sinus tachycardia, CTABL anteriorly, shaking uncontrollably throughout, no apparent distress but difficult to assess given patient's baseline.    Difficulty obtaining ECG earlier given patient is shaking.     T(C): 37.1 (07-24-22 @ 17:00), Max: 37.1 (07-24-22 @ 01:00)  HR: 128 (07-24-22 @ 20:30) (77 - 128)  BP: 156/98 (07-24-22 @ 17:00) (109/73 - 156/98)  RR: 17 (07-24-22 @ 17:00) (17 - 19)  SpO2: 92% (07-24-22 @ 17:00) (92% - 97%) on 6L NC    Plan:  Given new onset CP and patient being diaphoretic, will do a cardiac workup. Additionally, patient is diaphoretic and thus tachycardia may be secondary to a fever.   1) Cardiac enzymes and ECG   2) VBG comprehensive  3) Continue to monitor  4) Continue with antibiotic therapy  5) Rectal temp - HOANG PORTILLO  64F with hx of Parkinsons, COPD, T2DM, COPD, Schizoaffective disorder BIBEMS after unwitnessed fall, found to be hypoxic to 70s with EMS, a/w sepsis and acute hypoxic respiratory failure likely multifactorial from COVID-19, superimposed PNA, and COPD exacerbation. She was also found to develop rhabdomyolysis secondary to long time immobility after fall.     Contacted by RN for HR 120s-130s on tele. Notably, patient is shaking uncontrollable, but per nurse she has was like this at baseline earlier today. Patient is AOx1 currently (baseline is 1-2, minimally verbal). Patient is now 110s-120s on tele. Patient does not answer any yes or no questions for ROS, aside from responding yes to chest pain. On exam, patient is diaphoretic, sinus tachycardia, CTABL anteriorly, shaking uncontrollably throughout, no apparent distress but difficult to assess given patient's baseline.    Difficulty obtaining ECG earlier given patient is shaking.     T(C): 37.1 (07-24-22 @ 17:00), Max: 37.1 (07-24-22 @ 01:00)  HR: 128 (07-24-22 @ 20:30) (77 - 128)  BP: 156/98 (07-24-22 @ 17:00) (109/73 - 156/98)  RR: 17 (07-24-22 @ 17:00) (17 - 19)  SpO2: 92% (07-24-22 @ 17:00) (92% - 97%) on 6L NC    Plan:  Given new onset CP and patient being diaphoretic, will do a cardiac workup. Additionally, patient is diaphoretic and thus tachycardia may be secondary to a fever.   1) Cardiac enzymes and ECG   2) VBG comprehensive  3) Continue to monitor  4) Continue with antibiotic therapy  ----------------------------  Auxiliary temp returned at 102.3. Tachycardia may be 2/2 fever. Will give tylenol.     ECG poor quality given patient is shaking and muscle spasms. On tele and assessment, patient 120s and sinus. Will continue to monitor. HOANG PORTILLO  64F with hx of Parkinsons, COPD, T2DM, COPD, Schizoaffective disorder BIBEMS after unwitnessed fall, found to be hypoxic to 70s with EMS, a/w sepsis and acute hypoxic respiratory failure likely multifactorial from COVID-19, superimposed PNA, and COPD exacerbation. She was also found to develop rhabdomyolysis secondary to long time immobility after fall.     Contacted by RN for HR 120s-130s on tele. Notably, patient is shaking uncontrollable, but per nurse she has was like this at baseline earlier today. Patient is AOx1 currently (baseline is 1-2, minimally verbal). Patient is now 110s-120s on tele. Patient does not answer any yes or no questions for ROS, aside from responding yes to chest pain. On exam, patient is diaphoretic, sinus tachycardia, CTABL anteriorly, shaking uncontrollably throughout, no apparent distress but difficult to assess given patient's baseline.    Difficulty obtaining ECG earlier given patient is shaking.     T(C): 37.1 (07-24-22 @ 17:00), Max: 37.1 (07-24-22 @ 01:00)  HR: 128 (07-24-22 @ 20:30) (77 - 128)  BP: 156/98 (07-24-22 @ 17:00) (109/73 - 156/98)  RR: 17 (07-24-22 @ 17:00) (17 - 19)  SpO2: 92% (07-24-22 @ 17:00) (92% - 97%) on 6L NC    Plan:  Given new onset CP and patient being diaphoretic, will do a cardiac workup. CP is likely 2/2 known PNA superimposed on COVID. Additionally, patient is diaphoretic and thus tachycardia may be secondary to a fever.   1) Cardiac enzymes and ECG   2) VBG comprehensive  3) Continue to monitor  4) Continue with antibiotic therapy  ----------------------------  Auxiliary temp returned at 102.3. Tachycardia may be 2/2 fever. Will give 1g IV tylenol. Cooling with ice packs.    ECG poor quality given patient is shaking and muscle spasms. On tele and assessment, patient 120s and sinus. Will continue to monitor. HOANG PORTILLO  64F with hx of Parkinsons, COPD, T2DM, COPD, Schizoaffective disorder BIBEMS after unwitnessed fall, found to be hypoxic to 70s with EMS, a/w sepsis and acute hypoxic respiratory failure likely multifactorial from COVID-19, superimposed PNA, and COPD exacerbation. She was also found to develop rhabdomyolysis secondary to long time immobility after fall.     Contacted by RN for HR 120s-130s on tele. Notably, patient is shaking uncontrollable, but per nurse she has was like this at baseline earlier today. Patient is AOx1 currently (baseline is 1-2, minimally verbal). Patient is now 110s-120s on tele. Patient does not answer any yes or no questions for ROS, aside from responding yes to chest pain. On exam, patient is diaphoretic, sinus tachycardia, CTABL anteriorly, shaking uncontrollably throughout, no apparent distress but difficult to assess given patient's baseline.    Difficulty obtaining ECG earlier given patient is shaking.     T(C): 37.1 (07-24-22 @ 17:00), Max: 37.1 (07-24-22 @ 01:00)  HR: 128 (07-24-22 @ 20:30) (77 - 128)  BP: 156/98 (07-24-22 @ 17:00) (109/73 - 156/98)  RR: 17 (07-24-22 @ 17:00) (17 - 19)  SpO2: 92% (07-24-22 @ 17:00) (92% - 97%) on 6L NC    Plan:  Given new onset CP and patient being diaphoretic, will do a cardiac workup. CP is likely 2/2 known PNA superimposed on COVID. Additionally, patient is diaphoretic and thus tachycardia may be secondary to a fever.   1) Cardiac enzymes and ECG   2) VBG comprehensive  3) Continue to monitor  4) Continue with antibiotic therapy  ----------------------------  Auxiliary temp returned at 102.3. Tachycardia may be 2/2 fever. Will give 1g IV tylenol. Cooling with ice packs and cooling blanket.    ECG poor quality given patient is shaking and muscle spasms. On tele and assessment, patient 120s and sinus. Will continue to monitor.    Cardiac workup - trops neg, CK elevated but downtrending from earlier.  VBG lactate 1.6 HOANG PORTILLO  64F with hx of Parkinsons, COPD, T2DM, COPD, Schizoaffective disorder BIBEMS after unwitnessed fall, found to be hypoxic to 70s with EMS, a/w sepsis and acute hypoxic respiratory failure likely multifactorial from COVID-19, superimposed PNA, and COPD exacerbation. She was also found to develop rhabdomyolysis secondary to long time immobility after fall.     Contacted by RN for HR 120s-130s on tele. Notably, patient is shaking uncontrollable, but per nurse she has was like this at baseline earlier today. Patient is AOx1 currently (baseline is 1-2, minimally verbal). Patient is now 110s-120s on tele. Patient does not answer any yes or no questions for ROS, aside from responding yes to chest pain. On exam, patient is diaphoretic, warm to touch, sinus tachycardia, CTABL anteriorly, shaking uncontrollably throughout, no apparent distress but difficult to assess given patient's baseline.    Difficulty obtaining ECG earlier given patient is shaking.     T(C): 37.1 (07-24-22 @ 17:00), Max: 37.1 (07-24-22 @ 01:00)  HR: 128 (07-24-22 @ 20:30) (77 - 128)  BP: 156/98 (07-24-22 @ 17:00) (109/73 - 156/98)  RR: 17 (07-24-22 @ 17:00) (17 - 19)  SpO2: 92% (07-24-22 @ 17:00) (92% - 97%) on 6L NC    Plan:  Given new onset CP and patient being diaphoretic, will do a cardiac workup. CP is likely 2/2 known PNA superimposed on COVID. Additionally, patient is diaphoretic and warm, thus tachycardia may be secondary to a fever.   1) Cardiac enzymes and ECG   2) VBG comprehensive  3) Continue to monitor  4) Continue with antibiotic therapy  ----------------------------  Auxiliary temp returned at 102.3. Tachycardia likely 2/2 fever. Will give 1g IV tylenol. Cooling with ice packs and cooling blanket.    ECG poor quality given patient is shaking and muscle spasms. On tele and assessment, patient 120s and sinus. Will continue to monitor.    Cardiac workup - trops neg, CK elevated but downtrending from earlier.  VBG lactate 1.6

## 2022-07-24 NOTE — PROGRESS NOTE ADULT - SUBJECTIVE AND OBJECTIVE BOX
PROGRESS NOTE:       Patient is a 67y old  Female who presents with a chief complaint of severe hypoxia (2022 14:20)      SUBJECTIVE / OVERNIGHT EVENTS:    ****    ADDITIONAL REVIEW OF SYSTEMS:    MEDICATIONS  (STANDING):  amantadine 100 milliGRAM(s) Oral two times a day  azithromycin  IVPB 500 milliGRAM(s) IV Intermittent every 24 hours  budesonide 160 MICROgram(s)/formoterol 4.5 MICROgram(s) Inhaler 2 Puff(s) Inhalation two times a day  cefTRIAXone   IVPB 1000 milliGRAM(s) IV Intermittent every 24 hours  dexAMETHasone  Injectable 6 milliGRAM(s) IV Push daily  dextrose 50% Injectable 25 Gram(s) IV Push once  dextrose 50% Injectable 12.5 Gram(s) IV Push once  dextrose 50% Injectable 25 Gram(s) IV Push once  enoxaparin Injectable 40 milliGRAM(s) SubCutaneous every 12 hours  insulin lispro (ADMELOG) corrective regimen sliding scale   SubCutaneous three times a day before meals  insulin lispro (ADMELOG) corrective regimen sliding scale   SubCutaneous at bedtime  lactated ringers. 2400 milliLiter(s) (100 mL/Hr) IV Continuous <Continuous>  remdesivir  IVPB 100 milliGRAM(s) IV Intermittent every 24 hours  remdesivir  IVPB   IV Intermittent     MEDICATIONS  (PRN):  acetaminophen     Tablet .. 650 milliGRAM(s) Oral every 6 hours PRN Temp greater or equal to 38C (100.4F), Mild Pain (1 - 3), Moderate Pain (4 - 6)  ALBUTerol    90 MICROgram(s) HFA Inhaler 2 Puff(s) Inhalation every 6 hours PRN Shortness of Breath and/or Wheezing  dextrose Oral Gel 15 Gram(s) Oral once PRN Blood Glucose LESS THAN 70 milliGRAM(s)/deciliter      CAPILLARY BLOOD GLUCOSE      POCT Blood Glucose.: 110 mg/dL (2022 03:24)  POCT Blood Glucose.: 120 mg/dL (2022 20:57)  POCT Blood Glucose.: 120 mg/dL (2022 18:00)  POCT Blood Glucose.: 120 mg/dL (2022 15:58)  POCT Blood Glucose.: 123 mg/dL (2022 10:39)    I&O's Summary      PHYSICAL EXAM:  Vital Signs Last 24 Hrs  T(C): 37.1 (2022 01:00), Max: 38.3 (2022 10:35)  T(F): 98.7 (:00), Max: 100.9 (2022 10:35)  HR: 77 (:00) (77 - 110)  BP: 110/76 (:00) (11/86 - 142/92)  BP(mean): --  RR: 19 (:) (19 - 26)  SpO2: 97% (:) (92% - 100%)    Parameters below as of :  Patient On (Oxygen Delivery Method): nasal cannula  O2 Flow (L/min): 6      GENERAL: No acute distress, well-developed  HEAD:  Atraumatic, Normocephalic  EYES: EOMI, PERRLA, conjunctiva and sclera clear  NECK: Supple, no lymphadenopathy, no JVD  CHEST/LUNG: CTAB; No wheezes, rales, or rhonchi  HEART: Regular rate and rhythm; No murmurs, rubs, or gallops  ABDOMEN: Soft, non-tender, non-distended; normal bowel sounds, no organomegaly  EXTREMITIES:  2+ peripheral pulses b/l, No clubbing, cyanosis, or edema  NEUROLOGY: A&O x 3, no focal deficits  SKIN: No rashes or lesions    LABS:                        16.1   14.09 )-----------( 249      ( 2022 10:55 )             51.8     07-    143  |  103  |  36<H>  ----------------------------<  127<H>  4.2   |  20<L>  |  0.99    Ca    9.5      2022 10:55    TPro  7.6  /  Alb  4.6  /  TBili  0.3  /  DBili  x   /  AST  141<H>  /  ALT  88<H>  /  AlkPhos  82  07-23    PT/INR - ( 2022 10:55 )   PT: 13.5 sec;   INR: 1.16 ratio         PTT - ( 2022 10:55 )  PTT:32.6 sec  CARDIAC MARKERS ( 2022 10:55 )  x     / x     / 4354 U/L / x     / x          Urinalysis Basic - ( 2022 10:59 )    Color: Yellow / Appearance: Slightly Turbid / S.031 / pH: x  Gluc: x / Ketone: Small  / Bili: Small / Urobili: 3 mg/dL   Blood: x / Protein: 300 mg/dL / Nitrite: Negative   Leuk Esterase: Negative / RBC: 4 /HPF / WBC 79 /HPF   Sq Epi: x / Non Sq Epi: 26 /HPF / Bacteria: Few        Culture - Blood (collected 2022 10:55)  Source: .Blood Blood-Peripheral  Gram Stain (2022 05:55):    Growth in anaerobic bottle: Gram Positive Cocci in Clusters  Preliminary Report (2022 05:55):    Growth in anaerobic bottle: Gram Positive Cocci in Clusters    ***Blood Panel PCR results on this specimen are available    approximately 3 hours after the Gram stain result.***    Gram stain, PCR, and/or culture results may not always    correspond due todifference in methodologies.    ************************************************************    This PCR assay was performed by multiplex PCR. This    Assay tests for 66 bacterial and resistance gene targets.    Please refer to the [facility] testdirectory    at [email or web address] for details.        RADIOLOGY & ADDITIONAL TESTS:  Results Reviewed:   Imaging Personally Reviewed:  Electrocardiogram Personally Reviewed:    COORDINATION OF CARE:  Care Discussed with Consultants/Other Providers [Y/N]:  Prior or Outpatient Records Reviewed [Y/N]:   PROGRESS NOTE:       Patient is a 67y old  Female who presents with a chief complaint of severe hypoxia (2022 14:20)      SUBJECTIVE / OVERNIGHT EVENTS:    NAEON    ADDITIONAL REVIEW OF SYSTEMS:    MEDICATIONS  (STANDING):  amantadine 100 milliGRAM(s) Oral two times a day  azithromycin  IVPB 500 milliGRAM(s) IV Intermittent every 24 hours  budesonide 160 MICROgram(s)/formoterol 4.5 MICROgram(s) Inhaler 2 Puff(s) Inhalation two times a day  cefTRIAXone   IVPB 1000 milliGRAM(s) IV Intermittent every 24 hours  dexAMETHasone  Injectable 6 milliGRAM(s) IV Push daily  dextrose 50% Injectable 25 Gram(s) IV Push once  dextrose 50% Injectable 12.5 Gram(s) IV Push once  dextrose 50% Injectable 25 Gram(s) IV Push once  enoxaparin Injectable 40 milliGRAM(s) SubCutaneous every 12 hours  insulin lispro (ADMELOG) corrective regimen sliding scale   SubCutaneous three times a day before meals  insulin lispro (ADMELOG) corrective regimen sliding scale   SubCutaneous at bedtime  lactated ringers. 2400 milliLiter(s) (100 mL/Hr) IV Continuous <Continuous>  remdesivir  IVPB 100 milliGRAM(s) IV Intermittent every 24 hours  remdesivir  IVPB   IV Intermittent     MEDICATIONS  (PRN):  acetaminophen     Tablet .. 650 milliGRAM(s) Oral every 6 hours PRN Temp greater or equal to 38C (100.4F), Mild Pain (1 - 3), Moderate Pain (4 - 6)  ALBUTerol    90 MICROgram(s) HFA Inhaler 2 Puff(s) Inhalation every 6 hours PRN Shortness of Breath and/or Wheezing  dextrose Oral Gel 15 Gram(s) Oral once PRN Blood Glucose LESS THAN 70 milliGRAM(s)/deciliter      CAPILLARY BLOOD GLUCOSE      POCT Blood Glucose.: 110 mg/dL (2022 03:24)  POCT Blood Glucose.: 120 mg/dL (2022 20:57)  POCT Blood Glucose.: 120 mg/dL (2022 18:00)  POCT Blood Glucose.: 120 mg/dL (2022 15:58)  POCT Blood Glucose.: 123 mg/dL (2022 10:39)    I&O's Summary      PHYSICAL EXAM:  Vital Signs Last 24 Hrs  T(C): 37.1 (2022 01:00), Max: 38.3 (2022 10:35)  T(F): 98.7 (2022 01:00), Max: 100.9 (2022 10:35)  HR: 77 (:00) (77 - 110)  BP: 110/76 (:00) (11/86 - 142/92)  BP(mean): --  RR: 19 (:) (19 - 26)  SpO2: 97% (:00) (92% - 100%)    Parameters below as of :00  Patient On (Oxygen Delivery Method): nasal cannula  O2 Flow (L/min): 6      GENERAL: No acute distress, well-developed  HEAD:  Atraumatic, Normocephalic  EYES: EOMI, PERRLA, conjunctiva and sclera clear  NECK: Supple, no lymphadenopathy, no JVD  CHEST/LUNG: CTAB; No wheezes, rales, or rhonchi  HEART: Regular rate and rhythm; No murmurs, rubs, or gallops  ABDOMEN: Soft, non-tender, non-distended; normal bowel sounds, no organomegaly  EXTREMITIES:  2+ peripheral pulses b/l, No clubbing, cyanosis, or edema  NEUROLOGY: A&O x 3, no focal deficits  SKIN: No rashes or lesions    LABS:                        16.1   14.09 )-----------( 249      ( 2022 10:55 )             51.8     07-    143  |  103  |  36<H>  ----------------------------<  127<H>  4.2   |  20<L>  |  0.99    Ca    9.5      2022 10:55    TPro  7.6  /  Alb  4.6  /  TBili  0.3  /  DBili  x   /  AST  141<H>  /  ALT  88<H>  /  AlkPhos  82  07-23    PT/INR - ( 2022 10:55 )   PT: 13.5 sec;   INR: 1.16 ratio         PTT - ( 2022 10:55 )  PTT:32.6 sec  CARDIAC MARKERS ( 2022 10:55 )  x     / x     / 4354 U/L / x     / x          Urinalysis Basic - ( 2022 10:59 )    Color: Yellow / Appearance: Slightly Turbid / S.031 / pH: x  Gluc: x / Ketone: Small  / Bili: Small / Urobili: 3 mg/dL   Blood: x / Protein: 300 mg/dL / Nitrite: Negative   Leuk Esterase: Negative / RBC: 4 /HPF / WBC 79 /HPF   Sq Epi: x / Non Sq Epi: 26 /HPF / Bacteria: Few        Culture - Blood (collected 2022 10:55)  Source: .Blood Blood-Peripheral  Gram Stain (2022 05:55):    Growth in anaerobic bottle: Gram Positive Cocci in Clusters  Preliminary Report (2022 05:55):    Growth in anaerobic bottle: Gram Positive Cocci in Clusters    ***Blood Panel PCR results on this specimen are available    approximately 3 hours after the Gram stain result.***    Gram stain, PCR, and/or culture results may not always    correspond due todifference in methodologies.    ************************************************************    This PCR assay was performed by multiplex PCR. This    Assay tests for 66 bacterial and resistance gene targets.    Please refer to the [facility] testdirectory    at [email or web address] for details.        RADIOLOGY & ADDITIONAL TESTS:  Results Reviewed:   Imaging Personally Reviewed:  Electrocardiogram Personally Reviewed:    COORDINATION OF CARE:  Care Discussed with Consultants/Other Providers [Y/N]:  Prior or Outpatient Records Reviewed [Y/N]:

## 2022-07-24 NOTE — PROGRESS NOTE ADULT - PROBLEM SELECTOR PLAN 6
- likely 2/2 muscle compression and ischemia due to prolonged immobility  - elevation of CK > 4000, Cr wnl, elevated BUN, elevated urobilinogen  - Fluids resuscitation LR  100ml/hr for 24 hours   - Monitor urine output, electrolytes, and kidney function

## 2022-07-24 NOTE — PROVIDER CONTACT NOTE (CRITICAL VALUE NOTIFICATION) - SITUATION
Positive blood culture from 7/23- growth in anaerobic for cocci in clusters
Positive blood culture from 7/23- growth in anaerobic for cocci in clusters

## 2022-07-24 NOTE — PROVIDER CONTACT NOTE (CRITICAL VALUE NOTIFICATION) - TEST AND RESULT REPORTED:
Positive blood culture from 7 Positive blood culture from 7/23- growth in anaerobic for cocci in clusters

## 2022-07-24 NOTE — PROVIDER CONTACT NOTE (CRITICAL VALUE NOTIFICATION) - ASSESSMENT
Patient is asymptomatic and denies chest pain. Patient is afebrile as per flowsheet
Patient is asymptomatic and denies chest pain. Patient is afebrile as per flowsheet

## 2022-07-24 NOTE — PROGRESS NOTE ADULT - PROBLEM SELECTOR PLAN 5
-  fall vs non- fall  - Pt's home aid denied her prior history of seizure, unclear history of syncope  - Ordered TTE to rule out cardiogenic cause of syncope -  fall vs non- fall  - Pt's home aid denied her prior history of seizure, unclear history of syncope  - Ordered TTE to rule out cardiogenic cause of syncope  - Negative physical exam findings for hip fractures

## 2022-07-24 NOTE — PROGRESS NOTE ADULT - PROBLEM SELECTOR PLAN 12
- 2/2 to fall with face downwards to ground  - Consult wound care - baseline of bilateral hand tremor and plantar flexion  - oriented x2-3, follow the command  - cw amantadine home  - NOT parkinson's disease  - Likely 2/2 toxicity from anti-psychotic medication for schizoaffective disorder   - Need to follow up with psychiatry for symptom management

## 2022-07-24 NOTE — PROGRESS NOTE ADULT - PROBLEM SELECTOR PLAN 10
- baseline of bilateral hand tremor and plantar flexion  - oriented x2-3, follow the command  - cw amantadine home - Unclear baseline and ordered A1C  - low level sliding scale given NPO now  - Monitor glucose as she is given decadron

## 2022-07-24 NOTE — PROGRESS NOTE ADULT - ATTENDING COMMENTS
64 y.o. Female w/ Hx Morbid obesity, COPD, T2DM, COPD, Schizoaffective disorder, Parkinsons,  BIBEMS after unwitnessed fall, found to be hypoxic to 70s with EMS, a/w sepsis and acute hypoxic respiratory failure likely due to COVID-19, and possible superimposed RML PNA, and COPD exacerbation.  Patient also with rhabdomyolysis secondary to long time immobility after fall.     #Acute hypoxic respiratory failure  - COVID +; c/w isolation  -c/w remdesivir, Decadron  -encourage incentive spirometry  -currently requiring 6LNC; titrate to off as tolerated  -Patient with RML infiltrate on CXR; change abx to cover for aspiration; Zosyn/Zithromax    # Rhabdomyolysis  -CPK 4000 range  -due to Fall  -c/w NS IVFs  -trend CPK  -Creat normal. monitoring creat for RODERICK    #coag negative staphyl  -2/4 bottles  -likely contamination  -repeat Blood Cx  -ID following    #Syncope  -unwitnessed Fall  -Trop 30 x2  -check TTE to r/o wall motion abnormalities  -PT consult    #Parkinson's Disease  -not on sinemet. Need to check with PCP if this is a true diagnosis.

## 2022-07-24 NOTE — CONSULT NOTE ADULT - ATTENDING COMMENTS
67 year old with parkinson;s presents after being found on the floor  Presents with fever    Now with opaciities on chest imaging  COVID+   Blood culture positive for coagulase negative staph    1) Concern for pneumonia  Can continue ceftiraxone given elevate procalcitonin  Check urine legionella- if negative- stop azithromycin    2) COVID positive  ? vaccine status  Can check covid antibodies  Check D dimer  Consider rule out PE given hypoxia  Can continue remdesivir  On dexamethasone    3) Coagulase negative staph  Usually a contaminant  ? if both sets of blood cultures were drawn from the same venipuncture  Stop vancomycin  Repeat blood cultures off antibiotics

## 2022-07-24 NOTE — PROGRESS NOTE ADULT - PROBLEM SELECTOR PLAN 7
- ABG: high PaCO2, acidemia, low O2  - dx: respiration failure from COVID-19 or bacterial infection in the setting of COPD  - Will monitor BMP, VBG, lactate AM, vitals Q4

## 2022-07-24 NOTE — CONSULT NOTE ADULT - SUBJECTIVE AND OBJECTIVE BOX
Patient is a 67y old  Female who presents with a chief complaint of severe hypoxia (2022 06:35)    HPI:  67F with Parkinsons, COPD, T2DM, COPD, Schizoaffective disorder BIBEMS after unwitnessed fall, found to be hypoxic, admitted ()for Acute Hypoxic Respiratory Failure secondary to COVID, presumed superimposed bacteremia, and COPD exacerbation. ID consulted for CoNS Bacteremia.     As per chart, patient was found down on floor at home, thus prompting EMS to bring her to ED for evaluation.     In ED, found to be hypoxic, febrile 100.9F with elevated CK, hypercarbia and acidosis on VBG.           PAST MEDICAL & SURGICAL HISTORY:  Diabetes  Obesity  Schizoaffective disorder  COPD without exacerbation  Parkinsonian tremor  No significant past surgical history        Allergies  No Known Allergies    ANTIMICROBIALS (past 90 days)  MEDICATIONS  (STANDING):    s/p Zosyn x1 ()    azithromycin  IVPB 500 every 24 hours ( --- )  cefTRIAXone   IVPB 1000 every 24 hours ( --- )  remdesivir  IVPB 100 every 24 hours ( --- )  vancomycin  IVPB 1250 every 12 hours    MEDICATIONS  (STANDING):  acetaminophen     Tablet .. 650 every 6 hours PRN  ALBUTerol    90 MICROgram(s) HFA Inhaler 2 every 6 hours PRN  amantadine 100 two times a day  budesonide 160 MICROgram(s)/formoterol 4.5 MICROgram(s) Inhaler 2 two times a day  dexAMETHasone  Injectable 6 daily  dextrose 50% Injectable 25 once  dextrose 50% Injectable 12.5 once  dextrose 50% Injectable 25 once  dextrose Oral Gel 15 once PRN  enoxaparin Injectable 40 every 12 hours  insulin lispro (ADMELOG) corrective regimen sliding scale  three times a day before meals  insulin lispro (ADMELOG) corrective regimen sliding scale  at bedtime      FAMILY HISTORY:  FH: multiple myeloma  in Father      REVIEW OF SYSTEMS  Denied fevers, chills, night sweats, rash, cough, coryza, dysuria, loose stools    Vital Signs Last 24 Hrs  T(F): 97.7 (22 @ 05:00), Max: 100.9 (22 @ 10:35)  Vital Signs Last 24 Hrs  HR: 100 (22 @ 05:00) (77 - 110)  BP: 109/73 (07-24-22 @ 05:00) ( - 142)  RR: 19 (22 @ 05:00)  SpO2: 96% (22 @ 05:00) (92% - 100%)  Wt(kg): --    Physical Exam:  Constitutional:  well preserved, comfortable  Head/Eyes: no icterus, PERRL, EOMI  ENT:  supple; no thrush  LUNGS:  CTA  CVS:  normal S1, S2, no murmur  Abd:  soft, non-tender; non-distended  Ext:  no edema  Vascular:  IV site no erythema tenderness or discharge  MSK:  joints without swelling  Neuro: AAO X 3, non- focal                              16.1   14.09 )-----------( 249      ( 2022 10:55 )             51.8       143  |  103  |  36<H>  ----------------------------<  127<H>  4.2   |  20<L>  |  0.99    Ca    9.5      2022 10:55    TPro  7.6  /  Alb  4.6  /  TBili  0.3  /  DBili  x   /  AST  141<H>  /  ALT  88<H>  /  AlkPhos  82  07-23    Urinalysis Basic - ( 2022 10:59 )    Color: Yellow / Appearance: Slightly Turbid / S.031 / pH: x  Gluc: x / Ketone: Small  / Bili: Small / Urobili: 3 mg/dL   Blood: x / Protein: 300 mg/dL / Nitrite: Negative   Leuk Esterase: Negative / RBC: 4 /HPF / WBC 79 /HPF   Sq Epi: x / Non Sq Epi: 26 /HPF / Bacteria: Few    MICROBIOLOGY:  Culture - Blood (collected 2022 10:55)  Source: .Blood Blood-Peripheral  Gram Stain (2022 05:55):    Growth in anaerobic bottle: Gram Positive Cocci in Clusters  Preliminary Report (2022 05:55):    Growth in anaerobic bottle: Gram Positive Cocci in Clusters    ***Blood Panel PCR results on this specimen are available    approximately 3 hours after the Gram stain result.***    Gram stain, PCR, and/or culture results may not always    correspond due todifference in methodologies.    ************************************************************    This PCR assay was performed by multiplex PCR. This    Assay tests for 66 bacterial and resistance gene targets.    Please refer to the [facility] Labs testdirectory    at [email or web address] for details.  Organism: Blood Culture PCR (2022 08:25)  Organism: Blood Culture PCR (2022 08:25)      -  Coagulase negative Staphylococcus: Detec      Method Type: PCR    Culture - Blood (collected 2022 10:50)  Source: .Blood Blood-Peripheral  Gram Stain (2022 08:37):    Growth in anaerobic bottle: Gram Positive Cocci in Clusters  Preliminary Report (2022 08:37):    Growth in anaerobic bottle: Gram Positive Cocci in Clusters      Rapid RVP Result: Detected ( @ 10:55)      RADIOLOGY:  imaging below personally reviewed and agree with findings Patient is a 67y old  Female who presents with a chief complaint of severe hypoxia (2022 06:35)    HPI:  67F with Parkinsons, COPD, T2DM, COPD, Schizoaffective disorder BIBEMS after unwitnessed fall, found to be hypoxic, admitted ()for Acute Hypoxic Respiratory Failure secondary to COVID, presumed superimposed bacteremia, and COPD exacerbation. ID consulted for CoNS Bacteremia.     As per chart, patient was found down on floor at home, thus prompting EMS to bring her to ED for evaluation.  In the ED, patient hypoxic, febrile Tmax 100.9F, since admission has been afebrile  WBC 14, Hgb 6, Cr 0.99, AST /ALT elevated  Procal 0.75  CTA H/N without acute pathology  CTH negative  CT Chest with RML atelectasis, possible pneumonia     UA contaminated   COVID positive, RVP negative  BCx () with 2 out of 4 GPC, prelim CoNS    Patient s/p Zosyn x1 () and currently on:  azithromycin  IVPB 500 every 24 hours ( --- )  cefTRIAXone   IVPB 1000 every 24 hours ( --- )  remdesivir  IVPB 100 every 24 hours ( --- )  vancomycin  IVPB 1250 every 12 hours         PAST MEDICAL & SURGICAL HISTORY:  Diabetes  Obesity  Schizoaffective disorder  COPD without exacerbation  Parkinsonian tremor  No significant past surgical history        Allergies  No Known Allergies    ANTIMICROBIALS (past 90 days)  MEDICATIONS  (STANDING):    s/p Zosyn x1 ()    azithromycin  IVPB 500 every 24 hours ( --- )  cefTRIAXone   IVPB 1000 every 24 hours ( --- )  remdesivir  IVPB 100 every 24 hours ( --- )  vancomycin  IVPB 1250 every 12 hours    MEDICATIONS  (STANDING):  acetaminophen     Tablet .. 650 every 6 hours PRN  ALBUTerol    90 MICROgram(s) HFA Inhaler 2 every 6 hours PRN  amantadine 100 two times a day  budesonide 160 MICROgram(s)/formoterol 4.5 MICROgram(s) Inhaler 2 two times a day  dexAMETHasone  Injectable 6 daily  dextrose 50% Injectable 25 once  dextrose 50% Injectable 12.5 once  dextrose 50% Injectable 25 once  dextrose Oral Gel 15 once PRN  enoxaparin Injectable 40 every 12 hours  insulin lispro (ADMELOG) corrective regimen sliding scale  three times a day before meals  insulin lispro (ADMELOG) corrective regimen sliding scale  at bedtime      FAMILY HISTORY:  FH: multiple myeloma  in Father      REVIEW OF SYSTEMS  Denied fevers, chills, night sweats, rash, cough, coryza, dysuria, loose stools    Vital Signs Last 24 Hrs  T(F): 97.7 (22 @ 05:00), Max: 100.9 (22 @ 10:35)  Vital Signs Last 24 Hrs  HR: 100 (22 @ 05:00) (77 - 110)  BP: 109/73 (22 @ 05:00) (11/86 - 142/92)  RR: 19 (22 @ 05:00)  SpO2: 96% (22 @ 05:00) (92% - 100%)  Wt(kg): --    Physical Exam:  Constitutional:  well preserved, comfortable  Head/Eyes: no icterus, PERRL, EOMI  ENT:  supple; no thrush  LUNGS:  CTA  CVS:  normal S1, S2, no murmur  Abd:  soft, non-tender; non-distended  Ext:  no edema  Vascular:  IV site no erythema tenderness or discharge  MSK:  joints without swelling  Neuro: AAO X 3, non- focal                              16.1   14.09 )-----------( 249      ( 2022 10:55 )             51.8       143  |  103  |  36<H>  ----------------------------<  127<H>  4.2   |  20<L>  |  0.99    Ca    9.5      2022 10:55    TPro  7.6  /  Alb  4.6  /  TBili  0.3  /  DBili  x   /  AST  141<H>  /  ALT  88<H>  /  AlkPhos  82      Urinalysis Basic - ( 2022 10:59 )    Color: Yellow / Appearance: Slightly Turbid / S.031 / pH: x  Gluc: x / Ketone: Small  / Bili: Small / Urobili: 3 mg/dL   Blood: x / Protein: 300 mg/dL / Nitrite: Negative   Leuk Esterase: Negative / RBC: 4 /HPF / WBC 79 /HPF   Sq Epi: x / Non Sq Epi: 26 /HPF / Bacteria: Few    MICROBIOLOGY:  Culture - Blood (collected 2022 10:55)  Source: .Blood Blood-Peripheral  Gram Stain (2022 05:55):    Growth in anaerobic bottle: Gram Positive Cocci in Clusters  Preliminary Report (2022 05:55):    Growth in anaerobic bottle: Gram Positive Cocci in Clusters    ***Blood Panel PCR results on this specimen are available    approximately 3 hours after the Gram stain result.***    Gram stain, PCR, and/or culture results may not always    correspond due todifference in methodologies.    ************************************************************    This PCR assay was performed by multiplex PCR. This    Assay tests for 66 bacterial and resistance gene targets.    Please refer to the [facility] Labs testdirectory    at [email or web address] for details.  Organism: Blood Culture PCR (2022 08:25)  Organism: Blood Culture PCR (2022 08:25)      -  Coagulase negative Staphylococcus: Detec      Method Type: PCR    Culture - Blood (collected 2022 10:50)  Source: .Blood Blood-Peripheral  Gram Stain (2022 08:37):    Growth in anaerobic bottle: Gram Positive Cocci in Clusters  Preliminary Report (2022 08:37):    Growth in anaerobic bottle: Gram Positive Cocci in Clusters      Rapid RVP Result: Detected ( @ 10:55)      RADIOLOGY:  imaging below personally reviewed and agree with findings Patient is a 67y old  Female who presents with a chief complaint of severe hypoxia (2022 06:35)    HPI:  67F with Parkinsons, COPD, T2DM, COPD, Schizoaffective disorder BIBEMS after unwitnessed fall, found to be hypoxic, admitted ()for Acute Hypoxic Respiratory Failure secondary to COVID, presumed superimposed bacteremia, and COPD exacerbation. ID consulted for CoNS Bacteremia.     As per chart, patient was found down on floor at home, thus prompting EMS to bring her to ED for evaluation.  In the ED, patient hypoxic, febrile Tmax 100.9F, since admission has been afebrile  WBC 14, Hgb 6, Cr 0.99, AST /ALT elevated  Procal 0.75  CTA H/N without acute pathology  CTH negative  CT Chest with RML atelectasis, possible pneumonia     UA contaminated   COVID positive, RVP negative  BCx () with 2 out of 4 GPC, prelim CoNS    Patient s/p Zosyn x1 () and currently on:  azithromycin  IVPB 500 every 24 hours ( --- )  cefTRIAXone   IVPB 1000 every 24 hours ( --- )  remdesivir  IVPB 100 every 24 hours ( --- )  vancomycin  IVPB 1250 every 12 hours     Patient currently AAOx1, only to name (unsure of year, and location). Patient denies any fever, chills, chest pain n/v, diarrhea. Patient states her dyspnea has improved. Patient is unsure why she is in the hospital or what brought her in here. Patient denies any known hardware, no prior prosthetic devices, hip replacements or knee replacements.       PAST MEDICAL & SURGICAL HISTORY:  Diabetes  Obesity  Schizoaffective disorder  COPD without exacerbation  Parkinsonian tremor  No significant past surgical history        Allergies  No Known Allergies    ANTIMICROBIALS (past 90 days)  MEDICATIONS  (STANDING):    s/p Zosyn x1 ()    azithromycin  IVPB 500 every 24 hours ( --- )  cefTRIAXone   IVPB 1000 every 24 hours ( --- )  remdesivir  IVPB 100 every 24 hours ( --- )  vancomycin  IVPB 1250 every 12 hours    MEDICATIONS  (STANDING):  acetaminophen     Tablet .. 650 every 6 hours PRN  ALBUTerol    90 MICROgram(s) HFA Inhaler 2 every 6 hours PRN  amantadine 100 two times a day  budesonide 160 MICROgram(s)/formoterol 4.5 MICROgram(s) Inhaler 2 two times a day  dexAMETHasone  Injectable 6 daily  dextrose 50% Injectable 25 once  dextrose 50% Injectable 12.5 once  dextrose 50% Injectable 25 once  dextrose Oral Gel 15 once PRN  enoxaparin Injectable 40 every 12 hours  insulin lispro (ADMELOG) corrective regimen sliding scale  three times a day before meals  insulin lispro (ADMELOG) corrective regimen sliding scale  at bedtime      FAMILY HISTORY:  FH: multiple myeloma  in Father      REVIEW OF SYSTEMS  Denied fevers, chills, night sweats, rash, cough, coryza, dysuria, loose stools    Vital Signs Last 24 Hrs  T(F): 97.7 (22 @ 05:00), Max: 100.9 (22 @ 10:35)  Vital Signs Last 24 Hrs  HR: 100 (22 @ 05:00) (77 - 110)  BP: 109/73 (22 @ 05:00) (11/86 - 142/92)  RR: 19 (22 @ 05:00)  SpO2: 96% (22 @ 05:00) (92% - 100%)  Wt(kg): --    Physical Exam:  Constitutional:  obese female, comfortable on NC, baseline tremors likely from Parkinson's  Head/Eyes: no icterus, PERRL, EOMI +facial abrasion on L cheek  ENT:  supple; no thrush  LUNGS:  course breath sounds bilaterally no wheeze  CVS:  normal S1, S2, no murmur  Abd:  soft, non-tender; non-distended  Ext:  no edema  Vascular:  IV site no erythema tenderness or discharge  MSK:  joints without swelling  Neuro: AAO X 1, bilateral hand tremors                              16.1   14.09 )-----------( 249      ( 2022 10:55 )             51.8   -    143  |  103  |  36<H>  ----------------------------<  127<H>  4.2   |  20<L>  |  0.99    Ca    9.5      2022 10:55    TPro  7.6  /  Alb  4.6  /  TBili  0.3  /  DBili  x   /  AST  141<H>  /  ALT  88<H>  /  AlkPhos  82  07-23    Urinalysis Basic - ( 2022 10:59 )    Color: Yellow / Appearance: Slightly Turbid / S.031 / pH: x  Gluc: x / Ketone: Small  / Bili: Small / Urobili: 3 mg/dL   Blood: x / Protein: 300 mg/dL / Nitrite: Negative   Leuk Esterase: Negative / RBC: 4 /HPF / WBC 79 /HPF   Sq Epi: x / Non Sq Epi: 26 /HPF / Bacteria: Few    MICROBIOLOGY:  Culture - Blood (collected 2022 10:55)  Source: .Blood Blood-Peripheral  Gram Stain (2022 05:55):    Growth in anaerobic bottle: Gram Positive Cocci in Clusters  Preliminary Report (2022 05:55):    Growth in anaerobic bottle: Gram Positive Cocci in Clusters    ***Blood Panel PCR results on this specimen are available    approximately 3 hours after the Gram stain result.***    Gram stain, PCR, and/or culture results may not always    correspond due todifference in methodologies.    ************************************************************    This PCR assay was performed by multiplex PCR. This    Assay tests for 66 bacterial and resistance gene targets.    Please refer to the [facility] Labs testdirectory    at [email or web address] for details.  Organism: Blood Culture PCR (2022 08:25)  Organism: Blood Culture PCR (2022 08:25)      -  Coagulase negative Staphylococcus: Detec      Method Type: PCR    Culture - Blood (collected 2022 10:50)  Source: .Blood Blood-Peripheral  Gram Stain (2022 08:37):    Growth in anaerobic bottle: Gram Positive Cocci in Clusters  Preliminary Report (2022 08:37):    Growth in anaerobic bottle: Gram Positive Cocci in Clusters      Rapid RVP Result: Detected ( @ 10:55)      RADIOLOGY:  imaging below personally reviewed and agree with findings Patient is a 67y old  Female who presents with a chief complaint of severe hypoxia (2022 06:35)    HPI:  67F with Parkinsons, COPD, T2DM, COPD, Schizoaffective disorder BIBEMS after unwitnessed fall, found to be hypoxic, admitted ()for Acute Hypoxic Respiratory Failure secondary to COVID, presumed superimposed bacteremia, and COPD exacerbation. ID consulted for CoNS Bacteremia.     As per chart, patient was found down on floor at home, thus prompting EMS to bring her to ED for evaluation.  In the ED, patient hypoxic, febrile Tmax 100.9F, since admission has been afebrile  WBC 14, Cr 0.99, AST /ALT elevated  Procal 0.75  CTA H/N without acute pathology  CTH negative  CT Chest with RML atelectasis, possible pneumonia     UA contaminated   COVID positive, RVP negative  BCx () with 2 out of 4 GPC, prelim CoNS    Patient s/p Zosyn x1 () and currently on:  azithromycin  IVPB 500 every 24 hours ( --- )  cefTRIAXone   IVPB 1000 every 24 hours ( --- )  remdesivir  IVPB 100 every 24 hours ( --- )  vancomycin  IVPB 1250 every 12 hours     Patient currently AAOx1, only to name (unsure of year, and location). Patient denies any fever, chills, chest pain n/v, diarrhea. Patient states her dyspnea has improved. Patient is unsure why she is in the hospital or what brought her in here. Patient denies any known hardware, no prior prosthetic devices, hip replacements or knee replacements.       PAST MEDICAL & SURGICAL HISTORY:  Diabetes  Obesity  Schizoaffective disorder  COPD without exacerbation  Parkinsonian tremor  No significant past surgical history        Allergies  No Known Allergies    ANTIMICROBIALS (past 90 days)  MEDICATIONS  (STANDING):    s/p Zosyn x1 ()    azithromycin  IVPB 500 every 24 hours ( --- )  cefTRIAXone   IVPB 1000 every 24 hours ( --- )  remdesivir  IVPB 100 every 24 hours ( --- )  vancomycin  IVPB 1250 every 12 hours    MEDICATIONS  (STANDING):  acetaminophen     Tablet .. 650 every 6 hours PRN  ALBUTerol    90 MICROgram(s) HFA Inhaler 2 every 6 hours PRN  amantadine 100 two times a day  budesonide 160 MICROgram(s)/formoterol 4.5 MICROgram(s) Inhaler 2 two times a day  dexAMETHasone  Injectable 6 daily  dextrose 50% Injectable 25 once  dextrose 50% Injectable 12.5 once  dextrose 50% Injectable 25 once  dextrose Oral Gel 15 once PRN  enoxaparin Injectable 40 every 12 hours  insulin lispro (ADMELOG) corrective regimen sliding scale  three times a day before meals  insulin lispro (ADMELOG) corrective regimen sliding scale  at bedtime      FAMILY HISTORY:  FH: multiple myeloma  in Father      REVIEW OF SYSTEMS  Denied fevers, chills, night sweats, rash, cough, coryza, dysuria, loose stools    Vital Signs Last 24 Hrs  T(F): 97.7 (22 @ 05:00), Max: 100.9 (22 @ 10:35)  Vital Signs Last 24 Hrs  HR: 100 (22 @ 05:00) (77 - 110)  BP: 109/73 (22 @ 05:00) (11/86 - 142/92)  RR: 19 (22 @ 05:00)  SpO2: 96% (22 @ 05:00) (92% - 100%)  Wt(kg): --    Physical Exam:  Constitutional:  obese female, comfortable on NC, baseline tremors likely from Parkinson's  Head/Eyes: no icterus, PERRL, EOMI +facial abrasion on L cheek  ENT:  supple; no thrush  LUNGS:  course breath sounds bilaterally no wheeze  CVS:  normal S1, S2, no murmur  Abd:  soft, non-tender; non-distended  Ext:  no edema  Vascular:  IV site no erythema tenderness or discharge  MSK:  joints without swelling  Neuro: AAO X 1, bilateral hand tremors                              16.1   14.09 )-----------( 249      ( 2022 10:55 )             51.8   07-    143  |  103  |  36<H>  ----------------------------<  127<H>  4.2   |  20<L>  |  0.99    Ca    9.5      2022 10:55    TPro  7.6  /  Alb  4.6  /  TBili  0.3  /  DBili  x   /  AST  141<H>  /  ALT  88<H>  /  AlkPhos  82  07-23    Urinalysis Basic - ( 2022 10:59 )    Color: Yellow / Appearance: Slightly Turbid / S.031 / pH: x  Gluc: x / Ketone: Small  / Bili: Small / Urobili: 3 mg/dL   Blood: x / Protein: 300 mg/dL / Nitrite: Negative   Leuk Esterase: Negative / RBC: 4 /HPF / WBC 79 /HPF   Sq Epi: x / Non Sq Epi: 26 /HPF / Bacteria: Few    MICROBIOLOGY:  Culture - Blood (collected 2022 10:55)  Source: .Blood Blood-Peripheral  Gram Stain (2022 05:55):    Growth in anaerobic bottle: Gram Positive Cocci in Clusters  Preliminary Report (2022 05:55):    Growth in anaerobic bottle: Gram Positive Cocci in Clusters    ***Blood Panel PCR results on this specimen are available    approximately 3 hours after the Gram stain result.***    Gram stain, PCR, and/or culture results may not always    correspond due todifference in methodologies.    ************************************************************    This PCR assay was performed by multiplex PCR. This    Assay tests for 66 bacterial and resistance gene targets.    Please refer to the [facility] Labs testdirectory    at [email or web address] for details.  Organism: Blood Culture PCR (2022 08:25)  Organism: Blood Culture PCR (2022 08:25)      -  Coagulase negative Staphylococcus: Detec      Method Type: PCR    Culture - Blood (collected 2022 10:50)  Source: .Blood Blood-Peripheral  Gram Stain (2022 08:37):    Growth in anaerobic bottle: Gram Positive Cocci in Clusters  Preliminary Report (2022 08:37):    Growth in anaerobic bottle: Gram Positive Cocci in Clusters      Rapid RVP Result: Detected ( @ 10:55)      RADIOLOGY:  imaging below personally reviewed and agree with findings

## 2022-07-24 NOTE — PROGRESS NOTE ADULT - PROBLEM SELECTOR PLAN 4
- COPD likely due to chronic smoking history, no home oxygen need at home, never been intubated for Exacerbation  - CW advair diskus standing and proAIR PRN  - Monitor O2 sat with a goal of > 88 and less than 93 - COPD likely due to chronic smoking history, no home oxygen need at home, never been intubated for Exacerbation  - CW advair diskus standing and proAIR PRN  - Currently on NC 6L oxygen  - Monitor O2 sat with a goal of > 88 and less than 93

## 2022-07-24 NOTE — CONSULT NOTE ADULT - ASSESSMENT
DIAGNOSIS and IMPRESSION          RECOMMENDATIONS        PT TO BE SEEN. PRELIM NOTE  PENDING RECS. PLEASE WAIT FOR FINAL RECS AFTER DISCUSSION WITH ATTENDING#    Rameshalida Bravo DO, PGY-4   ID fellow  [employer] Teams Preferred  After 5pm/weekends call [phone]   DIAGNOSIS and IMPRESSION  67F with Parkinsons, COPD, T2DM, COPD, Schizoaffective disorder BIBEMS after unwitnessed fall, found to be hypoxic, admitted (7/23)for Acute Hypoxic Respiratory Failure secondary to COVID, presumed superimposed bacteremia, and COPD exacerbation. ID consulted for CoNS Bacteremia.     #CoNS Bacteremia  - BCx (7/23) with 2 out of 4 GPC, prelim CoNS  - patient hemodynamically stable  - would recommend hold vancomycin  - repeat BCx x2 stat  - obtain TTE    #COVID Infection  #Possible Superimposed Bacterial PNA  - Procal 0.75, WBC 14  - COVID positive, RVP negative  - CT Chest with RML atelectasis, possible pneumonia  - c/w remdesivir day 1 out of 5  - agree with empiric CAP coverage CTX and Azithro  - ECG for qtc  - obtain inflammatory markers, ESR; CRP; Ferritin; LDH; d-dimer      PT TO BE SEEN. PRELIM NOTE  PENDING RECS. PLEASE WAIT FOR FINAL RECS AFTER DISCUSSION WITH ATTENDING#    Ramesh Bravo DO, PGY-4   ID fellow  [employer] Teams Preferred  After 5pm/weekends call [phone]   DIAGNOSIS and IMPRESSION  67F with Parkinsons, COPD, T2DM, COPD, Schizoaffective disorder BIBEMS after unwitnessed fall, found to be hypoxic, admitted (7/23)for Acute Hypoxic Respiratory Failure secondary to COVID, presumed superimposed bacteremia, and COPD exacerbation. ID consulted for CoNS Bacteremia.     #CoNS Bacteremia  - BCx (7/23) with 2 out of 4 GPC, prelim CoNS  - denies history of hardware or prosthetic devices   - patient hemodynamically stable  - would recommend hold vancomycin  - repeat BCx x2 stat  - obtain TTE  - f/up BCx (7/23) final results    #COVID Infection  #Possible Superimposed Bacterial PNA  - Procal 0.75, WBC 14  - COVID positive, RVP negative  - CT Chest with RML atelectasis, possible pneumonia  - c/w remdesivir day 1 out of 5  - agree with empiric CAP coverage CTX and Azithro  - serial ECG for qtc  - obtain inflammatory markers, ESR; CRP; Ferritin; LDH; d-dimer      PT TO BE SEEN. PRELIM NOTE  PENDING RECS. PLEASE WAIT FOR FINAL RECS AFTER DISCUSSION WITH ATTENDINGJess Bravo DO, PGY-4   ID fellow  [employer] Teams Preferred  After 5pm/weekends call [phone]   DIAGNOSIS and IMPRESSION  67F with Parkinsons, COPD, T2DM, COPD, Schizoaffective disorder BIBEMS after unwitnessed fall, found to be hypoxic, admitted (7/23)for Acute Hypoxic Respiratory Failure secondary to COVID, presumed superimposed bacteremia, and COPD exacerbation. ID consulted for CoNS Bacteremia.     #CoNS Bacteremia  - BCx (7/23) with 2 out of 4 GPC, prelim CoNS  - denies history of hardware or prosthetic devices   - patient hemodynamically stable  - would recommend hold vancomycin  - repeat BCx x2 stat  - obtain TTE  - f/up BCx (7/23) final results    #COVID Infection  #Possible Superimposed Bacterial PNA  - Procal 0.75, WBC 14  - COVID positive, RVP negative  - CT Chest with RML atelectasis, possible pneumonia  - c/w remdesivir day 1 out of 5  - agree with empiric CAP coverage CTX and Azithro  - serial ECG for qtc  - obtain inflammatory markers, ESR; CRP; Ferritin; LDH; d-dimer    PT TO BE SEEN. PRELIM NOTE  PENDING RECS. PLEASE WAIT FOR FINAL RECS AFTER DISCUSSION WITH ATTENDINGJess Bravo DO, PGY-4   ID fellow  [employer] Teams Preferred  After 5pm/weekends call [phone]

## 2022-07-24 NOTE — PROGRESS NOTE ADULT - PROBLEM SELECTOR PLAN 9
- proteinuria +3, pyuria 79, bacteria unremarkable  - No urinary symptoms noted  - Ddx: DM, sepsis, or both  - Will monitor

## 2022-07-25 LAB
COVID-19 NUCLEOCAPSID GAM AB INTERP: NEGATIVE — SIGNIFICANT CHANGE UP
COVID-19 NUCLEOCAPSID TOTAL GAM ANTIBODY RESULT: 0.06 INDEX — SIGNIFICANT CHANGE UP
COVID-19 SPIKE DOMAIN AB INTERP: POSITIVE
COVID-19 SPIKE DOMAIN ANTIBODY RESULT: 29.9 U/ML — HIGH
CULTURE RESULTS: SIGNIFICANT CHANGE UP
GLUCOSE BLDC GLUCOMTR-MCNC: 151 MG/DL — HIGH (ref 70–99)
GLUCOSE BLDC GLUCOMTR-MCNC: 160 MG/DL — HIGH (ref 70–99)
GLUCOSE BLDC GLUCOMTR-MCNC: 249 MG/DL — HIGH (ref 70–99)
HCT VFR BLD CALC: 46.9 % — HIGH (ref 34.5–45)
HGB BLD-MCNC: 14.2 G/DL — SIGNIFICANT CHANGE UP (ref 11.5–15.5)
MCHC RBC-ENTMCNC: 26.1 PG — LOW (ref 27–34)
MCHC RBC-ENTMCNC: 30.3 GM/DL — LOW (ref 32–36)
MCV RBC AUTO: 86.2 FL — SIGNIFICANT CHANGE UP (ref 80–100)
NRBC # BLD: 0 /100 WBCS — SIGNIFICANT CHANGE UP
NRBC # FLD: 0 K/UL — SIGNIFICANT CHANGE UP
NT-PROBNP SERPL-SCNC: 1611 PG/ML — HIGH
ORGANISM # SPEC MICROSCOPIC CNT: SIGNIFICANT CHANGE UP
ORGANISM # SPEC MICROSCOPIC CNT: SIGNIFICANT CHANGE UP
PLATELET # BLD AUTO: 232 K/UL — SIGNIFICANT CHANGE UP (ref 150–400)
RBC # BLD: 5.44 M/UL — HIGH (ref 3.8–5.2)
RBC # FLD: 14.4 % — SIGNIFICANT CHANGE UP (ref 10.3–14.5)
SARS-COV-2 IGG+IGM SERPL QL IA: 0.06 INDEX — SIGNIFICANT CHANGE UP
SARS-COV-2 IGG+IGM SERPL QL IA: 29.9 U/ML — HIGH
SARS-COV-2 IGG+IGM SERPL QL IA: NEGATIVE — SIGNIFICANT CHANGE UP
SARS-COV-2 IGG+IGM SERPL QL IA: POSITIVE
SPECIMEN SOURCE: SIGNIFICANT CHANGE UP
WBC # BLD: 13.37 K/UL — HIGH (ref 3.8–10.5)
WBC # FLD AUTO: 13.37 K/UL — HIGH (ref 3.8–10.5)

## 2022-07-25 PROCEDURE — 93306 TTE W/DOPPLER COMPLETE: CPT | Mod: 26

## 2022-07-25 PROCEDURE — 99233 SBSQ HOSP IP/OBS HIGH 50: CPT

## 2022-07-25 PROCEDURE — 99233 SBSQ HOSP IP/OBS HIGH 50: CPT | Mod: GC

## 2022-07-25 RX ORDER — ACETAMINOPHEN 500 MG
1000 TABLET ORAL ONCE
Refills: 0 | Status: COMPLETED | OUTPATIENT
Start: 2022-07-25 | End: 2022-07-25

## 2022-07-25 RX ORDER — KETOROLAC TROMETHAMINE 30 MG/ML
15 SYRINGE (ML) INJECTION ONCE
Refills: 0 | Status: DISCONTINUED | OUTPATIENT
Start: 2022-07-25 | End: 2022-07-25

## 2022-07-25 RX ORDER — KETOROLAC TROMETHAMINE 30 MG/ML
30 SYRINGE (ML) INJECTION ONCE
Refills: 0 | Status: DISCONTINUED | OUTPATIENT
Start: 2022-07-25 | End: 2022-07-25

## 2022-07-25 RX ADMIN — AZITHROMYCIN 255 MILLIGRAM(S): 500 TABLET, FILM COATED ORAL at 18:16

## 2022-07-25 RX ADMIN — Medication 100 MILLIGRAM(S): at 05:09

## 2022-07-25 RX ADMIN — ENOXAPARIN SODIUM 40 MILLIGRAM(S): 100 INJECTION SUBCUTANEOUS at 05:12

## 2022-07-25 RX ADMIN — Medication 400 MILLIGRAM(S): at 08:26

## 2022-07-25 RX ADMIN — BUDESONIDE AND FORMOTEROL FUMARATE DIHYDRATE 2 PUFF(S): 160; 4.5 AEROSOL RESPIRATORY (INHALATION) at 10:15

## 2022-07-25 RX ADMIN — Medication 6 MILLIGRAM(S): at 05:09

## 2022-07-25 RX ADMIN — Medication 15 MILLIGRAM(S): at 13:47

## 2022-07-25 RX ADMIN — Medication 1: at 18:09

## 2022-07-25 RX ADMIN — SODIUM CHLORIDE 100 MILLILITER(S): 9 INJECTION, SOLUTION INTRAVENOUS at 01:39

## 2022-07-25 RX ADMIN — Medication 1000 MILLIGRAM(S): at 08:56

## 2022-07-25 RX ADMIN — PIPERACILLIN AND TAZOBACTAM 25 GRAM(S): 4; .5 INJECTION, POWDER, LYOPHILIZED, FOR SOLUTION INTRAVENOUS at 10:15

## 2022-07-25 RX ADMIN — BUDESONIDE AND FORMOTEROL FUMARATE DIHYDRATE 2 PUFF(S): 160; 4.5 AEROSOL RESPIRATORY (INHALATION) at 21:04

## 2022-07-25 RX ADMIN — PIPERACILLIN AND TAZOBACTAM 25 GRAM(S): 4; .5 INJECTION, POWDER, LYOPHILIZED, FOR SOLUTION INTRAVENOUS at 01:38

## 2022-07-25 RX ADMIN — Medication 15 MILLIGRAM(S): at 13:32

## 2022-07-25 RX ADMIN — Medication 100 MILLIGRAM(S): at 18:12

## 2022-07-25 RX ADMIN — ENOXAPARIN SODIUM 40 MILLIGRAM(S): 100 INJECTION SUBCUTANEOUS at 18:13

## 2022-07-25 RX ADMIN — Medication 2: at 13:29

## 2022-07-25 RX ADMIN — REMDESIVIR 500 MILLIGRAM(S): 5 INJECTION INTRAVENOUS at 18:04

## 2022-07-25 RX ADMIN — PIPERACILLIN AND TAZOBACTAM 25 GRAM(S): 4; .5 INJECTION, POWDER, LYOPHILIZED, FOR SOLUTION INTRAVENOUS at 15:57

## 2022-07-25 NOTE — PROGRESS NOTE ADULT - PROBLEM SELECTOR PLAN 5
-  fall vs non- fall  - Pt's home aid denied her prior history of seizure, unclear history of syncope  - Ordered TTE to rule out cardiogenic cause of syncope  - Negative physical exam findings for hip fractures

## 2022-07-25 NOTE — PROVIDER CONTACT NOTE (OTHER) - ACTION/TREATMENT ORDERED:
STAT labs collected. EKG in the chart, unable to get good reading on EKG due to tremors from Parkinson. IV tylenol given as per eMAR. cooling blanket initiated for the fever. STAT labs collected. EKG in the chart, unable to get good reading on EKG due to tremors from Parkinson. IV tylenol given as per eMAR. cooling blanket initiated for the fever. ice packs applied.

## 2022-07-25 NOTE — PROGRESS NOTE ADULT - PROBLEM SELECTOR PLAN 12
- baseline of bilateral hand tremor and plantar flexion  - oriented x2-3, follow the command  - cw amantadine home  - NOT parkinson's disease  - Likely 2/2 toxicity from anti-psychotic medication for schizoaffective disorder   - Need to follow up with psychiatry for symptom management

## 2022-07-25 NOTE — PROGRESS NOTE ADULT - SUBJECTIVE AND OBJECTIVE BOX
Follow Up:      Inverval History/ROS:Patient is a 67y old  Female who presents with a chief complaint of severe hypoxia (25 Jul 2022 06:46)    More lethargic   Increased oxygen requirement  + fever    Allergies    No Known Allergies    Intolerances        ANTIMICROBIALS:  azithromycin  IVPB 500 every 24 hours  piperacillin/tazobactam IVPB.. 3.375 every 8 hours  remdesivir  IVPB 100 every 24 hours  remdesivir  IVPB        OTHER MEDS:  acetaminophen     Tablet .. 650 milliGRAM(s) Oral every 6 hours PRN  ALBUTerol    90 MICROgram(s) HFA Inhaler 2 Puff(s) Inhalation every 6 hours PRN  amantadine 100 milliGRAM(s) Oral two times a day  budesonide 160 MICROgram(s)/formoterol 4.5 MICROgram(s) Inhaler 2 Puff(s) Inhalation two times a day  dexAMETHasone  Injectable 6 milliGRAM(s) IV Push daily  dextrose 50% Injectable 25 Gram(s) IV Push once  dextrose 50% Injectable 12.5 Gram(s) IV Push once  dextrose 50% Injectable 25 Gram(s) IV Push once  dextrose Oral Gel 15 Gram(s) Oral once PRN  enoxaparin Injectable 40 milliGRAM(s) SubCutaneous every 12 hours  insulin lispro (ADMELOG) corrective regimen sliding scale   SubCutaneous three times a day before meals  insulin lispro (ADMELOG) corrective regimen sliding scale   SubCutaneous at bedtime  lactated ringers. 2400 milliLiter(s) IV Continuous <Continuous>      Vital Signs Last 24 Hrs  T(C): 38.8 (25 Jul 2022 15:34), Max: 39.4 (25 Jul 2022 06:30)  T(F): 101.8 (25 Jul 2022 15:34), Max: 102.9 (25 Jul 2022 06:30)  HR: 107 (25 Jul 2022 11:55) (104 - 128)  BP: 126/94 (25 Jul 2022 11:55) (98/71 - 156/98)  BP(mean): --  RR: 20 (25 Jul 2022 11:55) (16 - 20)  SpO2: 91% (25 Jul 2022 11:55) (90% - 92%)    Parameters below as of 25 Jul 2022 11:55  Patient On (Oxygen Delivery Method): nasal cannula  O2 Flow (L/min): 6      PHYSICAL EXAM:  General: [ ] non-toxic  HEAD/EYES: [ ] PERRL [x ] white sclera [ ] icterus  ENT:  [ ] normal [x ] supple [ ] thrush [ ] pharyngeal exudate  Cardiovascular:   [ ] murmur [x ] normal [ ] PPM/AICD  Respiratory:  [x ] clear to ausculation bilaterally  GI:  [x ] soft, non-tender, normal bowel sounds  :  [ ] arreguin [x ] no CVA tenderness   Musculoskeletal:  [ ] no synovitis  Neurologic:  [ ] non-focal exam   Skin:  [ x] no rash  Lymph: [x ] no lymphadenopathy  Psychiatric:  [ ] appropriate affect [ ] alert & oriented  Lines:  [x ] no phlebitis [ ] central line                                14.2   13.37 )-----------( 232      ( 25 Jul 2022 09:44 )             46.9       07-24    139  |  105  |  30<H>  ----------------------------<  109<H>  5.3   |  19<L>  |  0.71    Ca    8.8      24 Jul 2022 09:18  Phos  2.5     07-24  Mg     2.00     07-24    TPro  6.8  /  Alb  3.8  /  TBili  0.3  /  DBili  x   /  AST  169<H>  /  ALT  96<H>  /  AlkPhos  67  07-24          MICROBIOLOGY:Culture Results:   <10,000 CFU/mL Normal Urogenital Sruthi (07-23-22 @ 10:59)  Culture Results:   Growth in anaerobic bottle: Staphylococcus hominis  Growth in aerobic bottle: Staphylococcus epidermidis  Coag Negative Staphylococcus  Single set isolate, possible contaminant. Contact  Microbiology if susceptibility testing clinically  indicated.  ***Blood Panel PCR results on this specimen are available  approximately 3 hours after the Gram stain result.***  Gram stain, PCR, and/or culture results may not always  correspond due to difference in methodologies.  ************************************************************  This PCR assay was performed by multiplex PCR. This  Assay tests for 66 bacterial and resistance gene targets.  Please refer to the [facility] test directory  at [email or web address] for details. (07-23-22 @ 10:55)  Culture Results:   Growth in anaerobic bottle: Staphylococcus hominis  Susceptibility to follow. (07-23-22 @ 10:50)      RADIOLOGY:

## 2022-07-25 NOTE — SWALLOW BEDSIDE ASSESSMENT ADULT - ORAL PHASE
Decreased anterior-posterior movement of the bolus Decreased anterior-posterior movement of the bolus/Stasis in lateral sulci/Lingual stasis

## 2022-07-25 NOTE — PROGRESS NOTE ADULT - PROBLEM SELECTOR PLAN 2
- equivocal lung findings on CT chest   - Dx: COVID-19 infection vs community acquire PNA vs exacerbation of COPD  - CW empirical treatment for COVID-19 and community acquired PNA listed above - equivocal lung findings on CT chest   - Dx: COVID-19 infection vs community acquire PNA vs exacerbation of COPD  - CW empirical treatment for COVID-19 and community acquired PNA listed above  - DCed azithromycin per ID recommend

## 2022-07-25 NOTE — PROGRESS NOTE ADULT - SUBJECTIVE AND OBJECTIVE BOX
PROGRESS NOTE:     Patient is a 67y old  Female who presents with a chief complaint of severe hypoxia (2022 08:59)      SUBJECTIVE / OVERNIGHT EVENTS:  NAEON    ADDITIONAL REVIEW OF SYSTEMS:    MEDICATIONS  (STANDING):  amantadine 100 milliGRAM(s) Oral two times a day  azithromycin  IVPB 500 milliGRAM(s) IV Intermittent every 24 hours  budesonide 160 MICROgram(s)/formoterol 4.5 MICROgram(s) Inhaler 2 Puff(s) Inhalation two times a day  dexAMETHasone  Injectable 6 milliGRAM(s) IV Push daily  dextrose 50% Injectable 25 Gram(s) IV Push once  dextrose 50% Injectable 12.5 Gram(s) IV Push once  dextrose 50% Injectable 25 Gram(s) IV Push once  enoxaparin Injectable 40 milliGRAM(s) SubCutaneous every 12 hours  insulin lispro (ADMELOG) corrective regimen sliding scale   SubCutaneous three times a day before meals  insulin lispro (ADMELOG) corrective regimen sliding scale   SubCutaneous at bedtime  lactated ringers. 2400 milliLiter(s) (100 mL/Hr) IV Continuous <Continuous>  piperacillin/tazobactam IVPB.. 3.375 Gram(s) IV Intermittent every 8 hours  remdesivir  IVPB 100 milliGRAM(s) IV Intermittent every 24 hours  remdesivir  IVPB   IV Intermittent     MEDICATIONS  (PRN):  acetaminophen     Tablet .. 650 milliGRAM(s) Oral every 6 hours PRN Temp greater or equal to 38C (100.4F), Mild Pain (1 - 3), Moderate Pain (4 - 6)  ALBUTerol    90 MICROgram(s) HFA Inhaler 2 Puff(s) Inhalation every 6 hours PRN Shortness of Breath and/or Wheezing  dextrose Oral Gel 15 Gram(s) Oral once PRN Blood Glucose LESS THAN 70 milliGRAM(s)/deciliter      CAPILLARY BLOOD GLUCOSE      POCT Blood Glucose.: 112 mg/dL (2022 22:12)  POCT Blood Glucose.: 103 mg/dL (2022 18:23)  POCT Blood Glucose.: 104 mg/dL (2022 12:51)  POCT Blood Glucose.: 106 mg/dL (2022 09:05)    I&O's Summary      PHYSICAL EXAM:  Vital Signs Last 24 Hrs  T(C): 39.4 (2022 06:30), Max: 39.4 (2022 06:30)  T(F): 102.9 (2022 06:30), Max: 102.9 (2022 06:30)  HR: 104 (2022 06:30) (104 - 128)  BP: 98/71 (2022 06:30) (98/71 - 156/98)  BP(mean): --  RR: 20 (2022 06:30) (16 - 20)  SpO2: 90% (2022 06:30) (90% - 92%)    Parameters below as of 2022 06:30  Patient On (Oxygen Delivery Method): nasal cannula  O2 Flow (L/min): 6      GENERAL: No acute distress, well-developed  HEAD:  Atraumatic, Normocephalic  EYES: EOMI, PERRLA, conjunctiva and sclera clear  NECK: Supple, no lymphadenopathy, no JVD  CHEST/LUNG: CTAB; No wheezes, rales, or rhonchi  HEART: Regular rate and rhythm; No murmurs, rubs, or gallops  ABDOMEN: Soft, non-tender, non-distended; normal bowel sounds, no organomegaly  EXTREMITIES:  2+ peripheral pulses b/l, No clubbing, cyanosis, or edema  NEUROLOGY: A&O x 3, no focal deficits  SKIN: No rashes or lesions    LABS:                        15.2   18.20 )-----------( 280      ( 2022 09:18 )             48.8     07-24    139  |  105  |  30<H>  ----------------------------<  109<H>  5.3   |  19<L>  |  0.71    Ca    8.8      2022 09:18  Phos  2.5     07-24  Mg     2.00     07-24    TPro  6.8  /  Alb  3.8  /  TBili  0.3  /  DBili  x   /  AST  169<H>  /  ALT  96<H>  /  AlkPhos  67  07-24    PT/INR - ( 2022 10:55 )   PT: 13.5 sec;   INR: 1.16 ratio         PTT - ( 2022 10:55 )  PTT:32.6 sec  CARDIAC MARKERS ( 2022 22:49 )  x     / x     / 3508 U/L / x     / 11.9 ng/mL  CARDIAC MARKERS ( 2022 09:18 )  x     / x     / 4386 U/L / x     / x      CARDIAC MARKERS ( 2022 10:55 )  x     / x     / 4354 U/L / x     / x          Urinalysis Basic - ( 2022 10:59 )    Color: Yellow / Appearance: Slightly Turbid / S.031 / pH: x  Gluc: x / Ketone: Small  / Bili: Small / Urobili: 3 mg/dL   Blood: x / Protein: 300 mg/dL / Nitrite: Negative   Leuk Esterase: Negative / RBC: 4 /HPF / WBC 79 /HPF   Sq Epi: x / Non Sq Epi: 26 /HPF / Bacteria: Few        Culture - Urine (collected 2022 10:59)  Source: Clean Catch Clean Catch (Midstream)  Final Report (2022 18:29):    <10,000 CFU/mL Normal Urogenital Sruthi    Culture - Blood (collected 2022 10:55)  Source: .Blood Blood-Peripheral  Gram Stain (2022 12:29):    Growth in anaerobic bottle: Gram Positive Cocci in Clusters    Growth in aerobic bottle: Gram Positive Cocci in Clusters  Preliminary Report (2022 12:29):    Growth in anaerobic bottle: Gram Positive Cocci in Clusters    Growth in aerobic bottle: Gram Positive Cocci in Clusters    ***Blood Panel PCR results on this specimen are available    approximately 3 hours after the Gram stain result.***    Gram stain, PCR, and/or culture results may not always    correspond due to difference in methodologies.    ************************************************************    This PCR assay was performed by multiplex PCR. This    Assay tests for 66 bacterial and resistance gene targets.    Please refer to the [facility] test directory    at [email or web address] for details.  Organism: Blood Culture PCR (2022 08:25)  Organism: Blood Culture PCR (2022 08:25)    Culture - Blood (collected 2022 10:50)  Source: .Blood Blood-Peripheral  Gram Stain (2022 08:37):    Growth in anaerobic bottle: Gram Positive Cocci in Clusters  Preliminary Report (2022 08:37):    Growth in anaerobic bottle: Gram Positive Cocci in Clusters        RADIOLOGY & ADDITIONAL TESTS:  Results Reviewed:   Imaging Personally Reviewed:  Electrocardiogram Personally Reviewed:    COORDINATION OF CARE:  Care Discussed with Consultants/Other Providers [Y/N]:  Prior or Outpatient Records Reviewed [Y/N]:   PROGRESS NOTE:     Patient is a 67y old  Female who presents with a chief complaint of severe hypoxia (2022 08:59)      SUBJECTIVE / OVERNIGHT EVENTS:  new onset CP and patient being diaphoretic overnight    ADDITIONAL REVIEW OF SYSTEMS:    MEDICATIONS  (STANDING):  amantadine 100 milliGRAM(s) Oral two times a day  azithromycin  IVPB 500 milliGRAM(s) IV Intermittent every 24 hours  budesonide 160 MICROgram(s)/formoterol 4.5 MICROgram(s) Inhaler 2 Puff(s) Inhalation two times a day  dexAMETHasone  Injectable 6 milliGRAM(s) IV Push daily  dextrose 50% Injectable 25 Gram(s) IV Push once  dextrose 50% Injectable 12.5 Gram(s) IV Push once  dextrose 50% Injectable 25 Gram(s) IV Push once  enoxaparin Injectable 40 milliGRAM(s) SubCutaneous every 12 hours  insulin lispro (ADMELOG) corrective regimen sliding scale   SubCutaneous three times a day before meals  insulin lispro (ADMELOG) corrective regimen sliding scale   SubCutaneous at bedtime  lactated ringers. 2400 milliLiter(s) (100 mL/Hr) IV Continuous <Continuous>  piperacillin/tazobactam IVPB.. 3.375 Gram(s) IV Intermittent every 8 hours  remdesivir  IVPB 100 milliGRAM(s) IV Intermittent every 24 hours  remdesivir  IVPB   IV Intermittent     MEDICATIONS  (PRN):  acetaminophen     Tablet .. 650 milliGRAM(s) Oral every 6 hours PRN Temp greater or equal to 38C (100.4F), Mild Pain (1 - 3), Moderate Pain (4 - 6)  ALBUTerol    90 MICROgram(s) HFA Inhaler 2 Puff(s) Inhalation every 6 hours PRN Shortness of Breath and/or Wheezing  dextrose Oral Gel 15 Gram(s) Oral once PRN Blood Glucose LESS THAN 70 milliGRAM(s)/deciliter      CAPILLARY BLOOD GLUCOSE      POCT Blood Glucose.: 112 mg/dL (2022 22:12)  POCT Blood Glucose.: 103 mg/dL (2022 18:23)  POCT Blood Glucose.: 104 mg/dL (2022 12:51)  POCT Blood Glucose.: 106 mg/dL (2022 09:05)    I&O's Summary      PHYSICAL EXAM:  Vital Signs Last 24 Hrs  T(C): 39.4 (2022 06:30), Max: 39.4 (2022 06:30)  T(F): 102.9 (2022 06:30), Max: 102.9 (2022 06:30)  HR: 104 (2022 06:30) (104 - 128)  BP: 98/71 (2022 06:30) (98/71 - 156/98)  BP(mean): --  RR: 20 (2022 06:30) (16 - 20)  SpO2: 90% (2022 06:30) (90% - 92%)    Parameters below as of 2022 06:30  Patient On (Oxygen Delivery Method): nasal cannula  O2 Flow (L/min): 6      GENERAL: No acute distress, well-developed  HEAD:  Atraumatic, Normocephalic  EYES: EOMI, PERRLA, conjunctiva and sclera clear  NECK: Supple, no lymphadenopathy, no JVD  CHEST/LUNG: CTAB; No wheezes, rales, or rhonchi  HEART: Regular rate and rhythm; No murmurs, rubs, or gallops  ABDOMEN: Soft, non-tender, non-distended; normal bowel sounds, no organomegaly  EXTREMITIES:  2+ peripheral pulses b/l, No clubbing, cyanosis, or edema  NEUROLOGY: A&O x 3, no focal deficits  SKIN: No rashes or lesions    LABS:                        15.2   18.20 )-----------( 280      ( 2022 09:18 )             48.8     07-24    139  |  105  |  30<H>  ----------------------------<  109<H>  5.3   |  19<L>  |  0.71    Ca    8.8      2022 09:18  Phos  2.5     07-24  Mg     2.00     07-24    TPro  6.8  /  Alb  3.8  /  TBili  0.3  /  DBili  x   /  AST  169<H>  /  ALT  96<H>  /  AlkPhos  67  07-24    PT/INR - ( 2022 10:55 )   PT: 13.5 sec;   INR: 1.16 ratio         PTT - ( 2022 10:55 )  PTT:32.6 sec  CARDIAC MARKERS ( 2022 22:49 )  x     / x     / 3508 U/L / x     / 11.9 ng/mL  CARDIAC MARKERS ( 2022 09:18 )  x     / x     / 4386 U/L / x     / x      CARDIAC MARKERS ( 2022 10:55 )  x     / x     / 4354 U/L / x     / x          Urinalysis Basic - ( 2022 10:59 )    Color: Yellow / Appearance: Slightly Turbid / S.031 / pH: x  Gluc: x / Ketone: Small  / Bili: Small / Urobili: 3 mg/dL   Blood: x / Protein: 300 mg/dL / Nitrite: Negative   Leuk Esterase: Negative / RBC: 4 /HPF / WBC 79 /HPF   Sq Epi: x / Non Sq Epi: 26 /HPF / Bacteria: Few        Culture - Urine (collected 2022 10:59)  Source: Clean Catch Clean Catch (Midstream)  Final Report (2022 18:29):    <10,000 CFU/mL Normal Urogenital Sruthi    Culture - Blood (collected 2022 10:55)  Source: .Blood Blood-Peripheral  Gram Stain (2022 12:29):    Growth in anaerobic bottle: Gram Positive Cocci in Clusters    Growth in aerobic bottle: Gram Positive Cocci in Clusters  Preliminary Report (2022 12:29):    Growth in anaerobic bottle: Gram Positive Cocci in Clusters    Growth in aerobic bottle: Gram Positive Cocci in Clusters    ***Blood Panel PCR results on this specimen are available    approximately 3 hours after the Gram stain result.***    Gram stain, PCR, and/or culture results may not always    correspond due to difference in methodologies.    ************************************************************    This PCR assay was performed by multiplex PCR. This    Assay tests for 66 bacterial and resistance gene targets.    Please refer to the [facility] test directory    at [email or web address] for details.  Organism: Blood Culture PCR (2022 08:25)  Organism: Blood Culture PCR (2022 08:25)    Culture - Blood (collected 2022 10:50)  Source: .Blood Blood-Peripheral  Gram Stain (2022 08:37):    Growth in anaerobic bottle: Gram Positive Cocci in Clusters  Preliminary Report (2022 08:37):    Growth in anaerobic bottle: Gram Positive Cocci in Clusters        RADIOLOGY & ADDITIONAL TESTS:  Results Reviewed:   Imaging Personally Reviewed:  Electrocardiogram Personally Reviewed:    COORDINATION OF CARE:  Care Discussed with Consultants/Other Providers [Y/N]:  Prior or Outpatient Records Reviewed [Y/N]:

## 2022-07-25 NOTE — ADVANCED PRACTICE NURSE CONSULT - REASON FOR CONSULT
Patient seen on skin care rounds after wound care referral received for assessment of skin impairment and recommendations of topical management. Patient is a 67 year old female with a PMHx of Parkinson disease (on amantadine), COPD (not on home oxygen), schizoaffective disorder (currently not on medications), DM2 (on metformin) presented to the ED for unwitnessed fall. Patient found to be hypoxic, febrile, tachycardic in ED and COVID+ on 7/23 . Found to have PNA and COPD exacerbation, and rhabdomyolysis secondary to long time immobility after fall. Chart reviewed: WBC 13.37, D-Dimer 346, BMI 38.4, nancy 13.

## 2022-07-25 NOTE — ADVANCED PRACTICE NURSE CONSULT - RECOMMEDATIONS
How Severe Is Your Skin Lesion?: mild
Has Your Skin Lesion Been Treated?: not been treated
Is This A New Presentation, Or A Follow-Up?: Skin Lesion
Topical recommendations:     Bilateral posterior ears - Apply liquid barrier daily. Offload nasal cannula with posey trach ties.     Left cheek - Cleanse with NS, pat dry. Apply Bacitracin daily.     Bilateral breast folds, abdominal pannus - Interdry textile sheeting, under intertriginous folds leaving 2 inches exposed at ends to wick, remove to wash & dry affected area, then replace. Individual sheeting may be used for up to 5 days unless soiled. Inspect skin daily.     Right 2nd toe, and left 5th toenail - Cleanse with NS, pat dry. Apply betadine wipe daily. Allow to dry, leave open to air.     Continue low air loss bed therapy, continue heel elevation with complete cair boots, continue to turn & reposition per protocol, continue moisture management with barrier creams & single breathable pad, continue measures to decrease friction/shear/pressure. Continue with nutritional support as per dietary/orders.     Please contact Wound/Ostomy Care Service Line if we can be of further assistance (ext [phone]).

## 2022-07-25 NOTE — PROGRESS NOTE ADULT - PROBLEM SELECTOR PLAN 4
- COPD likely due to chronic smoking history, no home oxygen need at home, never been intubated for Exacerbation  - CW advair diskus standing and proAIR PRN  - Currently on NC 6L oxygen  - Monitor O2 sat with a goal of > 88 and less than 93

## 2022-07-25 NOTE — PROGRESS NOTE ADULT - PROBLEM SELECTOR PROBLEM 7
Chief Complaint   Patient presents with   • Hypertension     Here for a follow up on his hypertension.        SUBJECTIVE:  Wagner Encarnacion is a 38 year old  male seen today for a physical exam.  He is healthy.  No health concerns.   Exercise: Softball, 2x weekly  Nutrition: Less fast food  Caffeine: energy drink/soda once daily  Employment: [employer]  Sleep: 7-8 hours    HTN, controlled with lisinopril-HCTZ in 01/18. Compliant and tolerating it well. No other concerns.    PROBLEM LIST:    There is no problem list on file for this patient.      MEDICATIONS:    Current Outpatient Medications   Medication Sig Dispense Refill   • lisinopril-hydroCHLOROthiazide (PRINZIDE,ZESTORETIC) 10-12.5 MG per tablet Take 1 tablet by mouth daily. 90 tablet 1     No current facility-administered medications for this visit.        ALLERGIES:    ALLERGIES:  No Known Allergies    PAST MEDICAL HISTORY:    History reviewed. No pertinent past medical history.    SURGICAL HISTORY:    Past Surgical History:   Procedure Laterality Date   • Shoulder surgery Right        FAMILY HISTORY:    Family History   Problem Relation Age of Onset   • Cancer Father         [facility]       SOCIAL HISTORY:       Social History     Tobacco Use   • Smoking status: Never Smoker   • Smokeless tobacco: Current User     Types: Chew   Substance Use Topics   • Alcohol use: Yes     Comment: 8 drinks of beer of liquor per week   • Drug use: No         REVIEW OF SYSTEMS:  Constitutional: Negative for fever, chills, or fatigue.  Eyes: Negative for visual changes.  ENT: Negative for rhinorrhea, ear pain or sore throat.  Respiratory: Negative for cough or shortness of breath.    Cardiovascular: Negative for chest pain, palpitations.   Gastrointestinal: Negative for nausea, vomiting, diarrhea or abdominal pain.   Extremities:  Negative for joint swelling or joint pain.  Neurologic:  Negative for headache.  Endocrine: Negative for heat or cold intolerance, weight  loss or gain.  Psychiatric: Negative for anxiety, depression.    OBJECTIVE:  Vital Signs:    Visit Vitals  /82 (BP Location: [facility], Patient Position: Sitting, Cuff Size: Large Adult)   Pulse 60   Resp 12   Ht 6' 3\" (1.905 m)   Wt 100.2 kg   SpO2 97%   BMI 27.62 kg/m²     General:   Alert, cooperative, conversive in no acute distress.  Skin:  Warm and dry without rash.    Head:  Normocephalic-atraumatic.   Neck:  Trachea is midline. No adenopathy.  Normal thyroid without mass or tenderness.  Eyes:  Normal conjunctivae and sclerae.  PERRL (Pupils equal, round, reactive to light).  EOMI (Extraocular movements intact).  ENT:  Mucous membranes are moist.  Normal tympanic membranes and external auditory canals bilaterally.  No pharyngeal erythema or exudate.   Cardiovascular:  Symmetrical pulses.  Regular rate and rhythm without murmur.  Respiratory:  Normal respiratory effort.  CTA (Clear to auscultation).  No wheezes, rales or rhonchi.  Gastrointestinal:  Soft and nontender.  Normal bowel sounds.   Musculoskeletal:  No deformity or edema.  No tenderness to palpation.  Neurologic:  Oriented x 3.  Cranial nerves 2-12 are intact.  No focal deficits or lateralizing signs.  Psychiatric:   Cooperative.  Appropriate mood and affect.    LABS: Reviewed    ASSESSMENT:  1. Annual physical exam    2. Hypertension, unspecified type        PLAN:  Discussed health and wellness.  Recommend a low fat/low cholesterol/low salt diet.  Exercise for at least 30 minutes most days of the week.  Work on weight loss. Alcohol and caffeine in moderation. He will complete fasting labs in 1-2 weeks. Otherwise, Recommend a yearly physical.    HTN: Controlled on lisinopril-HCTZ 10-12.5mg daily. If BP remains controlled with further weight loss, will decrease meds.    No orders of the defined types were placed in this encounter.      Return in about 1 year (around 7/5/2020).    Arnulfo Dill MD               Acute on chronic respiratory acidosis

## 2022-07-25 NOTE — PROGRESS NOTE ADULT - PROBLEM SELECTOR PLAN 10
- Unclear baseline and ordered A1C  - low level sliding scale given NPO now  - Monitor glucose as she is given decadron

## 2022-07-25 NOTE — SWALLOW BEDSIDE ASSESSMENT ADULT - COMMENTS
Internal medicine 7/25/2022: 64F with hx of Parkinsons, COPD, T2DM, COPD, Schizoaffective disorder BIBEMS after unwitnessed fall, found to be hypoxic to 70s with EMS, a/w sepsis and acute hypoxic respiratory failure likely multifactorial from COVID-19, superimposed PNA, and COPD exacerbation. She was also found to develop rhabdomyolysis secondary to long time immobility after fall.    CT Head 7/23/2022: Involutional changes with volume loss in both cerebral hemispheres. No CT evidence of an acute infarct, hemorrhage, or space-occupying lesion.    CT Chest 7/23/2022: Right middle lobe atelectasis. Superimposed pneumonia is a possibility.    Consult received and chart reviewed. Patient seen today for clinical swallow evaluation. Patient received sitting upright in bed, awake with nasal cannula at 6 lpm. Patient is able to follow some simple commands with repetition and make some basic wants/needs known.     Results and recommendations discussed with RN on unit. Called out to team.

## 2022-07-25 NOTE — PROGRESS NOTE ADULT - PROBLEM SELECTOR PLAN 1
- febrile, tachycardiac, tachypneic, hypoxic, leukocytosis, positive COVID19, equivocal lung findings in CT chest  - Lactate normal, elevated procal.  - Dx: infection from COVID-19 infection, community acquired bacterial PNA, or mixture of both  - zosyn x1 at ED and finished one litter of NS.   - start remidisivir, decadron for COVID pneumonia, azithryomycin + zosyn (switched from CTX 07/24 due to concern for anaerobic ) for community acquired PNA  - start chest PT/PD and monitor Dimer every 48-72 hours for AC dosage adjustment, as a part of COVID-19 treatment protocol.  - CW  LR  100ml/hr for 24 hours   - Start tele monitoring  - Trend CBC AM, vitals q4, Dimer every other day, blood gas AM, cmp AM  - O2 sat stable with 6l NC and will consider weaning down to 4L tomorrow  - Start incentive spirometry

## 2022-07-25 NOTE — SWALLOW BEDSIDE ASSESSMENT ADULT - SWALLOW EVAL: RECOMMENDED FEEDING/EATING TECHNIQUES
alternate food with liquid/check mouth frequently for oral residue/pocketing/position upright (90 degrees)/small sips/bites

## 2022-07-25 NOTE — PROVIDER CONTACT NOTE (OTHER) - RECOMMENDATIONS
provider made aware. 2nd IV inserted. IV abx given as per eMAR. Provider aware, 2nd IV inserted for multiple IV meds as per eMAR. ice packs applied for fever.

## 2022-07-25 NOTE — PROGRESS NOTE ADULT - ATTENDING COMMENTS
64F with hx of drug induced Parkinsons, COPD (not on home oxygen), T2DM, COPD, Schizoaffective disorder BIBEMS after unwitnessed fall, found to be hypoxic to 70s with EMS, a/w sepsis and acute hypoxic respiratory failure likely multifactorial from COVID-19, superimposed RLL PNA possibly aspiration, and COPD exacerbation. Also with rhabdomyolysis after fall. Continue remdesevir/dexamethasone, monitor LFTs. Abx broadened from CTX to Zosyn given persistent fevers, c/f aspiration, SLP pending. CTA to r/o PE given hypoxia and hypercoag state iso of COVID. Coag neg staph in blood likely contaminant, repeat cultures pending. ID eval appreciated. Syncope work up - TTE, Tele monitor, PT eval. Trend CPK.

## 2022-07-25 NOTE — ADVANCED PRACTICE NURSE CONSULT - ASSESSMENT
General: Alert but minimally conversational, bedbound, incontinent of urine and stool. Nasal cannula in place, blanchable erythema to bilateral posterior ears. Patient is diaphoretic, on a cooling blanket. Skin with increased moisture in intertriginous folds, adequate skin turgor, scattered areas of ecchymosis (without hematoma) on bilateral upper extremities. Bilateral breast folds and abdominal pannus at risk for moisture associated dermatitis. Umbilical hernia noted. Blanchable erythema on bilateral heels.     Vascular: Bilateral lower extremities warm to touch. Capillary refill <3 seconds. +2 DP/PT pulses. Trace edema. Bilateral foot drop.    Left cheek - scabbed over abrasion secondary to fall measuring [id number]. No erythema, no increased warmth, no edema, no induration, no signs or symptoms of overt skin infection to periwound.     Right 2nd toe - diabetic foot ulcer measuring [id number] purple-maroon discoloration, with skin slippage, exposing red moist dermis. No erythema, no increased warmth, no edema, no induration, no signs or symptoms of overt skin infection to periwound. Goals of care: provide antimicrobial support.    Left 5th toenails- subungual hematoma.

## 2022-07-26 LAB
-  AMPICILLIN/SULBACTAM: SIGNIFICANT CHANGE UP
-  CEFAZOLIN: SIGNIFICANT CHANGE UP
-  CLINDAMYCIN: SIGNIFICANT CHANGE UP
-  ERYTHROMYCIN: SIGNIFICANT CHANGE UP
-  GENTAMICIN: SIGNIFICANT CHANGE UP
-  OXACILLIN: SIGNIFICANT CHANGE UP
-  PENICILLIN: SIGNIFICANT CHANGE UP
-  RIFAMPIN: SIGNIFICANT CHANGE UP
-  TETRACYCLINE: SIGNIFICANT CHANGE UP
-  TRIMETHOPRIM/SULFAMETHOXAZOLE: SIGNIFICANT CHANGE UP
-  VANCOMYCIN: SIGNIFICANT CHANGE UP
ALBUMIN SERPL ELPH-MCNC: 3 G/DL — LOW (ref 3.3–5)
ALBUMIN SERPL ELPH-MCNC: 3.5 G/DL — SIGNIFICANT CHANGE UP (ref 3.3–5)
ALP SERPL-CCNC: 49 U/L — SIGNIFICANT CHANGE UP (ref 40–120)
ALP SERPL-CCNC: 57 U/L — SIGNIFICANT CHANGE UP (ref 40–120)
ALT FLD-CCNC: 73 U/L — HIGH (ref 4–33)
ALT FLD-CCNC: 91 U/L — HIGH (ref 4–33)
ANION GAP SERPL CALC-SCNC: 11 MMOL/L — SIGNIFICANT CHANGE UP (ref 7–14)
ANION GAP SERPL CALC-SCNC: 9 MMOL/L — SIGNIFICANT CHANGE UP (ref 7–14)
APPEARANCE UR: CLEAR — SIGNIFICANT CHANGE UP
AST SERPL-CCNC: 107 U/L — HIGH (ref 4–32)
AST SERPL-CCNC: 96 U/L — HIGH (ref 4–32)
BACTERIA # UR AUTO: ABNORMAL
BILIRUB SERPL-MCNC: 0.4 MG/DL — SIGNIFICANT CHANGE UP (ref 0.2–1.2)
BILIRUB SERPL-MCNC: 0.4 MG/DL — SIGNIFICANT CHANGE UP (ref 0.2–1.2)
BILIRUB UR-MCNC: NEGATIVE — SIGNIFICANT CHANGE UP
BLOOD GAS ARTERIAL - LYTES,HGB,ICA,LACT RESULT: SIGNIFICANT CHANGE UP
BUN SERPL-MCNC: 28 MG/DL — HIGH (ref 7–23)
BUN SERPL-MCNC: 29 MG/DL — HIGH (ref 7–23)
CALCIUM SERPL-MCNC: 8 MG/DL — LOW (ref 8.4–10.5)
CALCIUM SERPL-MCNC: 8.2 MG/DL — LOW (ref 8.4–10.5)
CHLORIDE SERPL-SCNC: 109 MMOL/L — HIGH (ref 98–107)
CHLORIDE SERPL-SCNC: 109 MMOL/L — HIGH (ref 98–107)
CK SERPL-CCNC: 1461 U/L — HIGH (ref 25–170)
CO2 SERPL-SCNC: 25 MMOL/L — SIGNIFICANT CHANGE UP (ref 22–31)
CO2 SERPL-SCNC: 25 MMOL/L — SIGNIFICANT CHANGE UP (ref 22–31)
COLOR SPEC: YELLOW — SIGNIFICANT CHANGE UP
CREAT SERPL-MCNC: 0.75 MG/DL — SIGNIFICANT CHANGE UP (ref 0.5–1.3)
CREAT SERPL-MCNC: 0.81 MG/DL — SIGNIFICANT CHANGE UP (ref 0.5–1.3)
CULTURE RESULTS: SIGNIFICANT CHANGE UP
D DIMER BLD IA.RAPID-MCNC: 182 NG/ML DDU — SIGNIFICANT CHANGE UP
DIFF PNL FLD: NEGATIVE — SIGNIFICANT CHANGE UP
EGFR: 81 ML/MIN/1.73M2 — SIGNIFICANT CHANGE UP
EGFR: 88 ML/MIN/1.73M2 — SIGNIFICANT CHANGE UP
EPI CELLS # UR: 4 /HPF — SIGNIFICANT CHANGE UP (ref 0–5)
GLUCOSE BLDC GLUCOMTR-MCNC: 136 MG/DL — HIGH (ref 70–99)
GLUCOSE BLDC GLUCOMTR-MCNC: 137 MG/DL — HIGH (ref 70–99)
GLUCOSE BLDC GLUCOMTR-MCNC: 193 MG/DL — HIGH (ref 70–99)
GLUCOSE SERPL-MCNC: 152 MG/DL — HIGH (ref 70–99)
GLUCOSE SERPL-MCNC: 200 MG/DL — HIGH (ref 70–99)
GLUCOSE UR QL: NEGATIVE — SIGNIFICANT CHANGE UP
HCT VFR BLD CALC: 43.2 % — SIGNIFICANT CHANGE UP (ref 34.5–45)
HCT VFR BLD CALC: 50 % — HIGH (ref 34.5–45)
HGB BLD-MCNC: 13.1 G/DL — SIGNIFICANT CHANGE UP (ref 11.5–15.5)
HGB BLD-MCNC: 14.3 G/DL — SIGNIFICANT CHANGE UP (ref 11.5–15.5)
HYALINE CASTS # UR AUTO: 0 /LPF — SIGNIFICANT CHANGE UP (ref 0–7)
INR BLD: 1.26 RATIO — HIGH (ref 0.88–1.16)
KETONES UR-MCNC: ABNORMAL
LACTATE SERPL-SCNC: 1.1 MMOL/L — SIGNIFICANT CHANGE UP (ref 0.5–2)
LEUKOCYTE ESTERASE UR-ACNC: NEGATIVE — SIGNIFICANT CHANGE UP
MCHC RBC-ENTMCNC: 25.4 PG — LOW (ref 27–34)
MCHC RBC-ENTMCNC: 25.9 PG — LOW (ref 27–34)
MCHC RBC-ENTMCNC: 28.6 GM/DL — LOW (ref 32–36)
MCHC RBC-ENTMCNC: 30.3 GM/DL — LOW (ref 32–36)
MCV RBC AUTO: 85.4 FL — SIGNIFICANT CHANGE UP (ref 80–100)
MCV RBC AUTO: 89 FL — SIGNIFICANT CHANGE UP (ref 80–100)
METHOD TYPE: SIGNIFICANT CHANGE UP
NITRITE UR-MCNC: NEGATIVE — SIGNIFICANT CHANGE UP
NRBC # BLD: 0 /100 WBCS — SIGNIFICANT CHANGE UP
NRBC # FLD: 0 K/UL — SIGNIFICANT CHANGE UP
ORGANISM # SPEC MICROSCOPIC CNT: SIGNIFICANT CHANGE UP
ORGANISM # SPEC MICROSCOPIC CNT: SIGNIFICANT CHANGE UP
PH UR: 6 — SIGNIFICANT CHANGE UP (ref 5–8)
PLATELET # BLD AUTO: 215 K/UL — SIGNIFICANT CHANGE UP (ref 150–400)
PLATELET # BLD AUTO: 307 K/UL — SIGNIFICANT CHANGE UP (ref 150–400)
POTASSIUM SERPL-MCNC: 3.6 MMOL/L — SIGNIFICANT CHANGE UP (ref 3.5–5.3)
POTASSIUM SERPL-MCNC: 4.4 MMOL/L — SIGNIFICANT CHANGE UP (ref 3.5–5.3)
POTASSIUM SERPL-SCNC: 3.6 MMOL/L — SIGNIFICANT CHANGE UP (ref 3.5–5.3)
POTASSIUM SERPL-SCNC: 4.4 MMOL/L — SIGNIFICANT CHANGE UP (ref 3.5–5.3)
PROT SERPL-MCNC: 5.6 G/DL — LOW (ref 6–8.3)
PROT SERPL-MCNC: 6.3 G/DL — SIGNIFICANT CHANGE UP (ref 6–8.3)
PROT UR-MCNC: ABNORMAL
PROTHROM AB SERPL-ACNC: 14.6 SEC — HIGH (ref 10.5–13.4)
RBC # BLD: 5.06 M/UL — SIGNIFICANT CHANGE UP (ref 3.8–5.2)
RBC # BLD: 5.62 M/UL — HIGH (ref 3.8–5.2)
RBC # FLD: 14.2 % — SIGNIFICANT CHANGE UP (ref 10.3–14.5)
RBC # FLD: 14.4 % — SIGNIFICANT CHANGE UP (ref 10.3–14.5)
RBC CASTS # UR COMP ASSIST: 1 /HPF — SIGNIFICANT CHANGE UP (ref 0–4)
SODIUM SERPL-SCNC: 143 MMOL/L — SIGNIFICANT CHANGE UP (ref 135–145)
SODIUM SERPL-SCNC: 145 MMOL/L — SIGNIFICANT CHANGE UP (ref 135–145)
SP GR SPEC: 1.04 — SIGNIFICANT CHANGE UP (ref 1–1.05)
SPECIMEN SOURCE: SIGNIFICANT CHANGE UP
UROBILINOGEN FLD QL: SIGNIFICANT CHANGE UP
WBC # BLD: 10.79 K/UL — HIGH (ref 3.8–10.5)
WBC # BLD: 20.29 K/UL — HIGH (ref 3.8–10.5)
WBC # FLD AUTO: 10.79 K/UL — HIGH (ref 3.8–10.5)
WBC # FLD AUTO: 20.29 K/UL — HIGH (ref 3.8–10.5)
WBC UR QL: 3 /HPF — SIGNIFICANT CHANGE UP (ref 0–5)

## 2022-07-26 PROCEDURE — 99232 SBSQ HOSP IP/OBS MODERATE 35: CPT

## 2022-07-26 PROCEDURE — 99291 CRITICAL CARE FIRST HOUR: CPT

## 2022-07-26 PROCEDURE — 99233 SBSQ HOSP IP/OBS HIGH 50: CPT | Mod: GC

## 2022-07-26 PROCEDURE — 71045 X-RAY EXAM CHEST 1 VIEW: CPT | Mod: 26

## 2022-07-26 RX ORDER — CHLORHEXIDINE GLUCONATE 213 G/1000ML
15 SOLUTION TOPICAL EVERY 12 HOURS
Refills: 0 | Status: DISCONTINUED | OUTPATIENT
Start: 2022-07-26 | End: 2022-07-26

## 2022-07-26 RX ORDER — SODIUM CHLORIDE 9 MG/ML
1000 INJECTION, SOLUTION INTRAVENOUS
Refills: 0 | Status: DISCONTINUED | OUTPATIENT
Start: 2022-07-26 | End: 2022-08-01

## 2022-07-26 RX ORDER — CHLORHEXIDINE GLUCONATE 213 G/1000ML
15 SOLUTION TOPICAL EVERY 12 HOURS
Refills: 0 | Status: DISCONTINUED | OUTPATIENT
Start: 2022-07-26 | End: 2022-07-29

## 2022-07-26 RX ORDER — ENOXAPARIN SODIUM 100 MG/ML
40 INJECTION SUBCUTANEOUS EVERY 24 HOURS
Refills: 0 | Status: DISCONTINUED | OUTPATIENT
Start: 2022-07-27 | End: 2022-07-27

## 2022-07-26 RX ORDER — CHLORHEXIDINE GLUCONATE 213 G/1000ML
1 SOLUTION TOPICAL
Refills: 0 | Status: DISCONTINUED | OUTPATIENT
Start: 2022-07-26 | End: 2022-07-28

## 2022-07-26 RX ORDER — GLUCAGON INJECTION, SOLUTION 0.5 MG/.1ML
1 INJECTION, SOLUTION SUBCUTANEOUS ONCE
Refills: 0 | Status: DISCONTINUED | OUTPATIENT
Start: 2022-07-26 | End: 2022-08-01

## 2022-07-26 RX ORDER — INSULIN LISPRO 100/ML
VIAL (ML) SUBCUTANEOUS EVERY 6 HOURS
Refills: 0 | Status: DISCONTINUED | OUTPATIENT
Start: 2022-07-26 | End: 2022-08-04

## 2022-07-26 RX ORDER — PROPOFOL 10 MG/ML
67.69 INJECTION, EMULSION INTRAVENOUS
Qty: 1000 | Refills: 0 | Status: DISCONTINUED | OUTPATIENT
Start: 2022-07-26 | End: 2022-07-29

## 2022-07-26 RX ORDER — IPRATROPIUM/ALBUTEROL SULFATE 18-103MCG
3 AEROSOL WITH ADAPTER (GRAM) INHALATION EVERY 6 HOURS
Refills: 0 | Status: DISCONTINUED | OUTPATIENT
Start: 2022-07-26 | End: 2022-07-26

## 2022-07-26 RX ORDER — DEXTROSE 50 % IN WATER 50 %
15 SYRINGE (ML) INTRAVENOUS ONCE
Refills: 0 | Status: DISCONTINUED | OUTPATIENT
Start: 2022-07-26 | End: 2022-08-04

## 2022-07-26 RX ORDER — SODIUM CHLORIDE 9 MG/ML
1000 INJECTION, SOLUTION INTRAVENOUS
Refills: 0 | Status: DISCONTINUED | OUTPATIENT
Start: 2022-07-26 | End: 2022-07-26

## 2022-07-26 RX ORDER — SODIUM CHLORIDE 9 MG/ML
1000 INJECTION, SOLUTION INTRAVENOUS
Refills: 0 | Status: COMPLETED | OUTPATIENT
Start: 2022-07-26 | End: 2022-07-27

## 2022-07-26 RX ADMIN — PIPERACILLIN AND TAZOBACTAM 25 GRAM(S): 4; .5 INJECTION, POWDER, LYOPHILIZED, FOR SOLUTION INTRAVENOUS at 18:10

## 2022-07-26 RX ADMIN — PIPERACILLIN AND TAZOBACTAM 25 GRAM(S): 4; .5 INJECTION, POWDER, LYOPHILIZED, FOR SOLUTION INTRAVENOUS at 09:24

## 2022-07-26 RX ADMIN — SODIUM CHLORIDE 75 MILLILITER(S): 9 INJECTION, SOLUTION INTRAVENOUS at 18:12

## 2022-07-26 RX ADMIN — ENOXAPARIN SODIUM 40 MILLIGRAM(S): 100 INJECTION SUBCUTANEOUS at 05:08

## 2022-07-26 RX ADMIN — CHLORHEXIDINE GLUCONATE 1 APPLICATION(S): 213 SOLUTION TOPICAL at 19:05

## 2022-07-26 RX ADMIN — Medication 6 MILLIGRAM(S): at 05:08

## 2022-07-26 RX ADMIN — CHLORHEXIDINE GLUCONATE 15 MILLILITER(S): 213 SOLUTION TOPICAL at 18:09

## 2022-07-26 RX ADMIN — REMDESIVIR 500 MILLIGRAM(S): 5 INJECTION INTRAVENOUS at 19:59

## 2022-07-26 RX ADMIN — BUDESONIDE AND FORMOTEROL FUMARATE DIHYDRATE 2 PUFF(S): 160; 4.5 AEROSOL RESPIRATORY (INHALATION) at 22:39

## 2022-07-26 RX ADMIN — PIPERACILLIN AND TAZOBACTAM 25 GRAM(S): 4; .5 INJECTION, POWDER, LYOPHILIZED, FOR SOLUTION INTRAVENOUS at 00:22

## 2022-07-26 RX ADMIN — PROPOFOL 35 MICROGRAM(S)/KG/MIN: 10 INJECTION, EMULSION INTRAVENOUS at 18:39

## 2022-07-26 RX ADMIN — Medication 100 MILLIGRAM(S): at 05:07

## 2022-07-26 NOTE — PROVIDER CONTACT NOTE (OTHER) - RECOMMENDATIONS
HS1 made aware of attempt to wean. Pt placed back on 3L NC, pt now sat 88% which is goal for pt. HS1 made aware of attempt to wean. Pt placed back on 4L NC, pt now sat 88% which is goal for pt.

## 2022-07-26 NOTE — PROGRESS NOTE ADULT - ATTENDING COMMENTS
64F with hx of drug induced Parkinsons, COPD (not on home oxygen), T2DM, COPD, Schizoaffective disorder BIBEMS after unwitnessed fall, found to be hypoxic to 70s with EMS, a/w sepsis and acute hypoxic respiratory failure likely multifactorial from COVID-19, superimposed RLL PNA likely aspiration, and COPD exacerbation. Also with rhabdomyolysis after fall. CTX change to Zosyn, completed course of azithromycin, coag neg in blood likely contaminant, repeat cultures pending. RRT 7/26 for AMS/hypoxia - patient intubated for airway protection, transferred to MICU. Patient does not have any family, updated JOSHUA Witt who knows patient for many years. 64F with hx of drug induced Parkinsons, COPD (not on home oxygen), T2DM, COPD, Schizoaffective disorder BIBEMS after unwitnessed fall, found to be hypoxic to 70s with EMS, a/w sepsis and acute hypoxic respiratory failure likely multifactorial from COVID-19, superimposed RLL PNA likely aspiration, and COPD exacerbation. Also with rhabdomyolysis after fall. CTX change to Zosyn, completed course of azithromycin, coag neg in blood likely contaminant, repeat cultures pending. RRT 7/26 for AMS/hypoxia - patient intubated for airway protection, transferred to MICU. Patient does not have any family, updated HHA Miryam [phone] who knows patient for many years.

## 2022-07-26 NOTE — PROGRESS NOTE ADULT - SUBJECTIVE AND OBJECTIVE BOX
Follow Up:      Inverval History/ROS:Patient is a 67y old  Female who presents with a chief complaint of severe hypoxia (26 Jul 2022 06:33)    Respiratory distress- intubated  On pressors  + fever    Allergies    No Known Allergies    Intolerances        ANTIMICROBIALS:  piperacillin/tazobactam IVPB.. 3.375 every 8 hours  remdesivir  IVPB 100 every 24 hours  remdesivir  IVPB        OTHER MEDS:  acetaminophen     Tablet .. 650 milliGRAM(s) Oral every 6 hours PRN  ALBUTerol    90 MICROgram(s) HFA Inhaler 2 Puff(s) Inhalation every 6 hours PRN  albuterol/ipratropium for Nebulization 3 milliLiter(s) Nebulizer every 6 hours  amantadine 100 milliGRAM(s) Oral two times a day  budesonide 160 MICROgram(s)/formoterol 4.5 MICROgram(s) Inhaler 2 Puff(s) Inhalation two times a day  chlorhexidine 0.12% Liquid 15 milliLiter(s) Oral Mucosa every 12 hours  chlorhexidine 4% Liquid 1 Application(s) Topical <User Schedule>  dexAMETHasone  Injectable 6 milliGRAM(s) IV Push daily  dextrose 5%. 1000 milliLiter(s) IV Continuous <Continuous>  dextrose 5%. 1000 milliLiter(s) IV Continuous <Continuous>  dextrose 50% Injectable 25 Gram(s) IV Push once  dextrose 50% Injectable 12.5 Gram(s) IV Push once  dextrose 50% Injectable 25 Gram(s) IV Push once  dextrose Oral Gel 15 Gram(s) Oral once PRN  dextrose Oral Gel 15 Gram(s) Oral once PRN  enoxaparin Injectable 40 milliGRAM(s) SubCutaneous every 12 hours  glucagon  Injectable 1 milliGRAM(s) IntraMuscular once  insulin lispro (ADMELOG) corrective regimen sliding scale   SubCutaneous every 6 hours  lactated ringers. 1000 milliLiter(s) IV Continuous <Continuous>      Vital Signs Last 24 Hrs  T(C): 37.7 (26 Jul 2022 13:00), Max: 38.8 (25 Jul 2022 15:34)  T(F): 99.9 (26 Jul 2022 13:00), Max: 101.8 (25 Jul 2022 15:34)  HR: 72 (26 Jul 2022 13:00) (72 - 93)  BP: 136/68 (26 Jul 2022 13:00) (125/99 - 157/88)  BP(mean): --  RR: 19 (26 Jul 2022 13:00) (19 - 20)  SpO2: 98% (26 Jul 2022 13:00) (76% - 98%)    Parameters below as of 26 Jul 2022 13:00  Patient On (Oxygen Delivery Method): room air        PHYSICAL EXAM:  General: [x ] Intubated  HEAD/EYES: [ ] PERRL [x] white sclera [ ] icterus  ENT:  [ ] normal [ x] supple [ ] thrush [ ] pharyngeal exudate  Cardiovascular:   [ ] murmur [x ] normal [ ] PPM/AICD  Respiratory:  [x ] course BS  GI:  [ x] soft, non-tender, normal bowel sounds  :  x[x ] arreguin [ ] no CVA tenderness   Musculoskeletal:  [ ] no synovitis  Neurologic:  [ ] non-focal exam   Skin:  [ x] no rash  Lymph: [ x] no lymphadenopathy  Psychiatric:  [ ] appropriate affect [ ] alert & oriented  Lines:  [x ] no phlebitis [ ] central line                                14.3   20.29 )-----------( 307      ( 26 Jul 2022 11:20 )             50.0       07-26    143  |  109<H>  |  29<H>  ----------------------------<  200<H>  4.4   |  25  |  0.75    Ca    8.0<L>      26 Jul 2022 11:20    TPro  6.3  /  Alb  3.5  /  TBili  0.4  /  DBili  x   /  AST  107<H>  /  ALT  91<H>  /  AlkPhos  57  07-26          MICROBIOLOGY:Culture Results:   No growth to date. (07-25-22 @ 09:30)  Culture Results:   No growth to date. (07-24-22 @ 16:00)  Culture Results:   No growth to date. (07-24-22 @ 13:45)  Culture Results:   <10,000 CFU/mL Normal Urogenital Sruthi (07-23-22 @ 10:59)  Culture Results:   Growth in anaerobic bottle: Staphylococcus hominis  Growth in aerobic bottle: Staphylococcus epidermidis  Coag Negative Staphylococcus  Single set isolate, possible contaminant. Contact  Microbiology if susceptibility testing clinically  indicated.  ***Blood Panel PCR results on this specimen are available  approximately 3 hours after the Gram stain result.***  Gram stain, PCR, and/or culture results may not always  correspond due to difference in methodologies.  ************************************************************  This PCR assay was performed by multiplex PCR. This  Assay tests for 66 bacterial and resistance gene targets.  Please refer to the [facility] test directory  at [email or web address] for details. (07-23-22 @ 10:55)  Culture Results:   Growth in anaerobic bottle: Staphylococcus hominis  Susceptibility to follow. (07-23-22 @ 10:50)      RADIOLOGY:

## 2022-07-26 NOTE — PROGRESS NOTE ADULT - SUBJECTIVE AND OBJECTIVE BOX
PROGRESS NOTE:       Patient is a 67y old  Female who presents with a chief complaint of severe hypoxia (25 Jul 2022 15:35)      SUBJECTIVE / OVERNIGHT EVENTS:  NAEON    ADDITIONAL REVIEW OF SYSTEMS:    MEDICATIONS  (STANDING):  amantadine 100 milliGRAM(s) Oral two times a day  budesonide 160 MICROgram(s)/formoterol 4.5 MICROgram(s) Inhaler 2 Puff(s) Inhalation two times a day  dexAMETHasone  Injectable 6 milliGRAM(s) IV Push daily  dextrose 50% Injectable 25 Gram(s) IV Push once  dextrose 50% Injectable 12.5 Gram(s) IV Push once  dextrose 50% Injectable 25 Gram(s) IV Push once  enoxaparin Injectable 40 milliGRAM(s) SubCutaneous every 12 hours  insulin lispro (ADMELOG) corrective regimen sliding scale   SubCutaneous three times a day before meals  insulin lispro (ADMELOG) corrective regimen sliding scale   SubCutaneous at bedtime  lactated ringers. 2400 milliLiter(s) (100 mL/Hr) IV Continuous <Continuous>  piperacillin/tazobactam IVPB.. 3.375 Gram(s) IV Intermittent every 8 hours  remdesivir  IVPB 100 milliGRAM(s) IV Intermittent every 24 hours  remdesivir  IVPB   IV Intermittent     MEDICATIONS  (PRN):  acetaminophen     Tablet .. 650 milliGRAM(s) Oral every 6 hours PRN Temp greater or equal to 38C (100.4F), Mild Pain (1 - 3), Moderate Pain (4 - 6)  ALBUTerol    90 MICROgram(s) HFA Inhaler 2 Puff(s) Inhalation every 6 hours PRN Shortness of Breath and/or Wheezing  dextrose Oral Gel 15 Gram(s) Oral once PRN Blood Glucose LESS THAN 70 milliGRAM(s)/deciliter      CAPILLARY BLOOD GLUCOSE      POCT Blood Glucose.: 160 mg/dL (25 Jul 2022 23:31)  POCT Blood Glucose.: 151 mg/dL (25 Jul 2022 17:58)  POCT Blood Glucose.: 249 mg/dL (25 Jul 2022 12:39)  POCT Blood Glucose.: 119 mg/dL (25 Jul 2022 09:23)    I&O's Summary      PHYSICAL EXAM:  Vital Signs Last 24 Hrs  T(C): 37.9 (26 Jul 2022 05:05), Max: 38.8 (25 Jul 2022 15:34)  T(F): 100.3 (26 Jul 2022 05:05), Max: 101.8 (25 Jul 2022 15:34)  HR: 83 (26 Jul 2022 05:05) (81 - 107)  BP: 125/99 (26 Jul 2022 05:05) (125/99 - 157/88)  BP(mean): --  RR: 20 (26 Jul 2022 05:05) (20 - 20)  SpO2: 93% (26 Jul 2022 05:05) (91% - 96%)    Parameters below as of 26 Jul 2022 05:05  Patient On (Oxygen Delivery Method): nasal cannula  O2 Flow (L/min): 6      GENERAL: No acute distress, well-developed  HEAD:  Atraumatic, Normocephalic  EYES: EOMI, PERRLA, conjunctiva and sclera clear  NECK: Supple, no lymphadenopathy, no JVD  CHEST/LUNG: CTAB; No wheezes, rales, or rhonchi  HEART: Regular rate and rhythm; No murmurs, rubs, or gallops  ABDOMEN: Soft, non-tender, non-distended; normal bowel sounds, no organomegaly  EXTREMITIES:  2+ peripheral pulses b/l, No clubbing, cyanosis, or edema  NEUROLOGY: A&O x 3, no focal deficits  SKIN: No rashes or lesions    LABS:                        14.2   13.37 )-----------( 232      ( 25 Jul 2022 09:44 )             46.9     07-24    139  |  105  |  30<H>  ----------------------------<  109<H>  5.3   |  19<L>  |  0.71    Ca    8.8      24 Jul 2022 09:18  Phos  2.5     07-24  Mg     2.00     07-24    TPro  6.8  /  Alb  3.8  /  TBili  0.3  /  DBili  x   /  AST  169<H>  /  ALT  96<H>  /  AlkPhos  67  07-24      CARDIAC MARKERS ( 24 Jul 2022 22:49 )  x     / x     / 3508 U/L / x     / 11.9 ng/mL  CARDIAC MARKERS ( 24 Jul 2022 09:18 )  x     / x     / 4386 U/L / x     / x              Culture - Blood (collected 24 Jul 2022 16:00)  Source: .Blood  Preliminary Report (25 Jul 2022 19:02):    No growth to date.    Culture - Blood (collected 24 Jul 2022 13:45)  Source: .Blood Blood-Peripheral  Preliminary Report (25 Jul 2022 19:02):    No growth to date.    Culture - Urine (collected 23 Jul 2022 10:59)  Source: Clean Catch Clean Catch (Midstream)  Final Report (24 Jul 2022 18:29):    <10,000 CFU/mL Normal Urogenital Sruthi    Culture - Blood (collected 23 Jul 2022 10:55)  Source: .Blood Blood-Peripheral  Gram Stain (24 Jul 2022 12:29):    Growth in anaerobic bottle: Gram Positive Cocci in Clusters    Growth in aerobic bottle: Gram Positive Cocci in Clusters  Final Report (25 Jul 2022 19:13):    Growth in anaerobic bottle: Staphylococcus hominis    Growth in aerobic bottle: Staphylococcus epidermidis    Coag Negative Staphylococcus    Single set isolate, possible contaminant. Contact    Microbiology if susceptibility testing clinically    indicated.    ***Blood Panel PCR results on this specimen are available    approximately 3 hours after the Gram stain result.***    Gram stain, PCR, and/or culture results may not always    correspond due to difference in methodologies.    ************************************************************    This PCR assay was performed by multiplex PCR. This    Assay tests for 66 bacterial and resistance gene targets.    Please refer to the [facility] Labs test directory    at [email or web address] for details.  Organism: Blood Culture PCR (25 Jul 2022 19:13)  Organism: Blood Culture PCR (25 Jul 2022 19:13)    Culture - Blood (collected 23 Jul 2022 10:50)  Source: .Blood Blood-Peripheral  Gram Stain (24 Jul 2022 08:37):    Growth in anaerobic bottle: Gram Positive Cocci in Clusters  Preliminary Report (25 Jul 2022 12:15):    Growth in anaerobic bottle: Staphylococcus hominis    Susceptibility to follow.        RADIOLOGY & ADDITIONAL TESTS:  Results Reviewed:   Imaging Personally Reviewed:  Electrocardiogram Personally Reviewed:    COORDINATION OF CARE:  Care Discussed with Consultants/Other Providers [Y/N]:  Prior or Outpatient Records Reviewed [Y/N]:

## 2022-07-26 NOTE — PROVIDER CONTACT NOTE (CHANGE IN STATUS NOTIFICATION) - BACKGROUND
Pt wih hx of parkinsons and admitted for recent fall. Pt has pneumonia and covid. Baseline pt is A&Ox1-2 and hypophonic.

## 2022-07-26 NOTE — RAPID RESPONSE TEAM SUMMARY - NSSITUATIONBACKGROUNDRRT_GEN_ALL_CORE
Patient is a 65 yo F PMHx of schizoaffective disorder on Geodon, Parkinson's Disease DMII, COPD, presenting to ED after unwitnessed fall last night at home. Brought in by EMS w/ hypoxia to 70s. Found to be febrile and COVID positive. Placed on NRB 15L, satting 99%. Received IV zosyn, decadron and tylenol in ED prior to RRT. RRT called for HR 200s. Upon arrival, patient has visible tremors, likely drug-induced parkinson's (seen previously by Neurology). Due to shaking, unable to get reliable HRs, ranging from 100-300s BPM). Once patient's tremors stopped, able to get vitals of , /116, SpO2 99% on NRB, 99.4 Temp rectally. . ECG shows , sinus tachycardia, likely mediated by ongoing sepsis. Patient ill-appearing but alert and oriented to self and place, able to follow commands. Lung exam coarse breath sounds b/l, concern for aspiration.
67F PMH including COPD, admitted to hospital for COVID-19 PNA, possible superimposed bacterial PNA. RRT called for AMS and hypoxia. Baseline AOx2-3 as of this morning, was being weaned off NC 6LPM, trialing RA. On arrival, patient AOx0, obtunded, not protecting airway. Vital signs notable for O2 sat 82% on 15LPM NRB. Afebrile. Decision made to intubate for airway protection. MICU notified.  Patient was pending CT-A Chest, however was receiving DVT ppx per COVID-19 protocol. Of note, was fed some breakfast (puree) prior to event. CXR ordered post-extubation, notable for RLL consolidation.  Differential includes aspiration vs PE. ABG post-intubation showing chronic respiratory acidosis.

## 2022-07-26 NOTE — PROVIDER CONTACT NOTE (OTHER) - SITUATION
Patients HR is 113
Tachycardia, Hyperthermia, chest pain
Temp 102.9F
Patients HR is 108
Attempted to feed pt breakfast. Gave pt 1 teaspoon of pureed food. Pt did not swallow. Suctioned food out of mouth.
Patients HR is 113
Per HS1 req to attempt to wean pt to room air. Pt sat 76% on room air.

## 2022-07-26 NOTE — PROGRESS NOTE ADULT - ASSESSMENT
67 year old with parkinson;s presents after being found on the floor  Presents with fever    Now with opacities on chest imaging  COVID+   Blood culture positive for coagulase negative staph  Hypoxic respiratory failure    1) Concern for pneumonia- ? aspiration today  Can continue zosyn    2) COVID positive  Consider rule out PE given hypoxia  Can continue remdesivir for 5 d  On dexamethasone for 10 d  Anticoagulation per hospital guideline    3) Coagulase negative staph in blood- two different isolates  Usually a contaminant  Stop vancomycin  Repeat blood cultures off antibiotics    4) Fever  ? aspiration vs covid vs occult focus  Follow up repeat cultures

## 2022-07-26 NOTE — PROVIDER CONTACT NOTE (OTHER) - ASSESSMENT
Pt tachypnic. On room air pt sat 76%. Pt placed back on 3L NC. Pt tachypnic. On room air pt sat 76%. Pt placed back on 4L NC.

## 2022-07-26 NOTE — PROVIDER CONTACT NOTE (CHANGE IN STATUS NOTIFICATION) - ASSESSMENT
Pt desat to 75%, upon assessment pt unresponsive to sternal rub. Rapid response called on pt. See rapid response sheet note.

## 2022-07-26 NOTE — PROVIDER CONTACT NOTE (OTHER) - ASSESSMENT
Attempted to feed pt breakfast. Gave pt 1 teaspoon of pureed food. Pt did not swallow. Suctioned food out of mouth.

## 2022-07-26 NOTE — CHART NOTE - NSCHARTNOTEFT_GEN_A_CORE
MICU Accept Note    CHIEF COMPLAINT: AMS    HPI / INTERVAL HISTORY:    Ms. Chaz Taylor is a 67 year old female with a past medical history of Parkinson disease (on amantadine), COPD (not on home oxygen), schizoaffective disorder (currently not on medications), DM2 (on metformin)  presented to the ED for unwitnessed fall. She was found lying on the ground with face downwards at 10am this morning since her home aide left at 7pm yesterday. Per home aide, she presumably fell to the ground for attempting to answer the door for medications. Pt has a baseline of chronic right-side weakness, bilateral hand termor and needed walker to ambulate. Per home aide, pt has been doing well with no physical complaints. She had two prior inconsequential falls due to similar events of attempting to answering door. Pt is limited in terms of history telling but alert and oriented x2, follow command, answering some questions.     At the ED, Pt is found to be severely hypoxic with O2 sat less than 70, tachycardiac, tachypneic, febrile to 100.9. Her lab is positive for leukocytosis, increased AG, significant elevation of CK and BNP, metabolic acidosis from VBG, COVID+.  CT chest is positive for atelectasis and right lung lobular consolidation. She responded to non-rebreather with O2 sat 100% then tolerated well to nasal canula 6 litter with goal of O2 sat > 88%. Her vitals are improved after 1L NS with /64, HR 93, RR 24.  Pt is admitted for acute respiratory failure in the setting of covid vs bacteria pneumonia and sepsis as well as rhabdomyolysis.     Patient treated on the floor for acute hypoxic respiratory failure likely multifactorial from COVID-19, superimposed PNA, and COPD exacerbation. She was also found to develop rhabdomyolysis secondary to long time immobility after fall.     RRT called 7/26 for AMS and hypoxia. Given concern for airway protection given mental status change, decision made to intubate patient. Patient transferred to the MICU for further care.    PAST MEDICAL & SURGICAL HISTORY:  Diabetes      Obesity      Schizoaffective disorder      COPD without exacerbation      Parkinsonian tremor      No significant past surgical history          FAMILY HISTORY:  FH: multiple myeloma  in Father        SOCIAL HISTORY:  Smoking: [  ] Never Smoked  [  ] Former Smoker (# packs x # years)  [  ] Current Smoker (# packs x # years)  Substance Use:   EtOH Use:   Marital Status: [  ] Single  [  ]   [  ]   [  ]   Sexual History:   Occupation:  Recent Travel:  Country of Birth:   Advance Directives:     HOME MEDICATIONS:      Allergies    No Known Allergies    Intolerances          REVIEW OF SYSTEMS:  Constitutional: No fevers, chills, weight loss, weight gain  HEENT: No vision problems, eye pain, nasal congestion, rhinorrhea, sore throat, dysphagia  CV: No chest pain, orthopnea, palpitations  Resp: No cough, dyspnea, wheezing, hemoptysis  GI: No nausea, vomiting, diarrhea, constipation, abdominal pain  : [ ] dysuria [ ] nocturia [ ] hematuria [ ] increased urinary frequency  Musculoskeletal: [ ] back pain [ ] myalgias [ ] arthralgias [ ] fracture  Skin: [ ] rash [ ] itch  Neurological: [ ] headache [ ] dizziness [ ] syncope [ ] weakness [ ] numbness  Psychiatric: [ ] anxiety [ ] depression  Endocrine: [ ] diabetes [ ] thyroid problem  Hematologic/Lymphatic: [ ] anemia [ ] bleeding problem  Allergic/Immunologic: [ ] itchy eyes [ ] nasal discharge [ ] hives [ ] angioedema  [ ] All other systems negative  [x ] Unable to assess ROS because patient intubated and sedated    OBJECTIVE:  ICU Vital Signs Last 24 Hrs  T(C): 36.8 (26 Jul 2022 09:00), Max: 38.8 (25 Jul 2022 15:34)  T(F): 98.2 (26 Jul 2022 09:00), Max: 101.8 (25 Jul 2022 15:34)  HR: 88 (26 Jul 2022 11:59) (81 - 93)  BP: 146/85 (26 Jul 2022 09:00) (125/99 - 157/88)  BP(mean): --  ABP: --  ABP(mean): --  RR: 20 (26 Jul 2022 10:47) (20 - 20)  SpO2: 95% (26 Jul 2022 11:59) (76% - 96%)    O2 Parameters below as of 26 Jul 2022 10:47  Patient On (Oxygen Delivery Method): room air          Mode: AC/ CMV (Assist Control/ Continuous Mandatory Ventilation), RR (machine): 16, TV (machine): 400, FiO2: 100, PEEP: 5, ITime: 0.6, MAP: 11, PIP: 39    CAPILLARY BLOOD GLUCOSE      POCT Blood Glucose.: 193 mg/dL (26 Jul 2022 11:11)      PHYSICAL EXAM:    Constitutional: intubated + sedated  Eyes: PERRL, sclera clear  ENMT: MMM, clear oropharynx  Neck: Supple, no cervical LAD  Respiratory: CTAB, no rales, rhonchi or wheezes  Cardiovascular: RRR, no m/g/r  Gastrointestinal: +BS, soft, NT/ND  Neurological: intubated + sedated  Psychiatric: unable to assess  Extremities: No LE edema      [facility] MEDICATIONS:  MEDICATIONS  (STANDING):  albuterol/ipratropium for Nebulization 3 milliLiter(s) Nebulizer every 6 hours  amantadine 100 milliGRAM(s) Oral two times a day  budesonide 160 MICROgram(s)/formoterol 4.5 MICROgram(s) Inhaler 2 Puff(s) Inhalation two times a day  chlorhexidine 0.12% Liquid 15 milliLiter(s) Oral Mucosa every 12 hours  chlorhexidine 4% Liquid 1 Application(s) Topical <User Schedule>  dexAMETHasone  Injectable 6 milliGRAM(s) IV Push daily  dextrose 50% Injectable 25 Gram(s) IV Push once  dextrose 50% Injectable 12.5 Gram(s) IV Push once  dextrose 50% Injectable 25 Gram(s) IV Push once  enoxaparin Injectable 40 milliGRAM(s) SubCutaneous every 12 hours  insulin lispro (ADMELOG) corrective regimen sliding scale   SubCutaneous three times a day before meals  insulin lispro (ADMELOG) corrective regimen sliding scale   SubCutaneous at bedtime  piperacillin/tazobactam IVPB.. 3.375 Gram(s) IV Intermittent every 8 hours  remdesivir  IVPB 100 milliGRAM(s) IV Intermittent every 24 hours  remdesivir  IVPB   IV Intermittent     MEDICATIONS  (PRN):  acetaminophen     Tablet .. 650 milliGRAM(s) Oral every 6 hours PRN Temp greater or equal to 38C (100.4F), Mild Pain (1 - 3), Moderate Pain (4 - 6)  ALBUTerol    90 MICROgram(s) HFA Inhaler 2 Puff(s) Inhalation every 6 hours PRN Shortness of Breath and/or Wheezing  dextrose Oral Gel 15 Gram(s) Oral once PRN Blood Glucose LESS THAN 70 milliGRAM(s)/deciliter      LABS:                        14.3   20.29 )-----------( 307      ( 26 Jul 2022 11:20 )             50.0     Hgb Trend: 14.3<--, 13.1<--, 14.2<--, 15.2<--, 16.1<--  07-26    143  |  109<H>  |  29<H>  ----------------------------<  200<H>  4.4   |  25  |  0.75    Ca    8.0<L>      26 Jul 2022 11:20    TPro  6.3  /  Alb  3.5  /  TBili  0.4  /  DBili  x   /  AST  107<H>  /  ALT  91<H>  /  AlkPhos  57  07-26    Creatinine Trend: 0.75<--, 0.81<--, 0.71<--, 0.99<--  PT/INR - ( 26 Jul 2022 11:20 )   PT: 14.6 sec;   INR: 1.26 ratio               Venous Blood Gas:  07-24 @ 22:49  7.45/33/68/23/94.4  VBG Lactate: 1.6      MICROBIOLOGY:     RADIOLOGY & ADDITIONAL TESTS:      Assessment/Plan:     64F with hx of Parkinsons, COPD, T2DM, COPD, Schizoaffective disorder BIBEMS after unwitnessed fall, found to be hypoxic to 70s with EMS, a/w sepsis and acute hypoxic respiratory failure likely multifactorial from COVID-19, superimposed PNA, and COPD exacerbation. She was also found to develop rhabdomyolysis secondary to long time immobility after fall. RRT called 7/26 for AMS, s/p intubation and transfer to MICU for further care.     #Neuro-  ##acute metabolic encephalopathy in setting of COVID induced encephalopathy vs sepsis   COVID+, continue with treatment of COVID as below  CTH pending    ##parkinson's disease   home regimen: amantadine  baseline of bilateral hand tremor and plantar flexion, A+O x2-3  - c/w amantadine     #Pulm-  hx of COPD not on home oxygen  acute hypoxic respiratory failure 2/2 COVID-19, superimposed PNA, and COPD exacerbation  s/p intubation 7/26  -c/w COVID treatment (decadron 7/24-8/1), (remdesevir 7/23-7/27)  -c/w empiric abx (zosyn 7/24 --> )  -c/w steroids for potential COPD exacerbation   -f/u post intubation abg   -wean as tolerated    #CV-  -stable; no active issues   -monitor BP     #GI-  per S+S evaluation - recommended for pureed with moderately thick liquids  NPO   stable; no active issues      #Renal-  rhabdomyolysis 2/2 fall and prolonged immobility   -elevation of CK > 4000, Cr wnl, elevated BUN, elevated urobilinogen  -c/w IVF   -trend CK, CR  -Monitor urine output, electrolytes, and kidney function.    #ID-  COVID-19 and superimposed PNA   ID following  blood cx growing coag negative staph - likely contaminant   c/w zosyn   blood cx pending     #Heme-  trend d-dimer given COVID+  c/w therapeutic lovenox per hospital guidelines     #Endocrine-  A1C pending  SSI   will monitor POCT and adjust insulin regimen accordingly     #Skin   open facial wound 2/2 ?fall  wound care c/s     #PPx- lovenox     #Dispo- MICU Accept Note    CHIEF COMPLAINT: AMS    HPI / INTERVAL HISTORY:    Ms. Chaz Taylor is a 67 year old female with a past medical history of Parkinson disease (on amantadine), COPD (not on home oxygen), schizoaffective disorder (currently not on medications), DM2 (on metformin)  presented to the ED for unwitnessed fall. She was found lying on the ground with face downwards at 10am this morning since her home aide left at 7pm yesterday. Per home aide, she presumably fell to the ground for attempting to answer the door for medications. Pt has a baseline of chronic right-side weakness, bilateral hand termor and needed walker to ambulate. Per home aide, pt has been doing well with no physical complaints. She had two prior inconsequential falls due to similar events of attempting to answering door. Pt is limited in terms of history telling but alert and oriented x2, follow command, answering some questions.     At the ED, Pt is found to be severely hypoxic with O2 sat less than 70, tachycardiac, tachypneic, febrile to 100.9. Her lab is positive for leukocytosis, increased AG, significant elevation of CK and BNP, metabolic acidosis from VBG, COVID+.  CT chest is positive for atelectasis and right lung lobular consolidation. She responded to non-rebreather with O2 sat 100% then tolerated well to nasal canula 6 litter with goal of O2 sat > 88%. Her vitals are improved after 1L NS with /64, HR 93, RR 24.  Pt is admitted for acute respiratory failure in the setting of covid vs bacteria pneumonia and sepsis as well as rhabdomyolysis.     Patient treated on the floor for acute hypoxic respiratory failure likely multifactorial from COVID-19, superimposed PNA, and COPD exacerbation. She was also found to develop rhabdomyolysis secondary to long time immobility after fall.     RRT called 7/26 for AMS and hypoxia. Given concern for airway protection given mental status change, decision made to intubate patient. Patient transferred to the MICU for further care.    PAST MEDICAL & SURGICAL HISTORY:  Diabetes      Obesity      Schizoaffective disorder      COPD without exacerbation      Parkinsonian tremor      No significant past surgical history          FAMILY HISTORY:  FH: multiple myeloma  in Father        SOCIAL HISTORY:  Smoking: [  ] Never Smoked  [  ] Former Smoker (# packs x # years)  [  ] Current Smoker (# packs x # years)  Substance Use:   EtOH Use:   Marital Status: [  ] Single  [  ]   [  ]   [  ]   Sexual History:   Occupation:  Recent Travel:  Country of Birth:   Advance Directives:     HOME MEDICATIONS:      Allergies    No Known Allergies    Intolerances          REVIEW OF SYSTEMS:  Constitutional: No fevers, chills, weight loss, weight gain  HEENT: No vision problems, eye pain, nasal congestion, rhinorrhea, sore throat, dysphagia  CV: No chest pain, orthopnea, palpitations  Resp: No cough, dyspnea, wheezing, hemoptysis  GI: No nausea, vomiting, diarrhea, constipation, abdominal pain  : [ ] dysuria [ ] nocturia [ ] hematuria [ ] increased urinary frequency  Musculoskeletal: [ ] back pain [ ] myalgias [ ] arthralgias [ ] fracture  Skin: [ ] rash [ ] itch  Neurological: [ ] headache [ ] dizziness [ ] syncope [ ] weakness [ ] numbness  Psychiatric: [ ] anxiety [ ] depression  Endocrine: [ ] diabetes [ ] thyroid problem  Hematologic/Lymphatic: [ ] anemia [ ] bleeding problem  Allergic/Immunologic: [ ] itchy eyes [ ] nasal discharge [ ] hives [ ] angioedema  [ ] All other systems negative  [x ] Unable to assess ROS because patient intubated and sedated    OBJECTIVE:  ICU Vital Signs Last 24 Hrs  T(C): 36.8 (26 Jul 2022 09:00), Max: 38.8 (25 Jul 2022 15:34)  T(F): 98.2 (26 Jul 2022 09:00), Max: 101.8 (25 Jul 2022 15:34)  HR: 88 (26 Jul 2022 11:59) (81 - 93)  BP: 146/85 (26 Jul 2022 09:00) (125/99 - 157/88)  BP(mean): --  ABP: --  ABP(mean): --  RR: 20 (26 Jul 2022 10:47) (20 - 20)  SpO2: 95% (26 Jul 2022 11:59) (76% - 96%)    O2 Parameters below as of 26 Jul 2022 10:47  Patient On (Oxygen Delivery Method): room air          Mode: AC/ CMV (Assist Control/ Continuous Mandatory Ventilation), RR (machine): 16, TV (machine): 400, FiO2: 100, PEEP: 5, ITime: 0.6, MAP: 11, PIP: 39    CAPILLARY BLOOD GLUCOSE      POCT Blood Glucose.: 193 mg/dL (26 Jul 2022 11:11)      PHYSICAL EXAM:    Constitutional: intubated + sedated  Eyes: PERRL, sclera clear  ENMT: MMM, clear oropharynx  Neck: Supple, no cervical LAD  Respiratory: CTAB, no rales, rhonchi or wheezes  Cardiovascular: RRR, no m/g/r  Gastrointestinal: +BS, soft, NT/ND  Neurological: intubated + sedated  Psychiatric: unable to assess  Extremities: No LE edema      [facility] MEDICATIONS:  MEDICATIONS  (STANDING):  albuterol/ipratropium for Nebulization 3 milliLiter(s) Nebulizer every 6 hours  amantadine 100 milliGRAM(s) Oral two times a day  budesonide 160 MICROgram(s)/formoterol 4.5 MICROgram(s) Inhaler 2 Puff(s) Inhalation two times a day  chlorhexidine 0.12% Liquid 15 milliLiter(s) Oral Mucosa every 12 hours  chlorhexidine 4% Liquid 1 Application(s) Topical <User Schedule>  dexAMETHasone  Injectable 6 milliGRAM(s) IV Push daily  dextrose 50% Injectable 25 Gram(s) IV Push once  dextrose 50% Injectable 12.5 Gram(s) IV Push once  dextrose 50% Injectable 25 Gram(s) IV Push once  enoxaparin Injectable 40 milliGRAM(s) SubCutaneous every 12 hours  insulin lispro (ADMELOG) corrective regimen sliding scale   SubCutaneous three times a day before meals  insulin lispro (ADMELOG) corrective regimen sliding scale   SubCutaneous at bedtime  piperacillin/tazobactam IVPB.. 3.375 Gram(s) IV Intermittent every 8 hours  remdesivir  IVPB 100 milliGRAM(s) IV Intermittent every 24 hours  remdesivir  IVPB   IV Intermittent     MEDICATIONS  (PRN):  acetaminophen     Tablet .. 650 milliGRAM(s) Oral every 6 hours PRN Temp greater or equal to 38C (100.4F), Mild Pain (1 - 3), Moderate Pain (4 - 6)  ALBUTerol    90 MICROgram(s) HFA Inhaler 2 Puff(s) Inhalation every 6 hours PRN Shortness of Breath and/or Wheezing  dextrose Oral Gel 15 Gram(s) Oral once PRN Blood Glucose LESS THAN 70 milliGRAM(s)/deciliter      LABS:                        14.3   20.29 )-----------( 307      ( 26 Jul 2022 11:20 )             50.0     Hgb Trend: 14.3<--, 13.1<--, 14.2<--, 15.2<--, 16.1<--  07-26    143  |  109<H>  |  29<H>  ----------------------------<  200<H>  4.4   |  25  |  0.75    Ca    8.0<L>      26 Jul 2022 11:20    TPro  6.3  /  Alb  3.5  /  TBili  0.4  /  DBili  x   /  AST  107<H>  /  ALT  91<H>  /  AlkPhos  57  07-26    Creatinine Trend: 0.75<--, 0.81<--, 0.71<--, 0.99<--  PT/INR - ( 26 Jul 2022 11:20 )   PT: 14.6 sec;   INR: 1.26 ratio               Venous Blood Gas:  07-24 @ 22:49  7.45/33/68/23/94.4  VBG Lactate: 1.6      MICROBIOLOGY:     RADIOLOGY & ADDITIONAL TESTS:      Assessment/Plan:     64F with hx of Parkinsons, COPD, T2DM, COPD, Schizoaffective disorder BIBEMS after unwitnessed fall, found to be hypoxic to 70s with EMS, a/w sepsis and acute hypoxic respiratory failure likely multifactorial from COVID-19, superimposed PNA, and COPD exacerbation. She was also found to develop rhabdomyolysis secondary to long time immobility after fall. RRT called 7/26 for AMS, s/p intubation and transfer to MICU for further care.     #Neuro-  ##acute metabolic encephalopathy in setting of COVID induced encephalopathy vs sepsis   COVID+, continue with treatment of COVID as below  CTH pending    ##drug induced parkinson's disease 2/2 psychotropic medication for schizoaffective disorder   home regimen: amantadine  baseline of bilateral hand tremor and plantar flexion, A+O x2-3  - c/w amantadine     #Pulm-  hx of COPD not on home oxygen  acute hypoxic respiratory failure 2/2 COVID-19, superimposed PNA, and COPD exacerbation  s/p intubation 7/26  -c/w COVID treatment (decadron 7/24-8/1), (remdesevir 7/23-7/27)  -c/w empiric abx (zosyn 7/24 --> )  -c/w steroids for potential COPD exacerbation   -f/u post intubation abg   -wean as tolerated    #CV-  -stable; no active issues   -monitor BP     #GI-  per S+S evaluation - recommended for pureed with moderately thick liquids  NPO   stable; no active issues      #Renal-  rhabdomyolysis 2/2 fall and prolonged immobility   -elevation of CK > 4000, Cr wnl, elevated BUN, elevated urobilinogen  -c/w IVF   -trend CK, CR  -Monitor urine output, electrolytes, and kidney function.    #ID-  COVID-19 and superimposed PNA   ID following  blood cx growing coag negative staph - likely contaminant   c/w zosyn   blood cx pending     #Heme-  trend d-dimer given COVID+  c/w therapeutic lovenox per hospital guidelines     #Endocrine-  A1C pending  SSI   will monitor POCT and adjust insulin regimen accordingly     #Skin   open facial wound 2/2 ?fall  wound care c/s     #PPx- lovenox     #Dispo- MICU Accept Note    CHIEF COMPLAINT: AMS    HPI / INTERVAL HISTORY:    Ms. Chaz Taylor is a 67 year old female with a past medical history of Parkinson disease (on amantadine), COPD (not on home oxygen), schizoaffective disorder (currently not on medications), DM2 (on metformin)  presented to the ED for unwitnessed fall. She was found lying on the ground with face downwards at 10am this morning since her home aide left at 7pm yesterday. Per home aide, she presumably fell to the ground for attempting to answer the door for medications. Pt has a baseline of chronic right-side weakness, bilateral hand termor and needed walker to ambulate. Per home aide, pt has been doing well with no physical complaints. She had two prior inconsequential falls due to similar events of attempting to answering door. Pt is limited in terms of history telling but alert and oriented x2, follow command, answering some questions.     At the ED, Pt is found to be severely hypoxic with O2 sat less than 70, tachycardiac, tachypneic, febrile to 100.9. Her lab is positive for leukocytosis, increased AG, significant elevation of CK and BNP, metabolic acidosis from VBG, COVID+.  CT chest is positive for atelectasis and right lung lobular consolidation. She responded to non-rebreather with O2 sat 100% then tolerated well to nasal canula 6 litter with goal of O2 sat > 88%. Her vitals are improved after 1L NS with /64, HR 93, RR 24.  Pt is admitted for acute respiratory failure in the setting of covid vs bacteria pneumonia and sepsis as well as rhabdomyolysis.     Patient treated on the floor for acute hypoxic respiratory failure likely multifactorial from COVID-19, superimposed PNA, and COPD exacerbation. She was also found to develop rhabdomyolysis secondary to long time immobility after fall.     RRT called 7/26 for AMS and hypoxia. Given concern for airway protection given mental status change, decision made to intubate patient. Patient transferred to the MICU for further care.    PAST MEDICAL & SURGICAL HISTORY:  Diabetes      Obesity      Schizoaffective disorder      COPD without exacerbation      Parkinsonian tremor      No significant past surgical history          FAMILY HISTORY:  FH: multiple myeloma  in Father        SOCIAL HISTORY:  Smoking: [  ] Never Smoked  [  ] Former Smoker (# packs x # years)  [  ] Current Smoker (# packs x # years)  Substance Use:   EtOH Use:   Marital Status: [  ] Single  [  ]   [  ]   [  ]   Sexual History:   Occupation:  Recent Travel:  Country of Birth:   Advance Directives:     HOME MEDICATIONS:      Allergies    No Known Allergies    Intolerances          REVIEW OF SYSTEMS:  Constitutional: No fevers, chills, weight loss, weight gain  HEENT: No vision problems, eye pain, nasal congestion, rhinorrhea, sore throat, dysphagia  CV: No chest pain, orthopnea, palpitations  Resp: No cough, dyspnea, wheezing, hemoptysis  GI: No nausea, vomiting, diarrhea, constipation, abdominal pain  : [ ] dysuria [ ] nocturia [ ] hematuria [ ] increased urinary frequency  Musculoskeletal: [ ] back pain [ ] myalgias [ ] arthralgias [ ] fracture  Skin: [ ] rash [ ] itch  Neurological: [ ] headache [ ] dizziness [ ] syncope [ ] weakness [ ] numbness  Psychiatric: [ ] anxiety [ ] depression  Endocrine: [ ] diabetes [ ] thyroid problem  Hematologic/Lymphatic: [ ] anemia [ ] bleeding problem  Allergic/Immunologic: [ ] itchy eyes [ ] nasal discharge [ ] hives [ ] angioedema  [ ] All other systems negative  [x ] Unable to assess ROS because patient intubated and sedated    OBJECTIVE:  ICU Vital Signs Last 24 Hrs  T(C): 36.8 (26 Jul 2022 09:00), Max: 38.8 (25 Jul 2022 15:34)  T(F): 98.2 (26 Jul 2022 09:00), Max: 101.8 (25 Jul 2022 15:34)  HR: 88 (26 Jul 2022 11:59) (81 - 93)  BP: 146/85 (26 Jul 2022 09:00) (125/99 - 157/88)  BP(mean): --  ABP: --  ABP(mean): --  RR: 20 (26 Jul 2022 10:47) (20 - 20)  SpO2: 95% (26 Jul 2022 11:59) (76% - 96%)    O2 Parameters below as of 26 Jul 2022 10:47  Patient On (Oxygen Delivery Method): room air          Mode: AC/ CMV (Assist Control/ Continuous Mandatory Ventilation), RR (machine): 16, TV (machine): 400, FiO2: 100, PEEP: 5, ITime: 0.6, MAP: 11, PIP: 39    CAPILLARY BLOOD GLUCOSE      POCT Blood Glucose.: 193 mg/dL (26 Jul 2022 11:11)      PHYSICAL EXAM:    Constitutional: intubated + sedated  Eyes: PERRL, sclera clear  ENMT: MMM, clear oropharynx  Neck: Supple, no cervical LAD  Respiratory: CTAB, no rales, rhonchi or wheezes  Cardiovascular: RRR, no m/g/r  Gastrointestinal: +BS, soft, NT/ND  Neurological: intubated + sedated  Psychiatric: unable to assess  Extremities: No LE edema      [facility] MEDICATIONS:  MEDICATIONS  (STANDING):  albuterol/ipratropium for Nebulization 3 milliLiter(s) Nebulizer every 6 hours  amantadine 100 milliGRAM(s) Oral two times a day  budesonide 160 MICROgram(s)/formoterol 4.5 MICROgram(s) Inhaler 2 Puff(s) Inhalation two times a day  chlorhexidine 0.12% Liquid 15 milliLiter(s) Oral Mucosa every 12 hours  chlorhexidine 4% Liquid 1 Application(s) Topical <User Schedule>  dexAMETHasone  Injectable 6 milliGRAM(s) IV Push daily  dextrose 50% Injectable 25 Gram(s) IV Push once  dextrose 50% Injectable 12.5 Gram(s) IV Push once  dextrose 50% Injectable 25 Gram(s) IV Push once  enoxaparin Injectable 40 milliGRAM(s) SubCutaneous every 12 hours  insulin lispro (ADMELOG) corrective regimen sliding scale   SubCutaneous three times a day before meals  insulin lispro (ADMELOG) corrective regimen sliding scale   SubCutaneous at bedtime  piperacillin/tazobactam IVPB.. 3.375 Gram(s) IV Intermittent every 8 hours  remdesivir  IVPB 100 milliGRAM(s) IV Intermittent every 24 hours  remdesivir  IVPB   IV Intermittent     MEDICATIONS  (PRN):  acetaminophen     Tablet .. 650 milliGRAM(s) Oral every 6 hours PRN Temp greater or equal to 38C (100.4F), Mild Pain (1 - 3), Moderate Pain (4 - 6)  ALBUTerol    90 MICROgram(s) HFA Inhaler 2 Puff(s) Inhalation every 6 hours PRN Shortness of Breath and/or Wheezing  dextrose Oral Gel 15 Gram(s) Oral once PRN Blood Glucose LESS THAN 70 milliGRAM(s)/deciliter      LABS:                        14.3   20.29 )-----------( 307      ( 26 Jul 2022 11:20 )             50.0     Hgb Trend: 14.3<--, 13.1<--, 14.2<--, 15.2<--, 16.1<--  07-26    143  |  109<H>  |  29<H>  ----------------------------<  200<H>  4.4   |  25  |  0.75    Ca    8.0<L>      26 Jul 2022 11:20    TPro  6.3  /  Alb  3.5  /  TBili  0.4  /  DBili  x   /  AST  107<H>  /  ALT  91<H>  /  AlkPhos  57  07-26    Creatinine Trend: 0.75<--, 0.81<--, 0.71<--, 0.99<--  PT/INR - ( 26 Jul 2022 11:20 )   PT: 14.6 sec;   INR: 1.26 ratio               Venous Blood Gas:  07-24 @ 22:49  7.45/33/68/23/94.4  VBG Lactate: 1.6      MICROBIOLOGY:     RADIOLOGY & ADDITIONAL TESTS:      Assessment/Plan:     64F with hx of Parkinsons, COPD, T2DM, COPD, Schizoaffective disorder BIBEMS after unwitnessed fall, found to be hypoxic to 70s with EMS, a/w sepsis and acute hypoxic respiratory failure likely multifactorial from COVID-19, superimposed PNA, and COPD exacerbation. She was also found to develop rhabdomyolysis secondary to long time immobility after fall. RRT called 7/26 for AMS, s/p intubation and transfer to MICU for further care.     #Neuro-  ##acute metabolic encephalopathy in setting of COVID induced encephalopathy vs sepsis   COVID+, continue with treatment of COVID as below  CTH pending    ##drug induced parkinson's disease 2/2 psychotropic medication for schizoaffective disorder   home regimen: amantadine  baseline of bilateral hand tremor and plantar flexion, A+O x2-3  - c/w amantadine     #Pulm-  hx of COPD not on home oxygen  acute hypoxic respiratory failure 2/2 COVID-19, superimposed PNA, and COPD exacerbation  s/p intubation 7/26  -c/w COVID treatment (decadron 7/24-8/1), (remdesevir 7/23-7/27)  -c/w empiric abx (zosyn 7/24 --> )  -c/w steroids for potential COPD exacerbation   -f/u post intubation abg   -wean as tolerated    #CV-  -stable; no active issues   -monitor BP     #GI-  per S+S evaluation - recommended for pureed with moderately thick liquids  NPO   stable; no active issues      #Renal-  rhabdomyolysis 2/2 fall and prolonged immobility   -elevation of CK > 4000, Cr wnl, elevated BUN, elevated urobilinogen  -c/w IVF   -trend CK, CR  -Monitor urine output, electrolytes, and kidney function.    #ID-  COVID-19 and superimposed PNA   ID following  blood cx growing coag negative staph - likely contaminant   c/w zosyn   blood cx pending     #Heme-  trend d-dimer given COVID+  c/w therapeutic lovenox per hospital guidelines     #Endocrine-  A1C pending  SSI   will monitor POCT and adjust insulin regimen accordingly     #Skin   open facial wound 2/2 ?fall  wound care c/s     #PPx- lovenox     #Dispo-    Attending Attestation:    Patient seen and examined with resident/fellow.  Agree with above except as noted.  63 yo female with h/o COPD, schizoaffective disorder. parkinson's disease COVID +  intubated for acute hypoxemic respiratory failure. Hypoxemia seemed to have worsened after eating. Likely aspiration pneumonia.    1. Acute hypoxemic respiratory failure due to aspiration and possible covid pneumonia. Pt also with COPD.      Continue current AC vent settings.      Zosyn for Aspiration pneumonia      Dexamethasone and Remdesivir for COVID.      COPD. on dex. Start bronchodilators. D/C Symbicort.  2. Rhabdomyolysis. Pt found down at home.  Continue IVF. CPKS trending down.  3.Parkinson's disease. Continue amantadine.  4. DVT prophylaxis Lovenox  5. GOC full code  6. FEN start tube feeds.          This patient is critically ill and required frequent bedside visits with therapy change.  Total critical care time spent was 40 minutes not including procedures.  Colin Bro MD.

## 2022-07-26 NOTE — PROVIDER CONTACT NOTE (OTHER) - BACKGROUND
Patient admitted for unwitnessed fall and rhabdomyolysis. Pt has pmh of DM, parkinson's and sepsis
Patient admitted for unwitnessed fall and rhabdomyolysis. Pt has pmh of DM, parkinson's and sepsis
Pt admitted for recent fall and developed rhabdo. Pt has hx of parkinson's and COPD. Pt has pneumonia and COVID.
Pt admitted for unwitnessed fall. Pt has pneumonia and covid.
Patient admitted for unwitnessed fall and rhabdomyolysis. Pt has pmh of DM, parkinson's and sepsis

## 2022-07-26 NOTE — PROGRESS NOTE ADULT - PROBLEM SELECTOR PLAN 2
- equivocal lung findings on CT chest   - Dx: COVID-19 infection vs community acquire PNA vs exacerbation of COPD  - CW empirical treatment for COVID-19 and community acquired PNA listed above  - DCed azithromycin per ID recommend

## 2022-07-27 LAB
A1C WITH ESTIMATED AVERAGE GLUCOSE RESULT: 5.9 % — HIGH (ref 4–5.6)
ALBUMIN SERPL ELPH-MCNC: 2.7 G/DL — LOW (ref 3.3–5)
ALP SERPL-CCNC: 40 U/L — SIGNIFICANT CHANGE UP (ref 40–120)
ALT FLD-CCNC: 69 U/L — HIGH (ref 4–33)
ANION GAP SERPL CALC-SCNC: 9 MMOL/L — SIGNIFICANT CHANGE UP (ref 7–14)
APTT BLD: 26.4 SEC — LOW (ref 27–36.3)
AST SERPL-CCNC: 63 U/L — HIGH (ref 4–32)
BASOPHILS # BLD AUTO: 0.01 K/UL — SIGNIFICANT CHANGE UP (ref 0–0.2)
BASOPHILS NFR BLD AUTO: 0.1 % — SIGNIFICANT CHANGE UP (ref 0–2)
BILIRUB SERPL-MCNC: 0.3 MG/DL — SIGNIFICANT CHANGE UP (ref 0.2–1.2)
BUN SERPL-MCNC: 28 MG/DL — HIGH (ref 7–23)
CALCIUM SERPL-MCNC: 8.1 MG/DL — LOW (ref 8.4–10.5)
CHLORIDE SERPL-SCNC: 110 MMOL/L — HIGH (ref 98–107)
CK SERPL-CCNC: 590 U/L — HIGH (ref 25–170)
CO2 SERPL-SCNC: 25 MMOL/L — SIGNIFICANT CHANGE UP (ref 22–31)
CREAT SERPL-MCNC: 0.7 MG/DL — SIGNIFICANT CHANGE UP (ref 0.5–1.3)
CRP SERPL-MCNC: 13.6 MG/L — HIGH
D DIMER BLD IA.RAPID-MCNC: <150 NG/ML DDU — SIGNIFICANT CHANGE UP
EGFR: 96 ML/MIN/1.73M2 — SIGNIFICANT CHANGE UP
EOSINOPHIL # BLD AUTO: 0 K/UL — SIGNIFICANT CHANGE UP (ref 0–0.5)
EOSINOPHIL NFR BLD AUTO: 0 % — SIGNIFICANT CHANGE UP (ref 0–6)
ESTIMATED AVERAGE GLUCOSE: 123 — SIGNIFICANT CHANGE UP
FERRITIN SERPL-MCNC: 307 NG/ML — HIGH (ref 15–150)
GAS PNL BLDA: SIGNIFICANT CHANGE UP
GLUCOSE BLDC GLUCOMTR-MCNC: 150 MG/DL — HIGH (ref 70–99)
GLUCOSE BLDC GLUCOMTR-MCNC: 153 MG/DL — HIGH (ref 70–99)
GLUCOSE BLDC GLUCOMTR-MCNC: 162 MG/DL — HIGH (ref 70–99)
GLUCOSE BLDC GLUCOMTR-MCNC: 168 MG/DL — HIGH (ref 70–99)
GLUCOSE BLDC GLUCOMTR-MCNC: 202 MG/DL — HIGH (ref 70–99)
GLUCOSE SERPL-MCNC: 161 MG/DL — HIGH (ref 70–99)
HCT VFR BLD CALC: 38.3 % — SIGNIFICANT CHANGE UP (ref 34.5–45)
HGB BLD-MCNC: 11.5 G/DL — SIGNIFICANT CHANGE UP (ref 11.5–15.5)
IANC: 5.3 K/UL — SIGNIFICANT CHANGE UP (ref 1.8–7.4)
IMM GRANULOCYTES NFR BLD AUTO: 0.4 % — SIGNIFICANT CHANGE UP (ref 0–1.5)
INR BLD: 1.29 RATIO — HIGH (ref 0.88–1.16)
LYMPHOCYTES # BLD AUTO: 4.64 K/UL — HIGH (ref 1–3.3)
LYMPHOCYTES # BLD AUTO: 42.8 % — SIGNIFICANT CHANGE UP (ref 13–44)
MAGNESIUM SERPL-MCNC: 2 MG/DL — SIGNIFICANT CHANGE UP (ref 1.6–2.6)
MCHC RBC-ENTMCNC: 25.4 PG — LOW (ref 27–34)
MCHC RBC-ENTMCNC: 30 GM/DL — LOW (ref 32–36)
MCV RBC AUTO: 84.7 FL — SIGNIFICANT CHANGE UP (ref 80–100)
MONOCYTES # BLD AUTO: 0.86 K/UL — SIGNIFICANT CHANGE UP (ref 0–0.9)
MONOCYTES NFR BLD AUTO: 7.9 % — SIGNIFICANT CHANGE UP (ref 2–14)
MRSA PCR RESULT.: DETECTED
NEUTROPHILS # BLD AUTO: 5.3 K/UL — SIGNIFICANT CHANGE UP (ref 1.8–7.4)
NEUTROPHILS NFR BLD AUTO: 48.8 % — SIGNIFICANT CHANGE UP (ref 43–77)
NRBC # BLD: 0 /100 WBCS — SIGNIFICANT CHANGE UP
NRBC # FLD: 0 K/UL — SIGNIFICANT CHANGE UP
PHOSPHATE SERPL-MCNC: 2.7 MG/DL — SIGNIFICANT CHANGE UP (ref 2.5–4.5)
PLATELET # BLD AUTO: 187 K/UL — SIGNIFICANT CHANGE UP (ref 150–400)
POTASSIUM SERPL-MCNC: 3.3 MMOL/L — LOW (ref 3.5–5.3)
POTASSIUM SERPL-SCNC: 3.3 MMOL/L — LOW (ref 3.5–5.3)
PROT SERPL-MCNC: 4.8 G/DL — LOW (ref 6–8.3)
PROTHROM AB SERPL-ACNC: 15 SEC — HIGH (ref 10.5–13.4)
RBC # BLD: 4.52 M/UL — SIGNIFICANT CHANGE UP (ref 3.8–5.2)
RBC # FLD: 14 % — SIGNIFICANT CHANGE UP (ref 10.3–14.5)
S AUREUS DNA NOSE QL NAA+PROBE: DETECTED
SODIUM SERPL-SCNC: 144 MMOL/L — SIGNIFICANT CHANGE UP (ref 135–145)
WBC # BLD: 10.85 K/UL — HIGH (ref 3.8–10.5)
WBC # FLD AUTO: 10.85 K/UL — HIGH (ref 3.8–10.5)

## 2022-07-27 PROCEDURE — 99291 CRITICAL CARE FIRST HOUR: CPT

## 2022-07-27 PROCEDURE — 71045 X-RAY EXAM CHEST 1 VIEW: CPT | Mod: 26

## 2022-07-27 PROCEDURE — 99232 SBSQ HOSP IP/OBS MODERATE 35: CPT

## 2022-07-27 RX ORDER — AMANTADINE HCL 100 MG
100 CAPSULE ORAL
Refills: 0 | Status: DISCONTINUED | OUTPATIENT
Start: 2022-07-28 | End: 2022-07-30

## 2022-07-27 RX ORDER — ENOXAPARIN SODIUM 100 MG/ML
40 INJECTION SUBCUTANEOUS EVERY 12 HOURS
Refills: 0 | Status: DISCONTINUED | OUTPATIENT
Start: 2022-07-27 | End: 2022-08-10

## 2022-07-27 RX ORDER — AMANTADINE HCL 100 MG
100 CAPSULE ORAL
Refills: 0 | Status: DISCONTINUED | OUTPATIENT
Start: 2022-07-27 | End: 2022-07-27

## 2022-07-27 RX ORDER — POTASSIUM CHLORIDE 20 MEQ
40 PACKET (EA) ORAL EVERY 4 HOURS
Refills: 0 | Status: DISCONTINUED | OUTPATIENT
Start: 2022-07-27 | End: 2022-07-27

## 2022-07-27 RX ORDER — MUPIROCIN 20 MG/G
1 OINTMENT TOPICAL
Refills: 0 | Status: COMPLETED | OUTPATIENT
Start: 2022-07-27 | End: 2022-08-01

## 2022-07-27 RX ORDER — POTASSIUM CHLORIDE 20 MEQ
40 PACKET (EA) ORAL EVERY 4 HOURS
Refills: 0 | Status: COMPLETED | OUTPATIENT
Start: 2022-07-27 | End: 2022-07-27

## 2022-07-27 RX ORDER — ALBUTEROL 90 UG/1
2 AEROSOL, METERED ORAL EVERY 6 HOURS
Refills: 0 | Status: DISCONTINUED | OUTPATIENT
Start: 2022-07-27 | End: 2022-08-10

## 2022-07-27 RX ADMIN — ENOXAPARIN SODIUM 40 MILLIGRAM(S): 100 INJECTION SUBCUTANEOUS at 16:08

## 2022-07-27 RX ADMIN — PROPOFOL 35 MICROGRAM(S)/KG/MIN: 10 INJECTION, EMULSION INTRAVENOUS at 09:46

## 2022-07-27 RX ADMIN — PROPOFOL 35 MICROGRAM(S)/KG/MIN: 10 INJECTION, EMULSION INTRAVENOUS at 22:21

## 2022-07-27 RX ADMIN — Medication 2: at 23:34

## 2022-07-27 RX ADMIN — PIPERACILLIN AND TAZOBACTAM 25 GRAM(S): 4; .5 INJECTION, POWDER, LYOPHILIZED, FOR SOLUTION INTRAVENOUS at 17:00

## 2022-07-27 RX ADMIN — SODIUM CHLORIDE 75 MILLILITER(S): 9 INJECTION, SOLUTION INTRAVENOUS at 22:20

## 2022-07-27 RX ADMIN — CHLORHEXIDINE GLUCONATE 15 MILLILITER(S): 213 SOLUTION TOPICAL at 17:02

## 2022-07-27 RX ADMIN — Medication 1: at 05:09

## 2022-07-27 RX ADMIN — Medication 100 MILLIGRAM(S): at 17:24

## 2022-07-27 RX ADMIN — Medication 40 MILLIEQUIVALENT(S): at 09:44

## 2022-07-27 RX ADMIN — PIPERACILLIN AND TAZOBACTAM 25 GRAM(S): 4; .5 INJECTION, POWDER, LYOPHILIZED, FOR SOLUTION INTRAVENOUS at 01:07

## 2022-07-27 RX ADMIN — REMDESIVIR 500 MILLIGRAM(S): 5 INJECTION INTRAVENOUS at 16:08

## 2022-07-27 RX ADMIN — SODIUM CHLORIDE 75 MILLILITER(S): 9 INJECTION, SOLUTION INTRAVENOUS at 09:46

## 2022-07-27 RX ADMIN — PROPOFOL 35 MICROGRAM(S)/KG/MIN: 10 INJECTION, EMULSION INTRAVENOUS at 01:07

## 2022-07-27 RX ADMIN — Medication 40 MILLIEQUIVALENT(S): at 05:08

## 2022-07-27 RX ADMIN — BUDESONIDE AND FORMOTEROL FUMARATE DIHYDRATE 2 PUFF(S): 160; 4.5 AEROSOL RESPIRATORY (INHALATION) at 07:37

## 2022-07-27 RX ADMIN — PROPOFOL 35 MICROGRAM(S)/KG/MIN: 10 INJECTION, EMULSION INTRAVENOUS at 06:00

## 2022-07-27 RX ADMIN — ENOXAPARIN SODIUM 40 MILLIGRAM(S): 100 INJECTION SUBCUTANEOUS at 05:08

## 2022-07-27 RX ADMIN — Medication 1: at 00:03

## 2022-07-27 RX ADMIN — Medication 6 MILLIGRAM(S): at 06:00

## 2022-07-27 RX ADMIN — Medication 100 MILLIGRAM(S): at 05:08

## 2022-07-27 RX ADMIN — CHLORHEXIDINE GLUCONATE 15 MILLILITER(S): 213 SOLUTION TOPICAL at 06:00

## 2022-07-27 RX ADMIN — PIPERACILLIN AND TAZOBACTAM 25 GRAM(S): 4; .5 INJECTION, POWDER, LYOPHILIZED, FOR SOLUTION INTRAVENOUS at 09:43

## 2022-07-27 RX ADMIN — Medication 1 APPLICATION(S): at 22:22

## 2022-07-27 RX ADMIN — MUPIROCIN 1 APPLICATION(S): 20 OINTMENT TOPICAL at 17:00

## 2022-07-27 RX ADMIN — Medication 1: at 17:02

## 2022-07-27 RX ADMIN — CHLORHEXIDINE GLUCONATE 1 APPLICATION(S): 213 SOLUTION TOPICAL at 05:39

## 2022-07-27 NOTE — DIETITIAN INITIAL EVALUATION ADULT - OTHER CALCULATIONS
Mom called to report daughter is still a bit fussy but doing a lot better since last OV and start of medication. She sends her appreciation to PCP.
Ideal Body Weight = 95lbs (+/- 10%)

## 2022-07-27 NOTE — PROGRESS NOTE ADULT - ASSESSMENT
Assessment/Plan:     64F with hx of Parkinsons, COPD, T2DM, COPD, Schizoaffective disorder BIBEMS after unwitnessed fall, found to be hypoxic to 70s with EMS, a/w sepsis and acute hypoxic respiratory failure likely multifactorial from COVID-19, superimposed PNA, and COPD exacerbation. She was also found to develop rhabdomyolysis secondary to long time immobility after fall. RRT called 7/26 for AMS, s/p intubation and transfer to MICU for further care.     #Neuro-  ##acute metabolic encephalopathy in setting of COVID induced encephalopathy vs sepsis   COVID+, continue with treatment of COVID as below  CTH pending    ##drug induced parkinson's disease 2/2 psychotropic medication for schizoaffective disorder   home regimen: amantadine  baseline of bilateral hand tremor and plantar flexion, A+O x2-3  - c/w amantadine     #Pulm-  hx of COPD not on home oxygen  acute hypoxic respiratory failure 2/2 COVID-19, superimposed PNA, and COPD exacerbation  s/p intubation 7/26  -c/w COVID treatment (decadron 7/24-8/1), (remdesevir 7/23-7/27)  -c/w empiric abx (zosyn 7/24 --> )  -c/w steroids for potential COPD exacerbation   -f/u post intubation abg   -wean as tolerated    #CV-  -stable; no active issues   -monitor BP     #GI-  per S+S evaluation - recommended for pureed with moderately thick liquids  NPO   stable; no active issues      #Renal-  rhabdomyolysis 2/2 fall and prolonged immobility   -elevation of CK > 4000, Cr wnl, elevated BUN, elevated urobilinogen  -c/w IVF   -trend CK, CR  -Monitor urine output, electrolytes, and kidney function.    #ID-  COVID-19 and superimposed PNA   ID following  blood cx growing coag negative staph - likely contaminant   c/w zosyn   blood cx pending     #Heme-  trend d-dimer given COVID+  c/w therapeutic lovenox per hospital guidelines     #Endocrine-  A1C pending  SSI   will monitor POCT and adjust insulin regimen accordingly     #Skin   open facial wound 2/2 ?fall  wound care c/s     #PPx- lovenox     #Dispo-. Assessment/Plan:     64F with hx of Parkinsons, COPD, T2DM, COPD, Schizoaffective disorder BIBEMS after unwitnessed fall, found to be hypoxic to 70s with EMS, a/w sepsis and acute hypoxic respiratory failure likely multifactorial from COVID-19, superimposed PNA, and COPD exacerbation. She was also found to develop rhabdomyolysis secondary to long time immobility after fall. RRT called 7/26 for AMS, s/p intubation and transfer to MICU for further care.     #Neuro-  ##acute metabolic encephalopathy in setting of COVID induced encephalopathy vs sepsis   COVID+, continue with treatment of COVID as below  CTH no acute changes    ##drug induced parkinson's disease 2/2 psychotropic medication for schizoaffective disorder   home regimen: amantadine  baseline of bilateral hand tremor and plantar flexion, A+O x2-3  - c/w amantadine     #Pulm-  hx of COPD not on home oxygen  acute hypoxic respiratory failure 2/2 COVID-19, superimposed PNA, and COPD exacerbation  s/p intubation 7/26  -c/w COVID treatment (decadron 7/24-8/1), (remdesevir 7/23-7/27)  -c/w empiric abx (zosyn 7/24 --> )  - Discontinued Symbicort, PRN albuterol ordered   -f/u ABG  -wean as tolerated    #CV-  -stable; no active issues   -monitor BP     #GI-  - per S+S evaluation - recommended for pureed with moderately thick liquids  - Start tube feeds     #Renal-  rhabdomyolysis 2/2 fall and prolonged immobility   -elevation of CK > 4000, Cr wnl, elevated BUN, elevated urobilinogen  -c/w IVF   -trend CK, CR  -Monitor urine output, electrolytes, and kidney function.    #ID-  COVID-19 and superimposed PNA   ID following  blood cx growing coag negative staph - likely contaminant   c/w zosyn   blood cx pending   MRSA swab positive, started mupirocin    #Heme-  trend d-dimer given COVID+  c/w therapeutic lovenox per hospital guidelines     #Endocrine-  A1C pending  SSI   will monitor POCT and adjust insulin regimen accordingly     #Skin   open facial wound 2/2 ?fall  wound care c/s     #PPx- lovenox     #Dispo-.

## 2022-07-27 NOTE — PROGRESS NOTE ADULT - ATTENDING COMMENTS
1. Acute hypoxemic respiratory failure due to aspiration and possible covid pneumonia. Pt also with COPD.      Continue current AC vent settings. Titrtating down FIO2 as tolerated.      Zosyn for Aspiration pneumonia      Dexamethasone and Remdesivir for COVID.      COPD. on dex. Start bronchodilators. D/C Symbicort.  2. Rhabdomyolysis. Pt found down at home.  Continue IVF. CPKS  Continue to trending down.  3. Parkinson's disease. Continue amantadine.  4. DVT prophylaxis Lovenox  5. [facility] full code  6. Tolerating  tube feeds.

## 2022-07-27 NOTE — DIETITIAN INITIAL EVALUATION ADULT - PERTINENT MEDS FT
MEDICATIONS  (STANDING):  amantadine 100 milliGRAM(s) Oral two times a day  budesonide 160 MICROgram(s)/formoterol 4.5 MICROgram(s) Inhaler 2 Puff(s) Inhalation two times a day  chlorhexidine 0.12% Liquid 15 milliLiter(s) Oral Mucosa every 12 hours  chlorhexidine 4% Liquid 1 Application(s) Topical <User Schedule>  dexAMETHasone  Injectable 6 milliGRAM(s) IV Push daily  dextrose 5%. 1000 milliLiter(s) (50 mL/Hr) IV Continuous <Continuous>  dextrose 5%. 1000 milliLiter(s) (100 mL/Hr) IV Continuous <Continuous>  dextrose 50% Injectable 25 Gram(s) IV Push once  dextrose 50% Injectable 12.5 Gram(s) IV Push once  dextrose 50% Injectable 25 Gram(s) IV Push once  enoxaparin Injectable 40 milliGRAM(s) SubCutaneous every 24 hours  glucagon  Injectable 1 milliGRAM(s) IntraMuscular once  insulin lispro (ADMELOG) corrective regimen sliding scale   SubCutaneous every 6 hours  lactated ringers. 1000 milliLiter(s) (75 mL/Hr) IV Continuous <Continuous>  piperacillin/tazobactam IVPB.. 3.375 Gram(s) IV Intermittent every 8 hours  propofol Infusion 67.685 MICROgram(s)/kG/Min (35 mL/Hr) IV Continuous <Continuous>  remdesivir  IVPB 100 milliGRAM(s) IV Intermittent every 24 hours  remdesivir  IVPB   IV Intermittent     MEDICATIONS  (PRN):  acetaminophen     Tablet .. 650 milliGRAM(s) Oral every 6 hours PRN Temp greater or equal to 38C (100.4F), Mild Pain (1 - 3), Moderate Pain (4 - 6)  ALBUTerol    90 MICROgram(s) HFA Inhaler 2 Puff(s) Inhalation every 6 hours PRN Shortness of Breath and/or Wheezing  dextrose Oral Gel 15 Gram(s) Oral once PRN Blood Glucose LESS THAN 70 milliGRAM(s)/deciliter  dextrose Oral Gel 15 Gram(s) Oral once PRN Blood Glucose LESS THAN 70 milliGRAM(s)/deciliter

## 2022-07-27 NOTE — DIETITIAN INITIAL EVALUATION ADULT - REASON FOR ADMISSION
65yo F BIBEMS after unwitnessed fall and found to be hypoxic to 70s.  Also found with rhabdomyolysis.

## 2022-07-27 NOTE — DIETITIAN INITIAL EVALUATION ADULT - OTHER INFO
Pt. s/p RRT 7/26 for AMS, now intubated/sedated.  ~265 non-nutritive lipid calories provided by Propofol over past 24hrs.  Obtain triglycerides.  Unable to determine adequacy of PO intake prior to NPO status.    Advise provision of enteral nutrition with VitalHP formula.  Pt. observed as tremulous at time of RDN encounter (perhaps related to Hx/Dx of Parkinson's).    Spoke w RN.

## 2022-07-27 NOTE — DIETITIAN INITIAL EVALUATION ADULT - ENTERAL
---- If/when medically feasible, initiate Vital HP @ 20mL/hr then increase by 10mL q6hrs to goal of 40mL/hr continuously    provides:  960mL total volume  960 kcals  84g protein

## 2022-07-27 NOTE — PROGRESS NOTE ADULT - SUBJECTIVE AND OBJECTIVE BOX
Follow Up:      Inverval History/ROS:Patient is a 67y old  Female who presents with a chief complaint of 65yo F BIBEMS after unwitnessed fall and found to be hypoxic to 70s.  Also found with rhabdomyolysis.       (2022 09:51)    + fever  Intubated  FiO2 @ 70 % PEEP 5  No pressors      Allergies    No Known Allergies    Intolerances        ANTIMICROBIALS:  piperacillin/tazobactam IVPB.. 3.375 every 8 hours  remdesivir  IVPB 100 every 24 hours  remdesivir  IVPB        OTHER MEDS:  acetaminophen     Tablet .. 650 milliGRAM(s) Oral every 6 hours PRN  ALBUTerol    90 MICROgram(s) HFA Inhaler 2 Puff(s) Inhalation every 6 hours PRN  amantadine 100 milliGRAM(s) Oral two times a day  budesonide 160 MICROgram(s)/formoterol 4.5 MICROgram(s) Inhaler 2 Puff(s) Inhalation two times a day  chlorhexidine 0.12% Liquid 15 milliLiter(s) Oral Mucosa every 12 hours  chlorhexidine 4% Liquid 1 Application(s) Topical <User Schedule>  dexAMETHasone  Injectable 6 milliGRAM(s) IV Push daily  dextrose 5%. 1000 milliLiter(s) IV Continuous <Continuous>  dextrose 5%. 1000 milliLiter(s) IV Continuous <Continuous>  dextrose 50% Injectable 25 Gram(s) IV Push once  dextrose 50% Injectable 12.5 Gram(s) IV Push once  dextrose 50% Injectable 25 Gram(s) IV Push once  dextrose Oral Gel 15 Gram(s) Oral once PRN  dextrose Oral Gel 15 Gram(s) Oral once PRN  enoxaparin Injectable 40 milliGRAM(s) SubCutaneous every 24 hours  glucagon  Injectable 1 milliGRAM(s) IntraMuscular once  insulin lispro (ADMELOG) corrective regimen sliding scale   SubCutaneous every 6 hours  lactated ringers. 1000 milliLiter(s) IV Continuous <Continuous>  propofol Infusion 67.685 MICROgram(s)/kG/Min IV Continuous <Continuous>      Vital Signs Last 24 Hrs  T(C): 37.4 (2022 09:00), Max: 37.7 (2022 13:00)  T(F): 99.3 (2022 09:00), Max: 99.9 (2022 13:00)  HR: 77 (2022 09:00) (57 - 148)  BP: 134/84 (2022 09:00) (89/72 - 147/91)  BP(mean): 101 (2022 09:00) (79 - 108)  RR: 24 (2022 09:00) (18 - 24)  SpO2: 95% (2022 09:00) (76% - 100%)    Parameters below as of 2022 09:00  Patient On (Oxygen Delivery Method): ventilator    O2 Concentration (%): 70    PHYSICAL EXAM:  General: [x ] intubated  HEAD/EYES: [ ] PERRL [x ] white sclera [ ] icterus  ENT:  [ ] normal [x ] supple [ ] thrush [ ] pharyngeal exudate  Cardiovascular:   [ ] murmur [ x] normal [ ] PPM/AICD  Respiratory:  [x ]course BS  GI:  [x ] soft, non-tender, normal bowel sounds  :  [ ] arreguin [x ] no CVA tenderness   Musculoskeletal:  [x ] no synovitis  Neurologic:  [ ] non-focal exam   Skin:  [x ] no rash  Lymph: [x ] no lymphadenopathy  Psychiatric:  [ ] appropriate affect [ ] alert & oriented  Lines:  x[ ] no phlebitis [ ] central line                                11.5   10.85 )-----------( 187      ( 2022 02:40 )             38.3       -    144  |  110<H>  |  28<H>  ----------------------------<  161<H>  3.3<L>   |  25  |  0.70    Ca    8.1<L>      2022 02:40  Phos  2.7       Mg     2.00         TPro  4.8<L>  /  Alb  2.7<L>  /  TBili  0.3  /  DBili  x   /  AST  63<H>  /  ALT  69<H>  /  AlkPhos  40        Urinalysis Basic - ( 2022 23:05 )    Color: Yellow / Appearance: Clear / S.044 / pH: x  Gluc: x / Ketone: Small  / Bili: Negative / Urobili: <2 mg/dL   Blood: x / Protein: 30 mg/dL / Nitrite: Negative   Leuk Esterase: Negative / RBC: 1 /HPF / WBC 3 /HPF   Sq Epi: x / Non Sq Epi: 4 /HPF / Bacteria: Few        MICROBIOLOGY:Culture Results:   No growth to date. (22 @ 09:30)  Culture Results:   No growth to date. (22 @ 16:00)  Culture Results:   No growth to date. (22 @ 13:45)  Culture Results:   <10,000 CFU/mL Normal Urogenital Sruthi (22 @ 10:59)  Culture Results:   Growth in anaerobic bottle: Staphylococcus hominis  Growth in aerobic bottle: Staphylococcus epidermidis  Coag Negative Staphylococcus  Single set isolate, possible contaminant. Contact  Microbiology if susceptibility testing clinically  indicated.  ***Blood Panel PCR results on this specimen are available  approximately 3 hours after the Gram stain result.***  Gram stain, PCR, and/or culture results may not always  correspond due to difference in methodologies.  ************************************************************  This PCR assay was performed by multiplex PCR. This  Assay tests for 66 bacterial and resistance gene targets.  Please refer to the [facility] test directory  at [email or web address] for details. (22 @ 10:55)  Culture Results:   Growth in anaerobic bottle: Staphylococcus hominis  Susceptibility to follow. (22 @ 10:50)      RADIOLOGY:     Notified patient of doctors recommendation.

## 2022-07-27 NOTE — DIETITIAN INITIAL EVALUATION ADULT - PERTINENT LABORATORY DATA
07-27    144  |  110<H>  |  28<H>  ----------------------------<  161<H>  3.3<L>   |  25  |  0.70    Ca    8.1<L>      27 Jul 2022 02:40  Phos  2.7     07-27  Mg     2.00     07-27    TPro  4.8<L>  /  Alb  2.7<L>  /  TBili  0.3  /  DBili  x   /  AST  63<H>  /  ALT  69<H>  /  AlkPhos  40  07-27    CAPILLARY BLOOD GLUCOSE      POCT Blood Glucose.: 162 mg/dL (27 Jul 2022 05:00)  POCT Blood Glucose.: 168 mg/dL (26 Jul 2022 23:59)  POCT Blood Glucose.: 136 mg/dL (26 Jul 2022 17:43)  POCT Blood Glucose.: 193 mg/dL (26 Jul 2022 11:11)    A1C with Estimated Average Glucose Result: 5.9 % (07-27-22 @ 02:40)

## 2022-07-27 NOTE — PROGRESS NOTE ADULT - SUBJECTIVE AND OBJECTIVE BOX
*******************************  Melissa Reyes PGY-1  *******************************    INTERVAL HPI/OVERNIGHT EVENTS:    SUBJECTIVE: Patient seen and examined at bedside.     CONSTITUTIONAL: No weakness, fevers or chills  EYES/ENT: No visual changes;  No vertigo or throat pain   NECK: No pain or stiffness  RESPIRATORY: No cough, wheezing, hemoptysis; No shortness of breath  CARDIOVASCULAR: No chest pain or palpitations  GASTROINTESTINAL: No abdominal or epigastric pain. No nausea, vomiting, or hematemesis; No diarrhea or constipation. No melena or hematochezia.  GENITOURINARY: No dysuria, frequency or hematuria  NEUROLOGICAL: No numbness or weakness  SKIN: No itching, rashes    OBJECTIVE:    VITAL SIGNS:  ICU Vital Signs Last 24 Hrs  T(C): 37.4 (2022 04:00), Max: 37.7 (2022 13:00)  T(F): 99.3 (2022 04:00), Max: 99.9 (2022 13:00)  HR: 57 (2022 06:00) (57 - 93)  BP: 89/72 (2022 06:00) (89/72 - 147/91)  BP(mean): 79 (2022 06:00) (79 - 108)  ABP: --  ABP(mean): --  RR: 18 (2022 06:00) (18 - 21)  SpO2: 93% (2022 06:00) (76% - 100%)    O2 Parameters below as of 2022 04:00  Patient On (Oxygen Delivery Method): ventilator    O2 Concentration (%): 70      Mode: AC/ CMV (Assist Control/ Continuous Mandatory Ventilation), RR (machine): 18, TV (machine): 400, FiO2: 90, PEEP: 5, ITime: 0.8, MAP: 9, PIP: 23    07-26 @ 07:01  -  07-27 @ 06:42  --------------------------------------------------------  IN: 1741 mL / OUT: 715 mL / NET: 1026 mL      CAPILLARY BLOOD GLUCOSE      POCT Blood Glucose.: 162 mg/dL (2022 05:00)        PHYSICAL EXAM:  Constitutional: intubated + sedated  Eyes: PERRL, sclera clear  ENMT: MMM, clear oropharynx  Neck: Supple, no cervical LAD  Respiratory: CTAB, no rales, rhonchi or wheezes  Cardiovascular: RRR, no m/g/r  Gastrointestinal: +BS, soft, NT/ND  Neurological: intubated + sedated  Psychiatric: unable to assess  Extremities: No LE edema      MEDICATIONS:  MEDICATIONS  (STANDING):  amantadine 100 milliGRAM(s) Oral two times a day  budesonide 160 MICROgram(s)/formoterol 4.5 MICROgram(s) Inhaler 2 Puff(s) Inhalation two times a day  chlorhexidine 0.12% Liquid 15 milliLiter(s) Oral Mucosa every 12 hours  chlorhexidine 4% Liquid 1 Application(s) Topical <User Schedule>  dexAMETHasone  Injectable 6 milliGRAM(s) IV Push daily  dextrose 5%. 1000 milliLiter(s) (100 mL/Hr) IV Continuous <Continuous>  dextrose 5%. 1000 milliLiter(s) (50 mL/Hr) IV Continuous <Continuous>  dextrose 50% Injectable 25 Gram(s) IV Push once  dextrose 50% Injectable 12.5 Gram(s) IV Push once  dextrose 50% Injectable 25 Gram(s) IV Push once  enoxaparin Injectable 40 milliGRAM(s) SubCutaneous every 24 hours  glucagon  Injectable 1 milliGRAM(s) IntraMuscular once  insulin lispro (ADMELOG) corrective regimen sliding scale   SubCutaneous every 6 hours  lactated ringers. 1000 milliLiter(s) (75 mL/Hr) IV Continuous <Continuous>  piperacillin/tazobactam IVPB.. 3.375 Gram(s) IV Intermittent every 8 hours  potassium chloride   Powder 40 milliEquivalent(s) Oral every 4 hours  propofol Infusion 67.685 MICROgram(s)/kG/Min (35 mL/Hr) IV Continuous <Continuous>  remdesivir  IVPB 100 milliGRAM(s) IV Intermittent every 24 hours  remdesivir  IVPB   IV Intermittent     MEDICATIONS  (PRN):  acetaminophen     Tablet .. 650 milliGRAM(s) Oral every 6 hours PRN Temp greater or equal to 38C (100.4F), Mild Pain (1 - 3), Moderate Pain (4 - 6)  ALBUTerol    90 MICROgram(s) HFA Inhaler 2 Puff(s) Inhalation every 6 hours PRN Shortness of Breath and/or Wheezing  dextrose Oral Gel 15 Gram(s) Oral once PRN Blood Glucose LESS THAN 70 milliGRAM(s)/deciliter  dextrose Oral Gel 15 Gram(s) Oral once PRN Blood Glucose LESS THAN 70 milliGRAM(s)/deciliter      ALLERGIES:  Allergies    No Known Allergies    Intolerances        LABS:                        11.5   10.85 )-----------( 187      ( 2022 02:40 )             38.3     07    144  |  110<H>  |  28<H>  ----------------------------<  161<H>  3.3<L>   |  25  |  0.70    Ca    8.1<L>      2022 02:40  Phos  2.7       Mg     2.00         TPro  4.8<L>  /  Alb  2.7<L>  /  TBili  0.3  /  DBili  x   /  AST  63<H>  /  ALT  69<H>  /  AlkPhos  40  07-27    PT/INR - ( 2022 02:40 )   PT: 15.0 sec;   INR: 1.29 ratio         PTT - ( 2022 02:40 )  PTT:26.4 sec  Urinalysis Basic - ( 2022 23:05 )    Color: Yellow / Appearance: Clear / S.044 / pH: x  Gluc: x / Ketone: Small  / Bili: Negative / Urobili: <2 mg/dL   Blood: x / Protein: 30 mg/dL / Nitrite: Negative   Leuk Esterase: Negative / RBC: 1 /HPF / WBC 3 /HPF   Sq Epi: x / Non Sq Epi: 4 /HPF / Bacteria: Few        RADIOLOGY & ADDITIONAL TESTS: Reviewed.     *******************************  Melissa Reyes PGY-1  *******************************    INTERVAL HPI/OVERNIGHT EVENTS: No acute events overnight.     SUBJECTIVE: Patient seen and examined at bedside. Continues to have bilateral arm shaking. Does not respond to noxious stimuli.     Unable to obtain ROS as patient intubated and sedated.    OBJECTIVE:    VITAL SIGNS:  ICU Vital Signs Last 24 Hrs  T(C): 37.4 (2022 04:00), Max: 37.7 (2022 13:00)  T(F): 99.3 (2022 04:00), Max: 99.9 (2022 13:00)  HR: 57 (2022 06:00) (57 - 93)  BP: 89/72 (2022 06:00) (89/72 - 147/91)  BP(mean): 79 (2022 06:00) (79 - 108)  ABP: --  ABP(mean): --  RR: 18 (2022 06:00) (18 - 21)  SpO2: 93% (2022 06:00) (76% - 100%)    O2 Parameters below as of 2022 04:00  Patient On (Oxygen Delivery Method): ventilator    O2 Concentration (%): 70      Mode: AC/ CMV (Assist Control/ Continuous Mandatory Ventilation), RR (machine): 18, TV (machine): 400, FiO2: 90, PEEP: 5, ITime: 0.8, MAP: 9, PIP: 23    07-26 @ 07:01  -  - @ 06:42  --------------------------------------------------------  IN: 1741 mL / OUT: 715 mL / NET: 1026 mL      CAPILLARY BLOOD GLUCOSE      POCT Blood Glucose.: 162 mg/dL (2022 05:00)        PHYSICAL EXAM:  Constitutional: intubated + sedated  Head: Wounds on mandible  Eyes: PERRL, sclera clear  ENMT: MMM, clear oropharynx  Respiratory: CTAB  Cardiovascular: RRR, radial pulses 2+  Gastrointestinal: +BS, soft, NT/ND  Neurological: intubated + sedated  Psychiatric: unable to assess  Extremities: + Shaking bilateral upper extremities      MEDICATIONS:  MEDICATIONS  (STANDING):  amantadine 100 milliGRAM(s) Oral two times a day  budesonide 160 MICROgram(s)/formoterol 4.5 MICROgram(s) Inhaler 2 Puff(s) Inhalation two times a day  chlorhexidine 0.12% Liquid 15 milliLiter(s) Oral Mucosa every 12 hours  chlorhexidine 4% Liquid 1 Application(s) Topical <User Schedule>  dexAMETHasone  Injectable 6 milliGRAM(s) IV Push daily  dextrose 5%. 1000 milliLiter(s) (100 mL/Hr) IV Continuous <Continuous>  dextrose 5%. 1000 milliLiter(s) (50 mL/Hr) IV Continuous <Continuous>  dextrose 50% Injectable 25 Gram(s) IV Push once  dextrose 50% Injectable 12.5 Gram(s) IV Push once  dextrose 50% Injectable 25 Gram(s) IV Push once  enoxaparin Injectable 40 milliGRAM(s) SubCutaneous every 24 hours  glucagon  Injectable 1 milliGRAM(s) IntraMuscular once  insulin lispro (ADMELOG) corrective regimen sliding scale   SubCutaneous every 6 hours  lactated ringers. 1000 milliLiter(s) (75 mL/Hr) IV Continuous <Continuous>  piperacillin/tazobactam IVPB.. 3.375 Gram(s) IV Intermittent every 8 hours  potassium chloride   Powder 40 milliEquivalent(s) Oral every 4 hours  propofol Infusion 67.685 MICROgram(s)/kG/Min (35 mL/Hr) IV Continuous <Continuous>  remdesivir  IVPB 100 milliGRAM(s) IV Intermittent every 24 hours  remdesivir  IVPB   IV Intermittent     MEDICATIONS  (PRN):  acetaminophen     Tablet .. 650 milliGRAM(s) Oral every 6 hours PRN Temp greater or equal to 38C (100.4F), Mild Pain (1 - 3), Moderate Pain (4 - 6)  ALBUTerol    90 MICROgram(s) HFA Inhaler 2 Puff(s) Inhalation every 6 hours PRN Shortness of Breath and/or Wheezing  dextrose Oral Gel 15 Gram(s) Oral once PRN Blood Glucose LESS THAN 70 milliGRAM(s)/deciliter  dextrose Oral Gel 15 Gram(s) Oral once PRN Blood Glucose LESS THAN 70 milliGRAM(s)/deciliter      ALLERGIES:  Allergies    No Known Allergies    Intolerances        LABS:                        11.5   10.85 )-----------( 187      ( 2022 02:40 )             38.3     07-    144  |  110<H>  |  28<H>  ----------------------------<  161<H>  3.3<L>   |  25  |  0.70    Ca    8.1<L>      2022 02:40  Phos  2.7       Mg     2.00         TPro  4.8<L>  /  Alb  2.7<L>  /  TBili  0.3  /  DBili  x   /  AST  63<H>  /  ALT  69<H>  /  AlkPhos  40  07-27    PT/INR - ( 2022 02:40 )   PT: 15.0 sec;   INR: 1.29 ratio         PTT - ( 2022 02:40 )  PTT:26.4 sec  Urinalysis Basic - ( 2022 23:05 )    Color: Yellow / Appearance: Clear / S.044 / pH: x  Gluc: x / Ketone: Small  / Bili: Negative / Urobili: <2 mg/dL   Blood: x / Protein: 30 mg/dL / Nitrite: Negative   Leuk Esterase: Negative / RBC: 1 /HPF / WBC 3 /HPF   Sq Epi: x / Non Sq Epi: 4 /HPF / Bacteria: Few        RADIOLOGY & ADDITIONAL TESTS: Reviewed.

## 2022-07-27 NOTE — PROGRESS NOTE ADULT - ASSESSMENT
67 year old with parkinson;s presents after being found on the floor  Presents with fever    Now with opacities on chest imaging  COVID+   Blood culture positive for coagulase negative staph  Hypoxic respiratory failure    1) Concern for pneumonia- ? aspiration  Can continue zosyn for 5 days post intubation    2) COVID positive  Consider rule out PE given hypoxia  Can continue remdesivir for 5 d  On dexamethasone for 10 d  Anticoagulation per hospital guideline    3) Coagulase negative staph in blood- two different isolates  Usually a contaminant  Stop vancomycin  Repeat blood cultures off antibiotics are without growth    4) Fever  ? aspiration vs covid vs occult focus  Follow up repeat cultures    I will be away 7/29 through 8/7.  Please call the ID service at [phone] with questions/concerns.

## 2022-07-28 LAB
ALBUMIN SERPL ELPH-MCNC: 2.5 G/DL — LOW (ref 3.3–5)
ALP SERPL-CCNC: 40 U/L — SIGNIFICANT CHANGE UP (ref 40–120)
ALT FLD-CCNC: 57 U/L — HIGH (ref 4–33)
ANION GAP SERPL CALC-SCNC: 9 MMOL/L — SIGNIFICANT CHANGE UP (ref 7–14)
APTT BLD: 26.5 SEC — LOW (ref 27–36.3)
AST SERPL-CCNC: 37 U/L — HIGH (ref 4–32)
BASOPHILS # BLD AUTO: 0 K/UL — SIGNIFICANT CHANGE UP (ref 0–0.2)
BASOPHILS NFR BLD AUTO: 0 % — SIGNIFICANT CHANGE UP (ref 0–2)
BILIRUB SERPL-MCNC: 0.3 MG/DL — SIGNIFICANT CHANGE UP (ref 0.2–1.2)
BUN SERPL-MCNC: 29 MG/DL — HIGH (ref 7–23)
CALCIUM SERPL-MCNC: 8.2 MG/DL — LOW (ref 8.4–10.5)
CHLORIDE SERPL-SCNC: 110 MMOL/L — HIGH (ref 98–107)
CK SERPL-CCNC: 297 U/L — HIGH (ref 25–170)
CO2 SERPL-SCNC: 23 MMOL/L — SIGNIFICANT CHANGE UP (ref 22–31)
CREAT SERPL-MCNC: 0.67 MG/DL — SIGNIFICANT CHANGE UP (ref 0.5–1.3)
EGFR: 97 ML/MIN/1.73M2 — SIGNIFICANT CHANGE UP
EOSINOPHIL # BLD AUTO: 0 K/UL — SIGNIFICANT CHANGE UP (ref 0–0.5)
EOSINOPHIL NFR BLD AUTO: 0 % — SIGNIFICANT CHANGE UP (ref 0–6)
GLUCOSE BLDC GLUCOMTR-MCNC: 158 MG/DL — HIGH (ref 70–99)
GLUCOSE BLDC GLUCOMTR-MCNC: 164 MG/DL — HIGH (ref 70–99)
GLUCOSE BLDC GLUCOMTR-MCNC: 164 MG/DL — HIGH (ref 70–99)
GLUCOSE BLDC GLUCOMTR-MCNC: 227 MG/DL — HIGH (ref 70–99)
GLUCOSE SERPL-MCNC: 187 MG/DL — HIGH (ref 70–99)
HCT VFR BLD CALC: 37.5 % — SIGNIFICANT CHANGE UP (ref 34.5–45)
HGB BLD-MCNC: 11.2 G/DL — LOW (ref 11.5–15.5)
IANC: 4.32 K/UL — SIGNIFICANT CHANGE UP (ref 1.8–7.4)
IMM GRANULOCYTES NFR BLD AUTO: 0.5 % — SIGNIFICANT CHANGE UP (ref 0–1.5)
INR BLD: 1.25 RATIO — HIGH (ref 0.88–1.16)
LYMPHOCYTES # BLD AUTO: 3.47 K/UL — HIGH (ref 1–3.3)
LYMPHOCYTES # BLD AUTO: 41 % — SIGNIFICANT CHANGE UP (ref 13–44)
MAGNESIUM SERPL-MCNC: 1.9 MG/DL — SIGNIFICANT CHANGE UP (ref 1.6–2.6)
MCHC RBC-ENTMCNC: 25 PG — LOW (ref 27–34)
MCHC RBC-ENTMCNC: 29.9 GM/DL — LOW (ref 32–36)
MCV RBC AUTO: 83.7 FL — SIGNIFICANT CHANGE UP (ref 80–100)
MONOCYTES # BLD AUTO: 0.64 K/UL — SIGNIFICANT CHANGE UP (ref 0–0.9)
MONOCYTES NFR BLD AUTO: 7.6 % — SIGNIFICANT CHANGE UP (ref 2–14)
NEUTROPHILS # BLD AUTO: 4.32 K/UL — SIGNIFICANT CHANGE UP (ref 1.8–7.4)
NEUTROPHILS NFR BLD AUTO: 50.9 % — SIGNIFICANT CHANGE UP (ref 43–77)
NRBC # BLD: 0 /100 WBCS — SIGNIFICANT CHANGE UP
NRBC # FLD: 0 K/UL — SIGNIFICANT CHANGE UP
PHOSPHATE SERPL-MCNC: 3.2 MG/DL — SIGNIFICANT CHANGE UP (ref 2.5–4.5)
PLATELET # BLD AUTO: 167 K/UL — SIGNIFICANT CHANGE UP (ref 150–400)
POTASSIUM SERPL-MCNC: 4.2 MMOL/L — SIGNIFICANT CHANGE UP (ref 3.5–5.3)
POTASSIUM SERPL-SCNC: 4.2 MMOL/L — SIGNIFICANT CHANGE UP (ref 3.5–5.3)
PROT SERPL-MCNC: 4.3 G/DL — LOW (ref 6–8.3)
PROTHROM AB SERPL-ACNC: 14.5 SEC — HIGH (ref 10.5–13.4)
RBC # BLD: 4.48 M/UL — SIGNIFICANT CHANGE UP (ref 3.8–5.2)
RBC # FLD: 13.8 % — SIGNIFICANT CHANGE UP (ref 10.3–14.5)
SODIUM SERPL-SCNC: 142 MMOL/L — SIGNIFICANT CHANGE UP (ref 135–145)
WBC # BLD: 8.47 K/UL — SIGNIFICANT CHANGE UP (ref 3.8–10.5)
WBC # FLD AUTO: 8.47 K/UL — SIGNIFICANT CHANGE UP (ref 3.8–10.5)

## 2022-07-28 PROCEDURE — 93970 EXTREMITY STUDY: CPT | Mod: 26

## 2022-07-28 PROCEDURE — 99233 SBSQ HOSP IP/OBS HIGH 50: CPT

## 2022-07-28 PROCEDURE — 99291 CRITICAL CARE FIRST HOUR: CPT

## 2022-07-28 RX ORDER — DEXMEDETOMIDINE HYDROCHLORIDE IN 0.9% SODIUM CHLORIDE 4 UG/ML
0.2 INJECTION INTRAVENOUS
Qty: 400 | Refills: 0 | Status: DISCONTINUED | OUTPATIENT
Start: 2022-07-28 | End: 2022-07-29

## 2022-07-28 RX ADMIN — PROPOFOL 35 MICROGRAM(S)/KG/MIN: 10 INJECTION, EMULSION INTRAVENOUS at 08:21

## 2022-07-28 RX ADMIN — PIPERACILLIN AND TAZOBACTAM 25 GRAM(S): 4; .5 INJECTION, POWDER, LYOPHILIZED, FOR SOLUTION INTRAVENOUS at 09:11

## 2022-07-28 RX ADMIN — ENOXAPARIN SODIUM 40 MILLIGRAM(S): 100 INJECTION SUBCUTANEOUS at 05:12

## 2022-07-28 RX ADMIN — MUPIROCIN 1 APPLICATION(S): 20 OINTMENT TOPICAL at 05:10

## 2022-07-28 RX ADMIN — Medication 2: at 12:16

## 2022-07-28 RX ADMIN — MUPIROCIN 1 APPLICATION(S): 20 OINTMENT TOPICAL at 17:17

## 2022-07-28 RX ADMIN — CHLORHEXIDINE GLUCONATE 15 MILLILITER(S): 213 SOLUTION TOPICAL at 05:11

## 2022-07-28 RX ADMIN — DEXMEDETOMIDINE HYDROCHLORIDE IN 0.9% SODIUM CHLORIDE 4.31 MICROGRAM(S)/KG/HR: 4 INJECTION INTRAVENOUS at 12:16

## 2022-07-28 RX ADMIN — Medication 1: at 17:20

## 2022-07-28 RX ADMIN — Medication 1: at 05:23

## 2022-07-28 RX ADMIN — ENOXAPARIN SODIUM 40 MILLIGRAM(S): 100 INJECTION SUBCUTANEOUS at 17:15

## 2022-07-28 RX ADMIN — Medication 1 APPLICATION(S): at 17:18

## 2022-07-28 RX ADMIN — CHLORHEXIDINE GLUCONATE 15 MILLILITER(S): 213 SOLUTION TOPICAL at 17:15

## 2022-07-28 RX ADMIN — Medication 1 APPLICATION(S): at 05:59

## 2022-07-28 RX ADMIN — Medication 100 MILLIGRAM(S): at 05:12

## 2022-07-28 RX ADMIN — PIPERACILLIN AND TAZOBACTAM 25 GRAM(S): 4; .5 INJECTION, POWDER, LYOPHILIZED, FOR SOLUTION INTRAVENOUS at 17:15

## 2022-07-28 RX ADMIN — Medication 1: at 23:51

## 2022-07-28 RX ADMIN — Medication 100 MILLIGRAM(S): at 17:15

## 2022-07-28 RX ADMIN — PIPERACILLIN AND TAZOBACTAM 25 GRAM(S): 4; .5 INJECTION, POWDER, LYOPHILIZED, FOR SOLUTION INTRAVENOUS at 01:38

## 2022-07-28 RX ADMIN — Medication 6 MILLIGRAM(S): at 05:13

## 2022-07-28 NOTE — PROGRESS NOTE ADULT - SUBJECTIVE AND OBJECTIVE BOX
Follow Up: COVID    Interval History/ROS: Afebrile. Remains intubated.     Allergies  No Known Allergies        ANTIMICROBIALS:  piperacillin/tazobactam IVPB.. 3.375 every 8 hours      OTHER MEDS:  acetaminophen     Tablet .. 650 milliGRAM(s) Oral every 6 hours PRN  ALBUTerol    90 MICROgram(s) HFA Inhaler 2 Puff(s) Inhalation every 6 hours PRN  amantadine Syrup 100 milliGRAM(s) Oral two times a day  chlorhexidine 0.12% Liquid 15 milliLiter(s) Oral Mucosa every 12 hours  dexAMETHasone  Injectable 6 milliGRAM(s) IV Push daily  dexMEDEtomidine Infusion 0.2 MICROgram(s)/kG/Hr IV Continuous <Continuous>  dextrose 5%. 1000 milliLiter(s) IV Continuous <Continuous>  dextrose 5%. 1000 milliLiter(s) IV Continuous <Continuous>  dextrose 50% Injectable 25 Gram(s) IV Push once  dextrose 50% Injectable 12.5 Gram(s) IV Push once  dextrose 50% Injectable 25 Gram(s) IV Push once  dextrose Oral Gel 15 Gram(s) Oral once PRN  dextrose Oral Gel 15 Gram(s) Oral once PRN  enoxaparin Injectable 40 milliGRAM(s) SubCutaneous every 12 hours  glucagon  Injectable 1 milliGRAM(s) IntraMuscular once  insulin lispro (ADMELOG) corrective regimen sliding scale   SubCutaneous every 6 hours  mupirocin 2% Ointment 1 Application(s) Topical two times a day  petrolatum Ophthalmic Ointment 1 Application(s) Both EYES two times a day  propofol Infusion 67.685 MICROgram(s)/kG/Min IV Continuous <Continuous>      Vital Signs Last 24 Hrs  T(C): 37.4 (2022 12:00), Max: 37.4 (2022 12:00)  T(F): 99.3 (2022 12:00), Max: 99.3 (2022 12:00)  HR: 70 (2022 16:00) (41 - 89)  BP: 134/88 (2022 16:00) (87/62 - 155/98)  BP(mean): 104 (2022 16:00) (71 - 115)  RR: 25 (2022 16:00) (13 - 28)  SpO2: 96% (2022 16:00) (94% - 98%)    Parameters below as of 2022 16:00  Patient On (Oxygen Delivery Method): ventilator    O2 Concentration (%): 50    Physical Exam:  General: eyes open. chronically ill appearing. obese  Cardio: regular rate   Respiratory: intubated   abd: soft   Skin: no rash  Neuro: increased tone                           11.2   8.47  )-----------( 167      ( 2022 01:18 )             37.5           142  |  110<H>  |  29<H>  ----------------------------<  187<H>  4.2   |  23  |  0.67    Ca    8.2<L>      2022 01:18  Phos  3.2       Mg     1.90         TPro  4.3<L>  /  Alb  2.5<L>  /  TBili  0.3  /  DBili  x   /  AST  37<H>  /  ALT  57<H>  /  AlkPhos  40        Urinalysis Basic - ( 2022 23:05 )    Color: Yellow / Appearance: Clear / S.044 / pH: x  Gluc: x / Ketone: Small  / Bili: Negative / Urobili: <2 mg/dL   Blood: x / Protein: 30 mg/dL / Nitrite: Negative   Leuk Esterase: Negative / RBC: 1 /HPF / WBC 3 /HPF   Sq Epi: x / Non Sq Epi: 4 /HPF / Bacteria: Few        MICROBIOLOGY:  Culture - Blood (collected 22 @ 07:21)  Source: .Blood Blood-Peripheral  Preliminary Report (22 @ 13:02):    No growth to date.    Culture - Blood (collected 22 @ 09:30)  Source: .Blood Blood-Peripheral  Preliminary Report (22 @ 12:01):    No growth to date.    Culture - Blood (collected 22 @ 16:00)  Source: .Blood  Preliminary Report (22 @ 19:02):    No growth to date.    Culture - Blood (collected 22 @ 13:45)  Source: .Blood Blood-Peripheral  Preliminary Report (22 @ 19:02):    No growth to date.    Culture - Urine (collected 22 @ 10:59)  Source: Clean Catch Clean Catch (Midstream)  Final Report (22 @ 18:29):    <10,000 CFU/mL Normal Urogenital Sruthi    Culture - Blood (collected 22 @ 10:55)  Source: .Blood Blood-Peripheral  Gram Stain (22 @ 12:29):    Growth in anaerobic bottle: Gram Positive Cocci in Clusters    Growth in aerobic bottle: Gram Positive Cocci in Clusters  Final Report (22 @ 19:13):    Growth in anaerobic bottle: Staphylococcus hominis    Growth in aerobic bottle: Staphylococcus epidermidis    Coag Negative Staphylococcus    Single set isolate, possible contaminant. Contact    Microbiology if susceptibility testing clinically    indicated.    ***Blood Panel PCR results on this specimen are available    approximately 3 hours after the Gram stain result.***    Gram stain, PCR, and/or culture results may not always    correspond due to difference in methodologies.    ************************************************************    This PCR assay was performed by multiplex PCR. This    Assay tests for 66 bacterial and resistance gene targets.    Please refer to the [facility] test directory    at [email or web address] for details.  Organism: Blood Culture PCR (22 @ 19:13)  Organism: Blood Culture PCR (22 @ 19:13)      -  Coagulase negative Staphylococcus: Detec      Method Type: PCR    Culture - Blood (collected 22 @ 10:50)  Source: .Blood Blood-Peripheral  Gram Stain (22 @ 08:37):    Growth in anaerobic bottle: Gram Positive Cocci in Clusters  Final Report (22 @ 12:28):    Growth in anaerobic bottle: Staphylococcus hominis    Susceptibility to follow.  Organism: Staphylococcus hominis (22 @ 12:28)  Organism: Staphylococcus hominis (22 @ 12:28)      -  Ampicillin/Sulbactam: S <=8/4      -  Cefazolin: S <=4      -  Clindamycin: S <=0.25      -  Erythromycin: S <=0.25      -  Gentamicin: S <=1 Should not be used as monotherapy      -  Oxacillin: S <=0.25      -  Penicillin: R >8      -  Rifampin: S <=1 Should not be used as monotherapy      -  Tetra/Doxy: S <=1      -  Trimethoprim/Sulfamethoxazole: S       -  Vancomycin: S 1      Method Type: JAVIER    Rapid RVP Result: Detected ( @ 10:55)      RADIOLOGY:  Images below reviewed personally  US Duplex Venous Lower Ext Complete, Bilateral (22 @ 14:51)   No evidence of deep venous thrombosis in either lower extremity.    Xray Chest 1 View- PORTABLE-Urgent (Xray Chest 1 View- PORTABLE-Urgent .) (22 @ 00:51)   Since the last exam, an enteric tube has been placed and it enters the   stomach with the sidehole and tip not visualized on this exam.  Endotracheal tube remains with its tip above the maximo.  The left lung is clear. On the right side, hemidiaphragm is obscured by   an opacity seen to represent middle lobe atelectasis or pneumonia or on   the recent chest CT. No pneumothorax.

## 2022-07-28 NOTE — PROGRESS NOTE ADULT - ASSESSMENT
67F Parkinson, Schizoaffective, COPD, obesity.   Here 7/23 after found on the floor of her home.   Febrile, hypoxic and COVID positive, eventually intubated 7/26.   Treating both COVID and possible bacterial pneumonia although chest imaging is not impressive. Remdesivir finished 7/27.   No other clear infection.   No bacteriuria.   CoNS on blood cultures appears contaminant.   COPD and rhabdomyolysis likely contributing to critical illness.   Remains intubated.     Suggest  -finish Zosyn today, it's been 5 days   -dexamethasone day 6 of 10  -monitor blood cultures   -if worsening fevers or concern for pneumonia, check sputum culture     Discussed with YUSEF Ruffin MD   Infectious Disease   Available on TEAMS. After 5PM and on weekends please page fellow on call or call [phone] 67F Parkinson, Schizoaffective, COPD, obesity.   Here 7/23 after found on the floor of her home.   Febrile, hypoxic and COVID positive, eventually intubated 7/26.   Treating both COVID and possible bacterial pneumonia although chest imaging is not impressive. Remdesivir finished 7/27.   No other clear infection.   No bacteriuria.   CoNS on blood cultures appears contaminant.   COPD and rhabdomyolysis likely contributing to critical illness.   Remains intubated.     Suggest  -finish Zosyn on Sun for 5 days since intubation   -dexamethasone day 6 of 10  -monitor blood cultures   -if worsening fevers or concern for pneumonia, check sputum culture     Discussed with MICU     Kole Ruffin MD   Infectious Disease   Available on TEAMS. After 5PM and on weekends please page fellow on call or call [phone]

## 2022-07-28 NOTE — PROGRESS NOTE ADULT - ATTENDING COMMENTS
1. Acute hypoxemic respiratory failure due to aspiration and possible covid pneumonia. Pt also with COPD.      Continue current AC vent settings.PS wean as tolerated after sedation off. May need Precedex      Zosyn for Aspiration pneumonia. WBC decreasing.       Dexamethasone and Remdesivir for COVID.      COPD. on dex. Start bronchodilators. D/C Symbicort.  2. Rhabdomyolysis. Pt found down at home.  Continue IVF. CPKS  Continue to trending down.  3. Parkinson's disease. Continue amantadine.  4. DVT prophylaxis Lovenox  5. [facility] full code  6. Tolerating  tube feeds.

## 2022-07-28 NOTE — PROGRESS NOTE ADULT - ASSESSMENT
Assessment/Plan:     64F with hx of Parkinsons, COPD, T2DM, COPD, Schizoaffective disorder BIBEMS after unwitnessed fall, found to be hypoxic to 70s with EMS, a/w sepsis and acute hypoxic respiratory failure likely multifactorial from COVID-19, superimposed PNA, and COPD exacerbation. She was also found to develop rhabdomyolysis secondary to long time immobility after fall. RRT called 7/26 for AMS, s/p intubation and transfer to MICU for further care.     #Neuro-  ##acute metabolic encephalopathy in setting of COVID induced encephalopathy vs sepsis   COVID+, continue with treatment of COVID as below  CTH no acute changes    ##drug induced parkinson's disease 2/2 psychotropic medication for schizoaffective disorder   home regimen: amantadine  baseline of bilateral hand tremor and plantar flexion, A+O x2-3  - c/w amantadine     #Pulm-  hx of COPD not on home oxygen  acute hypoxic respiratory failure 2/2 COVID-19, superimposed PNA, and COPD exacerbation  s/p intubation 7/26  -c/w COVID treatment (decadron 7/24-8/1), (remdesevir 7/23-7/27)  -c/w empiric abx (zosyn 7/24 --> )  - Discontinued Symbicort, PRN albuterol ordered   -f/u ABG  -wean as tolerated    #CV-  -stable; no active issues   -monitor BP     #GI-  - per S+S evaluation - recommended for pureed with moderately thick liquids  - Start tube feeds     #Renal-  rhabdomyolysis 2/2 fall and prolonged immobility   -elevation of CK > 4000, Cr wnl, elevated BUN, elevated urobilinogen  -c/w IVF   -trend CK, CR  -Monitor urine output, electrolytes, and kidney function.    #ID-  COVID-19 and superimposed PNA   ID following  blood cx growing coag negative staph - likely contaminant   c/w zosyn   blood cx pending   MRSA swab positive, started mupirocin    #Heme-  trend d-dimer given COVID+  c/w therapeutic lovenox per hospital guidelines     #Endocrine-  A1C pending  SSI   will monitor POCT and adjust insulin regimen accordingly     #Skin   open facial wound 2/2 ?fall  wound care c/s     #PPx- lovenox     #Dispo-. Assessment/Plan:     64F with hx of Parkinsons, COPD, T2DM, COPD, Schizoaffective disorder BIBEMS after unwitnessed fall, found to be hypoxic to 70s with EMS, a/w sepsis and acute hypoxic respiratory failure likely multifactorial from COVID-19, superimposed PNA, and COPD exacerbation. She was also found to develop rhabdomyolysis secondary to long time immobility after fall. RRT called 7/26 for AMS, s/p intubation and transfer to MICU for further care.     #Neuro-  ##acute metabolic encephalopathy in setting of COVID induced encephalopathy vs sepsis   COVID+, continue with treatment of COVID as below  CTH no acute changes  Will transition sedation from propofol to precedex, in preparation for CPAP trial    ##drug induced parkinson's disease 2/2 psychotropic medication for schizoaffective disorder   home regimen: amantadine  baseline of bilateral hand tremor and plantar flexion, A+O x2-3  - c/w amantadine     #Pulm-  hx of COPD not on home oxygen  acute hypoxic respiratory failure 2/2 COVID-19, superimposed PNA, and COPD exacerbation  s/p intubation 7/26  -c/w COVID treatment (decadron 7/24-8/1), (remdesevir 7/23-7/27)  -c/w empiric abx - started 7/24. Will continue for 5-7 days after aspiration event on 7/26  - Discontinued Symbicort, PRN albuterol ordered   -f/u ABG  -wean as tolerated    #CV-  -stable; no active issues   -monitor BP     #GI-  - per S+S evaluation - recommended for pureed with moderately thick liquids  - Hold tube feeds in preparation for possible extubation    #Renal-  rhabdomyolysis 2/2 fall and prolonged immobility   -elevation of CK > 4000, Cr wnl, elevated BUN, elevated urobilinogen  -c/w IVF   -trend CK, CR  -Monitor urine output, electrolytes, and kidney function.    #ID-  COVID-19 and superimposed PNA   ID following  blood cx growing coag negative staph - likely contaminant   c/w zosyn   blood cx pending   MRSA swab positive, started mupirocin    #Heme-  trend d-dimer given COVID+  c/w therapeutic lovenox per hospital guidelines     #Endocrine-  A1C pending  SSI   will monitor POCT and adjust insulin regimen accordingly     #Skin   open facial wound 2/2 ?fall  wound care c/s     #PPx- lovenox     #Dispo-.

## 2022-07-28 NOTE — PROGRESS NOTE ADULT - SUBJECTIVE AND OBJECTIVE BOX
*******************************  Melissa Reyes PGY-1  *******************************    INTERVAL HPI/OVERNIGHT EVENTS:    SUBJECTIVE: Patient seen and examined at bedside.     Unable to obtain ROS as patient intubated and sedated.    OBJECTIVE:    VITAL SIGNS:  ICU Vital Signs Last 24 Hrs  T(C): 37.1 (2022 04:00), Max: 37.4 (2022 09:00)  T(F): 98.8 (2022 04:00), Max: 99.3 (2022 09:00)  HR: 47 (2022 06:00) (47 - 148)  BP: 98/68 (2022 06:00) (87/62 - 149/92)  BP(mean): 78 (2022 06:00) (71 - 115)  ABP: --  ABP(mean): --  RR: 18 (2022 06:00) (13 - 24)  SpO2: 97% (2022 06:00) (94% - 98%)    O2 Parameters below as of 2022 06:00  Patient On (Oxygen Delivery Method): ventilator    O2 Concentration (%): 50      Mode: AC/ CMV (Assist Control/ Continuous Mandatory Ventilation), RR (machine): 18, TV (machine): 400, FiO2: 50, PEEP: 5, ITime: 0.76, MAP: 9, PIP: 22    -27 @ 07:01  -  -28 @ 07:00  --------------------------------------------------------  IN: 2273 mL / OUT: 945 mL / NET: 1328 mL      CAPILLARY BLOOD GLUCOSE      POCT Blood Glucose.: 158 mg/dL (2022 05:08)        PHYSICAL EXAM:  Constitutional: intubated + sedated  Head: Wounds on mandible  Eyes: PERRL, sclera clear  ENMT: MMM, clear oropharynx  Respiratory: CTAB  Cardiovascular: RRR, radial pulses 2+  Gastrointestinal: +BS, soft, NT/ND  Neurological: intubated + sedated  Psychiatric: unable to assess  Extremities: + Shaking bilateral upper extremities      MEDICATIONS:  MEDICATIONS  (STANDING):  amantadine Syrup 100 milliGRAM(s) Oral two times a day  chlorhexidine 0.12% Liquid 15 milliLiter(s) Oral Mucosa every 12 hours  chlorhexidine 4% Liquid 1 Application(s) Topical <User Schedule>  dexAMETHasone  Injectable 6 milliGRAM(s) IV Push daily  dextrose 5%. 1000 milliLiter(s) (50 mL/Hr) IV Continuous <Continuous>  dextrose 5%. 1000 milliLiter(s) (100 mL/Hr) IV Continuous <Continuous>  dextrose 50% Injectable 25 Gram(s) IV Push once  dextrose 50% Injectable 12.5 Gram(s) IV Push once  dextrose 50% Injectable 25 Gram(s) IV Push once  enoxaparin Injectable 40 milliGRAM(s) SubCutaneous every 12 hours  glucagon  Injectable 1 milliGRAM(s) IntraMuscular once  insulin lispro (ADMELOG) corrective regimen sliding scale   SubCutaneous every 6 hours  mupirocin 2% Ointment 1 Application(s) Topical two times a day  petrolatum Ophthalmic Ointment 1 Application(s) Both EYES two times a day  piperacillin/tazobactam IVPB.. 3.375 Gram(s) IV Intermittent every 8 hours  propofol Infusion 67.685 MICROgram(s)/kG/Min (35 mL/Hr) IV Continuous <Continuous>    MEDICATIONS  (PRN):  acetaminophen     Tablet .. 650 milliGRAM(s) Oral every 6 hours PRN Temp greater or equal to 38C (100.4F), Mild Pain (1 - 3), Moderate Pain (4 - 6)  ALBUTerol    90 MICROgram(s) HFA Inhaler 2 Puff(s) Inhalation every 6 hours PRN Shortness of Breath and/or Wheezing  dextrose Oral Gel 15 Gram(s) Oral once PRN Blood Glucose LESS THAN 70 milliGRAM(s)/deciliter  dextrose Oral Gel 15 Gram(s) Oral once PRN Blood Glucose LESS THAN 70 milliGRAM(s)/deciliter      ALLERGIES:  Allergies    No Known Allergies    Intolerances        LABS:                        11.2   8.47  )-----------( 167      ( 2022 01:18 )             37.5     07-28    142  |  110<H>  |  29<H>  ----------------------------<  187<H>  4.2   |  23  |  0.67    Ca    8.2<L>      2022 01:18  Phos  3.2       Mg     1.90         TPro  4.3<L>  /  Alb  2.5<L>  /  TBili  0.3  /  DBili  x   /  AST  37<H>  /  ALT  57<H>  /  AlkPhos  40      PT/INR - ( 2022 01:18 )   PT: 14.5 sec;   INR: 1.25 ratio         PTT - ( 2022 01:18 )  PTT:26.5 sec  Urinalysis Basic - ( 2022 23:05 )    Color: Yellow / Appearance: Clear / S.044 / pH: x  Gluc: x / Ketone: Small  / Bili: Negative / Urobili: <2 mg/dL   Blood: x / Protein: 30 mg/dL / Nitrite: Negative   Leuk Esterase: Negative / RBC: 1 /HPF / WBC 3 /HPF   Sq Epi: x / Non Sq Epi: 4 /HPF / Bacteria: Few        RADIOLOGY & ADDITIONAL TESTS: Reviewed.     *******************************  Melissa Reyes PGY-1  *******************************    INTERVAL HPI/OVERNIGHT EVENTS: No acute events overnight.    SUBJECTIVE: Patient seen and examined at bedside. Shaking less than yesterday. Does not respond to noxious stimuli.     Unable to obtain ROS as patient intubated and sedated.    OBJECTIVE:    VITAL SIGNS:  ICU Vital Signs Last 24 Hrs  T(C): 37.1 (2022 04:00), Max: 37.4 (2022 09:00)  T(F): 98.8 (2022 04:00), Max: 99.3 (2022 09:00)  HR: 47 (2022 06:00) (47 - 148)  BP: 98/68 (2022 06:00) (87/62 - 149/92)  BP(mean): 78 (2022 06:00) (71 - 115)  ABP: --  ABP(mean): --  RR: 18 (2022 06:00) (13 - 24)  SpO2: 97% (2022 06:00) (94% - 98%)    O2 Parameters below as of 2022 06:00  Patient On (Oxygen Delivery Method): ventilator    O2 Concentration (%): 50      Mode: AC/ CMV (Assist Control/ Continuous Mandatory Ventilation), RR (machine): 18, TV (machine): 400, FiO2: 50, PEEP: 5, ITime: 0.76, MAP: 9, PIP: 22    -27 @ 07:01  -  - @ 07:00  --------------------------------------------------------  IN: 2273 mL / OUT: 945 mL / NET: 1328 mL      CAPILLARY BLOOD GLUCOSE      POCT Blood Glucose.: 158 mg/dL (2022 05:08)        PHYSICAL EXAM:  Constitutional: intubated + sedated  Head: Wounds on mandible  Eyes: PERRL, sclera clear  ENMT: MMM, clear oropharynx  Respiratory: CTAB, mechanical breath sounds  Cardiovascular: RRR, radial pulses 2+  Gastrointestinal: +BS, soft, NT/ND  Neurological: intubated + sedated  Psychiatric: unable to assess  Extremities: + Shaking bilateral upper extremities, improved from yesterday      MEDICATIONS:  MEDICATIONS  (STANDING):  amantadine Syrup 100 milliGRAM(s) Oral two times a day  chlorhexidine 0.12% Liquid 15 milliLiter(s) Oral Mucosa every 12 hours  chlorhexidine 4% Liquid 1 Application(s) Topical <User Schedule>  dexAMETHasone  Injectable 6 milliGRAM(s) IV Push daily  dextrose 5%. 1000 milliLiter(s) (50 mL/Hr) IV Continuous <Continuous>  dextrose 5%. 1000 milliLiter(s) (100 mL/Hr) IV Continuous <Continuous>  dextrose 50% Injectable 25 Gram(s) IV Push once  dextrose 50% Injectable 12.5 Gram(s) IV Push once  dextrose 50% Injectable 25 Gram(s) IV Push once  enoxaparin Injectable 40 milliGRAM(s) SubCutaneous every 12 hours  glucagon  Injectable 1 milliGRAM(s) IntraMuscular once  insulin lispro (ADMELOG) corrective regimen sliding scale   SubCutaneous every 6 hours  mupirocin 2% Ointment 1 Application(s) Topical two times a day  petrolatum Ophthalmic Ointment 1 Application(s) Both EYES two times a day  piperacillin/tazobactam IVPB.. 3.375 Gram(s) IV Intermittent every 8 hours  propofol Infusion 67.685 MICROgram(s)/kG/Min (35 mL/Hr) IV Continuous <Continuous>    MEDICATIONS  (PRN):  acetaminophen     Tablet .. 650 milliGRAM(s) Oral every 6 hours PRN Temp greater or equal to 38C (100.4F), Mild Pain (1 - 3), Moderate Pain (4 - 6)  ALBUTerol    90 MICROgram(s) HFA Inhaler 2 Puff(s) Inhalation every 6 hours PRN Shortness of Breath and/or Wheezing  dextrose Oral Gel 15 Gram(s) Oral once PRN Blood Glucose LESS THAN 70 milliGRAM(s)/deciliter  dextrose Oral Gel 15 Gram(s) Oral once PRN Blood Glucose LESS THAN 70 milliGRAM(s)/deciliter      ALLERGIES:  Allergies    No Known Allergies    Intolerances        LABS:                        11.2   8.47  )-----------( 167      ( 2022 01:18 )             37.5         142  |  110<H>  |  29<H>  ----------------------------<  187<H>  4.2   |  23  |  0.67    Ca    8.2<L>      2022 01:18  Phos  3.2       Mg     1.90         TPro  4.3<L>  /  Alb  2.5<L>  /  TBili  0.3  /  DBili  x   /  AST  37<H>  /  ALT  57<H>  /  AlkPhos  40      PT/INR - ( 2022 01:18 )   PT: 14.5 sec;   INR: 1.25 ratio         PTT - ( 2022 01:18 )  PTT:26.5 sec  Urinalysis Basic - ( 2022 23:05 )    Color: Yellow / Appearance: Clear / S.044 / pH: x  Gluc: x / Ketone: Small  / Bili: Negative / Urobili: <2 mg/dL   Blood: x / Protein: 30 mg/dL / Nitrite: Negative   Leuk Esterase: Negative / RBC: 1 /HPF / WBC 3 /HPF   Sq Epi: x / Non Sq Epi: 4 /HPF / Bacteria: Few        RADIOLOGY & ADDITIONAL TESTS: Reviewed.

## 2022-07-29 LAB
ALBUMIN SERPL ELPH-MCNC: 2.9 G/DL — LOW (ref 3.3–5)
ALP SERPL-CCNC: 48 U/L — SIGNIFICANT CHANGE UP (ref 40–120)
ALT FLD-CCNC: 60 U/L — HIGH (ref 4–33)
ANION GAP SERPL CALC-SCNC: 8 MMOL/L — SIGNIFICANT CHANGE UP (ref 7–14)
APTT BLD: 25 SEC — LOW (ref 27–36.3)
AST SERPL-CCNC: 36 U/L — HIGH (ref 4–32)
BASOPHILS # BLD AUTO: 0.01 K/UL — SIGNIFICANT CHANGE UP (ref 0–0.2)
BASOPHILS NFR BLD AUTO: 0.1 % — SIGNIFICANT CHANGE UP (ref 0–2)
BILIRUB SERPL-MCNC: 0.4 MG/DL — SIGNIFICANT CHANGE UP (ref 0.2–1.2)
BLOOD GAS ARTERIAL - LYTES,HGB,ICA,LACT RESULT: SIGNIFICANT CHANGE UP
BUN SERPL-MCNC: 26 MG/DL — HIGH (ref 7–23)
CALCIUM SERPL-MCNC: 8.5 MG/DL — SIGNIFICANT CHANGE UP (ref 8.4–10.5)
CHLORIDE SERPL-SCNC: 104 MMOL/L — SIGNIFICANT CHANGE UP (ref 98–107)
CK SERPL-CCNC: 283 U/L — HIGH (ref 25–170)
CO2 SERPL-SCNC: 25 MMOL/L — SIGNIFICANT CHANGE UP (ref 22–31)
CREAT SERPL-MCNC: 0.63 MG/DL — SIGNIFICANT CHANGE UP (ref 0.5–1.3)
CRP SERPL-MCNC: 6.1 MG/L — HIGH
CULTURE RESULTS: SIGNIFICANT CHANGE UP
D DIMER BLD IA.RAPID-MCNC: 232 NG/ML DDU — HIGH
EGFR: 98 ML/MIN/1.73M2 — SIGNIFICANT CHANGE UP
EOSINOPHIL # BLD AUTO: 0.03 K/UL — SIGNIFICANT CHANGE UP (ref 0–0.5)
EOSINOPHIL NFR BLD AUTO: 0.2 % — SIGNIFICANT CHANGE UP (ref 0–6)
FERRITIN SERPL-MCNC: 304 NG/ML — HIGH (ref 15–150)
GLUCOSE BLDC GLUCOMTR-MCNC: 142 MG/DL — HIGH (ref 70–99)
GLUCOSE BLDC GLUCOMTR-MCNC: 156 MG/DL — HIGH (ref 70–99)
GLUCOSE BLDC GLUCOMTR-MCNC: 180 MG/DL — HIGH (ref 70–99)
GLUCOSE BLDC GLUCOMTR-MCNC: 196 MG/DL — HIGH (ref 70–99)
GLUCOSE SERPL-MCNC: 208 MG/DL — HIGH (ref 70–99)
HCT VFR BLD CALC: 43.1 % — SIGNIFICANT CHANGE UP (ref 34.5–45)
HGB BLD-MCNC: 13.1 G/DL — SIGNIFICANT CHANGE UP (ref 11.5–15.5)
IANC: 7.95 K/UL — HIGH (ref 1.8–7.4)
IMM GRANULOCYTES NFR BLD AUTO: 1 % — SIGNIFICANT CHANGE UP (ref 0–1.5)
INR BLD: 1.2 RATIO — HIGH (ref 0.88–1.16)
LYMPHOCYTES # BLD AUTO: 36 % — SIGNIFICANT CHANGE UP (ref 13–44)
LYMPHOCYTES # BLD AUTO: 5.06 K/UL — HIGH (ref 1–3.3)
MAGNESIUM SERPL-MCNC: 1.9 MG/DL — SIGNIFICANT CHANGE UP (ref 1.6–2.6)
MCHC RBC-ENTMCNC: 25.6 PG — LOW (ref 27–34)
MCHC RBC-ENTMCNC: 30.4 GM/DL — LOW (ref 32–36)
MCV RBC AUTO: 84.2 FL — SIGNIFICANT CHANGE UP (ref 80–100)
MONOCYTES # BLD AUTO: 0.87 K/UL — SIGNIFICANT CHANGE UP (ref 0–0.9)
MONOCYTES NFR BLD AUTO: 6.2 % — SIGNIFICANT CHANGE UP (ref 2–14)
NEUTROPHILS # BLD AUTO: 7.95 K/UL — HIGH (ref 1.8–7.4)
NEUTROPHILS NFR BLD AUTO: 56.5 % — SIGNIFICANT CHANGE UP (ref 43–77)
NRBC # BLD: 0 /100 WBCS — SIGNIFICANT CHANGE UP
NRBC # FLD: 0.02 K/UL — HIGH
PHOSPHATE SERPL-MCNC: 3 MG/DL — SIGNIFICANT CHANGE UP (ref 2.5–4.5)
PLATELET # BLD AUTO: 182 K/UL — SIGNIFICANT CHANGE UP (ref 150–400)
POTASSIUM SERPL-MCNC: 4.1 MMOL/L — SIGNIFICANT CHANGE UP (ref 3.5–5.3)
POTASSIUM SERPL-SCNC: 4.1 MMOL/L — SIGNIFICANT CHANGE UP (ref 3.5–5.3)
PROT SERPL-MCNC: 4.9 G/DL — LOW (ref 6–8.3)
PROTHROM AB SERPL-ACNC: 14 SEC — HIGH (ref 10.5–13.4)
RBC # BLD: 5.12 M/UL — SIGNIFICANT CHANGE UP (ref 3.8–5.2)
RBC # FLD: 13.4 % — SIGNIFICANT CHANGE UP (ref 10.3–14.5)
SODIUM SERPL-SCNC: 137 MMOL/L — SIGNIFICANT CHANGE UP (ref 135–145)
SPECIMEN SOURCE: SIGNIFICANT CHANGE UP
WBC # BLD: 14.06 K/UL — HIGH (ref 3.8–10.5)
WBC # FLD AUTO: 14.06 K/UL — HIGH (ref 3.8–10.5)

## 2022-07-29 PROCEDURE — 71045 X-RAY EXAM CHEST 1 VIEW: CPT | Mod: 26

## 2022-07-29 PROCEDURE — 99291 CRITICAL CARE FIRST HOUR: CPT

## 2022-07-29 RX ADMIN — Medication 1: at 12:11

## 2022-07-29 RX ADMIN — MUPIROCIN 1 APPLICATION(S): 20 OINTMENT TOPICAL at 17:56

## 2022-07-29 RX ADMIN — Medication 1: at 17:55

## 2022-07-29 RX ADMIN — Medication 100 MILLIGRAM(S): at 05:08

## 2022-07-29 RX ADMIN — MUPIROCIN 1 APPLICATION(S): 20 OINTMENT TOPICAL at 05:10

## 2022-07-29 RX ADMIN — Medication 1 APPLICATION(S): at 06:03

## 2022-07-29 RX ADMIN — Medication 1 APPLICATION(S): at 17:56

## 2022-07-29 RX ADMIN — PIPERACILLIN AND TAZOBACTAM 25 GRAM(S): 4; .5 INJECTION, POWDER, LYOPHILIZED, FOR SOLUTION INTRAVENOUS at 09:56

## 2022-07-29 RX ADMIN — Medication 100 MILLIGRAM(S): at 17:56

## 2022-07-29 RX ADMIN — Medication 1: at 05:09

## 2022-07-29 RX ADMIN — CHLORHEXIDINE GLUCONATE 15 MILLILITER(S): 213 SOLUTION TOPICAL at 05:09

## 2022-07-29 RX ADMIN — ENOXAPARIN SODIUM 40 MILLIGRAM(S): 100 INJECTION SUBCUTANEOUS at 04:53

## 2022-07-29 RX ADMIN — PIPERACILLIN AND TAZOBACTAM 25 GRAM(S): 4; .5 INJECTION, POWDER, LYOPHILIZED, FOR SOLUTION INTRAVENOUS at 17:55

## 2022-07-29 RX ADMIN — Medication 6 MILLIGRAM(S): at 05:08

## 2022-07-29 RX ADMIN — ENOXAPARIN SODIUM 40 MILLIGRAM(S): 100 INJECTION SUBCUTANEOUS at 17:55

## 2022-07-29 RX ADMIN — PIPERACILLIN AND TAZOBACTAM 25 GRAM(S): 4; .5 INJECTION, POWDER, LYOPHILIZED, FOR SOLUTION INTRAVENOUS at 01:49

## 2022-07-29 NOTE — PROGRESS NOTE ADULT - SUBJECTIVE AND OBJECTIVE BOX
*******************************  Melissa Reyes PGY-1  *******************************    INTERVAL HPI/OVERNIGHT EVENTS:    SUBJECTIVE: Patient seen and examined at bedside.     CONSTITUTIONAL: No weakness, fevers or chills  EYES/ENT: No visual changes;  No vertigo or throat pain   NECK: No pain or stiffness  RESPIRATORY: No cough, wheezing, hemoptysis; No shortness of breath  CARDIOVASCULAR: No chest pain or palpitations  GASTROINTESTINAL: No abdominal or epigastric pain. No nausea, vomiting, or hematemesis; No diarrhea or constipation. No melena or hematochezia.  GENITOURINARY: No dysuria, frequency or hematuria  NEUROLOGICAL: No numbness or weakness  SKIN: No itching, rashes    OBJECTIVE:    VITAL SIGNS:  ICU Vital Signs Last 24 Hrs  T(C): 36.6 (29 Jul 2022 04:00), Max: 37.4 (28 Jul 2022 12:00)  T(F): 97.8 (29 Jul 2022 04:00), Max: 99.3 (28 Jul 2022 12:00)  HR: 62 (29 Jul 2022 06:00) (41 - 80)  BP: 113/82 (29 Jul 2022 06:00) (103/67 - 155/98)  BP(mean): 92 (29 Jul 2022 06:00) (78 - 125)  ABP: --  ABP(mean): --  RR: 20 (29 Jul 2022 06:00) (18 - 28)  SpO2: 99% (29 Jul 2022 06:00) (95% - 99%)    O2 Parameters below as of 29 Jul 2022 06:00  Patient On (Oxygen Delivery Method): ventilator    O2 Concentration (%): 40      Mode: CPAP with PS, FiO2: 40, PEEP: 5, PS: 10, MAP: 10, PIP: 19    07-27 @ 07:01  -  07-28 @ 07:00  --------------------------------------------------------  IN: 2273 mL / OUT: 945 mL / NET: 1328 mL    07-28 @ 07:01 - 07-29 @ 06:56  --------------------------------------------------------  IN: 1028.8 mL / OUT: 1455 mL / NET: -426.2 mL      CAPILLARY BLOOD GLUCOSE      POCT Blood Glucose.: 180 mg/dL (29 Jul 2022 05:00)        PHYSICAL EXAM:  Constitutional: intubated + sedated  Head: Wounds on mandible  Eyes: PERRL, sclera clear  ENMT: MMM, clear oropharynx  Respiratory: CTAB, mechanical breath sounds  Cardiovascular: RRR, radial pulses 2+  Gastrointestinal: +BS, soft, NT/ND  Neurological: intubated + sedated  Psychiatric: unable to assess  Extremities: + Shaking bilateral upper extremities, improved from yesterday    MEDICATIONS:  MEDICATIONS  (STANDING):  amantadine Syrup 100 milliGRAM(s) Oral two times a day  chlorhexidine 0.12% Liquid 15 milliLiter(s) Oral Mucosa every 12 hours  dexAMETHasone  Injectable 6 milliGRAM(s) IV Push daily  dextrose 5%. 1000 milliLiter(s) (50 mL/Hr) IV Continuous <Continuous>  dextrose 5%. 1000 milliLiter(s) (100 mL/Hr) IV Continuous <Continuous>  dextrose 50% Injectable 25 Gram(s) IV Push once  dextrose 50% Injectable 12.5 Gram(s) IV Push once  dextrose 50% Injectable 25 Gram(s) IV Push once  enoxaparin Injectable 40 milliGRAM(s) SubCutaneous every 12 hours  glucagon  Injectable 1 milliGRAM(s) IntraMuscular once  insulin lispro (ADMELOG) corrective regimen sliding scale   SubCutaneous every 6 hours  mupirocin 2% Ointment 1 Application(s) Topical two times a day  petrolatum Ophthalmic Ointment 1 Application(s) Both EYES two times a day  piperacillin/tazobactam IVPB.. 3.375 Gram(s) IV Intermittent every 8 hours    MEDICATIONS  (PRN):  acetaminophen     Tablet .. 650 milliGRAM(s) Oral every 6 hours PRN Temp greater or equal to 38C (100.4F), Mild Pain (1 - 3), Moderate Pain (4 - 6)  ALBUTerol    90 MICROgram(s) HFA Inhaler 2 Puff(s) Inhalation every 6 hours PRN Shortness of Breath and/or Wheezing  dextrose Oral Gel 15 Gram(s) Oral once PRN Blood Glucose LESS THAN 70 milliGRAM(s)/deciliter  dextrose Oral Gel 15 Gram(s) Oral once PRN Blood Glucose LESS THAN 70 milliGRAM(s)/deciliter      ALLERGIES:  Allergies    No Known Allergies    Intolerances        LABS:                        13.1   14.06 )-----------( 182      ( 29 Jul 2022 02:00 )             43.1     07-29    137  |  104  |  26<H>  ----------------------------<  208<H>  4.1   |  25  |  0.63    Ca    8.5      29 Jul 2022 02:00  Phos  3.0     07-29  Mg     1.90     07-29    TPro  4.9<L>  /  Alb  2.9<L>  /  TBili  0.4  /  DBili  x   /  AST  36<H>  /  ALT  60<H>  /  AlkPhos  48  07-29    PT/INR - ( 29 Jul 2022 02:00 )   PT: 14.0 sec;   INR: 1.20 ratio         PTT - ( 29 Jul 2022 02:00 )  PTT:25.0 sec      RADIOLOGY & ADDITIONAL TESTS: Reviewed.     *******************************  Melissa Reyes PGY-1  *******************************    INTERVAL HPI/OVERNIGHT EVENTS: No acute events overnight.     SUBJECTIVE: Patient seen and examined at bedside. Follows commands to move toes, and tracks. Did not respond when asked about pain/discomfort.    Unable to obtain ROS as patient not answering questions.     OBJECTIVE:    VITAL SIGNS:  ICU Vital Signs Last 24 Hrs  T(C): 36.6 (29 Jul 2022 04:00), Max: 37.4 (28 Jul 2022 12:00)  T(F): 97.8 (29 Jul 2022 04:00), Max: 99.3 (28 Jul 2022 12:00)  HR: 62 (29 Jul 2022 06:00) (41 - 80)  BP: 113/82 (29 Jul 2022 06:00) (103/67 - 155/98)  BP(mean): 92 (29 Jul 2022 06:00) (78 - 125)  ABP: --  ABP(mean): --  RR: 20 (29 Jul 2022 06:00) (18 - 28)  SpO2: 99% (29 Jul 2022 06:00) (95% - 99%)    O2 Parameters below as of 29 Jul 2022 06:00  Patient On (Oxygen Delivery Method): ventilator    O2 Concentration (%): 40      Mode: CPAP with PS, FiO2: 40, PEEP: 5, PS: 10, MAP: 10, PIP: 19    07-27 @ 07:01 - 07-28 @ 07:00  --------------------------------------------------------  IN: 2273 mL / OUT: 945 mL / NET: 1328 mL    07-28 @ 07:01 - 07-29 @ 06:56  --------------------------------------------------------  IN: 1028.8 mL / OUT: 1455 mL / NET: -426.2 mL      CAPILLARY BLOOD GLUCOSE      POCT Blood Glucose.: 180 mg/dL (29 Jul 2022 05:00)        PHYSICAL EXAM:  Constitutional: intubated, off sedation, no acute distress.   Head: Wounds on mandible  Eyes: PERRL, dilated, EOM appear limited, patient not following commands to track finger, sclera clear  ENMT: MMM, clear oropharynx  Respiratory: CTAB, mechanical breath sounds  Cardiovascular: RRR, radial pulses 2+  Gastrointestinal: +BS, soft, NT/ND  Neurological: intubated, follows commands to move toes, does not answer questions, tracks  Psychiatric: unable to assess  Extremities: + Shaking bilateral upper extremities    MEDICATIONS:  MEDICATIONS  (STANDING):  amantadine Syrup 100 milliGRAM(s) Oral two times a day  chlorhexidine 0.12% Liquid 15 milliLiter(s) Oral Mucosa every 12 hours  dexAMETHasone  Injectable 6 milliGRAM(s) IV Push daily  dextrose 5%. 1000 milliLiter(s) (50 mL/Hr) IV Continuous <Continuous>  dextrose 5%. 1000 milliLiter(s) (100 mL/Hr) IV Continuous <Continuous>  dextrose 50% Injectable 25 Gram(s) IV Push once  dextrose 50% Injectable 12.5 Gram(s) IV Push once  dextrose 50% Injectable 25 Gram(s) IV Push once  enoxaparin Injectable 40 milliGRAM(s) SubCutaneous every 12 hours  glucagon  Injectable 1 milliGRAM(s) IntraMuscular once  insulin lispro (ADMELOG) corrective regimen sliding scale   SubCutaneous every 6 hours  mupirocin 2% Ointment 1 Application(s) Topical two times a day  petrolatum Ophthalmic Ointment 1 Application(s) Both EYES two times a day  piperacillin/tazobactam IVPB.. 3.375 Gram(s) IV Intermittent every 8 hours    MEDICATIONS  (PRN):  acetaminophen     Tablet .. 650 milliGRAM(s) Oral every 6 hours PRN Temp greater or equal to 38C (100.4F), Mild Pain (1 - 3), Moderate Pain (4 - 6)  ALBUTerol    90 MICROgram(s) HFA Inhaler 2 Puff(s) Inhalation every 6 hours PRN Shortness of Breath and/or Wheezing  dextrose Oral Gel 15 Gram(s) Oral once PRN Blood Glucose LESS THAN 70 milliGRAM(s)/deciliter  dextrose Oral Gel 15 Gram(s) Oral once PRN Blood Glucose LESS THAN 70 milliGRAM(s)/deciliter      ALLERGIES:  Allergies    No Known Allergies    Intolerances        LABS:                        13.1   14.06 )-----------( 182      ( 29 Jul 2022 02:00 )             43.1     07-29    137  |  104  |  26<H>  ----------------------------<  208<H>  4.1   |  25  |  0.63    Ca    8.5      29 Jul 2022 02:00  Phos  3.0     07-29  Mg     1.90     07-29    TPro  4.9<L>  /  Alb  2.9<L>  /  TBili  0.4  /  DBili  x   /  AST  36<H>  /  ALT  60<H>  /  AlkPhos  48  07-29    PT/INR - ( 29 Jul 2022 02:00 )   PT: 14.0 sec;   INR: 1.20 ratio         PTT - ( 29 Jul 2022 02:00 )  PTT:25.0 sec      RADIOLOGY & ADDITIONAL TESTS: Reviewed.

## 2022-07-29 NOTE — CHART NOTE - NSCHARTNOTEFT_GEN_A_CORE
MICU Transfer Note    Transfer from: MICU    Transfer to: ( x ) Medicine    (  ) Telemetry     (   ) RCU        (    ) Palliative         (   ) Stroke Unit          (   ) __________________    Accepting Physician:  Signout given to:     MICU COURSE:    Ms. Chaz Taylor is a 67 year old female with a past medical history of Parkinson disease (on amantadine), COPD (not on home oxygen), schizoaffective disorder (currently not on medications), DM2 (on metformin)  presented to the ED for unwitnessed fall. She was found lying on the ground with face downwards at 10am this morning since her home aide left at 7pm yesterday. Per home aide, she presumably fell to the ground for attempting to answer the door for medications. Pt has a baseline of chronic right-side weakness, bilateral hand termor and needed walker to ambulate. Per home aide, pt has been doing well with no physical complaints. She had two prior inconsequential falls due to similar events of attempting to answering door. Pt is limited in terms of history telling but alert and oriented x2, follow command, answering some questions.     At the ED, Pt is found to be severely hypoxic with O2 sat less than 70, tachycardiac, tachypneic, febrile to 100.9. Her lab is positive for leukocytosis, increased AG, significant elevation of CK and BNP, metabolic acidosis from VBG, COVID+.  CT chest is positive for atelectasis and right lung lobular consolidation. She responded to non-rebreather with O2 sat 100% then tolerated well to nasal canula 6 litter with goal of O2 sat > 88%. Her vitals are improved after 1L NS with /64, HR 93, RR 24.  Pt is admitted for acute respiratory failure in the setting of covid vs bacteria pneumonia and sepsis as well as rhabdomyolysis.     Patient treated on the floor for acute hypoxic respiratory failure likely multifactorial from COVID-19, superimposed PNA, and COPD exacerbation. She was also found to develop rhabdomyolysis secondary to long time immobility after fall.     RRT called 7/26 for AMS and hypoxia. Given concern for airway protection given mental status change, decision made to intubate patient. Patient transferred to the MICU for further care.     Patient treated for acute hypoxic respiratory failure in the setting of presumed aspiration pneumonia. Patient started on abx. Patient was successfully extubated and remained hemodynamically stable. Patient now stable for transfer to the medicine floor.      ASSESSMENT & PLAN:     64F with hx of Parkinsons, COPD, T2DM, COPD, Schizoaffective disorder BIBEMS after unwitnessed fall, found to be hypoxic to 70s with EMS, a/w sepsis and acute hypoxic respiratory failure likely multifactorial from COVID-19, superimposed PNA, and COPD exacerbation. She was also found to develop rhabdomyolysis secondary to long time immobility after fall. RRT called 7/26 for AMS, s/p intubation and transfer to MICU for further care.     #Neuro-  ##acute metabolic encephalopathy in setting of COVID induced encephalopathy vs sepsis   COVID+, continue with treatment of COVID as below  CTH no acute changes  Off sedation    ##drug induced parkinson's disease 2/2 psychotropic medication for schizoaffective disorder   home regimen: amantadine  baseline of bilateral hand tremor and plantar flexion, A+O x2-3  - c/w amantadine     #Pulm-  hx of COPD not on home oxygen  acute hypoxic respiratory failure 2/2 COVID-19, superimposed PNA, and COPD exacerbation  s/p intubation 7/26  -c/w COVID treatment (decadron 7/24-8/1), (remdesevir 7/23-7/27)  -c/w empiric abx - started 7/24. Will continue for 5-7 days after aspiration event on 7/26  - Discontinued Symbicort, PRN albuterol ordered   - ABG 7/29 with 7.5 | 36 | 137 | 28 | 98.9  - Extubated 7/29 10 AM    #CV-  -stable; no active issues   -monitor BP     #GI-  - per S+S evaluation - recommended for pureed with moderately thick liquids  - Can restart diet if tolerating extubation    #Renal-  rhabdomyolysis 2/2 fall and prolonged immobility   -elevation of CK > 4000, Cr wnl, elevated BUN, elevated urobilinogen  -c/w IVF   -trend CK, CR  -Monitor urine output, electrolytes, and kidney function.    #ID-  COVID-19 and superimposed PNA   ID following  blood cx growing coag negative staph - likely contaminant   c/w zosyn   blood cx pending   MRSA swab positive, started mupirocin    #Heme-  trend d-dimer given COVID+  c/w therapeutic lovenox per hospital guidelines     #Endocrine-  A1C pending  SSI   will monitor POCT and adjust insulin regimen accordingly     #Skin   open facial wound 2/2 ?fall  wound care c/s     #PPx- lovenox     #Dispo-.    FOR FOLLOW UP:    [ ] wean NC as tolerated  [ ] S+S eval given recent asp PNA MICU Transfer Note    Transfer from: MICU    Transfer to: ( x ) Medicine    (  ) Telemetry     (   ) RCU        (    ) Palliative         (   ) Stroke Unit          (   ) __________________    Accepting Physician: Pablo  Signout given to:  Eddie Ojeda      MICU COURSE:    Ms. Chaz Taylor is a 67 year old female with a past medical history of Parkinson disease (on amantadine), COPD (not on home oxygen), schizoaffective disorder (currently not on medications), DM2 (on metformin)  presented to the ED for unwitnessed fall. She was found lying on the ground with face downwards at 10am this morning since her home aide left at 7pm yesterday. Per home aide, she presumably fell to the ground for attempting to answer the door for medications. Pt has a baseline of chronic right-side weakness, bilateral hand termor and needed walker to ambulate. Per home aide, pt has been doing well with no physical complaints. She had two prior inconsequential falls due to similar events of attempting to answering door. Pt is limited in terms of history telling but alert and oriented x2, follow command, answering some questions.     At the ED, Pt is found to be severely hypoxic with O2 sat less than 70, tachycardiac, tachypneic, febrile to 100.9. Her lab is positive for leukocytosis, increased AG, significant elevation of CK and BNP, metabolic acidosis from VBG, COVID+.  CT chest is positive for atelectasis and right lung lobular consolidation. She responded to non-rebreather with O2 sat 100% then tolerated well to nasal canula 6 litter with goal of O2 sat > 88%. Her vitals are improved after 1L NS with /64, HR 93, RR 24.  Pt is admitted for acute respiratory failure in the setting of covid vs bacteria pneumonia and sepsis as well as rhabdomyolysis.     Patient treated on the floor for acute hypoxic respiratory failure likely multifactorial from COVID-19, superimposed PNA, and COPD exacerbation. She was also found to develop rhabdomyolysis secondary to long time immobility after fall.     RRT called 7/26 for AMS and hypoxia. Given concern for airway protection given mental status change, decision made to intubate patient. Patient transferred to the MICU for further care.     Patient treated for acute hypoxic respiratory failure in the setting of presumed aspiration pneumonia. Patient started on abx. Patient was successfully extubated and remained hemodynamically stable. Patient now stable for transfer to the medicine floor.      ASSESSMENT & PLAN:     64F with hx of Parkinsons, COPD, T2DM, COPD, Schizoaffective disorder BIBEMS after unwitnessed fall, found to be hypoxic to 70s with EMS, a/w sepsis and acute hypoxic respiratory failure likely multifactorial from COVID-19, superimposed PNA, and COPD exacerbation. She was also found to develop rhabdomyolysis secondary to long time immobility after fall. RRT called 7/26 for AMS, s/p intubation and transfer to MICU for further care.     #Neuro-  ##acute metabolic encephalopathy in setting of COVID induced encephalopathy vs sepsis   COVID+, continue with treatment of COVID as below  CTH no acute changes  Off sedation    ##drug induced parkinson's disease 2/2 psychotropic medication for schizoaffective disorder   home regimen: amantadine  baseline of bilateral hand tremor and plantar flexion, A+O x2-3  - c/w amantadine     #Pulm-  hx of COPD not on home oxygen  acute hypoxic respiratory failure 2/2 COVID-19, superimposed PNA, and COPD exacerbation  s/p intubation 7/26  -c/w COVID treatment (decadron 7/24-8/1), (remdesevir 7/23-7/27)  -c/w empiric abx - started 7/24. Will continue for 5-7 days after aspiration event on 7/26  - Discontinued Symbicort, PRN albuterol ordered   - ABG 7/29 with 7.5 | 36 | 137 | 28 | 98.9  - Extubated 7/29 10 AM    #CV-  -stable; no active issues   -monitor BP     #GI-  - per S+S evaluation - recommended for pureed with moderately thick liquids  - Can restart diet if tolerating extubation    #Renal-  rhabdomyolysis 2/2 fall and prolonged immobility   -elevation of CK > 4000, Cr wnl, elevated BUN, elevated urobilinogen  -c/w IVF   -trend CK, CR  -Monitor urine output, electrolytes, and kidney function.    #ID-  COVID-19 and superimposed PNA   ID following  blood cx growing coag negative staph - likely contaminant   c/w zosyn   blood cx pending   MRSA swab positive, started mupirocin    #Heme-  trend d-dimer given COVID+  c/w therapeutic lovenox per hospital guidelines     #Endocrine-  A1C pending  SSI   will monitor POCT and adjust insulin regimen accordingly     #Skin   open facial wound 2/2 ?fall  wound care c/s     #PPx- lovenox     #Dispo-.    FOR FOLLOW UP:    [ ] wean NC as tolerated  [ ] S+S eval given recent asp PNA

## 2022-07-29 NOTE — PROGRESS NOTE ADULT - ASSESSMENT
Assessment/Plan:     64F with hx of Parkinsons, COPD, T2DM, COPD, Schizoaffective disorder BIBEMS after unwitnessed fall, found to be hypoxic to 70s with EMS, a/w sepsis and acute hypoxic respiratory failure likely multifactorial from COVID-19, superimposed PNA, and COPD exacerbation. She was also found to develop rhabdomyolysis secondary to long time immobility after fall. RRT called 7/26 for AMS, s/p intubation and transfer to MICU for further care.     #Neuro-  ##acute metabolic encephalopathy in setting of COVID induced encephalopathy vs sepsis   COVID+, continue with treatment of COVID as below  CTH no acute changes  Will transition sedation from propofol to precedex, in preparation for CPAP trial    ##drug induced parkinson's disease 2/2 psychotropic medication for schizoaffective disorder   home regimen: amantadine  baseline of bilateral hand tremor and plantar flexion, A+O x2-3  - c/w amantadine     #Pulm-  hx of COPD not on home oxygen  acute hypoxic respiratory failure 2/2 COVID-19, superimposed PNA, and COPD exacerbation  s/p intubation 7/26  -c/w COVID treatment (decadron 7/24-8/1), (remdesevir 7/23-7/27)  -c/w empiric abx - started 7/24. Will continue for 5-7 days after aspiration event on 7/26  - Discontinued Symbicort, PRN albuterol ordered   -f/u ABG  -wean as tolerated    #CV-  -stable; no active issues   -monitor BP     #GI-  - per S+S evaluation - recommended for pureed with moderately thick liquids  - Hold tube feeds in preparation for possible extubation    #Renal-  rhabdomyolysis 2/2 fall and prolonged immobility   -elevation of CK > 4000, Cr wnl, elevated BUN, elevated urobilinogen  -c/w IVF   -trend CK, CR  -Monitor urine output, electrolytes, and kidney function.    #ID-  COVID-19 and superimposed PNA   ID following  blood cx growing coag negative staph - likely contaminant   c/w zosyn   blood cx pending   MRSA swab positive, started mupirocin    #Heme-  trend d-dimer given COVID+  c/w therapeutic lovenox per hospital guidelines     #Endocrine-  A1C pending  SSI   will monitor POCT and adjust insulin regimen accordingly     #Skin   open facial wound 2/2 ?fall  wound care c/s     #PPx- lovenox     #Dispo-. Assessment/Plan:     64F with hx of Parkinsons, COPD, T2DM, COPD, Schizoaffective disorder BIBEMS after unwitnessed fall, found to be hypoxic to 70s with EMS, a/w sepsis and acute hypoxic respiratory failure likely multifactorial from COVID-19, superimposed PNA, and COPD exacerbation. She was also found to develop rhabdomyolysis secondary to long time immobility after fall. RRT called 7/26 for AMS, s/p intubation and transfer to MICU for further care.     #Neuro-  ##acute metabolic encephalopathy in setting of COVID induced encephalopathy vs sepsis   COVID+, continue with treatment of COVID as below  CTH no acute changes  Off sedation    ##drug induced parkinson's disease 2/2 psychotropic medication for schizoaffective disorder   home regimen: amantadine  baseline of bilateral hand tremor and plantar flexion, A+O x2-3  - c/w amantadine     #Pulm-  hx of COPD not on home oxygen  acute hypoxic respiratory failure 2/2 COVID-19, superimposed PNA, and COPD exacerbation  s/p intubation 7/26  -c/w COVID treatment (decadron 7/24-8/1), (remdesevir 7/23-7/27)  -c/w empiric abx - started 7/24. Will continue for 5-7 days after aspiration event on 7/26  - Discontinued Symbicort, PRN albuterol ordered   - ABG 7/29 with 7.5 | 36 | 137 | 28 | 98.9  - Extubated 7/29 10 AM      #CV-  -stable; no active issues   -monitor BP     #GI-  - per S+S evaluation - recommended for pureed with moderately thick liquids  - Can restart diet if tolerating extubation    #Renal-  rhabdomyolysis 2/2 fall and prolonged immobility   -elevation of CK > 4000, Cr wnl, elevated BUN, elevated urobilinogen  -c/w IVF   -trend CK, CR  -Monitor urine output, electrolytes, and kidney function.    #ID-  COVID-19 and superimposed PNA   ID following  blood cx growing coag negative staph - likely contaminant   c/w zosyn   blood cx pending   MRSA swab positive, started mupirocin    #Heme-  trend d-dimer given COVID+  c/w therapeutic lovenox per hospital guidelines     #Endocrine-  A1C pending  SSI   will monitor POCT and adjust insulin regimen accordingly     #Skin   open facial wound 2/2 ?fall  wound care c/s     #PPx- lovenox     #Dispo-.

## 2022-07-30 DIAGNOSIS — A49.02 METHICILLIN RESISTANT STAPHYLOCOCCUS AUREUS INFECTION, UNSPECIFIED SITE: ICD-10-CM

## 2022-07-30 DIAGNOSIS — Z29.9 ENCOUNTER FOR PROPHYLACTIC MEASURES, UNSPECIFIED: ICD-10-CM

## 2022-07-30 LAB
ALBUMIN SERPL ELPH-MCNC: 2.9 G/DL — LOW (ref 3.3–5)
ALP SERPL-CCNC: 69 U/L — SIGNIFICANT CHANGE UP (ref 40–120)
ALT FLD-CCNC: 76 U/L — HIGH (ref 4–33)
ANION GAP SERPL CALC-SCNC: 14 MMOL/L — SIGNIFICANT CHANGE UP (ref 7–14)
AST SERPL-CCNC: 49 U/L — HIGH (ref 4–32)
BILIRUB SERPL-MCNC: 0.4 MG/DL — SIGNIFICANT CHANGE UP (ref 0.2–1.2)
BUN SERPL-MCNC: 24 MG/DL — HIGH (ref 7–23)
CALCIUM SERPL-MCNC: 8.6 MG/DL — SIGNIFICANT CHANGE UP (ref 8.4–10.5)
CHLORIDE SERPL-SCNC: 97 MMOL/L — LOW (ref 98–107)
CK SERPL-CCNC: 145 U/L — SIGNIFICANT CHANGE UP (ref 25–170)
CO2 SERPL-SCNC: 22 MMOL/L — SIGNIFICANT CHANGE UP (ref 22–31)
CREAT SERPL-MCNC: 0.59 MG/DL — SIGNIFICANT CHANGE UP (ref 0.5–1.3)
CRP SERPL-MCNC: 3.5 MG/L — SIGNIFICANT CHANGE UP
CULTURE RESULTS: SIGNIFICANT CHANGE UP
EGFR: 100 ML/MIN/1.73M2 — SIGNIFICANT CHANGE UP
FERRITIN SERPL-MCNC: 324 NG/ML — HIGH (ref 15–150)
GLUCOSE BLDC GLUCOMTR-MCNC: 169 MG/DL — HIGH (ref 70–99)
GLUCOSE BLDC GLUCOMTR-MCNC: 187 MG/DL — HIGH (ref 70–99)
GLUCOSE BLDC GLUCOMTR-MCNC: 220 MG/DL — HIGH (ref 70–99)
GLUCOSE SERPL-MCNC: 234 MG/DL — HIGH (ref 70–99)
HCT VFR BLD CALC: 43.7 % — SIGNIFICANT CHANGE UP (ref 34.5–45)
HGB BLD-MCNC: 13.5 G/DL — SIGNIFICANT CHANGE UP (ref 11.5–15.5)
MAGNESIUM SERPL-MCNC: 1.9 MG/DL — SIGNIFICANT CHANGE UP (ref 1.6–2.6)
MCHC RBC-ENTMCNC: 25.3 PG — LOW (ref 27–34)
MCHC RBC-ENTMCNC: 30.9 GM/DL — LOW (ref 32–36)
MCV RBC AUTO: 82 FL — SIGNIFICANT CHANGE UP (ref 80–100)
NRBC # BLD: 0 /100 WBCS — SIGNIFICANT CHANGE UP
NRBC # FLD: 0 K/UL — SIGNIFICANT CHANGE UP
PHOSPHATE SERPL-MCNC: 3.4 MG/DL — SIGNIFICANT CHANGE UP (ref 2.5–4.5)
PLATELET # BLD AUTO: 247 K/UL — SIGNIFICANT CHANGE UP (ref 150–400)
POTASSIUM SERPL-MCNC: 4.9 MMOL/L — SIGNIFICANT CHANGE UP (ref 3.5–5.3)
POTASSIUM SERPL-SCNC: 4.9 MMOL/L — SIGNIFICANT CHANGE UP (ref 3.5–5.3)
PROT SERPL-MCNC: 5.2 G/DL — LOW (ref 6–8.3)
RBC # BLD: 5.33 M/UL — HIGH (ref 3.8–5.2)
RBC # FLD: 13.8 % — SIGNIFICANT CHANGE UP (ref 10.3–14.5)
SODIUM SERPL-SCNC: 133 MMOL/L — LOW (ref 135–145)
SPECIMEN SOURCE: SIGNIFICANT CHANGE UP
WBC # BLD: 16.98 K/UL — HIGH (ref 3.8–10.5)
WBC # FLD AUTO: 16.98 K/UL — HIGH (ref 3.8–10.5)

## 2022-07-30 PROCEDURE — 99233 SBSQ HOSP IP/OBS HIGH 50: CPT | Mod: GC

## 2022-07-30 RX ORDER — AMANTADINE HCL 100 MG
100 CAPSULE ORAL
Refills: 0 | Status: DISCONTINUED | OUTPATIENT
Start: 2022-07-30 | End: 2022-08-05

## 2022-07-30 RX ORDER — SODIUM CHLORIDE 9 MG/ML
1000 INJECTION, SOLUTION INTRAVENOUS
Refills: 0 | Status: COMPLETED | OUTPATIENT
Start: 2022-07-30 | End: 2022-07-30

## 2022-07-30 RX ADMIN — PIPERACILLIN AND TAZOBACTAM 25 GRAM(S): 4; .5 INJECTION, POWDER, LYOPHILIZED, FOR SOLUTION INTRAVENOUS at 02:36

## 2022-07-30 RX ADMIN — Medication 100 MILLIGRAM(S): at 17:30

## 2022-07-30 RX ADMIN — Medication 2: at 12:38

## 2022-07-30 RX ADMIN — PIPERACILLIN AND TAZOBACTAM 25 GRAM(S): 4; .5 INJECTION, POWDER, LYOPHILIZED, FOR SOLUTION INTRAVENOUS at 17:31

## 2022-07-30 RX ADMIN — ENOXAPARIN SODIUM 40 MILLIGRAM(S): 100 INJECTION SUBCUTANEOUS at 17:51

## 2022-07-30 RX ADMIN — SODIUM CHLORIDE 100 MILLILITER(S): 9 INJECTION, SOLUTION INTRAVENOUS at 20:22

## 2022-07-30 RX ADMIN — Medication 6 MILLIGRAM(S): at 05:17

## 2022-07-30 RX ADMIN — Medication 1 APPLICATION(S): at 05:51

## 2022-07-30 RX ADMIN — Medication 100 MILLIGRAM(S): at 05:51

## 2022-07-30 RX ADMIN — SODIUM CHLORIDE 100 MILLILITER(S): 9 INJECTION, SOLUTION INTRAVENOUS at 10:59

## 2022-07-30 RX ADMIN — MUPIROCIN 1 APPLICATION(S): 20 OINTMENT TOPICAL at 17:29

## 2022-07-30 RX ADMIN — Medication 1 APPLICATION(S): at 18:22

## 2022-07-30 RX ADMIN — Medication 1: at 06:42

## 2022-07-30 RX ADMIN — ENOXAPARIN SODIUM 40 MILLIGRAM(S): 100 INJECTION SUBCUTANEOUS at 05:17

## 2022-07-30 RX ADMIN — Medication 1: at 17:31

## 2022-07-30 RX ADMIN — MUPIROCIN 1 APPLICATION(S): 20 OINTMENT TOPICAL at 05:52

## 2022-07-30 RX ADMIN — PIPERACILLIN AND TAZOBACTAM 25 GRAM(S): 4; .5 INJECTION, POWDER, LYOPHILIZED, FOR SOLUTION INTRAVENOUS at 09:46

## 2022-07-30 NOTE — PROGRESS NOTE ADULT - PROBLEM SELECTOR PLAN 10
- baseline of bilateral hand tremor and plantar flexion  - oriented x2-3, follow the command  - cw amantadine home - MRSA Swab +  - pt on nasal mupirocin

## 2022-07-30 NOTE — PATIENT PROFILE ADULT - FALL HARM RISK - HARM RISK INTERVENTIONS

## 2022-07-30 NOTE — PROGRESS NOTE ADULT - PROBLEM SELECTOR PLAN 11
- Unclear baseline and ordered A1C  - low level sliding scale given NPO now  - Monitor glucose as she is given decadron - c/w therapeutic lovenox per hospital guidelines  - will reobtain S&S eval

## 2022-07-30 NOTE — PROGRESS NOTE ADULT - PROBLEM SELECTOR PLAN 8
-Elevated AST,  88  -Unclear etiologies:  fatty liver disease, medication, alcohol use ?  -Will monitor CMP as pt is given remidisivir - Unclear baseline and ordered A1C  - Monitor glucose as she is given decadron  - Monitor POCT and adjust insulin regimen accordingly - currently on sliding scale A1C 5.9  - Monitor glucose as she is given decadron  - Monitor POCT and adjust insulin regimen accordingly - currently on sliding scale

## 2022-07-30 NOTE — PHYSICAL THERAPY INITIAL EVALUATION ADULT - PERTINENT HX OF CURRENT PROBLEM, REHAB EVAL
67 year old female with a past medical history of Parkinson disease, COPD, schizoaffective disorder, DM2, presented to the ED for unwitnessed fall. At the ED, Pt is found to be severely hypoxic with O2 sat less than 70, tachycardiac, tachypneic, febrile to 100.9. Her lab is positive for leukocytosis, increased AG, significant elevation of CK and BNP, metabolic acidosis from VBG, COVID+. CT chest is positive for atelectasis and right lung lobular consolidation.

## 2022-07-30 NOTE — PROGRESS NOTE ADULT - PROBLEM SELECTOR PLAN 6
- likely 2/2 muscle compression and ischemia due to prolonged immobility  - elevation of CK > 4000, Cr wnl, elevated BUN, elevated urobilinogen  - Fluids resuscitation LR  100ml/hr for 24 hours   - Monitor urine output, electrolytes, and kidney function -Elevated AST,  88  -Unclear etiologies:  fatty liver disease, medication, alcohol use ?  -Will monitor CMP as pt is given remidisivir -Unclear etiologies:  In setting of sepsis vs rhabdo vs fatty liver disease, medication, alcohol use   -Will monitor CMP

## 2022-07-30 NOTE — PROGRESS NOTE ADULT - PROBLEM SELECTOR PLAN 9
- proteinuria +3, pyuria 79, bacteria unremarkable  - No urinary symptoms noted  - Ddx: DM, sepsis, or both  - Will monitor - 2/2 to fall with face downwards to ground  - Wound care consulted

## 2022-07-30 NOTE — PHYSICAL THERAPY INITIAL EVALUATION ADULT - ADDITIONAL COMMENTS
As per chart, patient lives at home on her own with home aide everyday 4 hours. She is able to ambulate with walker,

## 2022-07-30 NOTE — PROGRESS NOTE ADULT - PROBLEM SELECTOR PLAN 3
- Positive COVID-19 test and positive exam findings of course lung sound  - Severe hypoxia   - Severe COVID-19 syndrome  - Order ferritin as prognostic marker  -  COVID-19 protocol listed above - Positive COVID-19 test and positive exam findings of course lung sound and severe hypoxia   - CW COVID-19 protocol listed above  - trend d-dimer given COVID+

## 2022-07-30 NOTE — PROGRESS NOTE ADULT - SUBJECTIVE AND OBJECTIVE BOX
PROGRESS NOTE:   Authored by Eda Beyer DO , PGY1     Patient is a 67y old  Female who presents with a chief complaint of severe hypoxia (29 Jul 2022 06:55)      SUBJECTIVE / OVERNIGHT EVENTS:    All other review of systems is negative unless indicated above.    MEDICATIONS  (STANDING):  amantadine Syrup 100 milliGRAM(s) Oral two times a day  dexAMETHasone  Injectable 6 milliGRAM(s) IV Push daily  dextrose 5%. 1000 milliLiter(s) (50 mL/Hr) IV Continuous <Continuous>  dextrose 5%. 1000 milliLiter(s) (100 mL/Hr) IV Continuous <Continuous>  dextrose 50% Injectable 25 Gram(s) IV Push once  dextrose 50% Injectable 12.5 Gram(s) IV Push once  dextrose 50% Injectable 25 Gram(s) IV Push once  enoxaparin Injectable 40 milliGRAM(s) SubCutaneous every 12 hours  glucagon  Injectable 1 milliGRAM(s) IntraMuscular once  insulin lispro (ADMELOG) corrective regimen sliding scale   SubCutaneous every 6 hours  mupirocin 2% Ointment 1 Application(s) Topical two times a day  petrolatum Ophthalmic Ointment 1 Application(s) Both EYES two times a day  piperacillin/tazobactam IVPB.. 3.375 Gram(s) IV Intermittent every 8 hours    MEDICATIONS  (PRN):  acetaminophen     Tablet .. 650 milliGRAM(s) Oral every 6 hours PRN Temp greater or equal to 38C (100.4F), Mild Pain (1 - 3), Moderate Pain (4 - 6)  ALBUTerol    90 MICROgram(s) HFA Inhaler 2 Puff(s) Inhalation every 6 hours PRN Shortness of Breath and/or Wheezing  dextrose Oral Gel 15 Gram(s) Oral once PRN Blood Glucose LESS THAN 70 milliGRAM(s)/deciliter  dextrose Oral Gel 15 Gram(s) Oral once PRN Blood Glucose LESS THAN 70 milliGRAM(s)/deciliter      CAPILLARY BLOOD GLUCOSE      POCT Blood Glucose.: 169 mg/dL (30 Jul 2022 06:16)  POCT Blood Glucose.: 142 mg/dL (29 Jul 2022 23:29)  POCT Blood Glucose.: 156 mg/dL (29 Jul 2022 17:05)  POCT Blood Glucose.: 196 mg/dL (29 Jul 2022 11:58)    I&O's Summary    29 Jul 2022 07:01  -  30 Jul 2022 07:00  --------------------------------------------------------  IN: 210 mL / OUT: 1440 mL / NET: -1230 mL        PHYSICAL EXAM:  Vital Signs Last 24 Hrs  T(C): 36.7 (30 Jul 2022 05:30), Max: 37.2 (29 Jul 2022 08:00)  T(F): 98 (30 Jul 2022 05:30), Max: 99 (29 Jul 2022 08:00)  HR: 68 (30 Jul 2022 05:30) (59 - 80)  BP: 118/78 (30 Jul 2022 05:30) (105/82 - 152/111)  BP(mean): 85 (30 Jul 2022 00:00) (85 - 124)  RR: 18 (30 Jul 2022 05:30) (18 - 26)  SpO2: 95% (30 Jul 2022 05:30) (93% - 99%)    Parameters below as of 30 Jul 2022 05:30  Patient On (Oxygen Delivery Method): nasal cannula  O2 Flow (L/min): 4      GENERAL: No acute distress, well-developed  HEAD:  Atraumatic, Normocephalic  EYES: EOMI, PERRLA, conjunctiva and sclera clear  NECK: Supple, no lymphadenopathy, no JVD  CHEST/LUNG: CTAB; No wheezes, rales, or rhonchi  HEART: Regular rate and rhythm; No murmurs, rubs, or gallops  ABDOMEN: Soft, non-tender, non-distended; normal bowel sounds, no organomegaly  EXTREMITIES:  2+ peripheral pulses b/l, No clubbing, cyanosis, or edema  NEUROLOGY: A&O x 3, no focal deficits  SKIN: No rashes or lesions    LABS:                        13.1   14.06 )-----------( 182      ( 29 Jul 2022 02:00 )             43.1     07-29    137  |  104  |  26<H>  ----------------------------<  208<H>  4.1   |  25  |  0.63    Ca    8.5      29 Jul 2022 02:00  Phos  3.0     07-29  Mg     1.90     07-29    TPro  4.9<L>  /  Alb  2.9<L>  /  TBili  0.4  /  DBili  x   /  AST  36<H>  /  ALT  60<H>  /  AlkPhos  48  07-29    PT/INR - ( 29 Jul 2022 02:00 )   PT: 14.0 sec;   INR: 1.20 ratio         PTT - ( 29 Jul 2022 02:00 )  PTT:25.0 sec  CARDIAC MARKERS ( 29 Jul 2022 02:00 )  x     / x     / 283 U/L / x     / x                RADIOLOGY & ADDITIONAL TESTS:  Results Reviewed:   Imaging Personally Reviewed:  Electrocardiogram Personally Reviewed:    COORDINATION OF CARE:  Care Discussed with Consultants/Other Providers [Y/N]:  Prior or Outpatient Records Reviewed [Y/N]:   PROGRESS NOTE:   Authored by Eda Beyer DO , PGY1     Patient is a 67y old  Female who presents with a chief complaint of severe hypoxia (29 Jul 2022 06:55)      SUBJECTIVE / OVERNIGHT EVENTS:  Pt states no problems overnight. Feeling tired and has dec energy but denies f/c, CP, SOB, Abdominal pain,    All other review of systems is negative unless indicated above.    MEDICATIONS  (STANDING):  amantadine Syrup 100 milliGRAM(s) Oral two times a day  dexAMETHasone  Injectable 6 milliGRAM(s) IV Push daily  dextrose 5%. 1000 milliLiter(s) (50 mL/Hr) IV Continuous <Continuous>  dextrose 5%. 1000 milliLiter(s) (100 mL/Hr) IV Continuous <Continuous>  dextrose 50% Injectable 25 Gram(s) IV Push once  dextrose 50% Injectable 12.5 Gram(s) IV Push once  dextrose 50% Injectable 25 Gram(s) IV Push once  enoxaparin Injectable 40 milliGRAM(s) SubCutaneous every 12 hours  glucagon  Injectable 1 milliGRAM(s) IntraMuscular once  insulin lispro (ADMELOG) corrective regimen sliding scale   SubCutaneous every 6 hours  mupirocin 2% Ointment 1 Application(s) Topical two times a day  petrolatum Ophthalmic Ointment 1 Application(s) Both EYES two times a day  piperacillin/tazobactam IVPB.. 3.375 Gram(s) IV Intermittent every 8 hours    MEDICATIONS  (PRN):  acetaminophen     Tablet .. 650 milliGRAM(s) Oral every 6 hours PRN Temp greater or equal to 38C (100.4F), Mild Pain (1 - 3), Moderate Pain (4 - 6)  ALBUTerol    90 MICROgram(s) HFA Inhaler 2 Puff(s) Inhalation every 6 hours PRN Shortness of Breath and/or Wheezing  dextrose Oral Gel 15 Gram(s) Oral once PRN Blood Glucose LESS THAN 70 milliGRAM(s)/deciliter  dextrose Oral Gel 15 Gram(s) Oral once PRN Blood Glucose LESS THAN 70 milliGRAM(s)/deciliter      CAPILLARY BLOOD GLUCOSE      POCT Blood Glucose.: 169 mg/dL (30 Jul 2022 06:16)  POCT Blood Glucose.: 142 mg/dL (29 Jul 2022 23:29)  POCT Blood Glucose.: 156 mg/dL (29 Jul 2022 17:05)  POCT Blood Glucose.: 196 mg/dL (29 Jul 2022 11:58)    I&O's Summary    29 Jul 2022 07:01  -  30 Jul 2022 07:00  --------------------------------------------------------  IN: 210 mL / OUT: 1440 mL / NET: -1230 mL        PHYSICAL EXAM:  Vital Signs Last 24 Hrs  T(C): 36.7 (30 Jul 2022 05:30), Max: 37.2 (29 Jul 2022 08:00)  T(F): 98 (30 Jul 2022 05:30), Max: 99 (29 Jul 2022 08:00)  HR: 68 (30 Jul 2022 05:30) (59 - 80)  BP: 118/78 (30 Jul 2022 05:30) (105/82 - 152/111)  BP(mean): 85 (30 Jul 2022 00:00) (85 - 124)  RR: 18 (30 Jul 2022 05:30) (18 - 26)  SpO2: 95% (30 Jul 2022 05:30) (93% - 99%)    Parameters below as of 30 Jul 2022 05:30  Patient On (Oxygen Delivery Method): nasal cannula  O2 Flow (L/min): 4      GENERAL: No acute distress, well-developed  HEAD:  Atraumatic, Normocephalic  EYES: EOMI, PERRLA, conjunctiva and sclera clear  NECK: Supple, no lymphadenopathy, no JVD  CHEST/LUNG: CTAB; No wheezes, rales, or rhonchi  HEART: Regular rate and rhythm; No murmurs, rubs, or gallops  ABDOMEN: Soft, non-tender, non-distended; normal bowel sounds, no organomegaly  EXTREMITIES:  2+ peripheral pulses b/l, No clubbing, cyanosis, or edema  NEUROLOGY: A&O x 3, no focal deficits  SKIN: No rashes or lesions    LABS:                        13.1   14.06 )-----------( 182      ( 29 Jul 2022 02:00 )             43.1     07-29    137  |  104  |  26<H>  ----------------------------<  208<H>  4.1   |  25  |  0.63    Ca    8.5      29 Jul 2022 02:00  Phos  3.0     07-29  Mg     1.90     07-29    TPro  4.9<L>  /  Alb  2.9<L>  /  TBili  0.4  /  DBili  x   /  AST  36<H>  /  ALT  60<H>  /  AlkPhos  48  07-29    PT/INR - ( 29 Jul 2022 02:00 )   PT: 14.0 sec;   INR: 1.20 ratio         PTT - ( 29 Jul 2022 02:00 )  PTT:25.0 sec  CARDIAC MARKERS ( 29 Jul 2022 02:00 )  x     / x     / 283 U/L / x     / x                RADIOLOGY & ADDITIONAL TESTS:  Results Reviewed:   Imaging Personally Reviewed:  Electrocardiogram Personally Reviewed:    COORDINATION OF CARE:  Care Discussed with Consultants/Other Providers [Y/N]:  Prior or Outpatient Records Reviewed [Y/N]:   PROGRESS NOTE:   Authored by Eda Beyer DO , PGY1     Patient is a 67y old  Female who presents with a chief complaint of severe hypoxia (29 Jul 2022 06:55)      SUBJECTIVE / OVERNIGHT EVENTS:  Pt states no problems overnight. A&Ox3 Feeling tired and has dec strenght but denies f/c, CP, SOB, Abdominal pain,    All other review of systems is negative unless indicated above.    MEDICATIONS  (STANDING):  amantadine Syrup 100 milliGRAM(s) Oral two times a day  dexAMETHasone  Injectable 6 milliGRAM(s) IV Push daily  dextrose 5%. 1000 milliLiter(s) (50 mL/Hr) IV Continuous <Continuous>  dextrose 5%. 1000 milliLiter(s) (100 mL/Hr) IV Continuous <Continuous>  dextrose 50% Injectable 25 Gram(s) IV Push once  dextrose 50% Injectable 12.5 Gram(s) IV Push once  dextrose 50% Injectable 25 Gram(s) IV Push once  enoxaparin Injectable 40 milliGRAM(s) SubCutaneous every 12 hours  glucagon  Injectable 1 milliGRAM(s) IntraMuscular once  insulin lispro (ADMELOG) corrective regimen sliding scale   SubCutaneous every 6 hours  mupirocin 2% Ointment 1 Application(s) Topical two times a day  petrolatum Ophthalmic Ointment 1 Application(s) Both EYES two times a day  piperacillin/tazobactam IVPB.. 3.375 Gram(s) IV Intermittent every 8 hours    MEDICATIONS  (PRN):  acetaminophen     Tablet .. 650 milliGRAM(s) Oral every 6 hours PRN Temp greater or equal to 38C (100.4F), Mild Pain (1 - 3), Moderate Pain (4 - 6)  ALBUTerol    90 MICROgram(s) HFA Inhaler 2 Puff(s) Inhalation every 6 hours PRN Shortness of Breath and/or Wheezing  dextrose Oral Gel 15 Gram(s) Oral once PRN Blood Glucose LESS THAN 70 milliGRAM(s)/deciliter  dextrose Oral Gel 15 Gram(s) Oral once PRN Blood Glucose LESS THAN 70 milliGRAM(s)/deciliter      CAPILLARY BLOOD GLUCOSE      POCT Blood Glucose.: 169 mg/dL (30 Jul 2022 06:16)  POCT Blood Glucose.: 142 mg/dL (29 Jul 2022 23:29)  POCT Blood Glucose.: 156 mg/dL (29 Jul 2022 17:05)  POCT Blood Glucose.: 196 mg/dL (29 Jul 2022 11:58)    I&O's Summary    29 Jul 2022 07:01  -  30 Jul 2022 07:00  --------------------------------------------------------  IN: 210 mL / OUT: 1440 mL / NET: -1230 mL        PHYSICAL EXAM:  Vital Signs Last 24 Hrs  T(C): 36.7 (30 Jul 2022 05:30), Max: 37.2 (29 Jul 2022 08:00)  T(F): 98 (30 Jul 2022 05:30), Max: 99 (29 Jul 2022 08:00)  HR: 68 (30 Jul 2022 05:30) (59 - 80)  BP: 118/78 (30 Jul 2022 05:30) (105/82 - 152/111)  BP(mean): 85 (30 Jul 2022 00:00) (85 - 124)  RR: 18 (30 Jul 2022 05:30) (18 - 26)  SpO2: 95% (30 Jul 2022 05:30) (93% - 99%)    Parameters below as of 30 Jul 2022 05:30  Patient On (Oxygen Delivery Method): nasal cannula  O2 Flow (L/min): 4      GENERAL: Morbidly obese, NAD, seems mildly uncomfortable   HEAD:  Atraumatic, Normocephalic  EYES: conjunctiva and sclera clear, unable to fully track finger   NECK: Supple, no lymphadenopathy, no JVD  CHEST/LUNG: CTAB; No wheezes, rales, or rhonchi  HEART: Regular rate and rhythm; No murmurs, rubs, or gallops  ABDOMEN: Soft, non-tender, non-distended; normal bowel sounds, no organomegaly  EXTREMITIES: Pt unable to move b/l upper or lower extremities   NEUROLOGY: A&O x 3,  SKIN: No rashes or lesions    LABS:                        13.1   14.06 )-----------( 182      ( 29 Jul 2022 02:00 )             43.1     07-29    137  |  104  |  26<H>  ----------------------------<  208<H>  4.1   |  25  |  0.63    Ca    8.5      29 Jul 2022 02:00  Phos  3.0     07-29  Mg     1.90     07-29    TPro  4.9<L>  /  Alb  2.9<L>  /  TBili  0.4  /  DBili  x   /  AST  36<H>  /  ALT  60<H>  /  AlkPhos  48  07-29    PT/INR - ( 29 Jul 2022 02:00 )   PT: 14.0 sec;   INR: 1.20 ratio         PTT - ( 29 Jul 2022 02:00 )  PTT:25.0 sec  CARDIAC MARKERS ( 29 Jul 2022 02:00 )  x     / x     / 283 U/L / x     / x                RADIOLOGY & ADDITIONAL TESTS:  Results Reviewed:   Imaging Personally Reviewed:  Electrocardiogram Personally Reviewed:    COORDINATION OF CARE:  Care Discussed with Consultants/Other Providers [Y/N]:  Prior or Outpatient Records Reviewed [Y/N]:

## 2022-07-30 NOTE — PHYSICAL THERAPY INITIAL EVALUATION ADULT - IMPAIRMENTS FOUND, PT EVAL
aerobic capacity/endurance/arousal, attention, and cognition/cognitive impairment/gait, locomotion, and balance/muscle strength/ROM

## 2022-07-30 NOTE — PROGRESS NOTE ADULT - PROBLEM SELECTOR PLAN 1
Pt found to be febrile, tachycardiac, tachypneic, hypoxic, with leukocytosis, positive COVID19, equivocal lung findings in CT chest  - Dx: hypoxic respiratory failure 2/2 COVID-19, superimposed PNA, and COPD exacerbation  s/p intubation and MICU transfer on 7/26, successfully extubated 7/29 and transferred to floors 7/30.    - c/w COVID treatment (decadron 7/24-8/1), (remdesevir 7/23-7/27)  - c/w empiric abx - started 7/24. Will continue for 5-7 days after aspiration event on 7/26  - Discontinued Symbicort, PRN albuterol ordered   - wean NC as tolerated   - Trend CBC AM, vitals q4, Dimer every other day, blood gas AM, cmp AM Pt found to be febrile, tachycardiac, tachypneic, hypoxic, with leukocytosis, positive COVID19, equivocal lung findings in CT chest  - Dx: hypoxic respiratory failure 2/2 COVID-19, superimposed PNA, and COPD exacerbation  s/p intubation and MICU transfer on 7/26, successfully extubated 7/29 and transferred to floors 7/30.    - c/w COVID treatment (decadron 7/24-8/1), (remdesevir 7/23-7/27)  - c/w empiric abx - started 7/24. Will continue for 5-7 days after aspiration event on 7/26  - Discontinued Symbicort, PRN albuterol ordered   - wean NC as tolerated   - ABG 7/29 with 7.5 | 36 | 137 | 28 | 98.9  - Trend CBC AM, vitals q4, Dimer every other day, blood gas AM, cmp AM

## 2022-07-30 NOTE — PROGRESS NOTE ADULT - ATTENDING COMMENTS
#Acute Hypoxic resp failure   #Sepsis  #COVID 19  #PNA  #COPD exacerbation  #Dysphagia    -Monitor O2 requirements and wean as tolerated   -c/w nebs   -c/w zosyn course. Blood cx growing coag negative staph - likely contaminant c/w zosyn to be completed tomorrow   -C/w tube feeds and pt did not pass SandS today. Will obtain cineesophagogram per recs

## 2022-07-30 NOTE — SWALLOW BEDSIDE ASSESSMENT ADULT - COMMENTS
Internal medicine note 7/30 "64F with hx of Parkinsons, COPD, T2DM, COPD, Schizoaffective disorder BIBEMS after unwitnessed fall, found to be hypoxic to 70s with EMS, a/w sepsis and acute hypoxic respiratory failure likely multifactorial from COVID-19, superimposed PNA, and COPD exacerbation. She was also found to develop rhabdomyolysis secondary to long time immobility after fall. RRT called 7/26 for AMS, s/p intubation and transfer to MICU. Pt successfully extubated and stable for transfer back to floors on 7/30."    CXR 7/29 "Feeding tube inserted extending below diaphragm with distal and in left epigastric region in satisfactory position. Clear lungs. No pleural effusions or pneumothorax."    Patient seen at bedside this afternoon for a reassessment of the swallow function, at which time patient was alert. Patient receiving supplemental O2 via nasal cannula and with NG tube in place during time of assessment. Patient minimally verbalized and followed simple 1-step directives. Internal medicine note 7/30 "64F with hx of Parkinsons, COPD, T2DM, COPD, Schizoaffective disorder BIBEMS after unwitnessed fall, found to be hypoxic to 70s with EMS, a/w sepsis and acute hypoxic respiratory failure likely multifactorial from COVID-19, superimposed PNA, and COPD exacerbation. She was also found to develop rhabdomyolysis secondary to long time immobility after fall. RRT called 7/26 for AMS, s/p intubation and transfer to MICU. Pt successfully extubated and stable for transfer back to floors on 7/30."    CXR 7/29 "Feeding tube inserted extending below diaphragm with distal and in left epigastric region in satisfactory position. Clear lungs. No pleural effusions or pneumothorax."    Patient seen for an initial assessment of the swallow on 7/25 at which time puree with moderately thick liquids was recommended (please see note for details).  Patient seen at bedside this afternoon for a reassessment of the swallow function, at which time patient was alert. Patient receiving supplemental O2 via nasal cannula and with NG tube in place during time of assessment. Patient minimally verbalized and followed simple 1-step directives.

## 2022-07-30 NOTE — PROGRESS NOTE ADULT - PROBLEM SELECTOR PLAN 7
- ABG: high PaCO2, acidemia, low O2  - dx: respiration failure from COVID-19 or bacterial infection in the setting of COPD  - Will monitor BMP, VBG, lactate AM, vitals Q4 drug induced parkinson's disease 2/2 psychotropic medication for schizoaffective disorder   - c/w home regimen: amantadine  - baseline of bilateral hand tremor and plantar flexion, A+O x2-3

## 2022-07-30 NOTE — PROGRESS NOTE ADULT - PROBLEM SELECTOR PLAN 5
-  fall vs non- fall  - Pt's home aid denied her prior history of seizure, unclear history of syncope  - Ordered TTE to rule out cardiogenic cause of syncope - likely 2/2 muscle compression and ischemia due to prolonged immobility  -elevation of CK > 4000, Cr wnl, elevated BUN, elevated urobilinogen  -c/w IVF**  -trend CK, CR  -Monitor urine output, electrolytes, and kidney function. Resolved     - likely 2/2 muscle compression and ischemia due to prolonged immobility  -elevation of CK > 4000, Cr wnl, elevated BUN, elevated urobilinogen  -c/w IVF**  -trend CK, CR  -Monitor urine output, electrolytes, and kidney function.

## 2022-07-30 NOTE — PHYSICAL THERAPY INITIAL EVALUATION ADULT - MANUAL MUSCLE TESTING RESULTS, REHAB EVAL
grossly assessed due to mental status: bilateral Upper Extremity 2-/5 and bilateral Lower Extremity 1+/5

## 2022-07-30 NOTE — PROGRESS NOTE ADULT - ASSESSMENT
64F with hx of Parkinsons, COPD, T2DM, COPD, Schizoaffective disorder BIBEMS after unwitnessed fall, found to be hypoxic to 70s with EMS, a/w sepsis and acute hypoxic respiratory failure likely multifactorial from COVID-19, superimposed PNA, and COPD exacerbation. She was also found to develop rhabdomyolysis secondary to long time immobility after fall. RRT called 7/26 for AMS, s/p intubation and transfer to MICU. Pt successfully extubated and stable for transfer back to floors on 7/30.    64F with hx of Parkinsons, COPD, T2DM, COPD, Schizoaffective disorder BIBEMS after unwitnessed fall, found to be hypoxic to 70s with EMS, a/w sepsis and acute hypoxic respiratory failure likely multifactorial from COVID-19, superimposed PNA, and COPD exacerbation. She was also found to develop rhabdomyolysis secondary to long time immobility after fall. RRT called 7/26 for AMS, s/p intubation and transfer to MICU. Pt successfully extubated and stable for transfer back to floors on 7/30.     MICU COURSE:    Ms. Chaz Taylor is a 67 year old female with a past medical history of Parkinson disease (on amantadine), COPD (not on home oxygen), schizoaffective disorder (currently not on medications), DM2 (on metformin)  presented to the ED for unwitnessed fall. She was found lying on the ground with face downwards at 10am this morning since her home aide left at 7pm yesterday. Per home aide, she presumably fell to the ground for attempting to answer the door for medications. Pt has a baseline of chronic right-side weakness, bilateral hand termor and needed walker to ambulate. Per home aide, pt has been doing well with no physical complaints. She had two prior inconsequential falls due to similar events of attempting to answering door. Pt is limited in terms of history telling but alert and oriented x2, follow command, answering some questions.     At the ED, Pt is found to be severely hypoxic with O2 sat less than 70, tachycardiac, tachypneic, febrile to 100.9. Her lab is positive for leukocytosis, increased AG, significant elevation of CK and BNP, metabolic acidosis from VBG, COVID+.  CT chest is positive for atelectasis and right lung lobular consolidation. She responded to non-rebreather with O2 sat 100% then tolerated well to nasal canula 6 litter with goal of O2 sat > 88%. Her vitals are improved after 1L NS with /64, HR 93, RR 24.  Pt is admitted for acute respiratory failure in the setting of covid vs bacteria pneumonia and sepsis as well as rhabdomyolysis.     Patient treated on the floor for acute hypoxic respiratory failure likely multifactorial from COVID-19, superimposed PNA, and COPD exacerbation. She was also found to develop rhabdomyolysis secondary to long time immobility after fall.     RRT called 7/26 for AMS and hypoxia. Given concern for airway protection given mental status change, decision made to intubate patient. Patient transferred to the MICU for further care.     Patient treated for acute hypoxic respiratory failure in the setting of presumed aspiration pneumonia. Patient started on abx. Patient was successfully extubated and remained hemodynamically stable. Patient now stable for transfer to the medicine floor.

## 2022-07-30 NOTE — PROGRESS NOTE ADULT - PROBLEM SELECTOR PLAN 2
- equivocal lung findings on CT chest   - Dx: COVID-19 infection vs community acquire PNA vs exacerbation of COPD  - CW empirical treatment for COVID-19 and community acquired PNA listed above - equivocal lung findings on CT chest   - Dx: COVID-19 infection and superimposed PNA   - CW empirical treatment for COVID-19 and community acquired PNA listed above  -blood cx growing coag negative staph - likely contaminant c/w zosyn   - ID following

## 2022-07-31 LAB
ALBUMIN SERPL ELPH-MCNC: 3 G/DL — LOW (ref 3.3–5)
ALP SERPL-CCNC: 81 U/L — SIGNIFICANT CHANGE UP (ref 40–120)
ALT FLD-CCNC: 74 U/L — HIGH (ref 4–33)
ANION GAP SERPL CALC-SCNC: 8 MMOL/L — SIGNIFICANT CHANGE UP (ref 7–14)
AST SERPL-CCNC: 34 U/L — HIGH (ref 4–32)
BILIRUB SERPL-MCNC: 0.5 MG/DL — SIGNIFICANT CHANGE UP (ref 0.2–1.2)
BUN SERPL-MCNC: 27 MG/DL — HIGH (ref 7–23)
CALCIUM SERPL-MCNC: 8.7 MG/DL — SIGNIFICANT CHANGE UP (ref 8.4–10.5)
CHLORIDE SERPL-SCNC: 95 MMOL/L — LOW (ref 98–107)
CK SERPL-CCNC: 82 U/L — SIGNIFICANT CHANGE UP (ref 25–170)
CO2 SERPL-SCNC: 30 MMOL/L — SIGNIFICANT CHANGE UP (ref 22–31)
CREAT SERPL-MCNC: 0.67 MG/DL — SIGNIFICANT CHANGE UP (ref 0.5–1.3)
CRP SERPL-MCNC: <3 MG/L — SIGNIFICANT CHANGE UP
CULTURE RESULTS: SIGNIFICANT CHANGE UP
D DIMER BLD IA.RAPID-MCNC: 191 NG/ML DDU — SIGNIFICANT CHANGE UP
EGFR: 97 ML/MIN/1.73M2 — SIGNIFICANT CHANGE UP
FERRITIN SERPL-MCNC: 288 NG/ML — HIGH (ref 15–150)
GLUCOSE BLDC GLUCOMTR-MCNC: 160 MG/DL — HIGH (ref 70–99)
GLUCOSE BLDC GLUCOMTR-MCNC: 171 MG/DL — HIGH (ref 70–99)
GLUCOSE BLDC GLUCOMTR-MCNC: 207 MG/DL — HIGH (ref 70–99)
GLUCOSE BLDC GLUCOMTR-MCNC: 210 MG/DL — HIGH (ref 70–99)
GLUCOSE BLDC GLUCOMTR-MCNC: 214 MG/DL — HIGH (ref 70–99)
GLUCOSE SERPL-MCNC: 177 MG/DL — HIGH (ref 70–99)
HCT VFR BLD CALC: 45.5 % — HIGH (ref 34.5–45)
HGB BLD-MCNC: 13.8 G/DL — SIGNIFICANT CHANGE UP (ref 11.5–15.5)
MAGNESIUM SERPL-MCNC: 2 MG/DL — SIGNIFICANT CHANGE UP (ref 1.6–2.6)
MCHC RBC-ENTMCNC: 25.5 PG — LOW (ref 27–34)
MCHC RBC-ENTMCNC: 30.3 GM/DL — LOW (ref 32–36)
MCV RBC AUTO: 84.1 FL — SIGNIFICANT CHANGE UP (ref 80–100)
NRBC # BLD: 0 /100 WBCS — SIGNIFICANT CHANGE UP
NRBC # FLD: 0 K/UL — SIGNIFICANT CHANGE UP
PHOSPHATE SERPL-MCNC: 3.2 MG/DL — SIGNIFICANT CHANGE UP (ref 2.5–4.5)
PLATELET # BLD AUTO: 255 K/UL — SIGNIFICANT CHANGE UP (ref 150–400)
POTASSIUM SERPL-MCNC: 4.4 MMOL/L — SIGNIFICANT CHANGE UP (ref 3.5–5.3)
POTASSIUM SERPL-SCNC: 4.4 MMOL/L — SIGNIFICANT CHANGE UP (ref 3.5–5.3)
PROT SERPL-MCNC: 5.4 G/DL — LOW (ref 6–8.3)
RBC # BLD: 5.41 M/UL — HIGH (ref 3.8–5.2)
RBC # FLD: 14 % — SIGNIFICANT CHANGE UP (ref 10.3–14.5)
SODIUM SERPL-SCNC: 133 MMOL/L — LOW (ref 135–145)
SPECIMEN SOURCE: SIGNIFICANT CHANGE UP
WBC # BLD: 17.15 K/UL — HIGH (ref 3.8–10.5)
WBC # FLD AUTO: 17.15 K/UL — HIGH (ref 3.8–10.5)

## 2022-07-31 PROCEDURE — 99233 SBSQ HOSP IP/OBS HIGH 50: CPT | Mod: GC

## 2022-07-31 RX ORDER — DEXAMETHASONE 0.5 MG/5ML
6 ELIXIR ORAL ONCE
Refills: 0 | Status: COMPLETED | OUTPATIENT
Start: 2022-08-01 | End: 2022-08-01

## 2022-07-31 RX ADMIN — Medication 2: at 18:36

## 2022-07-31 RX ADMIN — Medication 6 MILLIGRAM(S): at 05:09

## 2022-07-31 RX ADMIN — Medication 100 MILLIGRAM(S): at 17:02

## 2022-07-31 RX ADMIN — ENOXAPARIN SODIUM 40 MILLIGRAM(S): 100 INJECTION SUBCUTANEOUS at 17:02

## 2022-07-31 RX ADMIN — Medication 100 MILLIGRAM(S): at 05:09

## 2022-07-31 RX ADMIN — Medication 1: at 00:31

## 2022-07-31 RX ADMIN — Medication 2: at 13:17

## 2022-07-31 RX ADMIN — MUPIROCIN 1 APPLICATION(S): 20 OINTMENT TOPICAL at 17:03

## 2022-07-31 RX ADMIN — PIPERACILLIN AND TAZOBACTAM 25 GRAM(S): 4; .5 INJECTION, POWDER, LYOPHILIZED, FOR SOLUTION INTRAVENOUS at 01:59

## 2022-07-31 RX ADMIN — MUPIROCIN 1 APPLICATION(S): 20 OINTMENT TOPICAL at 05:09

## 2022-07-31 RX ADMIN — Medication 1: at 06:34

## 2022-07-31 RX ADMIN — ENOXAPARIN SODIUM 40 MILLIGRAM(S): 100 INJECTION SUBCUTANEOUS at 05:09

## 2022-07-31 RX ADMIN — PIPERACILLIN AND TAZOBACTAM 25 GRAM(S): 4; .5 INJECTION, POWDER, LYOPHILIZED, FOR SOLUTION INTRAVENOUS at 17:02

## 2022-07-31 RX ADMIN — Medication 1 APPLICATION(S): at 05:08

## 2022-07-31 RX ADMIN — Medication 1 APPLICATION(S): at 17:25

## 2022-07-31 RX ADMIN — PIPERACILLIN AND TAZOBACTAM 25 GRAM(S): 4; .5 INJECTION, POWDER, LYOPHILIZED, FOR SOLUTION INTRAVENOUS at 10:45

## 2022-07-31 NOTE — PROGRESS NOTE ADULT - SUBJECTIVE AND OBJECTIVE BOX
PROGRESS NOTE:       Patient is a 67y old  Female who presents with a chief complaint of severe hypoxia (30 Jul 2022 07:19)      SUBJECTIVE / OVERNIGHT EVENTS:  NAEON     ADDITIONAL REVIEW OF SYSTEMS:    MEDICATIONS  (STANDING):  amantadine Syrup 100 milliGRAM(s) Oral two times a day  dextrose 5%. 1000 milliLiter(s) (50 mL/Hr) IV Continuous <Continuous>  dextrose 5%. 1000 milliLiter(s) (100 mL/Hr) IV Continuous <Continuous>  dextrose 50% Injectable 25 Gram(s) IV Push once  dextrose 50% Injectable 12.5 Gram(s) IV Push once  dextrose 50% Injectable 25 Gram(s) IV Push once  enoxaparin Injectable 40 milliGRAM(s) SubCutaneous every 12 hours  glucagon  Injectable 1 milliGRAM(s) IntraMuscular once  insulin lispro (ADMELOG) corrective regimen sliding scale   SubCutaneous every 6 hours  mupirocin 2% Ointment 1 Application(s) Topical two times a day  petrolatum Ophthalmic Ointment 1 Application(s) Both EYES two times a day  piperacillin/tazobactam IVPB.. 3.375 Gram(s) IV Intermittent every 8 hours    MEDICATIONS  (PRN):  acetaminophen     Tablet .. 650 milliGRAM(s) Oral every 6 hours PRN Temp greater or equal to 38C (100.4F), Mild Pain (1 - 3), Moderate Pain (4 - 6)  ALBUTerol    90 MICROgram(s) HFA Inhaler 2 Puff(s) Inhalation every 6 hours PRN Shortness of Breath and/or Wheezing  dextrose Oral Gel 15 Gram(s) Oral once PRN Blood Glucose LESS THAN 70 milliGRAM(s)/deciliter  dextrose Oral Gel 15 Gram(s) Oral once PRN Blood Glucose LESS THAN 70 milliGRAM(s)/deciliter      CAPILLARY BLOOD GLUCOSE      POCT Blood Glucose.: 210 mg/dL (31 Jul 2022 12:23)  POCT Blood Glucose.: 171 mg/dL (31 Jul 2022 06:30)  POCT Blood Glucose.: 160 mg/dL (31 Jul 2022 00:21)  POCT Blood Glucose.: 187 mg/dL (30 Jul 2022 17:09)    I&O's Summary    30 Jul 2022 07:01  -  31 Jul 2022 07:00  --------------------------------------------------------  IN: 480 mL / OUT: 600 mL / NET: -120 mL    31 Jul 2022 07:01  -  31 Jul 2022 12:46  --------------------------------------------------------  IN: 0 mL / OUT: 350 mL / NET: -350 mL        PHYSICAL EXAM:  Vital Signs Last 24 Hrs  T(C): 36.7 (31 Jul 2022 05:00), Max: 36.8 (30 Jul 2022 13:30)  T(F): 98.1 (31 Jul 2022 05:00), Max: 98.2 (30 Jul 2022 13:30)  HR: 82 (31 Jul 2022 05:00) (79 - 82)  BP: 108/64 (31 Jul 2022 05:00) (106/73 - 139/72)  BP(mean): --  RR: 19 (31 Jul 2022 11:40) (18 - 19)  SpO2: 95% (31 Jul 2022 11:40) (95% - 97%)    Parameters below as of 31 Jul 2022 11:40  Patient On (Oxygen Delivery Method): nasal cannula  O2 Flow (L/min): 2      GENERAL: No acute distress, well-developed  HEAD:  Atraumatic, Normocephalic  EYES: EOMI, PERRLA, conjunctiva and sclera clear  NECK: Supple, no lymphadenopathy, no JVD  CHEST/LUNG: CTAB; No wheezes, rales, or rhonchi  HEART: Regular rate and rhythm; No murmurs, rubs, or gallops  ABDOMEN: Soft, non-tender, non-distended; normal bowel sounds, no organomegaly  EXTREMITIES:  2+ peripheral pulses b/l, No clubbing, cyanosis, or edema  NEUROLOGY: A&O x 3, no focal deficits  SKIN: No rashes or lesions    LABS:                        13.8   17.15 )-----------( 255      ( 31 Jul 2022 05:48 )             45.5     07-31    133<L>  |  95<L>  |  27<H>  ----------------------------<  177<H>  4.4   |  30  |  0.67    Ca    8.7      31 Jul 2022 05:48  Phos  3.2     07-31  Mg     2.00     07-31    TPro  5.4<L>  /  Alb  3.0<L>  /  TBili  0.5  /  DBili  x   /  AST  34<H>  /  ALT  74<H>  /  AlkPhos  81  07-31      CARDIAC MARKERS ( 31 Jul 2022 05:48 )  x     / x     / 82 U/L / x     / x      CARDIAC MARKERS ( 30 Jul 2022 15:30 )  x     / x     / 145 U/L / x     / x                RADIOLOGY & ADDITIONAL TESTS:  Results Reviewed:   Imaging Personally Reviewed:  Electrocardiogram Personally Reviewed:    COORDINATION OF CARE:  Care Discussed with Consultants/Other Providers [Y/N]:  Prior or Outpatient Records Reviewed [Y/N]:

## 2022-07-31 NOTE — PROGRESS NOTE ADULT - PROBLEM SELECTOR PLAN 7
drug induced parkinson's disease 2/2 psychotropic medication for schizoaffective disorder   - c/w home regimen: amantadine  - baseline of bilateral hand tremor and plantar flexion, A+O x2-3

## 2022-07-31 NOTE — PROGRESS NOTE ADULT - ASSESSMENT
64F with hx of Parkinsons, COPD, T2DM, COPD, Schizoaffective disorder BIBEMS after unwitnessed fall, found to be hypoxic to 70s with EMS, a/w sepsis and acute hypoxic respiratory failure likely multifactorial from COVID-19, superimposed PNA, and COPD exacerbation. She was also found to develop rhabdomyolysis secondary to long time immobility after fall. RRT called 7/26 for AMS, s/p intubation and transfer to MICU. Pt successfully extubated and stable for transfer back to floors on 7/30.     MICU COURSE:    Ms. Chaz Taylor is a 67 year old female with a past medical history of Parkinson disease (on amantadine), COPD (not on home oxygen), schizoaffective disorder (currently not on medications), DM2 (on metformin)  presented to the ED for unwitnessed fall. She was found lying on the ground with face downwards at 10am this morning since her home aide left at 7pm yesterday. Per home aide, she presumably fell to the ground for attempting to answer the door for medications. Pt has a baseline of chronic right-side weakness, bilateral hand termor and needed walker to ambulate. Per home aide, pt has been doing well with no physical complaints. She had two prior inconsequential falls due to similar events of attempting to answering door. Pt is limited in terms of history telling but alert and oriented x2, follow command, answering some questions.     At the ED, Pt is found to be severely hypoxic with O2 sat less than 70, tachycardiac, tachypneic, febrile to 100.9. Her lab is positive for leukocytosis, increased AG, significant elevation of CK and BNP, metabolic acidosis from VBG, COVID+.  CT chest is positive for atelectasis and right lung lobular consolidation. She responded to non-rebreather with O2 sat 100% then tolerated well to nasal canula 6 litter with goal of O2 sat > 88%. Her vitals are improved after 1L NS with /64, HR 93, RR 24.  Pt is admitted for acute respiratory failure in the setting of covid vs bacteria pneumonia and sepsis as well as rhabdomyolysis.     Patient treated on the floor for acute hypoxic respiratory failure likely multifactorial from COVID-19, superimposed PNA, and COPD exacerbation. She was also found to develop rhabdomyolysis secondary to long time immobility after fall.     RRT called 7/26 for AMS and hypoxia. Given concern for airway protection given mental status change, decision made to intubate patient. Patient transferred to the MICU for further care.     Patient treated for acute hypoxic respiratory failure in the setting of presumed aspiration pneumonia. Patient started on abx. Patient was successfully extubated and remained hemodynamically stable. Patient now stable for transfer to the medicine floor.

## 2022-07-31 NOTE — PROGRESS NOTE ADULT - PROBLEM SELECTOR PLAN 1
Pt found to be febrile, tachycardiac, tachypneic, hypoxic, with leukocytosis, positive COVID19, equivocal lung findings in CT chest  - Dx: hypoxic respiratory failure 2/2 COVID-19, superimposed PNA, and COPD exacerbation  s/p intubation and MICU transfer on 7/26, successfully extubated 7/29 and transferred to floors 7/30.    - c/w COVID treatment (decadron 7/24-8/1), (remdesevir 7/23-7/27)  - c/w empiric abx - started 7/24. Will continue for 5-7 days after aspiration event on 7/26  - Discontinued Symbicort, PRN albuterol ordered   - wean NC as tolerated   - ABG 7/29 with 7.5 | 36 | 137 | 28 | 98.9  - Trend CBC AM, vitals q4, Dimer every other day, blood gas AM, cmp AM Pt found to be febrile, tachycardiac, tachypneic, hypoxic, with leukocytosis, positive COVID19, equivocal lung findings in CT chest  - Dx: hypoxic respiratory failure 2/2 COVID-19, superimposed PNA, and COPD exacerbation  s/p intubation and MICU transfer on 7/26, successfully extubated 7/29 and transferred to floors 7/30.    - c/w COVID treatment (decadron 7/24-8/1), (remdesevir 7/23-7/27)  - c/w empiric abx - started 7/24. Will continue for 5-7 days after aspiration event on 7/26  - Discontinued Symbicort, PRN albuterol ordered   - wean NC as tolerated   - ABG 7/29 with 7.5 | 36 | 137 | 28 | 98.9  - Dimer downtrending to 191  - will continue zosyn until 08/02 as WBC elevated 07/31  - Trend CBC AM, vitals q8, Dimer every other day, cmp AM

## 2022-07-31 NOTE — PROGRESS NOTE ADULT - PROBLEM SELECTOR PLAN 3
- Positive COVID-19 test and positive exam findings of course lung sound and severe hypoxia   - CW COVID-19 protocol listed above  - trend d-dimer given COVID+

## 2022-07-31 NOTE — PROGRESS NOTE ADULT - PROBLEM SELECTOR PLAN 6
-Unclear etiologies:  In setting of sepsis vs rhabdo vs fatty liver disease, medication, alcohol use   -Will monitor CMP -Unclear etiologies:  In setting of sepsis vs rhabdo vs fatty liver disease, medication, alcohol use    Pt w/o any abdominal pain   -Will monitor CMP

## 2022-07-31 NOTE — CHART NOTE - NSCHARTNOTEFT_GEN_A_CORE
Source: Patient A&Ox [3]    Per chart review, 64F with hx of Parkinsons, COPD, T2DM, COPD, Schizoaffective disorder BIBEMS after unwitnessed fall, found to be hypoxic to 70s with EMS, a/w sepsis and acute hypoxic respiratory failure likely multifactorial from COVID-19, superimposed PNA, and COPD exacerbation. She was also found to develop rhabdomyolysis secondary to long time immobility after fall. RRT called 7/26 for AMS, s/p intubation and transfer to MICU. Pt successfully extubated and stable for transfer back to floors on 7/30.     Patient seen at bedside. Denies pain or discomfort. Denies any GI issues (nausea/vomiting/diarrhea/constipation.) Tube feeding visibly running at goal rate 40ml/hr, no intolerance reported. Patient skin noted with wound to right 2nd toe in setting of diabetic ulcer. 2+ edema to BL UE/LE and generalized edema which is masking weight loss. Patient's adm weight 190# and most recent weight 179# which indicates significant weight loss of -10#/-5.2% weight loss in 1 week. Labs notable with low Na 133L, Cl 95L. Recommend monitor and replete electrolytes.     Patient noted previously on Vital HP formula for TF. Currently ordered Glucerna 1.5 krystian via NG tube @ total of 960ml at rate of 40ml/iem23ucq. Current formula provides total of 1440kcal, 79gm pro, which exceeds patient's total needs. Recommend reduce rate to 30ml/hr x 24hr total 720ml w/ no carb prosource BID provides 1200kcal, 90gm pro, 546.5 ml free water.       Diet, NPO with Tube Feed:   Tube Feeding Modality: Nasogastric  Glucerna 1.5 Krystian (GLUCERNA1.5RTH)  Total Volume for 24 Hours (mL): 960  Continuous  Starting Tube Feed Rate {mL per Hour}: 20  Increase Tube Feed Rate by (mL): 10     Every 4 hours  Until Goal Tube Feed Rate (mL per Hour): 40  Tube Feed Duration (in Hours): 24  Tube Feed Start Time: 23:00 (07-29-22 @ 22:55)      GI: WDL. Last BM     Enteral /Parenteral Nutrition: NG tube     Anthropometrics: Height (cm): 149.9 (07-23)  Weight (kg):  86.183 (07-23)  BMI (kg/m2): 38.4 (07-23)    Edema: 2+ BL UE/LE, 2+ generalized       __________________ Pertinent Medications__________________   MEDICATIONS  (STANDING):  amantadine Syrup 100 milliGRAM(s) Oral two times a day  dextrose 5%. 1000 milliLiter(s) (50 mL/Hr) IV Continuous <Continuous>  dextrose 5%. 1000 milliLiter(s) (100 mL/Hr) IV Continuous <Continuous>  dextrose 50% Injectable 25 Gram(s) IV Push once  dextrose 50% Injectable 12.5 Gram(s) IV Push once  dextrose 50% Injectable 25 Gram(s) IV Push once  enoxaparin Injectable 40 milliGRAM(s) SubCutaneous every 12 hours  glucagon  Injectable 1 milliGRAM(s) IntraMuscular once  insulin lispro (ADMELOG) corrective regimen sliding scale   SubCutaneous every 6 hours  mupirocin 2% Ointment 1 Application(s) Topical two times a day  petrolatum Ophthalmic Ointment 1 Application(s) Both EYES two times a day  piperacillin/tazobactam IVPB.. 3.375 Gram(s) IV Intermittent every 8 hours    MEDICATIONS  (PRN):  acetaminophen     Tablet .. 650 milliGRAM(s) Oral every 6 hours PRN Temp greater or equal to 38C (100.4F), Mild Pain (1 - 3), Moderate Pain (4 - 6)  ALBUTerol    90 MICROgram(s) HFA Inhaler 2 Puff(s) Inhalation every 6 hours PRN Shortness of Breath and/or Wheezing  dextrose Oral Gel 15 Gram(s) Oral once PRN Blood Glucose LESS THAN 70 milliGRAM(s)/deciliter  dextrose Oral Gel 15 Gram(s) Oral once PRN Blood Glucose LESS THAN 70 milliGRAM(s)/deciliter      __________________ Pertinent Labs__________________   07-31 Na133 mmol/L<L> Glu 177 mg/dL<H> K+ 4.4 mmol/L Cr  0.67 mg/dL BUN 27 mg/dL<H> 07-31 Phos 3.2 mg/dL 07-31 Alb 3.0 g/dL<L>        POCT Blood Glucose.: 210 mg/dL (07-31-22 @ 12:23)  POCT Blood Glucose.: 171 mg/dL (07-31-22 @ 06:30)  POCT Blood Glucose.: 160 mg/dL (07-31-22 @ 00:21)  POCT Blood Glucose.: 187 mg/dL (07-30-22 @ 17:09)  POCT Blood Glucose.: 220 mg/dL (07-30-22 @ 12:06)  POCT Blood Glucose.: 169 mg/dL (07-30-22 @ 06:16)  POCT Blood Glucose.: 142 mg/dL (07-29-22 @ 23:29)  POCT Blood Glucose.: 156 mg/dL (07-29-22 @ 17:05)  POCT Blood Glucose.: 196 mg/dL (07-29-22 @ 11:58)  POCT Blood Glucose.: 180 mg/dL (07-29-22 @ 05:00)  POCT Blood Glucose.: 164 mg/dL (07-28-22 @ 23:19)  POCT Blood Glucose.: 164 mg/dL (07-28-22 @ 17:19)      Estimated Needs:   [ x] recalculated: nutritional needs calculated based on IBW:95kg   5052-9305 krystian/d @ 25-30kcal/kgbw (95kg)   86 gm pro/d @ 2.0g/kgbw (95kg)     Previous Nutrition Diagnosis: Overweight/Obesity    Nutrition Diagnosis is [x ] ongoing      New Nutrition Diagnosis:  Moderate protein calorie malnutrition in context of acute illness related to physiological causes as evidenced by moderate edema, and >2% weight loss in 1 week.       Recommendations:  1) Recommend Glucerna 1.5 via NGT @ 30ml/hr x 24hr, total of 720 ml (provides 1080kcal, 60gm pro)  2) Recommend add Prosource 2x daily (120kcal, 30gm pro).   3) Monitor intolerance to TF, monitor GI   4) Continue to obtain weights to monitor trend.       Monitoring and Evaluation:      [ x] Tolerance to diet prescription [x ] weights [x ] follow up per protocol  [ ] other: Source: Patient A&Ox [3]    Per chart review, 64F with hx of Parkinsons, COPD, T2DM, COPD, Schizoaffective disorder BIBEMS after unwitnessed fall, found to be hypoxic to 70s with EMS, a/w sepsis and acute hypoxic respiratory failure likely multifactorial from COVID-19, superimposed PNA, and COPD exacerbation. She was also found to develop rhabdomyolysis secondary to long time immobility after fall. RRT called 7/26 for AMS, s/p intubation and transfer to MICU. Pt successfully extubated and stable for transfer back to floors on 7/30.     Patient seen at bedside. Denies pain or discomfort. Denies any GI issues (nausea/vomiting/diarrhea/constipation.) Tube feeding visibly running at goal rate 40ml/hr, no intolerance reported. Patient skin noted with wound to right 2nd toe in setting of diabetic ulcer. 2+ edema to BL UE/LE and generalized edema which is masking weight loss. Patient's adm weight 190# and most recent weight 179# which indicates significant weight loss of -10#/-5.2% weight loss in 1 week. Labs notable with low Na 133L, Cl 95L. Recommend monitor and replete electrolytes.     Patient noted previously on Vital HP formula for TF. Currently ordered Glucerna 1.5 krystian via NG tube @ total of 960ml at rate of 40ml/[id number]. Current formula provides total of 1440kcal, 79gm pro, which exceeds patient's total needs. Recommend reduce rate to 30ml/hr x 24hr total 720ml w/ no carb prosource BID provides 1200kcal, 90gm pro, 546.5 ml free water.       Diet, NPO with Tube Feed:   Tube Feeding Modality: Nasogastric  Glucerna 1.5 Krystian (GLUCERNA1.5RTH)  Total Volume for 24 Hours (mL): 960  Continuous  Starting Tube Feed Rate {mL per Hour}: 20  Increase Tube Feed Rate by (mL): 10     Every 4 hours  Until Goal Tube Feed Rate (mL per Hour): 40  Tube Feed Duration (in Hours): 24  Tube Feed Start Time: 23:00 (07-29-22 @ 22:55)      GI: WDL. Last BM     Enteral /Parenteral Nutrition: NG tube     Anthropometrics: Height (cm): 149.9 (07-23)  Weight (kg):  86.183 (07-23)  BMI (kg/m2): 38.4 (07-23)    Edema: 2+ BL UE/LE, 2+ generalized       __________________ Pertinent Medications__________________   MEDICATIONS  (STANDING):  amantadine Syrup 100 milliGRAM(s) Oral two times a day  dextrose 5%. 1000 milliLiter(s) (50 mL/Hr) IV Continuous <Continuous>  dextrose 5%. 1000 milliLiter(s) (100 mL/Hr) IV Continuous <Continuous>  dextrose 50% Injectable 25 Gram(s) IV Push once  dextrose 50% Injectable 12.5 Gram(s) IV Push once  dextrose 50% Injectable 25 Gram(s) IV Push once  enoxaparin Injectable 40 milliGRAM(s) SubCutaneous every 12 hours  glucagon  Injectable 1 milliGRAM(s) IntraMuscular once  insulin lispro (ADMELOG) corrective regimen sliding scale   SubCutaneous every 6 hours  mupirocin 2% Ointment 1 Application(s) Topical two times a day  petrolatum Ophthalmic Ointment 1 Application(s) Both EYES two times a day  piperacillin/tazobactam IVPB.. 3.375 Gram(s) IV Intermittent every 8 hours    MEDICATIONS  (PRN):  acetaminophen     Tablet .. 650 milliGRAM(s) Oral every 6 hours PRN Temp greater or equal to 38C (100.4F), Mild Pain (1 - 3), Moderate Pain (4 - 6)  ALBUTerol    90 MICROgram(s) HFA Inhaler 2 Puff(s) Inhalation every 6 hours PRN Shortness of Breath and/or Wheezing  dextrose Oral Gel 15 Gram(s) Oral once PRN Blood Glucose LESS THAN 70 milliGRAM(s)/deciliter  dextrose Oral Gel 15 Gram(s) Oral once PRN Blood Glucose LESS THAN 70 milliGRAM(s)/deciliter      __________________ Pertinent Labs__________________   07-31 Na133 mmol/L<L> Glu 177 mg/dL<H> K+ 4.4 mmol/L Cr  0.67 mg/dL BUN 27 mg/dL<H> 07-31 Phos 3.2 mg/dL 07-31 Alb 3.0 g/dL<L>        POCT Blood Glucose.: 210 mg/dL (07-31-22 @ 12:23)  POCT Blood Glucose.: 171 mg/dL (07-31-22 @ 06:30)  POCT Blood Glucose.: 160 mg/dL (07-31-22 @ 00:21)  POCT Blood Glucose.: 187 mg/dL (07-30-22 @ 17:09)  POCT Blood Glucose.: 220 mg/dL (07-30-22 @ 12:06)  POCT Blood Glucose.: 169 mg/dL (07-30-22 @ 06:16)  POCT Blood Glucose.: 142 mg/dL (07-29-22 @ 23:29)  POCT Blood Glucose.: 156 mg/dL (07-29-22 @ 17:05)  POCT Blood Glucose.: 196 mg/dL (07-29-22 @ 11:58)  POCT Blood Glucose.: 180 mg/dL (07-29-22 @ 05:00)  POCT Blood Glucose.: 164 mg/dL (07-28-22 @ 23:19)  POCT Blood Glucose.: 164 mg/dL (07-28-22 @ 17:19)      Estimated Needs:   [ x] recalculated: nutritional needs calculated based on IBW:95kg   4193-8183 krystian/d @ 25-30kcal/kgbw (95kg)   86 gm pro/d @ 2.0g/kgbw (95kg)     Previous Nutrition Diagnosis: Overweight/Obesity    Nutrition Diagnosis is [x ] ongoing      New Nutrition Diagnosis:  Moderate protein calorie malnutrition in context of acute illness related to physiological causes as evidenced by moderate edema, and >5% weight loss in 1 week.       Recommendations:  1) Recommend Glucerna 1.5 via NGT @ 30ml/hr x 24hr, total of 720 ml (provides 1080kcal, 60gm pro)  2) Recommend add Prosource 2x daily (120kcal, 30gm pro).   3) Monitor intolerance to TF, monitor GI   4) Continue to obtain weights to monitor trend.       Monitoring and Evaluation:      [ x] Tolerance to diet prescription [x ] weights [x ] follow up per protocol  [ ] other:

## 2022-07-31 NOTE — PROGRESS NOTE ADULT - PROBLEM SELECTOR PLAN 8
A1C 5.9  - Monitor glucose as she is given decadron  - Monitor POCT and adjust insulin regimen accordingly - currently on sliding scale

## 2022-07-31 NOTE — PROGRESS NOTE ADULT - ATTENDING COMMENTS
#Acute Hypoxic resp failure   #Sepsis  #COVID 19  #PNA  #COPD exacerbation  #Dysphagia    -Monitor O2 requirements and wean as tolerated   -c/w nebs   -c/w zosyn course. Blood cx growing coag negative staph - likely contaminant c/w zosyn to be completed tomorrow per ID note. However, WBC continues to increase. Will continue zosyn for now. Can likely dc if leukocytosis improving.   -C/w tube feeds as pt did not pass SandS yesterdau 7/30. Pending cineesophagogram per recs for more objective evaluation

## 2022-07-31 NOTE — PROGRESS NOTE ADULT - PROBLEM SELECTOR PLAN 11
- c/w therapeutic lovenox per hospital guidelines  - will reobtain S&S eval - c/w therapeutic lovenox per hospital guidelines  - S&S eval recommend sinoesophagram 08/01 for more objective evaluations. currently C/w tube feeds as pt did not pass SandS yesterdau 7/30.

## 2022-07-31 NOTE — PROGRESS NOTE ADULT - PROBLEM SELECTOR PLAN 5
Resolved     - likely 2/2 muscle compression and ischemia due to prolonged immobility  -elevation of CK > 4000, Cr wnl, elevated BUN, elevated urobilinogen  -c/w IVF**  -trend CK, CR  -Monitor urine output, electrolytes, and kidney function. Resolved     - likely 2/2 muscle compression and ischemia due to prolonged immobility  -elevation of CK > 4000, Cr wnl, elevated BUN, elevated urobilinogen  -c/w IVF  -trend CK, CR  -Monitor urine output, electrolytes, and kidney function.

## 2022-07-31 NOTE — PROGRESS NOTE ADULT - PROBLEM SELECTOR PLAN 4
- COPD likely due to chronic smoking history, no home oxygen need at home, never been intubated for Exacerbation  - CW advair diskus standing and proAIR PRN  - Monitor O2 sat with a goal of > 88 and less than 93 - COPD likely due to chronic smoking history, no home oxygen need at home, never been intubated for Exacerbation  - CW advair diskus standing and proAIR PRN  - Now breathing well 95% with 2L 07/31  - Monitor O2 sat with a goal of > 88 and less than 93

## 2022-07-31 NOTE — PROGRESS NOTE ADULT - PROBLEM SELECTOR PLAN 2
- equivocal lung findings on CT chest   - Dx: COVID-19 infection and superimposed PNA   - CW empirical treatment for COVID-19 and community acquired PNA listed above  -blood cx growing coag negative staph - likely contaminant c/w zosyn   - ID following

## 2022-08-01 LAB
ALBUMIN SERPL ELPH-MCNC: 3.1 G/DL — LOW (ref 3.3–5)
ALP SERPL-CCNC: 72 U/L — SIGNIFICANT CHANGE UP (ref 40–120)
ALT FLD-CCNC: 65 U/L — HIGH (ref 4–33)
ANION GAP SERPL CALC-SCNC: 9 MMOL/L — SIGNIFICANT CHANGE UP (ref 7–14)
AST SERPL-CCNC: 22 U/L — SIGNIFICANT CHANGE UP (ref 4–32)
BILIRUB SERPL-MCNC: 0.4 MG/DL — SIGNIFICANT CHANGE UP (ref 0.2–1.2)
BUN SERPL-MCNC: 28 MG/DL — HIGH (ref 7–23)
CALCIUM SERPL-MCNC: 8.8 MG/DL — SIGNIFICANT CHANGE UP (ref 8.4–10.5)
CHLORIDE SERPL-SCNC: 96 MMOL/L — LOW (ref 98–107)
CO2 SERPL-SCNC: 30 MMOL/L — SIGNIFICANT CHANGE UP (ref 22–31)
CREAT SERPL-MCNC: 0.66 MG/DL — SIGNIFICANT CHANGE UP (ref 0.5–1.3)
EGFR: 97 ML/MIN/1.73M2 — SIGNIFICANT CHANGE UP
GLUCOSE BLDC GLUCOMTR-MCNC: 145 MG/DL — HIGH (ref 70–99)
GLUCOSE BLDC GLUCOMTR-MCNC: 180 MG/DL — HIGH (ref 70–99)
GLUCOSE BLDC GLUCOMTR-MCNC: 194 MG/DL — HIGH (ref 70–99)
GLUCOSE BLDC GLUCOMTR-MCNC: 251 MG/DL — HIGH (ref 70–99)
GLUCOSE SERPL-MCNC: 210 MG/DL — HIGH (ref 70–99)
HCT VFR BLD CALC: 45.1 % — HIGH (ref 34.5–45)
HGB BLD-MCNC: 13.6 G/DL — SIGNIFICANT CHANGE UP (ref 11.5–15.5)
MAGNESIUM SERPL-MCNC: 2.2 MG/DL — SIGNIFICANT CHANGE UP (ref 1.6–2.6)
MCHC RBC-ENTMCNC: 25.3 PG — LOW (ref 27–34)
MCHC RBC-ENTMCNC: 30.2 GM/DL — LOW (ref 32–36)
MCV RBC AUTO: 84 FL — SIGNIFICANT CHANGE UP (ref 80–100)
NRBC # BLD: 0 /100 WBCS — SIGNIFICANT CHANGE UP
NRBC # FLD: 0 K/UL — SIGNIFICANT CHANGE UP
PHOSPHATE SERPL-MCNC: 3.2 MG/DL — SIGNIFICANT CHANGE UP (ref 2.5–4.5)
PLATELET # BLD AUTO: 269 K/UL — SIGNIFICANT CHANGE UP (ref 150–400)
POTASSIUM SERPL-MCNC: 4.6 MMOL/L — SIGNIFICANT CHANGE UP (ref 3.5–5.3)
POTASSIUM SERPL-SCNC: 4.6 MMOL/L — SIGNIFICANT CHANGE UP (ref 3.5–5.3)
PROT SERPL-MCNC: 5.4 G/DL — LOW (ref 6–8.3)
RBC # BLD: 5.37 M/UL — HIGH (ref 3.8–5.2)
RBC # FLD: 14.1 % — SIGNIFICANT CHANGE UP (ref 10.3–14.5)
SODIUM SERPL-SCNC: 135 MMOL/L — SIGNIFICANT CHANGE UP (ref 135–145)
WBC # BLD: 18.17 K/UL — HIGH (ref 3.8–10.5)
WBC # FLD AUTO: 18.17 K/UL — HIGH (ref 3.8–10.5)

## 2022-08-01 PROCEDURE — 99232 SBSQ HOSP IP/OBS MODERATE 35: CPT

## 2022-08-01 PROCEDURE — 74230 X-RAY XM SWLNG FUNCJ C+: CPT | Mod: 26

## 2022-08-01 PROCEDURE — 99233 SBSQ HOSP IP/OBS HIGH 50: CPT | Mod: GC

## 2022-08-01 RX ADMIN — Medication 100 MILLIGRAM(S): at 05:28

## 2022-08-01 RX ADMIN — Medication 3: at 13:31

## 2022-08-01 RX ADMIN — Medication 1 APPLICATION(S): at 05:30

## 2022-08-01 RX ADMIN — Medication 1: at 06:43

## 2022-08-01 RX ADMIN — ENOXAPARIN SODIUM 40 MILLIGRAM(S): 100 INJECTION SUBCUTANEOUS at 17:30

## 2022-08-01 RX ADMIN — Medication 1: at 17:31

## 2022-08-01 RX ADMIN — Medication 100 MILLIGRAM(S): at 17:28

## 2022-08-01 RX ADMIN — Medication 1 APPLICATION(S): at 17:31

## 2022-08-01 RX ADMIN — Medication 6 MILLIGRAM(S): at 05:29

## 2022-08-01 RX ADMIN — PIPERACILLIN AND TAZOBACTAM 25 GRAM(S): 4; .5 INJECTION, POWDER, LYOPHILIZED, FOR SOLUTION INTRAVENOUS at 01:13

## 2022-08-01 RX ADMIN — MUPIROCIN 1 APPLICATION(S): 20 OINTMENT TOPICAL at 05:29

## 2022-08-01 RX ADMIN — PIPERACILLIN AND TAZOBACTAM 25 GRAM(S): 4; .5 INJECTION, POWDER, LYOPHILIZED, FOR SOLUTION INTRAVENOUS at 09:01

## 2022-08-01 RX ADMIN — ENOXAPARIN SODIUM 40 MILLIGRAM(S): 100 INJECTION SUBCUTANEOUS at 05:29

## 2022-08-01 NOTE — PROGRESS NOTE ADULT - PROBLEM SELECTOR PLAN 4
Quality 265: Biopsy Follow-Up: Biopsy results reviewed, communicated, tracked, and documented Detail Level: Detailed - COPD likely due to chronic smoking history, no home oxygen need at home, never been intubated for Exacerbation  - CW advair diskus standing and proAIR PRN  - Now breathing well 95% with 2L 07/31  - Monitor O2 sat with a goal of > 88 and less than 93 Quality 130: Documentation Of Current Medications In The Medical Record: Current Medications Documented - COPD likely due to chronic smoking history, no home oxygen need at home, never been intubated for Exacerbation  - CW advair diskus standing and proAIR PRN  - Now breathing well 92% with room air 08/01  - Monitor O2 sat with a goal of > 88 and less than 93

## 2022-08-01 NOTE — PROVIDER CONTACT NOTE (OTHER) - ACTION/TREATMENT ORDERED:
TOV initiated for patient at 11pm on 07/31/2022. Patient noted with 2 incontinent episodes. Bladder scan done and had a residual of 65ml. MD aware. No new orders received,

## 2022-08-01 NOTE — PROGRESS NOTE ADULT - ATTENDING COMMENTS
64F with hx of drug induced Parkinsons, COPD (not on home oxygen), T2DM, COPD, Schizoaffective disorder BIBEMS after unwitnessed fall, found to be hypoxic to 70s with EMS, a/w sepsis and acute hypoxic respiratory failure likely multifactorial from COVID-19, superimposed RLL PNA likely aspiration, and COPD exacerbation. Course c/b AMS and inability to protect airway requiring intubation transferred to MICU 7/26. Transferred back to floors 7/30 for further management. Completed remdesevir, dexamethasone, and zosyn (5 day course post intubation) per ID. Pending cine, follow up SLP recs.

## 2022-08-01 NOTE — PROGRESS NOTE ADULT - SUBJECTIVE AND OBJECTIVE BOX
PROGRESS NOTE:       Patient is a 67y old  Female who presents with a chief complaint of severe hypoxia (31 Jul 2022 12:45)      SUBJECTIVE / OVERNIGHT EVENTS:  NAEON    ADDITIONAL REVIEW OF SYSTEMS:    MEDICATIONS  (STANDING):  amantadine Syrup 100 milliGRAM(s) Oral two times a day  dextrose 5%. 1000 milliLiter(s) (50 mL/Hr) IV Continuous <Continuous>  dextrose 5%. 1000 milliLiter(s) (100 mL/Hr) IV Continuous <Continuous>  dextrose 50% Injectable 25 Gram(s) IV Push once  dextrose 50% Injectable 12.5 Gram(s) IV Push once  dextrose 50% Injectable 25 Gram(s) IV Push once  enoxaparin Injectable 40 milliGRAM(s) SubCutaneous every 12 hours  glucagon  Injectable 1 milliGRAM(s) IntraMuscular once  insulin lispro (ADMELOG) corrective regimen sliding scale   SubCutaneous every 6 hours  petrolatum Ophthalmic Ointment 1 Application(s) Both EYES two times a day  piperacillin/tazobactam IVPB.. 3.375 Gram(s) IV Intermittent every 8 hours    MEDICATIONS  (PRN):  acetaminophen     Tablet .. 650 milliGRAM(s) Oral every 6 hours PRN Temp greater or equal to 38C (100.4F), Mild Pain (1 - 3), Moderate Pain (4 - 6)  ALBUTerol    90 MICROgram(s) HFA Inhaler 2 Puff(s) Inhalation every 6 hours PRN Shortness of Breath and/or Wheezing  dextrose Oral Gel 15 Gram(s) Oral once PRN Blood Glucose LESS THAN 70 milliGRAM(s)/deciliter  dextrose Oral Gel 15 Gram(s) Oral once PRN Blood Glucose LESS THAN 70 milliGRAM(s)/deciliter      CAPILLARY BLOOD GLUCOSE      POCT Blood Glucose.: 180 mg/dL (01 Aug 2022 06:19)  POCT Blood Glucose.: 145 mg/dL (01 Aug 2022 00:30)  POCT Blood Glucose.: 214 mg/dL (31 Jul 2022 18:32)  POCT Blood Glucose.: 207 mg/dL (31 Jul 2022 17:20)  POCT Blood Glucose.: 210 mg/dL (31 Jul 2022 12:23)    I&O's Summary    30 Jul 2022 07:01  -  31 Jul 2022 07:00  --------------------------------------------------------  IN: 480 mL / OUT: 600 mL / NET: -120 mL    31 Jul 2022 07:01  -  01 Aug 2022 06:37  --------------------------------------------------------  IN: 480 mL / OUT: 1050 mL / NET: -570 mL        PHYSICAL EXAM:  Vital Signs Last 24 Hrs  T(C): 36.4 (01 Aug 2022 05:00), Max: 36.7 (31 Jul 2022 22:07)  T(F): 97.6 (01 Aug 2022 05:00), Max: 98 (31 Jul 2022 22:07)  HR: 82 (01 Aug 2022 05:00) (82 - 94)  BP: 121/70 (01 Aug 2022 05:00) (113/73 - 121/83)  BP(mean): --  RR: 18 (01 Aug 2022 05:00) (18 - 19)  SpO2: 95% (01 Aug 2022 05:00) (94% - 96%)    Parameters below as of 01 Aug 2022 05:00  Patient On (Oxygen Delivery Method): nasal cannula  O2 Flow (L/min): 2      GENERAL: No acute distress, well-developed  HEAD:  Atraumatic, Normocephalic  EYES: EOMI, PERRLA, conjunctiva and sclera clear  NECK: Supple, no lymphadenopathy, no JVD  CHEST/LUNG: CTAB; No wheezes, rales, or rhonchi  HEART: Regular rate and rhythm; No murmurs, rubs, or gallops  ABDOMEN: Soft, non-tender, non-distended; normal bowel sounds, no organomegaly  EXTREMITIES:  2+ peripheral pulses b/l, No clubbing, cyanosis, or edema  NEUROLOGY: A&O x 3, no focal deficits  SKIN: No rashes or lesions    LABS:                        13.8   17.15 )-----------( 255      ( 31 Jul 2022 05:48 )             45.5     07-31    133<L>  |  95<L>  |  27<H>  ----------------------------<  177<H>  4.4   |  30  |  0.67    Ca    8.7      31 Jul 2022 05:48  Phos  3.2     07-31  Mg     2.00     07-31    TPro  5.4<L>  /  Alb  3.0<L>  /  TBili  0.5  /  DBili  x   /  AST  34<H>  /  ALT  74<H>  /  AlkPhos  81  07-31      CARDIAC MARKERS ( 31 Jul 2022 05:48 )  x     / x     / 82 U/L / x     / x      CARDIAC MARKERS ( 30 Jul 2022 15:30 )  x     / x     / 145 U/L / x     / x                RADIOLOGY & ADDITIONAL TESTS:  Results Reviewed:   Imaging Personally Reviewed:  Electrocardiogram Personally Reviewed:    COORDINATION OF CARE:  Care Discussed with Consultants/Other Providers [Y/N]:  Prior or Outpatient Records Reviewed [Y/N]:

## 2022-08-01 NOTE — PROVIDER CONTACT NOTE (OTHER) - DATE AND TIME:
01-Aug-2022 05:20
26-Jul-2022 10:00
25-Jul-2022 06:30
24-Jul-2022 09:00
24-Jul-2022 13:00
24-Jul-2022 17:00
24-Jul-2022 22:50
26-Jul-2022 10:48

## 2022-08-01 NOTE — PROGRESS NOTE ADULT - PROBLEM SELECTOR PLAN 2
- equivocal lung findings on CT chest   - Dx: COVID-19 infection and superimposed PNA   - CW empirical treatment for COVID-19 and community acquired PNA listed above  -blood cx growing coag negative staph - likely contaminant c/w zosyn   - ID following - equivocal lung findings on CT chest   - Dx: COVID-19 infection and superimposed PNA   - CW empirical treatment for COVID-19 and community acquired PNA listed above  -blood cx growing coag negative staph - likely contaminant c/w zosyn   - aspiration pneumonia was treated by zosyn until 08/01 per ID recommendations

## 2022-08-01 NOTE — PROGRESS NOTE ADULT - SUBJECTIVE AND OBJECTIVE BOX
Follow Up: PNA    Interval History/ROS: Afebrile. On room air. Feels fine, no cough, dyspnea, pain or chills. No diarrhea or dysuria. Says she's here because she got COVID at home and fell while feeling sick. Doesn't recall how long she was sick for.     Allergies  No Known Allergies        ANTIMICROBIALS:      OTHER MEDS:  acetaminophen     Tablet .. 650 milliGRAM(s) Oral every 6 hours PRN  ALBUTerol    90 MICROgram(s) HFA Inhaler 2 Puff(s) Inhalation every 6 hours PRN  amantadine Syrup 100 milliGRAM(s) Oral two times a day  dextrose 5%. 1000 milliLiter(s) IV Continuous <Continuous>  dextrose 5%. 1000 milliLiter(s) IV Continuous <Continuous>  dextrose 50% Injectable 25 Gram(s) IV Push once  dextrose 50% Injectable 12.5 Gram(s) IV Push once  dextrose 50% Injectable 25 Gram(s) IV Push once  dextrose Oral Gel 15 Gram(s) Oral once PRN  dextrose Oral Gel 15 Gram(s) Oral once PRN  enoxaparin Injectable 40 milliGRAM(s) SubCutaneous every 12 hours  glucagon  Injectable 1 milliGRAM(s) IntraMuscular once  insulin lispro (ADMELOG) corrective regimen sliding scale   SubCutaneous every 6 hours  petrolatum Ophthalmic Ointment 1 Application(s) Both EYES two times a day      Vital Signs Last 24 Hrs  T(C): 36.6 (01 Aug 2022 10:00), Max: 36.7 (31 Jul 2022 22:07)  T(F): 97.8 (01 Aug 2022 10:00), Max: 98 (31 Jul 2022 22:07)  HR: 80 (01 Aug 2022 10:00) (80 - 89)  BP: 103/66 (01 Aug 2022 10:00) (103/66 - 121/70)  BP(mean): --  RR: 18 (01 Aug 2022 14:00) (18 - 18)  SpO2: 92% (01 Aug 2022 14:00) (92% - 96%)    Parameters below as of 01 Aug 2022 14:00  Patient On (Oxygen Delivery Method): room air        Physical Exam:  General: non toxic  ENT: eyes abducted. NG tube   Cardio: regular rate   Respiratory: nonlabored on room air   abd: nondistended, soft, nontender   Musculoskeletal: no focal joint swelling  Skin: no rash                          13.6   18.17 )-----------( 269      ( 01 Aug 2022 07:30 )             45.1       08-01    135  |  96<L>  |  28<H>  ----------------------------<  210<H>  4.6   |  30  |  0.66    Ca    8.8      01 Aug 2022 07:30  Phos  3.2     08-01  Mg     2.20     08-01    TPro  5.4<L>  /  Alb  3.1<L>  /  TBili  0.4  /  DBili  x   /  AST  22  /  ALT  65<H>  /  AlkPhos  72  08-01          MICROBIOLOGY:  Culture - Blood (collected 07-26-22 @ 07:21)  Source: .Blood Blood-Peripheral  Final Report (07-31-22 @ 13:00):    No Growth Final      RADIOLOGY:  Images below reviewed personally  Xray Chest 1 View- PORTABLE-Urgent (Xray Chest 1 View- PORTABLE-Urgent .) (07.29.22 @ 18:24)   Interval extubation and enteric tube removal.  Feeding tube inserted extending below diaphragm with distal and in left   epigastric region in satisfactory position.  Clear lungs. No pleural effusions or pneumothorax.  Prominent appearing cardiac and mediastinal silhouettes however   inaccurately assessed on the projection.

## 2022-08-01 NOTE — SWALLOW VFSS/MBS ASSESSMENT ADULT - RECOMMENDED CONSISTENCY
1.) Continue with Short Term Non Oral Means of Nutrition via NGTube 1.) Continue with Short Term Non Oral Means of Nutrition via NGTube for caloric/hydration/medication needs.   2.) Aspiration Precautions  3.) Reflux Precautions  4.) Maintain Good Oral Hygiene Care

## 2022-08-01 NOTE — PROVIDER CONTACT NOTE (OTHER) - ASSESSMENT
TOV initiated for patient at 11pm on 07/31/2022. Patient noted with 2 incontinent episodes. Bladder scan done and had a residual of 65ml.

## 2022-08-01 NOTE — PROGRESS NOTE ADULT - PROBLEM SELECTOR PLAN 11
- c/w therapeutic lovenox per hospital guidelines  - S&S eval recommend sinoesophagram 08/01 for more objective evaluations. currently C/w tube feeds as pt did not pass SandS yesterdau 7/30. - c/w therapeutic lovenox per hospital guidelines  - S&S ursula recommend cineesophagogram 08/01 for more objective evaluations. currently C/w tube feeds as pt did not pass SandS yesterdau 7/30.  - Pt failed cineesophagogram and per  recommendations:  1.) Continue with Short Term Non Oral Means of Nutrition via NGTube for caloric/hydration/medication needs.   2.) Aspiration Precautions  3.) Reflux Precautions  4.) Maintain Good Oral Hygiene Care

## 2022-08-01 NOTE — SWALLOW VFSS/MBS ASSESSMENT ADULT - COMMENTS
64F with hx of Parkinsons, COPD, T2DM, COPD, Schizoaffective disorder BIBEMS after unwitnessed fall, found to be hypoxic to 70s with EMS, a/w sepsis and acute hypoxic respiratory failure likely multifactorial from COVID-19, superimposed PNA, and COPD exacerbation. She was also found to develop rhabdomyolysis secondary to long time immobility after fall. RRT called 7/26 for AMS, s/p intubation and transfer to MICU. Pt successfully extubated and stable for transfer back to floors on 7/30…..Patient treated on the floor for acute hypoxic respiratory failure likely multifactorial from COVID-19, superimposed PNA, and COPD exacerbation. She was also found to develop rhabdomyolysis secondary to long time immobility after fall.....RRT called 7/26 for AMS and hypoxia. Given concern for airway protection given mental status change, decision made to intubate patient. Patient transferred to the MICU for further care.....Patient treated for acute hypoxic respiratory failure in the setting of presumed aspiration pneumonia. Patient started on abx. Patient was successfully extubated and remained hemodynamically stable. Patient now stable for transfer to the medicine floor.    Of Note: Patient was seen for a Clinical Swallow Eval on 7/25/2022 and 7/30/2022 (See Consults).     Patient arrived to Radiology for Cinesophagram. Patient was accompanied by Team 1 MD. Patient transferred to a specialized seating unit with lateral view projection.

## 2022-08-01 NOTE — PROVIDER CONTACT NOTE (OTHER) - NAME OF MD/NP/PA/DO NOTIFIED:
Akhil Mcmahan 1
Akhil Vickers
Hummartine Daniels
Chip Douglas HS 1
HS #3
HS1 Chip Douglas

## 2022-08-01 NOTE — PROGRESS NOTE ADULT - PROBLEM SELECTOR PLAN 1
Pt found to be febrile, tachycardiac, tachypneic, hypoxic, with leukocytosis, positive COVID19, equivocal lung findings in CT chest  - Dx: hypoxic respiratory failure 2/2 COVID-19, superimposed PNA, and COPD exacerbation  s/p intubation and MICU transfer on 7/26, successfully extubated 7/29 and transferred to floors 7/30.    - c/w COVID treatment (decadron 7/24-8/1), (remdesevir 7/23-7/27)  - c/w empiric abx - started 7/24. Will continue for 5-7 days after aspiration event on 7/26  - Discontinued Symbicort, PRN albuterol ordered   - wean NC as tolerated   - ABG 7/29 with 7.5 | 36 | 137 | 28 | 98.9  - Dimer downtrending to 191  - will continue zosyn until 08/02 as WBC elevated 07/31  - Trend CBC AM, vitals q8, Dimer every other day, cmp AM

## 2022-08-01 NOTE — PROGRESS NOTE ADULT - PROBLEM SELECTOR PLAN 6
-Unclear etiologies:  In setting of sepsis vs rhabdo vs fatty liver disease, medication, alcohol use    Pt w/o any abdominal pain   -Will monitor CMP

## 2022-08-01 NOTE — PROGRESS NOTE ADULT - PROBLEM SELECTOR PLAN 5
Resolved     - likely 2/2 muscle compression and ischemia due to prolonged immobility  -elevation of CK > 4000, Cr wnl, elevated BUN, elevated urobilinogen  -c/w IVF  -trend CK, CR  -Monitor urine output, electrolytes, and kidney function.

## 2022-08-01 NOTE — PROGRESS NOTE ADULT - ASSESSMENT
67F Parkinson, Schizoaffective, COPD, Obesity.   Here 7/23 after found on the floor of her home.   Febrile, hypoxic and COVID positive, eventually intubated 7/26.   Unimpressive chest imaging but treated for both COVID and possible bacterial pneumonia.   No other clear infection.   Rhabdomyolysis - CPK normalized.   Extubated 7/29, doing well on room air, no focal complaints.     Suggest  -stop Zosyn and decadron today     Will sign off, call back if needed  Discussed with medicine     Kole Ruffin MD   Infectious Disease   Available on TEAMS. After 5PM and on weekends please page fellow on call or call [phone]

## 2022-08-01 NOTE — SWALLOW VFSS/MBS ASSESSMENT ADULT - DIAGNOSTIC IMPRESSIONS
Patient presents with Mild Oral Stage and Moderate to Severe Pharyngeal Stage Dysphagia. The Oral Stage is characterized by adequate oral containment, adequate bolus manipulation, adequate tongue motion with adequate anterior to posterior transfer of the bolus for puree and moderately thick liquids; premature spillage/loss to the hypopharynx for Thin Liquids due to reduced tongue to palate seal; with trace oral clearance deficits located on the tongue surface post swallow. The Pharyngeal Stage is characterized by delayed initiation of the pharyngeal swallow (Bolus head varies to the vallecular/pyriforms for mildly thick liquids/thin liquids), reduced laryngeal elevation with incomplete laryngeal vestibular closure, reduced tongue base retraction with reduced epiglottic deflection which may have been exacerbated by the NGTube in place, and reduced pharyngeal constriction. There is trace pharyngeal clearance deficits located in the vallecular and lateral pharyngeal wall/pyriforms post swallow for all consistencies. There was Laryngeal Penetration during the swallow for Mildly Thick Liquids and Thin Liquids leaving residue in the laryngeal vestibule leading to subsequent delayed Aspiration (Silent). There was Laryngeal Penetration after the swallow as PO trials progressed for Puree as it enters into the laryngeal vestibule leading to subsequent delayed Aspiration.  Patient is not adequately sensate given no immediate cough response. Patient is at heighten risk for Aspiration Pneumonia. Patient presents with Mild Oral Stage and Moderate to Severe Pharyngeal Stage Dysphagia. The Oral Stage is characterized by adequate oral containment, adequate bolus manipulation, adequate tongue motion with adequate anterior to posterior transfer of the bolus for puree and moderately thick liquids; premature spillage/loss to the hypopharynx for Thin Liquids due to reduced tongue to palate seal; with trace oral clearance deficits located on the tongue surface post swallow. The Pharyngeal Stage is characterized by delayed initiation of the pharyngeal swallow (Bolus head varies to the vallecular/pyriforms for mildly thick liquids/thin liquids), reduced laryngeal elevation with incomplete laryngeal vestibular closure, reduced tongue base retraction with reduced epiglottic deflection which may have been exacerbated by the NGTube in place, and reduced pharyngeal constriction. There is trace pharyngeal clearance deficits located in the vallecular and lateral pharyngeal wall/pyriforms post swallow for all consistencies. There was Laryngeal Penetration during the swallow for Mildly Thick Liquids and Thin Liquids leaving residue in the laryngeal vestibule leading to subsequent delayed Aspiration (Silent). There was Laryngeal Penetration during and after the swallow for Puree into the laryngeal vestibule as PO trials progressed leading to subsequent delayed Aspiration.  Patient is not adequately sensate given no immediate cough response. Patient is at heighten risk for Aspiration Pneumonia. Patient presents with Mild Oral Stage and Moderate to Severe Pharyngeal Stage Dysphagia. The Oral Stage is characterized by adequate oral containment, adequate bolus manipulation, adequate tongue motion with adequate anterior to posterior transfer of the bolus for puree and moderately thick liquids; premature spillage/loss to the hypopharynx for Thin Liquids due to reduced tongue to palate seal; with trace oral clearance deficits located on the tongue surface post swallow. The Pharyngeal Stage is characterized by delayed initiation of the pharyngeal swallow (Bolus head varies to the vallecular/pyriforms for mildly thick liquids/thin liquids), reduced laryngeal elevation with incomplete laryngeal vestibular closure, reduced tongue base retraction with reduced epiglottic deflection which may have been exacerbated by the NGTube in place, and reduced pharyngeal constriction. There is trace pharyngeal clearance deficits located in the vallecular and lateral pharyngeal wall/pyriforms post swallow for all consistencies (liquids greater than puree). There was Laryngeal Penetration during the swallow for Mildly Thick Liquids and Thin Liquids leaving residue in the laryngeal vestibule posteriorly leading to subsequent delayed Aspiration (Silent). There was Trace Laryngeal Penetration after the swallow for Puree into the laryngeal vestibule as PO trials progressed leading to subsequent delayed Aspiration.  Patient is not adequately sensate given no immediate cough response. Patient is cued to cough but does not clear effectively clear contrast from the airway. Patient is at heighten risk for Aspiration Pneumonia.

## 2022-08-01 NOTE — PROGRESS NOTE ADULT - PROBLEM SELECTOR PLAN 3
- Positive COVID-19 test and positive exam findings of course lung sound and severe hypoxia   - CW COVID-19 protocol listed above  - trend d-dimer given COVID+ - Positive COVID-19 test and positive exam findings of course lung sound and severe hypoxia   - CW COVID-19 protocol listed above  - trend d-dimer given COVID+  - Finished remidisivir and decadron treatment course 08/01

## 2022-08-01 NOTE — PROVIDER CONTACT NOTE (OTHER) - REASON
Patients HR is 113
Patients HR is 108
Pts O2 sat
Temp 102.9F
Pt unable to swallow
TOV
Patients HR is 113
Tachycardia, Hyperthermia, chest pain

## 2022-08-02 LAB
ANION GAP SERPL CALC-SCNC: 10 MMOL/L — SIGNIFICANT CHANGE UP (ref 7–14)
BASOPHILS # BLD AUTO: 0 K/UL — SIGNIFICANT CHANGE UP (ref 0–0.2)
BASOPHILS NFR BLD AUTO: 0 % — SIGNIFICANT CHANGE UP (ref 0–2)
BUN SERPL-MCNC: 32 MG/DL — HIGH (ref 7–23)
CALCIUM SERPL-MCNC: 9.2 MG/DL — SIGNIFICANT CHANGE UP (ref 8.4–10.5)
CHLORIDE SERPL-SCNC: 97 MMOL/L — LOW (ref 98–107)
CO2 SERPL-SCNC: 29 MMOL/L — SIGNIFICANT CHANGE UP (ref 22–31)
CREAT SERPL-MCNC: 0.62 MG/DL — SIGNIFICANT CHANGE UP (ref 0.5–1.3)
EGFR: 99 ML/MIN/1.73M2 — SIGNIFICANT CHANGE UP
EOSINOPHIL # BLD AUTO: 0 K/UL — SIGNIFICANT CHANGE UP (ref 0–0.5)
EOSINOPHIL NFR BLD AUTO: 0 % — SIGNIFICANT CHANGE UP (ref 0–6)
GLUCOSE BLDC GLUCOMTR-MCNC: 131 MG/DL — HIGH (ref 70–99)
GLUCOSE BLDC GLUCOMTR-MCNC: 152 MG/DL — HIGH (ref 70–99)
GLUCOSE BLDC GLUCOMTR-MCNC: 154 MG/DL — HIGH (ref 70–99)
GLUCOSE BLDC GLUCOMTR-MCNC: 161 MG/DL — HIGH (ref 70–99)
GLUCOSE SERPL-MCNC: 169 MG/DL — HIGH (ref 70–99)
HCT VFR BLD CALC: 46.6 % — HIGH (ref 34.5–45)
HGB BLD-MCNC: 14 G/DL — SIGNIFICANT CHANGE UP (ref 11.5–15.5)
IANC: 12.37 K/UL — HIGH (ref 1.8–7.4)
LYMPHOCYTES # BLD AUTO: 22 % — SIGNIFICANT CHANGE UP (ref 13–44)
LYMPHOCYTES # BLD AUTO: 4.74 K/UL — HIGH (ref 1–3.3)
MAGNESIUM SERPL-MCNC: 2.1 MG/DL — SIGNIFICANT CHANGE UP (ref 1.6–2.6)
MCHC RBC-ENTMCNC: 25.3 PG — LOW (ref 27–34)
MCHC RBC-ENTMCNC: 30 GM/DL — LOW (ref 32–36)
MCV RBC AUTO: 84.1 FL — SIGNIFICANT CHANGE UP (ref 80–100)
MONOCYTES # BLD AUTO: 2.37 K/UL — HIGH (ref 0–0.9)
MONOCYTES NFR BLD AUTO: 11 % — SIGNIFICANT CHANGE UP (ref 2–14)
NEUTROPHILS # BLD AUTO: 14.45 K/UL — HIGH (ref 1.8–7.4)
NEUTROPHILS NFR BLD AUTO: 67 % — SIGNIFICANT CHANGE UP (ref 43–77)
NRBC # BLD: 0 /100 WBCS — SIGNIFICANT CHANGE UP
NRBC # FLD: 0 K/UL — SIGNIFICANT CHANGE UP
PHOSPHATE SERPL-MCNC: 3.3 MG/DL — SIGNIFICANT CHANGE UP (ref 2.5–4.5)
PLATELET # BLD AUTO: 329 K/UL — SIGNIFICANT CHANGE UP (ref 150–400)
POTASSIUM SERPL-MCNC: 4.2 MMOL/L — SIGNIFICANT CHANGE UP (ref 3.5–5.3)
POTASSIUM SERPL-SCNC: 4.2 MMOL/L — SIGNIFICANT CHANGE UP (ref 3.5–5.3)
PROCALCITONIN SERPL-MCNC: 0.08 NG/ML — SIGNIFICANT CHANGE UP (ref 0.02–0.1)
RBC # BLD: 5.54 M/UL — HIGH (ref 3.8–5.2)
RBC # FLD: 14.3 % — SIGNIFICANT CHANGE UP (ref 10.3–14.5)
SODIUM SERPL-SCNC: 136 MMOL/L — SIGNIFICANT CHANGE UP (ref 135–145)
WBC # BLD: 21.56 K/UL — HIGH (ref 3.8–10.5)
WBC # FLD AUTO: 21.56 K/UL — HIGH (ref 3.8–10.5)

## 2022-08-02 PROCEDURE — 99232 SBSQ HOSP IP/OBS MODERATE 35: CPT | Mod: GC

## 2022-08-02 RX ADMIN — Medication 1 APPLICATION(S): at 06:45

## 2022-08-02 RX ADMIN — Medication 1: at 01:02

## 2022-08-02 RX ADMIN — ENOXAPARIN SODIUM 40 MILLIGRAM(S): 100 INJECTION SUBCUTANEOUS at 17:21

## 2022-08-02 RX ADMIN — Medication 1 APPLICATION(S): at 17:21

## 2022-08-02 RX ADMIN — Medication 100 MILLIGRAM(S): at 17:21

## 2022-08-02 RX ADMIN — Medication 1: at 06:44

## 2022-08-02 RX ADMIN — ENOXAPARIN SODIUM 40 MILLIGRAM(S): 100 INJECTION SUBCUTANEOUS at 06:44

## 2022-08-02 RX ADMIN — Medication 100 MILLIGRAM(S): at 06:45

## 2022-08-02 RX ADMIN — Medication 1: at 12:39

## 2022-08-02 NOTE — PROGRESS NOTE ADULT - SUBJECTIVE AND OBJECTIVE BOX
PROGRESS NOTE:       Patient is a 67y old  Female who presents with a chief complaint of severe hypoxia (01 Aug 2022 15:23)      SUBJECTIVE / OVERNIGHT EVENTS:  NAEON     ADDITIONAL REVIEW OF SYSTEMS:    MEDICATIONS  (STANDING):  amantadine Syrup 100 milliGRAM(s) Oral two times a day  dextrose 50% Injectable 25 Gram(s) IV Push once  dextrose 50% Injectable 12.5 Gram(s) IV Push once  dextrose 50% Injectable 25 Gram(s) IV Push once  enoxaparin Injectable 40 milliGRAM(s) SubCutaneous every 12 hours  insulin lispro (ADMELOG) corrective regimen sliding scale   SubCutaneous every 6 hours  petrolatum Ophthalmic Ointment 1 Application(s) Both EYES two times a day    MEDICATIONS  (PRN):  acetaminophen     Tablet .. 650 milliGRAM(s) Oral every 6 hours PRN Temp greater or equal to 38C (100.4F), Mild Pain (1 - 3), Moderate Pain (4 - 6)  ALBUTerol    90 MICROgram(s) HFA Inhaler 2 Puff(s) Inhalation every 6 hours PRN Shortness of Breath and/or Wheezing  dextrose Oral Gel 15 Gram(s) Oral once PRN Blood Glucose LESS THAN 70 milliGRAM(s)/deciliter  dextrose Oral Gel 15 Gram(s) Oral once PRN Blood Glucose LESS THAN 70 milliGRAM(s)/deciliter      CAPILLARY BLOOD GLUCOSE      POCT Blood Glucose.: 154 mg/dL (02 Aug 2022 06:34)  POCT Blood Glucose.: 161 mg/dL (02 Aug 2022 01:00)  POCT Blood Glucose.: 194 mg/dL (01 Aug 2022 17:13)  POCT Blood Glucose.: 251 mg/dL (01 Aug 2022 12:32)    I&O's Summary    31 Jul 2022 07:01  -  01 Aug 2022 07:00  --------------------------------------------------------  IN: 480 mL / OUT: 1700 mL / NET: -1220 mL        PHYSICAL EXAM:  Vital Signs Last 24 Hrs  T(C): 36.7 (01 Aug 2022 16:00), Max: 36.7 (01 Aug 2022 16:00)  T(F): 98.1 (01 Aug 2022 16:00), Max: 98.1 (01 Aug 2022 16:00)  HR: 89 (01 Aug 2022 16:00) (80 - 89)  BP: 121/79 (01 Aug 2022 16:00) (103/66 - 121/79)  BP(mean): --  RR: 18 (01 Aug 2022 16:00) (18 - 18)  SpO2: 91% (01 Aug 2022 16:00) (91% - 94%)    Parameters below as of 01 Aug 2022 16:00  Patient On (Oxygen Delivery Method): room air        GENERAL: No acute distress, well-developed  HEAD:  Atraumatic, Normocephalic  EYES: EOMI, PERRLA, conjunctiva and sclera clear  NECK: Supple, no lymphadenopathy, no JVD  CHEST/LUNG: CTAB; No wheezes, rales, or rhonchi  HEART: Regular rate and rhythm; No murmurs, rubs, or gallops  ABDOMEN: Soft, non-tender, non-distended; normal bowel sounds, no organomegaly  EXTREMITIES:  2+ peripheral pulses b/l, No clubbing, cyanosis, or edema  NEUROLOGY: A&O x 3, no focal deficits  SKIN: No rashes or lesions    LABS:                        13.6   18.17 )-----------( 269      ( 01 Aug 2022 07:30 )             45.1     08-01    135  |  96<L>  |  28<H>  ----------------------------<  210<H>  4.6   |  30  |  0.66    Ca    8.8      01 Aug 2022 07:30  Phos  3.2     08-01  Mg     2.20     08-01    TPro  5.4<L>  /  Alb  3.1<L>  /  TBili  0.4  /  DBili  x   /  AST  22  /  ALT  65<H>  /  AlkPhos  72  08-01                RADIOLOGY & ADDITIONAL TESTS:  Results Reviewed:   Imaging Personally Reviewed:  Electrocardiogram Personally Reviewed:    COORDINATION OF CARE:  Care Discussed with Consultants/Other Providers [Y/N]:  Prior or Outpatient Records Reviewed [Y/N]:

## 2022-08-02 NOTE — PROGRESS NOTE ADULT - PROBLEM SELECTOR PLAN 4
- COPD likely due to chronic smoking history, no home oxygen need at home, never been intubated for Exacerbation  - CW advair diskus standing and proAIR PRN  - Now breathing well 92% with room air 08/01  - Monitor O2 sat with a goal of > 88 and less than 93

## 2022-08-02 NOTE — PROGRESS NOTE ADULT - PROBLEM SELECTOR PLAN 2
- equivocal lung findings on CT chest   - Dx: COVID-19 infection and superimposed PNA   - CW empirical treatment for COVID-19 and community acquired PNA listed above  -blood cx growing coag negative staph - likely contaminant c/w zosyn   - aspiration pneumonia was treated by zosyn until 08/01 per ID recommendations

## 2022-08-02 NOTE — PROGRESS NOTE ADULT - PROBLEM SELECTOR PLAN 11
- c/w therapeutic lovenox per hospital guidelines  - S&S ursula recommend cineesophagogram 08/01 for more objective evaluations. currently C/w tube feeds as pt did not pass SandS yesterdau 7/30.  - Pt failed cineesophagogram and per  recommendations:  1.) Continue with Short Term Non Oral Means of Nutrition via NGTube for caloric/hydration/medication needs.   2.) Aspiration Precautions  3.) Reflux Precautions  4.) Maintain Good Oral Hygiene Care

## 2022-08-02 NOTE — PROGRESS NOTE ADULT - PROBLEM SELECTOR PLAN 3
- Positive COVID-19 test and positive exam findings of course lung sound and severe hypoxia   - CW COVID-19 protocol listed above  - trend d-dimer given COVID+  - Finished remidisivir and decadron treatment course 08/01

## 2022-08-02 NOTE — PROGRESS NOTE ADULT - ATTENDING COMMENTS
64F with hx of drug induced Parkinsons, COPD (not on home oxygen), T2DM, COPD, Schizoaffective disorder BIBEMS after unwitnessed fall, found to be hypoxic to 70s with EMS, a/w sepsis and acute hypoxic respiratory failure likely multifactorial from COVID-19, superimposed RLL PNA likely aspiration, and COPD exacerbation. Course c/b AMS and inability to protect airway requiring intubation transferred to MICU 7/26. Transferred back to floors 7/30 for further management. Completed remdesevir, dexamethasone, and zosyn (5 day course post intubation) per ID. Failed repeat cine 8/1, per SLP plan for re-eval with NGT removal Thursday/Friday. If fails patient expressing she wants PEG, will discuss with JOSHUA Witt who helps patient make decisions.

## 2022-08-03 ENCOUNTER — TRANSCRIPTION ENCOUNTER (OUTPATIENT)
Age: 67
End: 2022-08-03

## 2022-08-03 LAB
ANION GAP SERPL CALC-SCNC: 8 MMOL/L — SIGNIFICANT CHANGE UP (ref 7–14)
BASOPHILS # BLD AUTO: 0.03 K/UL — SIGNIFICANT CHANGE UP (ref 0–0.2)
BASOPHILS NFR BLD AUTO: 0.1 % — SIGNIFICANT CHANGE UP (ref 0–2)
BUN SERPL-MCNC: 37 MG/DL — HIGH (ref 7–23)
CALCIUM SERPL-MCNC: 8.9 MG/DL — SIGNIFICANT CHANGE UP (ref 8.4–10.5)
CHLORIDE SERPL-SCNC: 100 MMOL/L — SIGNIFICANT CHANGE UP (ref 98–107)
CO2 SERPL-SCNC: 30 MMOL/L — SIGNIFICANT CHANGE UP (ref 22–31)
CREAT SERPL-MCNC: 0.66 MG/DL — SIGNIFICANT CHANGE UP (ref 0.5–1.3)
EGFR: 97 ML/MIN/1.73M2 — SIGNIFICANT CHANGE UP
EOSINOPHIL # BLD AUTO: 0.2 K/UL — SIGNIFICANT CHANGE UP (ref 0–0.5)
EOSINOPHIL NFR BLD AUTO: 1 % — SIGNIFICANT CHANGE UP (ref 0–6)
GLUCOSE BLDC GLUCOMTR-MCNC: 147 MG/DL — HIGH (ref 70–99)
GLUCOSE BLDC GLUCOMTR-MCNC: 162 MG/DL — HIGH (ref 70–99)
GLUCOSE BLDC GLUCOMTR-MCNC: 166 MG/DL — HIGH (ref 70–99)
GLUCOSE BLDC GLUCOMTR-MCNC: 182 MG/DL — HIGH (ref 70–99)
GLUCOSE BLDC GLUCOMTR-MCNC: 185 MG/DL — HIGH (ref 70–99)
GLUCOSE SERPL-MCNC: 201 MG/DL — HIGH (ref 70–99)
HCT VFR BLD CALC: 45.5 % — HIGH (ref 34.5–45)
HGB BLD-MCNC: 13.7 G/DL — SIGNIFICANT CHANGE UP (ref 11.5–15.5)
IANC: 11.18 K/UL — HIGH (ref 1.8–7.4)
IMM GRANULOCYTES NFR BLD AUTO: 1.4 % — SIGNIFICANT CHANGE UP (ref 0–1.5)
LYMPHOCYTES # BLD AUTO: 31.6 % — SIGNIFICANT CHANGE UP (ref 13–44)
LYMPHOCYTES # BLD AUTO: 6.35 K/UL — HIGH (ref 1–3.3)
MAGNESIUM SERPL-MCNC: 2.1 MG/DL — SIGNIFICANT CHANGE UP (ref 1.6–2.6)
MCHC RBC-ENTMCNC: 25.4 PG — LOW (ref 27–34)
MCHC RBC-ENTMCNC: 30.1 GM/DL — LOW (ref 32–36)
MCV RBC AUTO: 84.4 FL — SIGNIFICANT CHANGE UP (ref 80–100)
MONOCYTES # BLD AUTO: 2.04 K/UL — HIGH (ref 0–0.9)
MONOCYTES NFR BLD AUTO: 10.2 % — SIGNIFICANT CHANGE UP (ref 2–14)
NEUTROPHILS # BLD AUTO: 11.18 K/UL — HIGH (ref 1.8–7.4)
NEUTROPHILS NFR BLD AUTO: 55.7 % — SIGNIFICANT CHANGE UP (ref 43–77)
NRBC # BLD: 0 /100 WBCS — SIGNIFICANT CHANGE UP
NRBC # FLD: 0 K/UL — SIGNIFICANT CHANGE UP
PHOSPHATE SERPL-MCNC: 4.3 MG/DL — SIGNIFICANT CHANGE UP (ref 2.5–4.5)
PLATELET # BLD AUTO: 329 K/UL — SIGNIFICANT CHANGE UP (ref 150–400)
POTASSIUM SERPL-MCNC: 4.1 MMOL/L — SIGNIFICANT CHANGE UP (ref 3.5–5.3)
POTASSIUM SERPL-SCNC: 4.1 MMOL/L — SIGNIFICANT CHANGE UP (ref 3.5–5.3)
RBC # BLD: 5.39 M/UL — HIGH (ref 3.8–5.2)
RBC # FLD: 14.7 % — HIGH (ref 10.3–14.5)
SODIUM SERPL-SCNC: 138 MMOL/L — SIGNIFICANT CHANGE UP (ref 135–145)
WBC # BLD: 20.09 K/UL — HIGH (ref 3.8–10.5)
WBC # FLD AUTO: 20.09 K/UL — HIGH (ref 3.8–10.5)

## 2022-08-03 PROCEDURE — 99232 SBSQ HOSP IP/OBS MODERATE 35: CPT | Mod: GC

## 2022-08-03 RX ADMIN — Medication 1 APPLICATION(S): at 17:19

## 2022-08-03 RX ADMIN — Medication 100 MILLIGRAM(S): at 05:54

## 2022-08-03 RX ADMIN — Medication 100 MILLIGRAM(S): at 17:30

## 2022-08-03 RX ADMIN — Medication 1: at 18:00

## 2022-08-03 RX ADMIN — Medication 1: at 06:06

## 2022-08-03 RX ADMIN — ENOXAPARIN SODIUM 40 MILLIGRAM(S): 100 INJECTION SUBCUTANEOUS at 17:18

## 2022-08-03 RX ADMIN — ENOXAPARIN SODIUM 40 MILLIGRAM(S): 100 INJECTION SUBCUTANEOUS at 05:54

## 2022-08-03 RX ADMIN — Medication 1 APPLICATION(S): at 05:54

## 2022-08-03 RX ADMIN — Medication 1: at 12:26

## 2022-08-03 NOTE — PROGRESS NOTE ADULT - SUBJECTIVE AND OBJECTIVE BOX
PROGRESS NOTE:     Patient is a 67y old  Female who presents with a chief complaint of severe hypoxia (02 Aug 2022 06:39)      SUBJECTIVE / OVERNIGHT EVENTS:  NAEON     ADDITIONAL REVIEW OF SYSTEMS:    MEDICATIONS  (STANDING):  amantadine Syrup 100 milliGRAM(s) Oral two times a day  dextrose 50% Injectable 25 Gram(s) IV Push once  dextrose 50% Injectable 12.5 Gram(s) IV Push once  dextrose 50% Injectable 25 Gram(s) IV Push once  enoxaparin Injectable 40 milliGRAM(s) SubCutaneous every 12 hours  insulin lispro (ADMELOG) corrective regimen sliding scale   SubCutaneous every 6 hours  petrolatum Ophthalmic Ointment 1 Application(s) Both EYES two times a day    MEDICATIONS  (PRN):  acetaminophen     Tablet .. 650 milliGRAM(s) Oral every 6 hours PRN Temp greater or equal to 38C (100.4F), Mild Pain (1 - 3), Moderate Pain (4 - 6)  ALBUTerol    90 MICROgram(s) HFA Inhaler 2 Puff(s) Inhalation every 6 hours PRN Shortness of Breath and/or Wheezing  dextrose Oral Gel 15 Gram(s) Oral once PRN Blood Glucose LESS THAN 70 milliGRAM(s)/deciliter  dextrose Oral Gel 15 Gram(s) Oral once PRN Blood Glucose LESS THAN 70 milliGRAM(s)/deciliter      CAPILLARY BLOOD GLUCOSE      POCT Blood Glucose.: 182 mg/dL (03 Aug 2022 05:52)  POCT Blood Glucose.: 147 mg/dL (03 Aug 2022 00:13)  POCT Blood Glucose.: 131 mg/dL (02 Aug 2022 18:03)  POCT Blood Glucose.: 152 mg/dL (02 Aug 2022 12:31)    I&O's Summary      PHYSICAL EXAM:  Vital Signs Last 24 Hrs  T(C): 36.6 (03 Aug 2022 05:30), Max: 37 (02 Aug 2022 11:00)  T(F): 97.8 (03 Aug 2022 05:30), Max: 98.6 (02 Aug 2022 11:00)  HR: 86 (03 Aug 2022 05:30) (80 - 91)  BP: 122/74 (03 Aug 2022 05:30) (112/75 - 122/74)  BP(mean): --  RR: 18 (03 Aug 2022 05:30) (17 - 18)  SpO2: 96% (03 Aug 2022 05:30) (92% - 96%)    Parameters below as of 03 Aug 2022 05:30  Patient On (Oxygen Delivery Method): nasal cannula  O2 Flow (L/min): 3      GENERAL: No acute distress, well-developed  HEAD:  Atraumatic, Normocephalic  EYES: EOMI, PERRLA, conjunctiva and sclera clear  NECK: Supple, no lymphadenopathy, no JVD  CHEST/LUNG: CTAB; No wheezes, rales, or rhonchi  HEART: Regular rate and rhythm; No murmurs, rubs, or gallops  ABDOMEN: Soft, non-tender, non-distended; normal bowel sounds, no organomegaly  EXTREMITIES:  2+ peripheral pulses b/l, No clubbing, cyanosis, or edema  NEUROLOGY: A&O x 3, no focal deficits  SKIN: No rashes or lesions    LABS:                        13.7   20.09 )-----------( 329      ( 03 Aug 2022 05:27 )             45.5     08-03    138  |  100  |  37<H>  ----------------------------<  201<H>  4.1   |  30  |  0.66    Ca    8.9      03 Aug 2022 05:27  Phos  4.3     08-03  Mg     2.10     08-03    TPro  5.4<L>  /  Alb  3.1<L>  /  TBili  0.4  /  DBili  x   /  AST  22  /  ALT  65<H>  /  AlkPhos  72  08-01                RADIOLOGY & ADDITIONAL TESTS:  Results Reviewed:   Imaging Personally Reviewed:  Electrocardiogram Personally Reviewed:    COORDINATION OF CARE:  Care Discussed with Consultants/Other Providers [Y/N]:  Prior or Outpatient Records Reviewed [Y/N]:

## 2022-08-03 NOTE — PROGRESS NOTE ADULT - PROBLEM SELECTOR PLAN 1
Pt found to be febrile, tachycardiac, tachypneic, hypoxic, with leukocytosis, positive COVID19, equivocal lung findings in CT chest  - Dx: hypoxic respiratory failure 2/2 COVID-19, superimposed PNA, and COPD exacerbation  s/p intubation and MICU transfer on 7/26, successfully extubated 7/29 and transferred to floors 7/30.    - c/w COVID treatment (decadron 7/24-8/1), (remdesevir 7/23-7/27)  - c/w empiric abx - started 7/24. Will continue for 5-7 days after aspiration event on 7/26  - Discontinued Symbicort, PRN albuterol ordered   - wean NC as tolerated   - ABG 7/29 with 7.5 | 36 | 137 | 28 | 98.9  - Dimer downtrending to 191  - will continue zosyn until 08/02 as WBC elevated 07/31  - Trend CBC AM, vitals q8, Dimer every other day, cmp AM Pt found to be febrile, tachycardiac, tachypneic, hypoxic, with leukocytosis, positive COVID19, equivocal lung findings in CT chest  - Dx: hypoxic respiratory failure 2/2 COVID-19, superimposed PNA, and COPD exacerbation  s/p intubation and MICU transfer on 7/26, successfully extubated 7/29 and transferred to floors 7/30.    - c/w COVID treatment (decadron 7/24-8/1), (remdesevir 7/23-7/27)  - c/w empiric abx - started 7/24. Will continue for 5-7 days after aspiration event on 7/26  - Discontinued Symbicort, PRN albuterol ordered   - wean NC as tolerated   - ABG 7/29 with 7.5 | 36 | 137 | 28 | 98.9  - Dimer downtrending to 80s, Dced trending  - will continue zosyn until 08/02 as WBC elevated 07/31  - Trend CBC AM, vitals q8, cmp AM

## 2022-08-03 NOTE — DISCHARGE NOTE PROVIDER - DETAILS OF MALNUTRITION DIAGNOSIS/DIAGNOSES
This patient has been assessed with a concern for Malnutrition and was treated during this hospitalization for the following Nutrition diagnosis/diagnoses:     -  07/31/2022: Moderate protein-calorie malnutrition

## 2022-08-03 NOTE — DISCHARGE NOTE PROVIDER - INSTRUCTIONS
----- Message from Jerzytejal Parra sent at 6/1/2022 11:32 AM EDT -----  Subject: Refill Request    QUESTIONS  Name of Medication? metoprolol tartrate (LOPRESSOR) 50 MG tablet  Patient-reported dosage and instructions? 1 tablet twice a day  How many days do you have left? 1  Preferred Pharmacy? [facility]  Pharmacy phone number (if available)? [phone]  ---------------------------------------------------------------------------  --------------,  Name of Medication? atorvastatin (LIPITOR) 80 MG tablet  Patient-reported dosage and instructions? 1 tablet daily  How many days do you have left? 0  Preferred Pharmacy? [facility]  Pharmacy phone number (if available)? [phone]  ---------------------------------------------------------------------------  --------------,  Name of Medication? Other - brilinta  Patient-reported dosage and instructions? 1 tablet twice daily  How many days do you have left? 0  Preferred Pharmacy? [facility]  Pharmacy phone number (if available)? [phone]  ---------------------------------------------------------------------------  --------------  CALL BACK INFO  What is the best way for the office to contact you? OK to leave message on   voicemail  Preferred Call Back Phone Number? [phone]  ---------------------------------------------------------------------------  --------------  SCRIPT ANSWERS  Relationship to Patient? Other  Representative Name? Emmett Giles  Is the Representative on the appropriate HIPAA document in Epic?  Yes mildly thick liquids

## 2022-08-03 NOTE — PROGRESS NOTE ADULT - ATTENDING COMMENTS
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 Course c/b AMS and inability to protect airway requiring intubation transferred to MICU 7/26. Transferred back to floors 7/30 for further management. Completed remdesevir, dexamethasone, and zosyn (5 day course post intubation) per ID. Failed repeat cine 8/1, per SLP plan for re-eval with NGT removal Thursday/Friday. If fails patient expressing she may want PEG, wants to discuss with JOSHUA Witt.

## 2022-08-03 NOTE — DISCHARGE NOTE PROVIDER - NSDCCPCAREPLAN_GEN_ALL_CORE_FT
PRINCIPAL DISCHARGE DIAGNOSIS  Diagnosis: Unwitnessed fall  Assessment and Plan of Treatment:       SECONDARY DISCHARGE DIAGNOSES  Diagnosis: Sepsis with acute hypoxic respiratory failure  Assessment and Plan of Treatment:     Diagnosis: 2019 novel coronavirus disease (COVID-19)  Assessment and Plan of Treatment:     Diagnosis: Rhabdomyolysis  Assessment and Plan of Treatment:     Diagnosis: Pneumonia, aspiration  Assessment and Plan of Treatment:     Diagnosis: Unwitnessed fall  Assessment and Plan of Treatment:      PRINCIPAL DISCHARGE DIAGNOSIS  Diagnosis: Unwitnessed fall  Assessment and Plan of Treatment: You was brought to the hospital for prolonged unwitnessed falls. It remained unclear if this was mechanical fall, seizure, or syncope. Due to your prior history of mechanical falls and relevant history from your home aide, mechanical fall is more likely than others.  And you developed respiratory failure (low oxygen in you blood and trouble breathing with room air)  and rhabdomyolysis (muscular breakdown possibly due to prolonged immobiity). You were successfully treated in the hospital for respiratory failure and rhabdomyolysis. Please continue to work with your physical therapy at the ???? to strengthen your gait, strength, and balance to prevent future falls.      SECONDARY DISCHARGE DIAGNOSES  Diagnosis: 2019 novel coronavirus disease (COVID-19)  Assessment and Plan of Treatment: You were diagnosed with COVID-19 at the ED and admitted to the hospital for severe symptoms like low oxygen in your blood, increased work of breathing. You completed a course of COVID-19 treatment which includes low dose steroid for 10 days and  remedisivir for 5 days. You have now become symptom free and tested ???. Please seek help from your doctor if you still experienced very significant fatigue, brain fog, and weakness.    Diagnosis: Sepsis with acute hypoxic respiratory failure  Assessment and Plan of Treatment: At the ED, you were found to have fever with increated heart rate and elevated white blood cells, very low level oxygen in your blood. You were admitted for sepsis with acute hypoxic respiratory failre. You responsed well with oxygen supplementation through nasal cannula and did not need intubation for ventialtion support. You sepsis were treated wth a combination of antibiotics to cover a broad spectrum of bacterials, and steroid/remedisivir for COVID-pneumonia.    Diagnosis: Rhabdomyolysis  Assessment and Plan of Treatment: You were found to have very high level of muscle enzyme in  your blood and we think it was probably due to rhabdomyolysis which is a process of muscular breakdown. You were treated successfully with fluids and electrolytes repletion while closely being monitored for kidney function.    Diagnosis: Pneumonia, aspiration  Assessment and Plan of Treatment: During your hospital stay, you developed aspirtion pneumonia where your lung got inflamed because of aspiration of food into your lung when you were eating. You then developed acute respiratory failure and intubated, transferred to the MICU for close monitoring. You were extubated 3 days later, and finished a week long antibiotic treatment. Due to your weak swallowing function and prior history of aspiration, you still have a lot of risks to have it again in the future. Please follow stricktly the diet we recommended and advance appropriately only after professional's evaluation.    Diagnosis: Unwitnessed fall  Assessment and Plan of Treatment:      PRINCIPAL DISCHARGE DIAGNOSIS  Diagnosis: Unwitnessed fall  Assessment and Plan of Treatment: You was brought to the hospital for prolonged unwitnessed falls. It remained unclear if this was mechanical fall, seizure, or syncope. Due to your prior history of mechanical falls and relevant history from your home aide, mechanical fall is more likely than others.  And you developed respiratory failure (low oxygen in you blood and trouble breathing with room air)  and rhabdomyolysis (muscular breakdown possibly due to prolonged immobiity). You were successfully treated in the hospital for respiratory failure and rhabdomyolysis. Please continue to work with your physical therapy rehab to strengthen your gait, strength, and balance to prevent future falls.      SECONDARY DISCHARGE DIAGNOSES  Diagnosis: 2019 novel coronavirus disease (COVID-19)  Assessment and Plan of Treatment: You were diagnosed with COVID-19 at the ED and admitted to the hospital for severe symptoms like low oxygen in your blood, increased work of breathing. You completed a course of COVID-19 treatment which includes low dose steroid for 10 days and  remedisivir for 5 days. You have now become symptom free.  Please seek help from your doctor if you still experienced very significant fatigue, brain fog, and weakness.    Diagnosis: Rhabdomyolysis  Assessment and Plan of Treatment: You were found to have very high level of muscle enzyme in  your blood and we think it was probably due to rhabdomyolysis which is a process of muscular breakdown. You were treated successfully with fluids and electrolytes repletion while closely being monitored for kidney function.    Diagnosis: Pneumonia, aspiration  Assessment and Plan of Treatment: During your hospital stay, you developed aspirtion pneumonia where your lung got inflamed because of aspiration of food into your lung when you were eating. You then developed acute respiratory failure and intubated, transferred to the MICU for close monitoring. You were extubated 3 days later, and finished a week long antibiotic treatment. Due to your weak swallowing function and prior history of aspiration, you still have a lot of risks to have it again in the future. Please follow stricktly the diet we recommended and advance appropriately only after professional's evaluation.    Diagnosis: Sepsis with acute hypoxic respiratory failure  Assessment and Plan of Treatment: At the ED, you were found to have fever with increated heart rate and elevated white blood cells, very low level oxygen in your blood. You were admitted for sepsis with acute hypoxic respiratory failre. You responsed well with oxygen supplementation through nasal cannula and did not need intubation for ventialtion support. You sepsis were treated wth a combination of antibiotics to cover a broad spectrum of bacterials, and steroid/remedisivir for COVID-pneumonia.

## 2022-08-03 NOTE — DISCHARGE NOTE PROVIDER - HOSPITAL COURSE
Ms. 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 She was found to have severe hypoxia, COVID19 positive, prominent elevation of CK, admitted for rhabdomyolysis, sepsis, acute respiratory failure (not requiring intubation), likely due to COVID19, Community acquired bacterial pneumonia, and/or COPD exacerbation.  She was started on remidisivir, decadron for COVID pneumonia, azithryomycin + zosyn (switched from CTX 07/24 due to concern for anaerobic ) for community acquired PNA on Day 1. She was initially breathing well with 6L NC and then weaning down to 3L with appropriate O2 sat on Day 2 of hospital course. Then she developed Ms. 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 She was found to have severe hypoxia, COVID19 positive, prominent elevation of CK, admitted for rhabdomyolysis, sepsis, acute respiratory failure (not requiring intubation), likely due to COVID19, Community acquired bacterial pneumonia, and/or COPD exacerbation.  She was started on remidisivir, decadron for COVID pneumonia, azithryomycin + zosyn (switched from CTX 07/24 due to concern for anaerobic ) for community acquired PNA on Day 1 07/24. Her metabolic panel has been closely monitored and IV fluids were given for the management of rhabdomyolysis.  She was initially breathing well with 6L NC and then weaning down to 3L with appropriate O2 sat on Day 3 of hospital course. Then she developed another hypoxic respiratory failure who was then intubated for airway protection due to presumed aspiration pneumonia s/p breakfast feeding on 07/26.  She was transferred to MICU where she was started on zosyn IV antibiotics for 6 days since she was intubated. During her MICU stay, she had NG tube placement for enteral nutrition due to prior aspiration risks. She was extubated on 07/29 and hemodynamically stable returning back to floor on 07/30.  Since then, she completed 5 day course of remidisivir and 10 day course of decadron for COVID in total.  She has been successfully weaned off NC O2 supply to room air with appropriate O2 sat (88-92%).  Her rhabdomyolysis has been resolved with a normal CK level. She ? cineesophagogram and  ? .      She will be discharged to ????. Ms. 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 She was found to have severe hypoxia, COVID19 positive, prominent elevation of CK, admitted for rhabdomyolysis, sepsis, acute respiratory failure (not requiring intubation), likely due to COVID19, Community acquired bacterial pneumonia, and/or COPD exacerbation.  She was started on remidisivir, decadron for COVID pneumonia, azithryomycin + zosyn (switched from CTX 07/24 due to concern for anaerobic ) for community acquired PNA on Day 1 07/24. Her metabolic panel has been closely monitored and IV fluids were given for the management of rhabdomyolysis.  She was initially breathing well with 6L NC and then weaning down to 3L with appropriate O2 sat on Day 3 of hospital course. Then she developed another hypoxic respiratory failure who was then intubated for airway protection due to presumed aspiration pneumonia s/p breakfast feeding on 07/26.  She was transferred to MICU where she was started on zosyn IV antibiotics for 6 days since she was intubated. During her MICU stay, she had NG tube placement for enteral nutrition due to prior aspiration risks. She was extubated on 07/29 and hemodynamically stable returning back to floor on 07/30.  Since then, she completed 5 day course of remidisivir and 10 day course of decadron for COVID in total.  She has been successfully weaned off NC O2 supply to room air with appropriate O2 sat (88-92%).  Her rhabdomyolysis has been resolved with a normal CK level. She initially failed SS trial and then passed for the second trial.  She has been able to tolerate puree diet with moderately thick liquid.  She will be discharged to ???? for long term re-conditioning.. Ms. 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 She was found to have severe hypoxia, COVID19 positive, prominent elevation of CK, admitted for rhabdomyolysis, sepsis, acute respiratory failure (not requiring intubation), likely due to COVID19, Community acquired bacterial pneumonia, and/or COPD exacerbation.  She was started on remidisivir, decadron for COVID pneumonia, azithryomycin + zosyn (switched from CTX 07/24 due to concern for anaerobic ) for community acquired PNA on Day 1 07/24. Her metabolic panel has been closely monitored and IV fluids were given for the management of rhabdomyolysis.  She was initially breathing well with 6L NC and then weaning down to 3L with appropriate O2 sat on Day 3 of hospital course. Then she developed another hypoxic respiratory failure who was then intubated for airway protection due to presumed aspiration pneumonia s/p breakfast feeding on 07/26.  She was transferred to MICU where she was started on zosyn IV antibiotics for 6 days since she was intubated. During her MICU stay, she had NG tube placement for enteral nutrition due to prior aspiration risks. She was extubated on 07/29 and hemodynamically stable returning back to floor on 07/30.  Since then, she completed 5 day course of remidisivir and 10 day course of decadron for COVID in total.  She has been successfully weaned off NC O2 supply to room air with appropriate O2 sat (88-92%).  Her rhabdomyolysis has been resolved with a normal CK level. She initially failed SS trial and then passed for the second trial.  She has been able to tolerate puree diet with moderately thick liquid. Fu S/S changed to soft and bite sized, She will be discharged to rehab.    Patient seen and evaluated. Reviewed discharge medications with patient and attending. All new medications requiring new prescriptions were sent to the pharmacy of patient's choice. Reviewed need for prescription for previous home medications and new prescriptions sent if requested. Medically cleared/stable for discharge as per Dr. Wei on 8/10/2022  with appropriate follow up. Patient understands and agrees with plan of care.

## 2022-08-03 NOTE — PROGRESS NOTE ADULT - PROBLEM SELECTOR PLAN 11
- c/w therapeutic lovenox per hospital guidelines  - S&S ursula recommend cineesophagogram 08/01 for more objective evaluations. currently C/w tube feeds as pt did not pass [facility] yesterdau 7/30.  - Pt failed cineesophagogram and per  recommendations:  1.) Continue with Short Term Non Oral Means of Nutrition via NGTube for caloric/hydration/medication needs.   2.) Aspiration Precautions  3.) Reflux Precautions  4.) Maintain Good Oral Hygiene Care - c/w therapeutic lovenox per hospital guidelines  - S&S ursula recommend cineesophagogram 08/01 for more objective evaluations. currently C/w tube feeds as pt did not pass SandS yesterdau 7/30.  - Pt failed cineesophagogram and per  recommendations:  1.) Continue with Short Term Non Oral Means of Nutrition via NGTube for caloric/hydration/medication needs.   2.) Aspiration Precautions  3.) Reflux Precautions  4.) Maintain Good Oral Hygiene Care  - Will repeat cineesophagogram 08/04

## 2022-08-03 NOTE — DISCHARGE NOTE PROVIDER - NSDCMRMEDTOKEN_GEN_ALL_CORE_FT
Advair Diskus 250 mcg-50 mcg inhalation powder: 1 inhaler(s) inhaled 2 times a day  amantadine 100 mg oral capsule: 1 cap(s) orally 2 times a day  metFORMIN 500 mg oral tablet: 1 tab(s) orally 2 times a day  ProAir HFA 90 mcg/inh inhalation aerosol: 1 puff(s) inhaled 3 times a day, As Needed   Advair Diskus 250 mcg-50 mcg inhalation powder: 1 inhaler(s) inhaled 2 times a day  amantadine 100 mg oral capsule: 1 cap(s) orally 2 times a day  clotrimazole 1% topical cream: 1 application topically 2 times a day  metFORMIN 500 mg oral tablet: 1 tab(s) orally 2 times a day  ProAir HFA 90 mcg/inh inhalation aerosol: 1 puff(s) inhaled 3 times a day, As Needed

## 2022-08-04 LAB
ANION GAP SERPL CALC-SCNC: 9 MMOL/L — SIGNIFICANT CHANGE UP (ref 7–14)
BASOPHILS # BLD AUTO: 0.03 K/UL — SIGNIFICANT CHANGE UP (ref 0–0.2)
BASOPHILS NFR BLD AUTO: 0.2 % — SIGNIFICANT CHANGE UP (ref 0–2)
BUN SERPL-MCNC: 29 MG/DL — HIGH (ref 7–23)
CALCIUM SERPL-MCNC: 9.1 MG/DL — SIGNIFICANT CHANGE UP (ref 8.4–10.5)
CHLORIDE SERPL-SCNC: 101 MMOL/L — SIGNIFICANT CHANGE UP (ref 98–107)
CO2 SERPL-SCNC: 29 MMOL/L — SIGNIFICANT CHANGE UP (ref 22–31)
CREAT SERPL-MCNC: 0.59 MG/DL — SIGNIFICANT CHANGE UP (ref 0.5–1.3)
EGFR: 100 ML/MIN/1.73M2 — SIGNIFICANT CHANGE UP
EOSINOPHIL # BLD AUTO: 0.08 K/UL — SIGNIFICANT CHANGE UP (ref 0–0.5)
EOSINOPHIL NFR BLD AUTO: 0.4 % — SIGNIFICANT CHANGE UP (ref 0–6)
GLUCOSE BLDC GLUCOMTR-MCNC: 126 MG/DL — HIGH (ref 70–99)
GLUCOSE BLDC GLUCOMTR-MCNC: 153 MG/DL — HIGH (ref 70–99)
GLUCOSE BLDC GLUCOMTR-MCNC: 175 MG/DL — HIGH (ref 70–99)
GLUCOSE BLDC GLUCOMTR-MCNC: 197 MG/DL — HIGH (ref 70–99)
GLUCOSE SERPL-MCNC: 204 MG/DL — HIGH (ref 70–99)
HCT VFR BLD CALC: 43.9 % — SIGNIFICANT CHANGE UP (ref 34.5–45)
HGB BLD-MCNC: 13.8 G/DL — SIGNIFICANT CHANGE UP (ref 11.5–15.5)
IANC: 11.94 K/UL — HIGH (ref 1.8–7.4)
IMM GRANULOCYTES NFR BLD AUTO: 0.9 % — SIGNIFICANT CHANGE UP (ref 0–1.5)
LYMPHOCYTES # BLD AUTO: 31.2 % — SIGNIFICANT CHANGE UP (ref 13–44)
LYMPHOCYTES # BLD AUTO: 6.22 K/UL — HIGH (ref 1–3.3)
MAGNESIUM SERPL-MCNC: 2.1 MG/DL — SIGNIFICANT CHANGE UP (ref 1.6–2.6)
MCHC RBC-ENTMCNC: 26.5 PG — LOW (ref 27–34)
MCHC RBC-ENTMCNC: 31.4 GM/DL — LOW (ref 32–36)
MCV RBC AUTO: 84.4 FL — SIGNIFICANT CHANGE UP (ref 80–100)
MONOCYTES # BLD AUTO: 1.49 K/UL — HIGH (ref 0–0.9)
MONOCYTES NFR BLD AUTO: 7.5 % — SIGNIFICANT CHANGE UP (ref 2–14)
NEUTROPHILS # BLD AUTO: 11.94 K/UL — HIGH (ref 1.8–7.4)
NEUTROPHILS NFR BLD AUTO: 59.8 % — SIGNIFICANT CHANGE UP (ref 43–77)
NRBC # BLD: 0 /100 WBCS — SIGNIFICANT CHANGE UP
NRBC # FLD: 0 K/UL — SIGNIFICANT CHANGE UP
PHOSPHATE SERPL-MCNC: 3.4 MG/DL — SIGNIFICANT CHANGE UP (ref 2.5–4.5)
PLATELET # BLD AUTO: 337 K/UL — SIGNIFICANT CHANGE UP (ref 150–400)
POTASSIUM SERPL-MCNC: 4.3 MMOL/L — SIGNIFICANT CHANGE UP (ref 3.5–5.3)
POTASSIUM SERPL-SCNC: 4.3 MMOL/L — SIGNIFICANT CHANGE UP (ref 3.5–5.3)
RBC # BLD: 5.2 M/UL — SIGNIFICANT CHANGE UP (ref 3.8–5.2)
RBC # FLD: 15 % — HIGH (ref 10.3–14.5)
SODIUM SERPL-SCNC: 139 MMOL/L — SIGNIFICANT CHANGE UP (ref 135–145)
WBC # BLD: 19.93 K/UL — HIGH (ref 3.8–10.5)
WBC # FLD AUTO: 19.93 K/UL — HIGH (ref 3.8–10.5)

## 2022-08-04 PROCEDURE — 74230 X-RAY XM SWLNG FUNCJ C+: CPT | Mod: 26

## 2022-08-04 PROCEDURE — 99232 SBSQ HOSP IP/OBS MODERATE 35: CPT | Mod: GC

## 2022-08-04 RX ORDER — INSULIN LISPRO 100/ML
VIAL (ML) SUBCUTANEOUS
Refills: 0 | Status: DISCONTINUED | OUTPATIENT
Start: 2022-08-04 | End: 2022-08-10

## 2022-08-04 RX ORDER — INSULIN LISPRO 100/ML
VIAL (ML) SUBCUTANEOUS AT BEDTIME
Refills: 0 | Status: DISCONTINUED | OUTPATIENT
Start: 2022-08-04 | End: 2022-08-10

## 2022-08-04 RX ADMIN — Medication 100 MILLIGRAM(S): at 06:02

## 2022-08-04 RX ADMIN — ENOXAPARIN SODIUM 40 MILLIGRAM(S): 100 INJECTION SUBCUTANEOUS at 17:19

## 2022-08-04 RX ADMIN — ENOXAPARIN SODIUM 40 MILLIGRAM(S): 100 INJECTION SUBCUTANEOUS at 06:02

## 2022-08-04 RX ADMIN — Medication 1: at 17:29

## 2022-08-04 RX ADMIN — Medication 1 APPLICATION(S): at 17:29

## 2022-08-04 RX ADMIN — Medication 1: at 12:55

## 2022-08-04 RX ADMIN — Medication 1 APPLICATION(S): at 06:15

## 2022-08-04 RX ADMIN — Medication 1: at 00:08

## 2022-08-04 RX ADMIN — Medication 100 MILLIGRAM(S): at 17:19

## 2022-08-04 RX ADMIN — Medication 1: at 06:17

## 2022-08-04 NOTE — SWALLOW VFSS/MBS ASSESSMENT ADULT - DIAGNOSTIC IMPRESSIONS
Patient presents with Mild Oral Stage and Moderate Pharyngeal Stage Dysphagia. The Oral Stage is characterized by adequate oral containment, adequate bolus manipulation, adequate tongue motion with adequate anterior to posterior transfer of the bolus for puree/moderately thick liquids; premature spillage/loss to the hypopharynx for Thin liquids/Mildly Thick Liquids due to reduced tongue to palate seal; with trace/mild oral clearance deficits located on the tongue surface post swallow for puree and moderately thick liquids; a liquid assist with partial oral clearance. The Pharyngeal Stage is characterized by delayed initiation of the pharyngeal swallow (Bolus head varies to the vallecular/pyriforms for Thin Liquids/Mildly Thick Liquids), reduced laryngeal elevation with incomplete laryngeal vestibular closure, adequate tongue base retraction and adequate pharyngeal constriction. There is trace pharyngeal clearance deficits located vallecular/pyriforms post swallow.  There was Laryngeal penetration during the swallow for Thin liquids without retrieval leaving trace residue in the laryngeal vestibule above the level of the vocal folds. there was no Aspiration observed before, during or after the swallow for puree, moderately thick liquids and thin liquids. Patient presents with Mild Oral Stage and Moderate Pharyngeal Stage Dysphagia. The Oral Stage is characterized by adequate oral containment, adequate bolus manipulation, adequate tongue motion with adequate anterior to posterior transfer of the bolus for puree/moderately thick liquids; premature spillage/loss to the hypopharynx for Thin liquids/Mildly Thick Liquids due to reduced tongue to palate seal; with trace/mild oral clearance deficits located on the tongue surface post swallow for puree and moderately thick liquids; a liquid wash assist with partial oral clearance. The Pharyngeal Stage is characterized by delayed initiation of the pharyngeal swallow (Bolus head varies to the vallecular/pyriforms for Thin Liquids/Mildly Thick Liquids), reduced laryngeal elevation with incomplete laryngeal vestibular closure, adequate tongue base retraction and adequate pharyngeal constriction. There is trace pharyngeal clearance deficits located vallecular/pyriforms post swallow.  There was Laryngeal penetration during the swallow for Thin liquids without retrieval leaving trace residue in the laryngeal vestibule migrate to the level of the vocal folds. There was no Aspiration observed before, during or after the swallow for puree, moderately thick liquids and mildly thick liquids. Patient presents with Mild Oral Stage and Moderate Pharyngeal Stage Dysphagia. The Oral Stage is characterized by adequate oral containment, adequate bolus manipulation, adequate tongue motion with adequate anterior to posterior transfer of the bolus for puree/moderately thick liquids; premature spillage/loss to the hypopharynx for Thin liquids/Mildly Thick Liquids due to reduced tongue to palate seal; with trace/mild oral clearance deficits located on the tongue surface post swallow for puree and moderately thick liquids; a liquid wash assist with partial oral clearance. The Pharyngeal Stage is characterized by delayed initiation of the pharyngeal swallow (Bolus head varies to the vallecular/pyriforms for Thin Liquids/Mildly Thick Liquids), reduced laryngeal elevation with incomplete laryngeal vestibular closure, adequate tongue base retraction and adequate pharyngeal constriction. There is adequate pharyngeal clearance post swallow.  There was Laryngeal penetration during the swallow for Thin liquids without retrieval leaving trace residue in the laryngeal vestibule migrate to the level of the vocal folds. There was no Aspiration observed before, during or after the swallow for puree, moderately thick liquids and mildly thick liquids.

## 2022-08-04 NOTE — PROGRESS NOTE ADULT - PROBLEM SELECTOR PLAN 11
- c/w therapeutic lovenox per hospital guidelines  - S&S ursula recommend cineesophagogram 08/01 for more objective evaluations. currently C/w tube feeds as pt did not pass SandS yesterdau 7/30.  - Pt failed cineesophagogram and per  recommendations:  1.) Continue with Short Term Non Oral Means of Nutrition via NGTube for caloric/hydration/medication needs.   2.) Aspiration Precautions  3.) Reflux Precautions  4.) Maintain Good Oral Hygiene Care  - Will repeat cineesophagogram 08/04

## 2022-08-04 NOTE — PROGRESS NOTE ADULT - SUBJECTIVE AND OBJECTIVE BOX
PROGRESS NOTE:     Patient is a 67y old  Female who presents with a chief complaint of severe hypoxia (03 Aug 2022 17:03)      SUBJECTIVE / OVERNIGHT EVENTS:    ADDITIONAL REVIEW OF SYSTEMS:    MEDICATIONS  (STANDING):  amantadine Syrup 100 milliGRAM(s) Oral two times a day  dextrose 50% Injectable 25 Gram(s) IV Push once  dextrose 50% Injectable 12.5 Gram(s) IV Push once  dextrose 50% Injectable 25 Gram(s) IV Push once  enoxaparin Injectable 40 milliGRAM(s) SubCutaneous every 12 hours  insulin lispro (ADMELOG) corrective regimen sliding scale   SubCutaneous every 6 hours  petrolatum Ophthalmic Ointment 1 Application(s) Both EYES two times a day    MEDICATIONS  (PRN):  acetaminophen     Tablet .. 650 milliGRAM(s) Oral every 6 hours PRN Temp greater or equal to 38C (100.4F), Mild Pain (1 - 3), Moderate Pain (4 - 6)  ALBUTerol    90 MICROgram(s) HFA Inhaler 2 Puff(s) Inhalation every 6 hours PRN Shortness of Breath and/or Wheezing  dextrose Oral Gel 15 Gram(s) Oral once PRN Blood Glucose LESS THAN 70 milliGRAM(s)/deciliter  dextrose Oral Gel 15 Gram(s) Oral once PRN Blood Glucose LESS THAN 70 milliGRAM(s)/deciliter      CAPILLARY BLOOD GLUCOSE      POCT Blood Glucose.: 175 mg/dL (04 Aug 2022 06:07)  POCT Blood Glucose.: 185 mg/dL (03 Aug 2022 23:55)  POCT Blood Glucose.: 166 mg/dL (03 Aug 2022 17:53)  POCT Blood Glucose.: 162 mg/dL (03 Aug 2022 12:13)    I&O's Summary    02 Aug 2022 07:01  -  03 Aug 2022 07:00  --------------------------------------------------------  IN: 80 mL / OUT: 1000 mL / NET: -920 mL    03 Aug 2022 07:01  -  04 Aug 2022 06:49  --------------------------------------------------------  IN: 160 mL / OUT: 700 mL / NET: -540 mL        PHYSICAL EXAM:  Vital Signs Last 24 Hrs  T(C): 37.1 (04 Aug 2022 05:00), Max: 37.1 (04 Aug 2022 05:00)  T(F): 98.7 (04 Aug 2022 05:00), Max: 98.7 (04 Aug 2022 05:00)  HR: 99 (04 Aug 2022 05:00) (94 - 99)  BP: 112/84 (04 Aug 2022 05:00) (102/71 - 112/84)  BP(mean): --  RR: 18 (04 Aug 2022 05:00) (18 - 18)  SpO2: 92% (04 Aug 2022 05:00) (92% - 92%)    Parameters below as of 04 Aug 2022 05:00  Patient On (Oxygen Delivery Method): room air        GENERAL: No acute distress, well-developed  HEAD:  Atraumatic, Normocephalic  EYES: EOMI, PERRLA, conjunctiva and sclera clear  NECK: Supple, no lymphadenopathy, no JVD  CHEST/LUNG: CTAB; No wheezes, rales, or rhonchi  HEART: Regular rate and rhythm; No murmurs, rubs, or gallops  ABDOMEN: Soft, non-tender, non-distended; normal bowel sounds, no organomegaly  EXTREMITIES:  2+ peripheral pulses b/l, No clubbing, cyanosis, or edema  NEUROLOGY: A&O x 3, no focal deficits  SKIN: No rashes or lesions    LABS:                        13.7   20.09 )-----------( 329      ( 03 Aug 2022 05:27 )             45.5     08-03    138  |  100  |  37<H>  ----------------------------<  201<H>  4.1   |  30  |  0.66    Ca    8.9      03 Aug 2022 05:27  Phos  4.3     08-03  Mg     2.10     08-03                  RADIOLOGY & ADDITIONAL TESTS:  Results Reviewed:   Imaging Personally Reviewed:  Electrocardiogram Personally Reviewed:    COORDINATION OF CARE:  Care Discussed with Consultants/Other Providers [Y/N]:  Prior or Outpatient Records Reviewed [Y/N]:

## 2022-08-04 NOTE — SWALLOW VFSS/MBS ASSESSMENT ADULT - RECOMMENDED CONSISTENCY
1.) Puree with Mildly Thick Liquids   2.) Feeding/Swallowing Guidelines: Upright position, Feed slowly, Provide Puree via Teaspoon, allow patient to swallow prior to next presentation, provide small single cup sips of mildly thick liquids; provide liquid wash of mildly thick liquids after every 2 puree intake  3.) Aspiration Precautions  4.) Reflux Precautions  5.) Maintain Good Oral Hygiene Care 1.) Puree with Mildly Thick Liquids   2.) Feeding/Swallowing Guidelines: Upright position, Feed slowly, Provide Puree via Teaspoon, allow patient to swallow prior to next presentation, provide small single cup sips of mildly thick liquids; provide liquid wash of mildly thick liquids after every 2 puree intake; check oral cavity to ensure adequate oral clearance.   3.) Aspiration Precautions  4.) Reflux Precautions  5.) Maintain Good Oral Hygiene Care

## 2022-08-04 NOTE — PROGRESS NOTE ADULT - PROBLEM SELECTOR PLAN 1
Pt found to be febrile, tachycardiac, tachypneic, hypoxic, with leukocytosis, positive COVID19, equivocal lung findings in CT chest  - Dx: hypoxic respiratory failure 2/2 COVID-19, superimposed PNA, and COPD exacerbation  s/p intubation and MICU transfer on 7/26, successfully extubated 7/29 and transferred to floors 7/30.    - c/w COVID treatment (decadron 7/24-8/1), (remdesevir 7/23-7/27)  - c/w empiric abx - started 7/24. Will continue for 5-7 days after aspiration event on 7/26  - Discontinued Symbicort, PRN albuterol ordered   - wean NC as tolerated   - ABG 7/29 with 7.5 | 36 | 137 | 28 | 98.9  - Dimer downtrending to 80s, Dced trending  - will continue zosyn until 08/02 as WBC elevated 07/31  - Trend CBC AM, vitals q8, cmp AM

## 2022-08-04 NOTE — SWALLOW VFSS/MBS ASSESSMENT ADULT - COMMENTS
Medicine Note - 64F with hx of Parkinsons, COPD, T2DM, COPD, Schizoaffective disorder BIBEMS after unwitnessed fall, found to be hypoxic to 70s with EMS, a/w sepsis and acute hypoxic respiratory failure likely multifactorial from COVID-19, superimposed PNA, and COPD exacerbation. She was also found to develop rhabdomyolysis secondary to long time immobility after fall. RRT called 7/26 for AMS, s/p intubation and transfer to MICU. Pt successfully extubated and stable for transfer back to floors on 7/30.    Of Note: Patient was seen for Clinical Swallow Evaluations on 7/25 and 7/30 (See Consults). Patient had initial Cinesophagram completed on 8/2/2022 (See Report).     Patient arrived to Radiology for repeat Cinesophagram. Patient transferred to a specialized seating unit with lateral view projection.  Of Note: Patient is without NGTube in place for this repeat Cinesophagram. Medicine Note - 64F with hx of Parkinsons, COPD, T2DM, COPD, Schizoaffective disorder BIBEMS after unwitnessed fall, found to be hypoxic to 70s with EMS, a/w sepsis and acute hypoxic respiratory failure likely multifactorial from COVID-19, superimposed PNA, and COPD exacerbation. She was also found to develop rhabdomyolysis secondary to long time immobility after fall. RRT called 7/26 for AMS, s/p intubation and transfer to MICU. Pt successfully extubated and stable for transfer back to floors on 7/30.    Of Note: Patient was seen for Clinical Swallow Evaluations on 7/25 and 7/30 (See Consults). Patient had initial Cinesophagram completed on 8/2/2022 (See Report).     Patient arrived to Radiology for repeat Cinesophagram. Patient transferred to a specialized seating unit with lateral view projection.    Of Note: Patient is without NGTube in place for this repeat Cinesophagram.

## 2022-08-04 NOTE — PROGRESS NOTE ADULT - ATTENDING COMMENTS
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 Course c/b AMS and inability to protect airway requiring intubation transferred to MICU 7/26. Transferred back to floors 7/30 for further management. Completed remdesevir, dexamethasone, and zosyn (5 day course post intubation) per ID. Passed repeat SLP 8/4, started on modified diet with aspiration precautions. Pending dispo to rehab after completion of COVID isolation period. Discussed with JIMENEZ Witt at bedside.

## 2022-08-04 NOTE — SWALLOW VFSS/MBS ASSESSMENT ADULT - ADDITIONAL RECOMMENDATIONS
Patient will benefit swallowing therapy pending discharge plans (e.g. Rehab Center vs Home Care vs OutPatient at [facility] [phone]).  This service will follow as schedule permits.
Reconsult as patient continues to be medically optimized to reassess for an oral diet program. This service will follow as schedule permits.

## 2022-08-05 LAB
ANION GAP SERPL CALC-SCNC: 10 MMOL/L — SIGNIFICANT CHANGE UP (ref 7–14)
ANION GAP SERPL CALC-SCNC: 11 MMOL/L — SIGNIFICANT CHANGE UP (ref 7–14)
BUN SERPL-MCNC: 24 MG/DL — HIGH (ref 7–23)
BUN SERPL-MCNC: 25 MG/DL — HIGH (ref 7–23)
CALCIUM SERPL-MCNC: 8.6 MG/DL — SIGNIFICANT CHANGE UP (ref 8.4–10.5)
CALCIUM SERPL-MCNC: 9.1 MG/DL — SIGNIFICANT CHANGE UP (ref 8.4–10.5)
CHLORIDE SERPL-SCNC: 100 MMOL/L — SIGNIFICANT CHANGE UP (ref 98–107)
CHLORIDE SERPL-SCNC: 104 MMOL/L — SIGNIFICANT CHANGE UP (ref 98–107)
CO2 SERPL-SCNC: 21 MMOL/L — LOW (ref 22–31)
CO2 SERPL-SCNC: 26 MMOL/L — SIGNIFICANT CHANGE UP (ref 22–31)
CREAT SERPL-MCNC: 0.5 MG/DL — SIGNIFICANT CHANGE UP (ref 0.5–1.3)
CREAT SERPL-MCNC: 0.59 MG/DL — SIGNIFICANT CHANGE UP (ref 0.5–1.3)
EGFR: 100 ML/MIN/1.73M2 — SIGNIFICANT CHANGE UP
EGFR: 104 ML/MIN/1.73M2 — SIGNIFICANT CHANGE UP
GLUCOSE BLDC GLUCOMTR-MCNC: 107 MG/DL — HIGH (ref 70–99)
GLUCOSE BLDC GLUCOMTR-MCNC: 112 MG/DL — HIGH (ref 70–99)
GLUCOSE BLDC GLUCOMTR-MCNC: 145 MG/DL — HIGH (ref 70–99)
GLUCOSE BLDC GLUCOMTR-MCNC: 154 MG/DL — HIGH (ref 70–99)
GLUCOSE SERPL-MCNC: 122 MG/DL — HIGH (ref 70–99)
GLUCOSE SERPL-MCNC: 143 MG/DL — HIGH (ref 70–99)
MAGNESIUM SERPL-MCNC: 2 MG/DL — SIGNIFICANT CHANGE UP (ref 1.6–2.6)
MAGNESIUM SERPL-MCNC: 2.1 MG/DL — SIGNIFICANT CHANGE UP (ref 1.6–2.6)
PHOSPHATE SERPL-MCNC: 2.5 MG/DL — SIGNIFICANT CHANGE UP (ref 2.5–4.5)
PHOSPHATE SERPL-MCNC: 3.3 MG/DL — SIGNIFICANT CHANGE UP (ref 2.5–4.5)
POTASSIUM SERPL-MCNC: 4 MMOL/L — SIGNIFICANT CHANGE UP (ref 3.5–5.3)
POTASSIUM SERPL-MCNC: SIGNIFICANT CHANGE UP MMOL/L (ref 3.5–5.3)
POTASSIUM SERPL-SCNC: 4 MMOL/L — SIGNIFICANT CHANGE UP (ref 3.5–5.3)
POTASSIUM SERPL-SCNC: SIGNIFICANT CHANGE UP MMOL/L (ref 3.5–5.3)
SARS-COV-2 RNA SPEC QL NAA+PROBE: DETECTED
SODIUM SERPL-SCNC: 136 MMOL/L — SIGNIFICANT CHANGE UP (ref 135–145)
SODIUM SERPL-SCNC: 136 MMOL/L — SIGNIFICANT CHANGE UP (ref 135–145)

## 2022-08-05 PROCEDURE — 99232 SBSQ HOSP IP/OBS MODERATE 35: CPT | Mod: GC

## 2022-08-05 RX ORDER — AMANTADINE HCL 100 MG
100 CAPSULE ORAL
Refills: 0 | Status: DISCONTINUED | OUTPATIENT
Start: 2022-08-05 | End: 2022-08-10

## 2022-08-05 RX ADMIN — Medication 100 MILLIGRAM(S): at 04:42

## 2022-08-05 RX ADMIN — ENOXAPARIN SODIUM 40 MILLIGRAM(S): 100 INJECTION SUBCUTANEOUS at 16:48

## 2022-08-05 RX ADMIN — ENOXAPARIN SODIUM 40 MILLIGRAM(S): 100 INJECTION SUBCUTANEOUS at 04:43

## 2022-08-05 RX ADMIN — Medication 1: at 17:49

## 2022-08-05 RX ADMIN — Medication 1 APPLICATION(S): at 04:42

## 2022-08-05 RX ADMIN — Medication 1 APPLICATION(S): at 16:49

## 2022-08-05 RX ADMIN — Medication 100 MILLIGRAM(S): at 16:49

## 2022-08-05 NOTE — PROGRESS NOTE ADULT - ATTENDING COMMENTS
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 Course c/b AMS and inability to protect airway requiring intubation transferred to MICU 7/26. Transferred back to floors 7/30 for further management. Completed remdesevir, dexamethasone, and zosyn (5 day course post intubation) per ID. Passed repeat SLP 8/4, started on modified diet with aspiration precautions. Pending dispo to rehab. HCP is JOSHUA Witt - paperwork filled out and placed in chart.

## 2022-08-05 NOTE — PROGRESS NOTE ADULT - SUBJECTIVE AND OBJECTIVE BOX
PROGRESS NOTE:     Patient is a 67y old  Female who presents with a chief complaint of severe hypoxia (04 Aug 2022 06:48)      SUBJECTIVE / OVERNIGHT EVENTS:  NAEON    ADDITIONAL REVIEW OF SYSTEMS:    MEDICATIONS  (STANDING):  amantadine Syrup 100 milliGRAM(s) Oral two times a day  dextrose 50% Injectable 25 Gram(s) IV Push once  dextrose 50% Injectable 25 Gram(s) IV Push once  enoxaparin Injectable 40 milliGRAM(s) SubCutaneous every 12 hours  insulin lispro (ADMELOG) corrective regimen sliding scale   SubCutaneous three times a day before meals  insulin lispro (ADMELOG) corrective regimen sliding scale   SubCutaneous at bedtime  petrolatum Ophthalmic Ointment 1 Application(s) Both EYES two times a day    MEDICATIONS  (PRN):  acetaminophen     Tablet .. 650 milliGRAM(s) Oral every 6 hours PRN Temp greater or equal to 38C (100.4F), Mild Pain (1 - 3), Moderate Pain (4 - 6)  ALBUTerol    90 MICROgram(s) HFA Inhaler 2 Puff(s) Inhalation every 6 hours PRN Shortness of Breath and/or Wheezing      CAPILLARY BLOOD GLUCOSE      POCT Blood Glucose.: 126 mg/dL (04 Aug 2022 21:14)  POCT Blood Glucose.: 197 mg/dL (04 Aug 2022 17:16)  POCT Blood Glucose.: 153 mg/dL (04 Aug 2022 12:40)    I&O's Summary    03 Aug 2022 07:01  -  04 Aug 2022 07:00  --------------------------------------------------------  IN: 160 mL / OUT: 700 mL / NET: -540 mL        PHYSICAL EXAM:  Vital Signs Last 24 Hrs  T(C): 36.6 (05 Aug 2022 04:39), Max: 36.7 (04 Aug 2022 18:00)  T(F): 97.8 (05 Aug 2022 04:39), Max: 98 (04 Aug 2022 18:00)  HR: 91 (05 Aug 2022 04:39) (91 - 95)  BP: 114/79 (05 Aug 2022 04:39) (114/79 - 115/84)  BP(mean): --  RR: 18 (05 Aug 2022 04:39) (18 - 18)  SpO2: 96% (05 Aug 2022 04:39) (92% - 96%)    Parameters below as of 05 Aug 2022 04:39  Patient On (Oxygen Delivery Method): nasal cannula  O2 Flow (L/min): 2      GENERAL: No acute distress, well-developed  HEAD:  Atraumatic, Normocephalic  EYES: EOMI, PERRLA, conjunctiva and sclera clear  NECK: Supple, no lymphadenopathy, no JVD  CHEST/LUNG: CTAB; No wheezes, rales, or rhonchi  HEART: Regular rate and rhythm; No murmurs, rubs, or gallops  ABDOMEN: Soft, non-tender, non-distended; normal bowel sounds, no organomegaly  EXTREMITIES:  2+ peripheral pulses b/l, No clubbing, cyanosis, or edema  NEUROLOGY: A&O x 3, no focal deficits  SKIN: No rashes or lesions    LABS:                        13.8   19.93 )-----------( 337      ( 04 Aug 2022 06:10 )             43.9     08-04    139  |  101  |  29<H>  ----------------------------<  204<H>  4.3   |  29  |  0.59    Ca    9.1      04 Aug 2022 06:10  Phos  3.4     08-04  Mg     2.10     08-04                  RADIOLOGY & ADDITIONAL TESTS:  Results Reviewed:   Imaging Personally Reviewed:  Electrocardiogram Personally Reviewed:    COORDINATION OF CARE:  Care Discussed with Consultants/Other Providers [Y/N]:  Prior or Outpatient Records Reviewed [Y/N]:

## 2022-08-06 DIAGNOSIS — R21 RASH AND OTHER NONSPECIFIC SKIN ERUPTION: ICD-10-CM

## 2022-08-06 LAB
ANION GAP SERPL CALC-SCNC: 9 MMOL/L — SIGNIFICANT CHANGE UP (ref 7–14)
BASOPHILS # BLD AUTO: 0.02 K/UL — SIGNIFICANT CHANGE UP (ref 0–0.2)
BASOPHILS NFR BLD AUTO: 0.1 % — SIGNIFICANT CHANGE UP (ref 0–2)
BUN SERPL-MCNC: 21 MG/DL — SIGNIFICANT CHANGE UP (ref 7–23)
CALCIUM SERPL-MCNC: 8.1 MG/DL — LOW (ref 8.4–10.5)
CHLORIDE SERPL-SCNC: 97 MMOL/L — LOW (ref 98–107)
CO2 SERPL-SCNC: 26 MMOL/L — SIGNIFICANT CHANGE UP (ref 22–31)
CREAT SERPL-MCNC: 0.56 MG/DL — SIGNIFICANT CHANGE UP (ref 0.5–1.3)
EGFR: 101 ML/MIN/1.73M2 — SIGNIFICANT CHANGE UP
EOSINOPHIL # BLD AUTO: 0.2 K/UL — SIGNIFICANT CHANGE UP (ref 0–0.5)
EOSINOPHIL NFR BLD AUTO: 1.3 % — SIGNIFICANT CHANGE UP (ref 0–6)
GLUCOSE BLDC GLUCOMTR-MCNC: 107 MG/DL — HIGH (ref 70–99)
GLUCOSE BLDC GLUCOMTR-MCNC: 121 MG/DL — HIGH (ref 70–99)
GLUCOSE BLDC GLUCOMTR-MCNC: 123 MG/DL — HIGH (ref 70–99)
GLUCOSE BLDC GLUCOMTR-MCNC: 132 MG/DL — HIGH (ref 70–99)
GLUCOSE SERPL-MCNC: 108 MG/DL — HIGH (ref 70–99)
HCT VFR BLD CALC: 38.1 % — SIGNIFICANT CHANGE UP (ref 34.5–45)
HGB BLD-MCNC: 11.9 G/DL — SIGNIFICANT CHANGE UP (ref 11.5–15.5)
IANC: 7.69 K/UL — HIGH (ref 1.8–7.4)
IMM GRANULOCYTES NFR BLD AUTO: 0.7 % — SIGNIFICANT CHANGE UP (ref 0–1.5)
LYMPHOCYTES # BLD AUTO: 41.7 % — SIGNIFICANT CHANGE UP (ref 13–44)
LYMPHOCYTES # BLD AUTO: 6.67 K/UL — HIGH (ref 1–3.3)
MAGNESIUM SERPL-MCNC: 1.9 MG/DL — SIGNIFICANT CHANGE UP (ref 1.6–2.6)
MCHC RBC-ENTMCNC: 26.2 PG — LOW (ref 27–34)
MCHC RBC-ENTMCNC: 31.2 GM/DL — LOW (ref 32–36)
MCV RBC AUTO: 83.9 FL — SIGNIFICANT CHANGE UP (ref 80–100)
MONOCYTES # BLD AUTO: 1.31 K/UL — HIGH (ref 0–0.9)
MONOCYTES NFR BLD AUTO: 8.2 % — SIGNIFICANT CHANGE UP (ref 2–14)
NEUTROPHILS # BLD AUTO: 7.69 K/UL — HIGH (ref 1.8–7.4)
NEUTROPHILS NFR BLD AUTO: 48 % — SIGNIFICANT CHANGE UP (ref 43–77)
NRBC # BLD: 0 /100 WBCS — SIGNIFICANT CHANGE UP
NRBC # FLD: 0 K/UL — SIGNIFICANT CHANGE UP
PHOSPHATE SERPL-MCNC: 2.7 MG/DL — SIGNIFICANT CHANGE UP (ref 2.5–4.5)
PLATELET # BLD AUTO: 287 K/UL — SIGNIFICANT CHANGE UP (ref 150–400)
POTASSIUM SERPL-MCNC: 3.7 MMOL/L — SIGNIFICANT CHANGE UP (ref 3.5–5.3)
POTASSIUM SERPL-SCNC: 3.7 MMOL/L — SIGNIFICANT CHANGE UP (ref 3.5–5.3)
RBC # BLD: 4.54 M/UL — SIGNIFICANT CHANGE UP (ref 3.8–5.2)
RBC # FLD: 14.7 % — HIGH (ref 10.3–14.5)
SODIUM SERPL-SCNC: 132 MMOL/L — LOW (ref 135–145)
WBC # BLD: 16 K/UL — HIGH (ref 3.8–10.5)
WBC # FLD AUTO: 16 K/UL — HIGH (ref 3.8–10.5)

## 2022-08-06 PROCEDURE — 99232 SBSQ HOSP IP/OBS MODERATE 35: CPT | Mod: GC

## 2022-08-06 RX ORDER — BUDESONIDE AND FORMOTEROL FUMARATE DIHYDRATE 160; 4.5 UG/1; UG/1
2 AEROSOL RESPIRATORY (INHALATION)
Refills: 0 | Status: DISCONTINUED | OUTPATIENT
Start: 2022-08-06 | End: 2022-08-10

## 2022-08-06 RX ADMIN — Medication 1 APPLICATION(S): at 18:37

## 2022-08-06 RX ADMIN — Medication 100 MILLIGRAM(S): at 17:38

## 2022-08-06 RX ADMIN — ENOXAPARIN SODIUM 40 MILLIGRAM(S): 100 INJECTION SUBCUTANEOUS at 06:07

## 2022-08-06 RX ADMIN — ENOXAPARIN SODIUM 40 MILLIGRAM(S): 100 INJECTION SUBCUTANEOUS at 17:38

## 2022-08-06 RX ADMIN — Medication 1 APPLICATION(S): at 17:38

## 2022-08-06 RX ADMIN — BUDESONIDE AND FORMOTEROL FUMARATE DIHYDRATE 2 PUFF(S): 160; 4.5 AEROSOL RESPIRATORY (INHALATION) at 22:10

## 2022-08-06 RX ADMIN — Medication 100 MILLIGRAM(S): at 06:07

## 2022-08-06 RX ADMIN — Medication 1 APPLICATION(S): at 06:07

## 2022-08-06 NOTE — PROGRESS NOTE ADULT - PROBLEM SELECTOR PLAN 6
-Unclear etiologies:  In setting of sepsis vs rhabdo vs fatty liver disease, medication, alcohol use    Pt w/o any abdominal pain   -Will monitor CMP Resolved     - likely 2/2 muscle compression and ischemia due to prolonged immobility  -elevation of CK > 4000, Cr wnl, elevated BUN, elevated urobilinogen  -c/w IVF  -trend CK, CR  -Monitor urine output, electrolytes, and kidney function. Resolved     - likely 2/2 muscle compression and ischemia due to prolonged immobility  -elevation of CK > 4000, Cr wnl, elevated BUN, elevated urobilinogen  - s/p  IVF  -trend CK, CR  -Monitor urine output, electrolytes, and kidney function.

## 2022-08-06 NOTE — PROGRESS NOTE ADULT - PROBLEM SELECTOR PLAN 5
Resolved     - likely 2/2 muscle compression and ischemia due to prolonged immobility  -elevation of CK > 4000, Cr wnl, elevated BUN, elevated urobilinogen  -c/w IVF  -trend CK, CR  -Monitor urine output, electrolytes, and kidney function. - COPD likely due to chronic smoking history, no home oxygen need at home, never been intubated for Exacerbation  - CW advair diskus standing and proAIR PRN  - Now breathing well 92% with room air 08/01  - Monitor O2 sat with a goal of > 88 and less than 93 - equivocal lung findings on CT chest   - Dx: COVID-19 infection and superimposed PNA   - completed empiric treatment for COVID-19 and community acquired PNA   -blood cx growing coag negative staph - likely contaminant  - aspiration pneumonia was treated by zosyn until 08/01 per ID recommendations

## 2022-08-06 NOTE — PROGRESS NOTE ADULT - PROBLEM SELECTOR PLAN 2
- equivocal lung findings on CT chest   - Dx: COVID-19 infection and superimposed PNA   - CW empirical treatment for COVID-19 and community acquired PNA listed above  -blood cx growing coag negative staph - likely contaminant c/w zosyn   - aspiration pneumonia was treated by zosyn until 08/01 per ID recommendations Pt found to be febrile, tachycardiac, tachypneic, hypoxic, with leukocytosis, positive COVID19, equivocal lung findings in CT chest  - Dx: hypoxic respiratory failure 2/2 COVID-19, superimposed PNA, and COPD exacerbation  s/p intubation and MICU transfer on 7/26, successfully extubated 7/29 and transferred to floors 7/30.    - c/w COVID treatment (decadron 7/24-8/1), (remdesevir 7/23-7/27)  - c/w empiric abx - started 7/24. Will continue for 5-7 days after aspiration event on 7/26  - Discontinued Symbicort, PRN albuterol ordered   - wean NC as tolerated   - ABG 7/29 with 7.5 | 36 | 137 | 28 | 98.9  - Dimer downtrending to 80s, Dced trending  - will continue zosyn until 08/02 as WBC elevated 07/31  - Trend CBC AM, vitals q8, cmp AM - COPD likely due to chronic smoking history, no home oxygen need at home, not previously been intubated  - postextubation, resume Symbicort, c/w albuterol PRN  - Now breathing well 92% with room air   - Monitor O2 sat with a goal of > 88 and less than 93

## 2022-08-06 NOTE — PROGRESS NOTE ADULT - PROBLEM SELECTOR PLAN 4
- COPD likely due to chronic smoking history, no home oxygen need at home, never been intubated for Exacerbation  - CW advair diskus standing and proAIR PRN  - Now breathing well 92% with room air 08/01  - Monitor O2 sat with a goal of > 88 and less than 93 - Positive COVID-19 test and positive exam findings of course lung sound and severe hypoxia   - CW COVID-19 protocol listed above  - trend d-dimer given COVID+  - Finished remidisivir and decadron treatment course 08/01 - erythematous plaques with yellow scales on the skin fold of her neck   - Seems not bothering her  - Start empirical treatment clotrimazole 1% topical

## 2022-08-06 NOTE — PROGRESS NOTE ADULT - PROBLEM SELECTOR PLAN 10
- MRSA Swab +  - pt on nasal mupirocin - 2/2 to fall with face downwards to ground  - Wound care consulted

## 2022-08-06 NOTE — PROGRESS NOTE ADULT - PROBLEM SELECTOR PLAN 12
- c/w therapeutic lovenox per hospital guidelines  - S&S ursula recommend cineesophagogram 08/01 for more objective evaluations. currently C/w tube feeds as pt did not pass SandS yesterdau 7/30.  - Pt failed cineesophagogram and per  recommendations:  1.) Continue with Short Term Non Oral Means of Nutrition via NGTube for caloric/hydration/medication needs.   2.) Aspiration Precautions  3.) Reflux Precautions  4.) Maintain Good Oral Hygiene Care  - Will repeat cineesophagogram 08/04 - c/w therapeutic lovenox per hospital guidelines  - S&S celineal recommend cineesophagogram 08/01 for more objective evaluations. currently C/w tube feeds as pt did not pass SandS yesterdau 7/30.  - Pt passed cineesophagogram 08/04 and started puree diet with moderately thick fluid      Aspiration Precautions      Reflux Precautions      Maintain Good Oral Hygiene Care - c/w therapeutic lovenox per hospital guidelines  - S&S celineal recommend cineesophagogram 08/01 for more objective evaluations. currently C/w tube feeds as pt did not pass SandS yesterdau 7/30.  - Pt passed cineesophagogram 08/04 and started puree diet with mildly thick fluid      Aspiration Precautions      Reflux Precautions      Maintain Good Oral Hygiene Care - c/w therapeutic lovenox per hospital guidelines    - Pt passed cineesophagogram 08/04 and started puree diet with mildly thick fluid      Aspiration Precautions      Reflux Precautions      Maintain Good Oral Hygiene Care

## 2022-08-06 NOTE — PROGRESS NOTE ADULT - ATTENDING COMMENTS
I personally saw and examined the patient.  Discussed with the resident physician and agree with the resident's findings and plan as documented above. Briefly pt is 64F schizoaffective d/o, Parkinsonism, DM, a/w AHRF, treated for COVID, superimposed RLL PNA likely aspiration, and COPD exacerbation. Course c/b AMS and inability to protect airway requiring intubation transferred to MICU 7/26. Transferred back to floors 7/30 for further management. Completed remdesevir, dexamethasone, and zosyn (5 day course post intubation) per ID. Passed repeat SLP 8/4, started on modified diet with aspiration precautions. Pending dispo to rehab. HCP is JOSHUA Witt - paperwork filled out and placed in chart.

## 2022-08-06 NOTE — PROGRESS NOTE ADULT - PROBLEM SELECTOR PLAN 11
- c/w therapeutic lovenox per hospital guidelines  - S&S ursula recommend cineesophagogram 08/01 for more objective evaluations. currently C/w tube feeds as pt did not pass SandS yesterdau 7/30.  - Pt failed cineesophagogram and per  recommendations:  1.) Continue with Short Term Non Oral Means of Nutrition via NGTube for caloric/hydration/medication needs.   2.) Aspiration Precautions  3.) Reflux Precautions  4.) Maintain Good Oral Hygiene Care  - Will repeat cineesophagogram 08/04 - MRSA Swab +  - pt on nasal mupirocin

## 2022-08-06 NOTE — PROGRESS NOTE ADULT - PROBLEM SELECTOR PLAN 3
- Positive COVID-19 test and positive exam findings of course lung sound and severe hypoxia   - CW COVID-19 protocol listed above  - trend d-dimer given COVID+  - Finished remidisivir and decadron treatment course 08/01 - equivocal lung findings on CT chest   - Dx: COVID-19 infection and superimposed PNA   - CW empirical treatment for COVID-19 and community acquired PNA listed above  -blood cx growing coag negative staph - likely contaminant c/w zosyn   - aspiration pneumonia was treated by zosyn until 08/01 per ID recommendations - Positive COVID-19 test and positive exam findings of course lung sound and severe hypoxia   - completed COVID-19 protocol  - trend d-dimer given COVID+  - Finished remidisivir and decadron treatment course 08/01

## 2022-08-06 NOTE — PROGRESS NOTE ADULT - PROBLEM SELECTOR PLAN 9
- 2/2 to fall with face downwards to ground  - Wound care consulted A1C 5.9  - Monitor glucose as she is given decadron  - Monitor POCT and adjust insulin regimen accordingly - currently on sliding scale

## 2022-08-06 NOTE — PROGRESS NOTE ADULT - PROBLEM SELECTOR PLAN 1
Pt found to be febrile, tachycardiac, tachypneic, hypoxic, with leukocytosis, positive COVID19, equivocal lung findings in CT chest  - Dx: hypoxic respiratory failure 2/2 COVID-19, superimposed PNA, and COPD exacerbation  s/p intubation and MICU transfer on 7/26, successfully extubated 7/29 and transferred to floors 7/30.    - c/w COVID treatment (decadron 7/24-8/1), (remdesevir 7/23-7/27)  - c/w empiric abx - started 7/24. Will continue for 5-7 days after aspiration event on 7/26  - Discontinued Symbicort, PRN albuterol ordered   - wean NC as tolerated   - ABG 7/29 with 7.5 | 36 | 137 | 28 | 98.9  - Dimer downtrending to 80s, Dced trending  - will continue zosyn until 08/02 as WBC elevated 07/31  - Trend CBC AM, vitals q8, cmp AM - erythematous plaques with yellow scales on the skin fold of her neck   - Seems not bothering her  - Start empirical treatment clotrimazole 1% topical Pt found to be febrile, tachycardiac, tachypneic, hypoxic, with leukocytosis, positive COVID19, equivocal lung findings in CT chest  - Dx: hypoxic respiratory failure 2/2 COVID-19, superimposed PNA, and COPD exacerbation  s/p intubation and MICU transfer on 7/26, successfully extubated 7/29 and transferred to floors 7/30.    - completed COVID treatment (decadron 7/24-8/1), (remdesevir 7/23-7/27)  - completed empiric abx after aspiration event on 7/26  - Discontinued Symbicort, PRN albuterol ordered

## 2022-08-06 NOTE — PROGRESS NOTE ADULT - PROBLEM SELECTOR PLAN 7
drug induced parkinson's disease 2/2 psychotropic medication for schizoaffective disorder   - c/w home regimen: amantadine  - baseline of bilateral hand tremor and plantar flexion, A+O x2-3 -Unclear etiologies:  In setting of sepsis vs rhabdo vs fatty liver disease, medication, alcohol use    Pt w/o any abdominal pain   -Will monitor CMP -Unclear etiologies:  In setting of sepsis vs rhabdo vs fatty liver disease, medication, alcohol use    Pt w/o any abdominal pain   monitor

## 2022-08-06 NOTE — PROGRESS NOTE ADULT - SUBJECTIVE AND OBJECTIVE BOX
PROGRESS NOTE:     Patient is a 67y old  Female who presents with a chief complaint of severe hypoxia (05 Aug 2022 06:49)      SUBJECTIVE / OVERNIGHT EVENTS:  NAEON     ADDITIONAL REVIEW OF SYSTEMS:    MEDICATIONS  (STANDING):  amantadine Syrup 100 milliGRAM(s) Oral two times a day  dextrose 50% Injectable 25 Gram(s) IV Push once  dextrose 50% Injectable 25 Gram(s) IV Push once  enoxaparin Injectable 40 milliGRAM(s) SubCutaneous every 12 hours  insulin lispro (ADMELOG) corrective regimen sliding scale   SubCutaneous three times a day before meals  insulin lispro (ADMELOG) corrective regimen sliding scale   SubCutaneous at bedtime  petrolatum Ophthalmic Ointment 1 Application(s) Both EYES two times a day    MEDICATIONS  (PRN):  acetaminophen     Tablet .. 650 milliGRAM(s) Oral every 6 hours PRN Temp greater or equal to 38C (100.4F), Mild Pain (1 - 3), Moderate Pain (4 - 6)  ALBUTerol    90 MICROgram(s) HFA Inhaler 2 Puff(s) Inhalation every 6 hours PRN Shortness of Breath and/or Wheezing      CAPILLARY BLOOD GLUCOSE      POCT Blood Glucose.: 107 mg/dL (05 Aug 2022 22:24)  POCT Blood Glucose.: 154 mg/dL (05 Aug 2022 17:09)  POCT Blood Glucose.: 112 mg/dL (05 Aug 2022 12:37)  POCT Blood Glucose.: 145 mg/dL (05 Aug 2022 09:17)    I&O's Summary      PHYSICAL EXAM:  Vital Signs Last 24 Hrs  T(C): 36.8 (05 Aug 2022 21:45), Max: 36.8 (05 Aug 2022 21:45)  T(F): 98.3 (05 Aug 2022 21:45), Max: 98.3 (05 Aug 2022 21:45)  HR: 100 (05 Aug 2022 21:45) (92 - 100)  BP: 140/80 (05 Aug 2022 21:45) (130/78 - 140/80)  BP(mean): --  RR: 18 (05 Aug 2022 21:45) (18 - 18)  SpO2: 93% (05 Aug 2022 21:45) (92% - 93%)    Parameters below as of 05 Aug 2022 21:45  Patient On (Oxygen Delivery Method): room air        GENERAL: No acute distress, well-developed  HEAD:  Atraumatic, Normocephalic  EYES: EOMI, PERRLA, conjunctiva and sclera clear  NECK: Supple, no lymphadenopathy, no JVD  CHEST/LUNG: CTAB; No wheezes, rales, or rhonchi  HEART: Regular rate and rhythm; No murmurs, rubs, or gallops  ABDOMEN: Soft, non-tender, non-distended; normal bowel sounds, no organomegaly  EXTREMITIES:  2+ peripheral pulses b/l, No clubbing, cyanosis, or edema  NEUROLOGY: A&O x 3, no focal deficits  SKIN: No rashes or lesions    LABS:                        11.9   16.00 )-----------( 287      ( 06 Aug 2022 04:30 )             38.1     08-06    132<L>  |  97<L>  |  21  ----------------------------<  108<H>  3.7   |  26  |  0.56    Ca    8.1<L>      06 Aug 2022 04:30  Phos  2.7     08-06  Mg     1.90     08-06                  RADIOLOGY & ADDITIONAL TESTS:  Results Reviewed:   Imaging Personally Reviewed:  Electrocardiogram Personally Reviewed:    COORDINATION OF CARE:  Care Discussed with Consultants/Other Providers [Y/N]:  Prior or Outpatient Records Reviewed [Y/N]:   PROGRESS NOTE:     Patient is a 67y old  Female who presents with a chief complaint of severe hypoxia (05 Aug 2022 06:49)      SUBJECTIVE / OVERNIGHT EVENTS:  NAEON     ADDITIONAL REVIEW OF SYSTEMS:    MEDICATIONS  (STANDING):  amantadine Syrup 100 milliGRAM(s) Oral two times a day  dextrose 50% Injectable 25 Gram(s) IV Push once  dextrose 50% Injectable 25 Gram(s) IV Push once  enoxaparin Injectable 40 milliGRAM(s) SubCutaneous every 12 hours  insulin lispro (ADMELOG) corrective regimen sliding scale   SubCutaneous three times a day before meals  insulin lispro (ADMELOG) corrective regimen sliding scale   SubCutaneous at bedtime  petrolatum Ophthalmic Ointment 1 Application(s) Both EYES two times a day    MEDICATIONS  (PRN):  acetaminophen     Tablet .. 650 milliGRAM(s) Oral every 6 hours PRN Temp greater or equal to 38C (100.4F), Mild Pain (1 - 3), Moderate Pain (4 - 6)  ALBUTerol    90 MICROgram(s) HFA Inhaler 2 Puff(s) Inhalation every 6 hours PRN Shortness of Breath and/or Wheezing      CAPILLARY BLOOD GLUCOSE      POCT Blood Glucose.: 107 mg/dL (05 Aug 2022 22:24)  POCT Blood Glucose.: 154 mg/dL (05 Aug 2022 17:09)  POCT Blood Glucose.: 112 mg/dL (05 Aug 2022 12:37)  POCT Blood Glucose.: 145 mg/dL (05 Aug 2022 09:17)    I&O's Summary      PHYSICAL EXAM:  Vital Signs Last 24 Hrs  T(C): 36.8 (05 Aug 2022 21:45), Max: 36.8 (05 Aug 2022 21:45)  T(F): 98.3 (05 Aug 2022 21:45), Max: 98.3 (05 Aug 2022 21:45)  HR: 100 (05 Aug 2022 21:45) (92 - 100)  BP: 140/80 (05 Aug 2022 21:45) (130/78 - 140/80)  BP(mean): --  RR: 18 (05 Aug 2022 21:45) (18 - 18)  SpO2: 93% (05 Aug 2022 21:45) (92% - 93%)    Parameters below as of 05 Aug 2022 21:45  Patient On (Oxygen Delivery Method): room air        GENERAL: No acute distress, well-developed  HEAD:  Atraumatic, Normocephalic  EYES: EOMI, PERRLA, conjunctiva and sclera clear; strabismus   NECK: Supple, no lymphadenopathy, no JVD  CHEST/LUNG: CTAB; No wheezes, rales, or rhonchi  HEART: Regular rate and rhythm; No murmurs, rubs, or gallops  ABDOMEN: Soft, non-tender, non-distended; normal bowel sounds, no organomegaly  EXTREMITIES:  2+ peripheral pulses b/l, No clubbing, cyanosis, or edema  NEUROLOGY: A&O x 3, no focal deficits  SKIN: No rashes or lesions    LABS:                        11.9   16.00 )-----------( 287      ( 06 Aug 2022 04:30 )             38.1     08-06    132<L>  |  97<L>  |  21  ----------------------------<  108<H>  3.7   |  26  |  0.56    Ca    8.1<L>      06 Aug 2022 04:30  Phos  2.7     08-06  Mg     1.90     08-06                  RADIOLOGY & ADDITIONAL TESTS:  Results Reviewed:   Imaging Personally Reviewed:  Electrocardiogram Personally Reviewed:    COORDINATION OF CARE:  Care Discussed with Consultants/Other Providers [Y/N]:  Prior or Outpatient Records Reviewed [Y/N]:

## 2022-08-06 NOTE — PROGRESS NOTE ADULT - PROBLEM SELECTOR PLAN 8
A1C 5.9  - Monitor glucose as she is given decadron  - Monitor POCT and adjust insulin regimen accordingly - currently on sliding scale drug induced parkinson's disease 2/2 psychotropic medication for schizoaffective disorder   - c/w home regimen: amantadine  - baseline of bilateral hand tremor and plantar flexion, A+O x2-3

## 2022-08-07 LAB
ANION GAP SERPL CALC-SCNC: 8 MMOL/L — SIGNIFICANT CHANGE UP (ref 7–14)
BUN SERPL-MCNC: 16 MG/DL — SIGNIFICANT CHANGE UP (ref 7–23)
CALCIUM SERPL-MCNC: 8.3 MG/DL — LOW (ref 8.4–10.5)
CHLORIDE SERPL-SCNC: 99 MMOL/L — SIGNIFICANT CHANGE UP (ref 98–107)
CO2 SERPL-SCNC: 30 MMOL/L — SIGNIFICANT CHANGE UP (ref 22–31)
CREAT SERPL-MCNC: 0.54 MG/DL — SIGNIFICANT CHANGE UP (ref 0.5–1.3)
EGFR: 102 ML/MIN/1.73M2 — SIGNIFICANT CHANGE UP
GLUCOSE BLDC GLUCOMTR-MCNC: 115 MG/DL — HIGH (ref 70–99)
GLUCOSE BLDC GLUCOMTR-MCNC: 117 MG/DL — HIGH (ref 70–99)
GLUCOSE BLDC GLUCOMTR-MCNC: 118 MG/DL — HIGH (ref 70–99)
GLUCOSE BLDC GLUCOMTR-MCNC: 133 MG/DL — HIGH (ref 70–99)
GLUCOSE SERPL-MCNC: 104 MG/DL — HIGH (ref 70–99)
HCT VFR BLD CALC: 40.9 % — SIGNIFICANT CHANGE UP (ref 34.5–45)
HGB BLD-MCNC: 12.3 G/DL — SIGNIFICANT CHANGE UP (ref 11.5–15.5)
MAGNESIUM SERPL-MCNC: 1.8 MG/DL — SIGNIFICANT CHANGE UP (ref 1.6–2.6)
MCHC RBC-ENTMCNC: 25.7 PG — LOW (ref 27–34)
MCHC RBC-ENTMCNC: 30.1 GM/DL — LOW (ref 32–36)
MCV RBC AUTO: 85.6 FL — SIGNIFICANT CHANGE UP (ref 80–100)
NRBC # BLD: 0 /100 WBCS — SIGNIFICANT CHANGE UP
NRBC # FLD: 0 K/UL — SIGNIFICANT CHANGE UP
PHOSPHATE SERPL-MCNC: 2.5 MG/DL — SIGNIFICANT CHANGE UP (ref 2.5–4.5)
PLATELET # BLD AUTO: 288 K/UL — SIGNIFICANT CHANGE UP (ref 150–400)
POTASSIUM SERPL-MCNC: 3.5 MMOL/L — SIGNIFICANT CHANGE UP (ref 3.5–5.3)
POTASSIUM SERPL-SCNC: 3.5 MMOL/L — SIGNIFICANT CHANGE UP (ref 3.5–5.3)
RBC # BLD: 4.78 M/UL — SIGNIFICANT CHANGE UP (ref 3.8–5.2)
RBC # FLD: 14.6 % — HIGH (ref 10.3–14.5)
SODIUM SERPL-SCNC: 137 MMOL/L — SIGNIFICANT CHANGE UP (ref 135–145)
WBC # BLD: 15.42 K/UL — HIGH (ref 3.8–10.5)
WBC # FLD AUTO: 15.42 K/UL — HIGH (ref 3.8–10.5)

## 2022-08-07 PROCEDURE — 99232 SBSQ HOSP IP/OBS MODERATE 35: CPT

## 2022-08-07 RX ADMIN — BUDESONIDE AND FORMOTEROL FUMARATE DIHYDRATE 2 PUFF(S): 160; 4.5 AEROSOL RESPIRATORY (INHALATION) at 09:29

## 2022-08-07 RX ADMIN — ENOXAPARIN SODIUM 40 MILLIGRAM(S): 100 INJECTION SUBCUTANEOUS at 16:10

## 2022-08-07 RX ADMIN — Medication 100 MILLIGRAM(S): at 05:04

## 2022-08-07 RX ADMIN — ENOXAPARIN SODIUM 40 MILLIGRAM(S): 100 INJECTION SUBCUTANEOUS at 05:04

## 2022-08-07 RX ADMIN — Medication 1 APPLICATION(S): at 05:05

## 2022-08-07 RX ADMIN — Medication 100 MILLIGRAM(S): at 16:09

## 2022-08-07 RX ADMIN — Medication 1 APPLICATION(S): at 16:10

## 2022-08-07 RX ADMIN — BUDESONIDE AND FORMOTEROL FUMARATE DIHYDRATE 2 PUFF(S): 160; 4.5 AEROSOL RESPIRATORY (INHALATION) at 21:25

## 2022-08-07 NOTE — PROGRESS NOTE ADULT - PROBLEM SELECTOR PLAN 5
- equivocal lung findings on CT chest   - Dx: COVID-19 infection and superimposed PNA   - completed empiric treatment for COVID-19 and community acquired PNA   -blood cx growing coag negative staph - likely contaminant  - aspiration pneumonia was treated by zosyn until 08/01 per ID recommendations

## 2022-08-07 NOTE — PROGRESS NOTE ADULT - PROBLEM SELECTOR PLAN 6
Resolved     - likely 2/2 muscle compression and ischemia due to prolonged immobility  -elevation of CK > 4000, Cr wnl, elevated BUN, elevated urobilinogen  - s/p  IVF  -trend CK, CR  -Monitor urine output, electrolytes, and kidney function.

## 2022-08-07 NOTE — PROGRESS NOTE ADULT - ASSESSMENT
64F with hx of Parkinsons, COPD, T2DM, COPD, Schizoaffective disorder BIBEMS after unwitnessed fall, found to be hypoxic to 70s with EMS, a/w sepsis and acute hypoxic respiratory failure likely multifactorial from COVID-19, superimposed PNA, and COPD exacerbation. Transiently in the MICU for AMS requiring intubation. Pt successfully extubated and stable for transfer back to floors on 7/30. Now pending rehab placement

## 2022-08-07 NOTE — PROGRESS NOTE ADULT - PROBLEM SELECTOR PLAN 12
- c/w therapeutic lovenox per hospital guidelines    - Pt passed cineesophagogram 08/04 and started puree diet with mildly thick fluid      Aspiration Precautions      Reflux Precautions      Maintain Good Oral Hygiene Care

## 2022-08-07 NOTE — PROGRESS NOTE ADULT - SUBJECTIVE AND OBJECTIVE BOX
**************************************  Pavel Heaton, PGY-2  After 7PM, please contact night float at #[phone] or #[phone]  **************************************    INTERVAL HPI/OVERNIGHT EVENTS:  Patient was seen and examined at bedside. As per nurse and patient, no o/n events, patient resting comfortably. No complaints at this time.     VITAL SIGNS:  T(F): 98.1 (08-07-22 @ 05:00)  HR: 89 (08-07-22 @ 05:00)  BP: 106/89 (08-07-22 @ 05:00)  RR: 18 (08-07-22 @ 05:00)  SpO2: 95% (08-07-22 @ 05:00)  Wt(kg): --    PHYSICAL EXAM:    GENERAL: No acute distress, well-developed  HEAD:  Atraumatic, Normocephalic  EYES: EOMI, PERRLA, conjunctiva and sclera clear; strabismus   NECK: Supple, no lymphadenopathy, no JVD  CHEST/LUNG: CTAB; No wheezes, rales, or rhonchi  HEART: Regular rate and rhythm; No murmurs, rubs, or gallops  ABDOMEN: Soft, non-tender, non-distended; normal bowel sounds, no organomegaly  EXTREMITIES:  2+ peripheral pulses b/l, No clubbing, cyanosis, or edema  NEUROLOGY: A&O x 3, no focal deficits  SKIN: No rashes or lesions    MEDICATIONS  (STANDING):  amantadine Syrup 100 milliGRAM(s) Oral two times a day  budesonide 160 MICROgram(s)/formoterol 4.5 MICROgram(s) Inhaler 2 Puff(s) Inhalation two times a day  clotrimazole 1% Cream 1 Application(s) Topical two times a day  dextrose 50% Injectable 25 Gram(s) IV Push once  dextrose 50% Injectable 25 Gram(s) IV Push once  enoxaparin Injectable 40 milliGRAM(s) SubCutaneous every 12 hours  insulin lispro (ADMELOG) corrective regimen sliding scale   SubCutaneous three times a day before meals  insulin lispro (ADMELOG) corrective regimen sliding scale   SubCutaneous at bedtime  petrolatum Ophthalmic Ointment 1 Application(s) Both EYES two times a day    MEDICATIONS  (PRN):  acetaminophen     Tablet .. 650 milliGRAM(s) Oral every 6 hours PRN Temp greater or equal to 38C (100.4F), Mild Pain (1 - 3), Moderate Pain (4 - 6)  ALBUTerol    90 MICROgram(s) HFA Inhaler 2 Puff(s) Inhalation every 6 hours PRN Shortness of Breath and/or Wheezing      Allergies    No Known Allergies    Intolerances        LABS:                        12.3   15.42 )-----------( 288      ( 07 Aug 2022 04:54 )             40.9     08-07    137  |  99  |  16  ----------------------------<  104<H>  3.5   |  30  |  0.54    Ca    8.3<L>      07 Aug 2022 04:54  Phos  2.5     08-07  Mg     1.80     08-07            RADIOLOGY & ADDITIONAL TESTS:  Reviewed

## 2022-08-07 NOTE — PROGRESS NOTE ADULT - PROBLEM SELECTOR PLAN 7
-Unclear etiologies:  In setting of sepsis vs rhabdo vs fatty liver disease, medication, alcohol use    Pt w/o any abdominal pain   monitor

## 2022-08-07 NOTE — PROGRESS NOTE ADULT - PROBLEM SELECTOR PLAN 3
- Positive COVID-19 test and positive exam findings of course lung sound and severe hypoxia   - completed COVID-19 protocol  - trend d-dimer given COVID+  - Finished remidisivir and decadron treatment course 08/01 - Positive COVID-19 test and positive exam findings of course lung sound and severe hypoxia   - completed COVID-19 protocol  - Finished remidisivir and decadron treatment course 08/01

## 2022-08-07 NOTE — PROGRESS NOTE ADULT - PROBLEM SELECTOR PLAN 4
- erythematous plaques with yellow scales on the skin fold of her neck   - Seems not bothering her  - Start empirical treatment clotrimazole 1% topical

## 2022-08-07 NOTE — PROGRESS NOTE ADULT - PROBLEM SELECTOR PLAN 2
- COPD likely due to chronic smoking history, no home oxygen need at home, not previously been intubated  - postextubation, resume Symbicort, c/w albuterol PRN  - Now breathing well 92% with room air   - Monitor O2 sat with a goal of > 88 and less than 93

## 2022-08-08 LAB
ANION GAP SERPL CALC-SCNC: 7 MMOL/L — SIGNIFICANT CHANGE UP (ref 7–14)
BUN SERPL-MCNC: 13 MG/DL — SIGNIFICANT CHANGE UP (ref 7–23)
CALCIUM SERPL-MCNC: 8.3 MG/DL — LOW (ref 8.4–10.5)
CHLORIDE SERPL-SCNC: 97 MMOL/L — LOW (ref 98–107)
CO2 SERPL-SCNC: 28 MMOL/L — SIGNIFICANT CHANGE UP (ref 22–31)
CREAT SERPL-MCNC: 0.53 MG/DL — SIGNIFICANT CHANGE UP (ref 0.5–1.3)
EGFR: 103 ML/MIN/1.73M2 — SIGNIFICANT CHANGE UP
GLUCOSE BLDC GLUCOMTR-MCNC: 104 MG/DL — HIGH (ref 70–99)
GLUCOSE SERPL-MCNC: 115 MG/DL — HIGH (ref 70–99)
HCT VFR BLD CALC: 39.2 % — SIGNIFICANT CHANGE UP (ref 34.5–45)
HGB BLD-MCNC: 11.7 G/DL — SIGNIFICANT CHANGE UP (ref 11.5–15.5)
MAGNESIUM SERPL-MCNC: 1.9 MG/DL — SIGNIFICANT CHANGE UP (ref 1.6–2.6)
MCHC RBC-ENTMCNC: 25.7 PG — LOW (ref 27–34)
MCHC RBC-ENTMCNC: 29.8 GM/DL — LOW (ref 32–36)
MCV RBC AUTO: 86 FL — SIGNIFICANT CHANGE UP (ref 80–100)
NRBC # BLD: 0 /100 WBCS — SIGNIFICANT CHANGE UP
NRBC # FLD: 0 K/UL — SIGNIFICANT CHANGE UP
PHOSPHATE SERPL-MCNC: 2.5 MG/DL — SIGNIFICANT CHANGE UP (ref 2.5–4.5)
PLATELET # BLD AUTO: 263 K/UL — SIGNIFICANT CHANGE UP (ref 150–400)
POTASSIUM SERPL-MCNC: 3.5 MMOL/L — SIGNIFICANT CHANGE UP (ref 3.5–5.3)
POTASSIUM SERPL-SCNC: 3.5 MMOL/L — SIGNIFICANT CHANGE UP (ref 3.5–5.3)
RBC # BLD: 4.56 M/UL — SIGNIFICANT CHANGE UP (ref 3.8–5.2)
RBC # FLD: 14.6 % — HIGH (ref 10.3–14.5)
SODIUM SERPL-SCNC: 132 MMOL/L — LOW (ref 135–145)
WBC # BLD: 13.59 K/UL — HIGH (ref 3.8–10.5)
WBC # FLD AUTO: 13.59 K/UL — HIGH (ref 3.8–10.5)

## 2022-08-08 PROCEDURE — 99232 SBSQ HOSP IP/OBS MODERATE 35: CPT

## 2022-08-08 RX ADMIN — Medication 1 APPLICATION(S): at 17:59

## 2022-08-08 RX ADMIN — Medication 1 APPLICATION(S): at 05:33

## 2022-08-08 RX ADMIN — Medication 1 APPLICATION(S): at 17:57

## 2022-08-08 RX ADMIN — ENOXAPARIN SODIUM 40 MILLIGRAM(S): 100 INJECTION SUBCUTANEOUS at 05:32

## 2022-08-08 RX ADMIN — Medication 100 MILLIGRAM(S): at 05:32

## 2022-08-08 RX ADMIN — BUDESONIDE AND FORMOTEROL FUMARATE DIHYDRATE 2 PUFF(S): 160; 4.5 AEROSOL RESPIRATORY (INHALATION) at 21:17

## 2022-08-08 RX ADMIN — Medication 1 APPLICATION(S): at 05:34

## 2022-08-08 RX ADMIN — Medication 100 MILLIGRAM(S): at 17:56

## 2022-08-08 RX ADMIN — BUDESONIDE AND FORMOTEROL FUMARATE DIHYDRATE 2 PUFF(S): 160; 4.5 AEROSOL RESPIRATORY (INHALATION) at 09:13

## 2022-08-08 RX ADMIN — ENOXAPARIN SODIUM 40 MILLIGRAM(S): 100 INJECTION SUBCUTANEOUS at 17:56

## 2022-08-08 NOTE — PROGRESS NOTE ADULT - PROBLEM SELECTOR PLAN 11
- c/w lovenox ppx    - Pt passed cineesophagogram 08/04 and started puree diet with mildly thick fluid      Aspiration Precautions      Reflux Precautions      Maintain Good Oral Hygiene Care    Medically stable for dc to [facility], choices selected by HCP 8/4, SW aware

## 2022-08-08 NOTE — PROGRESS NOTE ADULT - PROBLEM SELECTOR PLAN 4
- erythematous plaques with yellow scales on the skin fold of her neck   - Seems not bothering her  - Resume empiric treatment clotrimazole 1% topical

## 2022-08-08 NOTE — PROGRESS NOTE ADULT - PROBLEM SELECTOR PLAN 6
Resolved  -Unclear etiologies:  In setting of sepsis vs rhabdo vs fatty liver disease, medication, alcohol use  Pt w/o any abdominal pain   monitor

## 2022-08-08 NOTE — PROGRESS NOTE ADULT - PROBLEM SELECTOR PLAN 3
- Positive COVID-19 test (1st positive 7/23) and positive exam findings of course lung sound and severe hypoxia   - completed COVID-19 protocol  - Finished remidisivir and decadron treatment course 08/01

## 2022-08-08 NOTE — CHART NOTE - NSCHARTNOTEFT_GEN_A_CORE
Source: Patient A&Ox2[ ]    Family [ ]     other [x ] chart, RN    Nutrition f/u for moderate protein calorie malnutrition.     64F with hx of Parkinsons, COPD, T2DM, COPD, Schizoaffective disorder BIBEMS after unwitnessed fall, found to be hypoxic to 70s with EMS, a/w sepsis and acute hypoxic respiratory failure likely multifactorial from COVID-19, superimposed PNA, and COPD exacerbation. Transiently in the MICU for AMS requiring intubation. Pt successfully extubated and stable for transfer back to floors on 7/30. Now pending rehab placement.     Swallow assessment 8/4 recommending pureed, mildly thick. Total feed; % completion of meals per RN documentation. Noted that prior to MBS, pt was on tube feeds.              Diet, Pureed:   Mildly Thick Liquids (MILDTHICKLIQS) (08-04-22 @ 13:36)      GI: WDL. Last BM     PO intake:   % [x ]  other :    Anthropometrics:   Height (cm): 149.9 (07-23)  Weight (kg): 81.2 (07-29), 86.183 (07-23)  BMI (kg/m2): 38.4 (07-23)    Edema: 1+ generalized  Pressure Injuries: stage 2 sacrum    __________________ Pertinent Medications__________________   MEDICATIONS  (STANDING):  amantadine Syrup 100 milliGRAM(s) Oral two times a day  budesonide 160 MICROgram(s)/formoterol 4.5 MICROgram(s) Inhaler 2 Puff(s) Inhalation two times a day  clotrimazole 1% Cream 1 Application(s) Topical two times a day  dextrose 50% Injectable 25 Gram(s) IV Push once  dextrose 50% Injectable 25 Gram(s) IV Push once  enoxaparin Injectable 40 milliGRAM(s) SubCutaneous every 12 hours  insulin lispro (ADMELOG) corrective regimen sliding scale   SubCutaneous three times a day before meals  insulin lispro (ADMELOG) corrective regimen sliding scale   SubCutaneous at bedtime  petrolatum Ophthalmic Ointment 1 Application(s) Both EYES two times a day    MEDICATIONS  (PRN):  acetaminophen     Tablet .. 650 milliGRAM(s) Oral every 6 hours PRN Temp greater or equal to 38C (100.4F), Mild Pain (1 - 3), Moderate Pain (4 - 6)  ALBUTerol    90 MICROgram(s) HFA Inhaler 2 Puff(s) Inhalation every 6 hours PRN Shortness of Breath and/or Wheezing      __________________ Pertinent Labs__________________   08-08 Na132 mmol/L<L> Glu 115 mg/dL<H> K+ 3.5 mmol/L Cr  0.53 mg/dL BUN 13 mg/dL 08-08 Phos 2.5 mg/dL        POCT Blood Glucose.: 99 mg/dL (08-08-22 @ 12:12)  POCT Blood Glucose.: 104 mg/dL (08-08-22 @ 08:30)  POCT Blood Glucose.: 133 mg/dL (08-07-22 @ 22:25)  POCT Blood Glucose.: 117 mg/dL (08-07-22 @ 16:42)  POCT Blood Glucose.: 115 mg/dL (08-07-22 @ 12:05)  POCT Blood Glucose.: 118 mg/dL (08-07-22 @ 08:22)  POCT Blood Glucose.: 132 mg/dL (08-06-22 @ 22:19)  POCT Blood Glucose.: 121 mg/dL (08-06-22 @ 17:07)  POCT Blood Glucose.: 123 mg/dL (08-06-22 @ 12:13)  POCT Blood Glucose.: 107 mg/dL (08-06-22 @ 08:23)  POCT Blood Glucose.: 107 mg/dL (08-05-22 @ 22:24)  POCT Blood Glucose.: 154 mg/dL (08-05-22 @ 17:09)          Estimated Needs:   [ x] no change since previous assessment  [ ] recalculated:       Previous Nutrition Diagnosis: Moderate protein calorie malnutrition     Nutrition Diagnosis is [x ] ongoing       Recommendations:    1. Add Vital Health Shake 1x daily to diet order (520 deepika, 22 gm pro).   2. Encourage PO intake and honor food preferences as able. Please provide feeding assistance.         Monitoring and Evaluation:      [ x] Tolerance to diet prescription [x ] weights [x ] follow up per protocol  [ ] other: Source: Patient A&Ox2[ ]    Family [ ]     other [x ] chart, RN    Nutrition f/u for moderate protein calorie malnutrition.     64F with hx of Parkinsons, COPD, T2DM, COPD, Schizoaffective disorder BIBEMS after unwitnessed fall, found to be hypoxic to 70s with EMS, a/w sepsis and acute hypoxic respiratory failure likely multifactorial from COVID-19, superimposed PNA, and COPD exacerbation. Transiently in the MICU for AMS requiring intubation. Pt successfully extubated and stable for transfer back to floors on 7/30. Now pending rehab placement.     Per RN, pt with excellent PO intake. Swallow assessment 8/4 recommending pureed, mildly thick. Total feed; % completion of meals per RN documentation. Noted that prior to MBS, pt was on tube feeds.     No reported GI issues (nausea/vomiting/diarrhea/constipation.)      Diet, Pureed:   Mildly Thick Liquids (MILDTHICKLIQS) (08-04-22 @ 13:36)      GI: WDL.     PO intake:   % [x ]  other :    Anthropometrics:   Height (cm): 149.9 (07-23)  Weight (kg): 81.2 (07-29), 86.183 (07-23)  BMI (kg/m2): 38.4 (07-23)    Edema: 1+ generalized  Pressure Injuries: stage 2 sacrum    __________________ Pertinent Medications__________________   MEDICATIONS  (STANDING):  amantadine Syrup 100 milliGRAM(s) Oral two times a day  budesonide 160 MICROgram(s)/formoterol 4.5 MICROgram(s) Inhaler 2 Puff(s) Inhalation two times a day  clotrimazole 1% Cream 1 Application(s) Topical two times a day  dextrose 50% Injectable 25 Gram(s) IV Push once  dextrose 50% Injectable 25 Gram(s) IV Push once  enoxaparin Injectable 40 milliGRAM(s) SubCutaneous every 12 hours  insulin lispro (ADMELOG) corrective regimen sliding scale   SubCutaneous three times a day before meals  insulin lispro (ADMELOG) corrective regimen sliding scale   SubCutaneous at bedtime  petrolatum Ophthalmic Ointment 1 Application(s) Both EYES two times a day    MEDICATIONS  (PRN):  acetaminophen     Tablet .. 650 milliGRAM(s) Oral every 6 hours PRN Temp greater or equal to 38C (100.4F), Mild Pain (1 - 3), Moderate Pain (4 - 6)  ALBUTerol    90 MICROgram(s) HFA Inhaler 2 Puff(s) Inhalation every 6 hours PRN Shortness of Breath and/or Wheezing      __________________ Pertinent Labs__________________   08-08 Na132 mmol/L<L> Glu 115 mg/dL<H> K+ 3.5 mmol/L Cr  0.53 mg/dL BUN 13 mg/dL 08-08 Phos 2.5 mg/dL        POCT Blood Glucose.: 99 mg/dL (08-08-22 @ 12:12)  POCT Blood Glucose.: 104 mg/dL (08-08-22 @ 08:30)  POCT Blood Glucose.: 133 mg/dL (08-07-22 @ 22:25)  POCT Blood Glucose.: 117 mg/dL (08-07-22 @ 16:42)  POCT Blood Glucose.: 115 mg/dL (08-07-22 @ 12:05)  POCT Blood Glucose.: 118 mg/dL (08-07-22 @ 08:22)  POCT Blood Glucose.: 132 mg/dL (08-06-22 @ 22:19)  POCT Blood Glucose.: 121 mg/dL (08-06-22 @ 17:07)  POCT Blood Glucose.: 123 mg/dL (08-06-22 @ 12:13)  POCT Blood Glucose.: 107 mg/dL (08-06-22 @ 08:23)  POCT Blood Glucose.: 107 mg/dL (08-05-22 @ 22:24)  POCT Blood Glucose.: 154 mg/dL (08-05-22 @ 17:09)          Estimated Needs:   [ x] no change since previous assessment  [ ] recalculated:       Previous Nutrition Diagnosis: Moderate protein calorie malnutrition     Nutrition Diagnosis is [x ] ongoing       Recommendations:    1. Continue current diet order.  2. Encourage PO intake and honor food preferences as able. Please provide feeding assistance.         Monitoring and Evaluation:      [ x] Tolerance to diet prescription [x ] weights [x ] follow up per protocol  [ ] other:

## 2022-08-08 NOTE — PROGRESS NOTE ADULT - SUBJECTIVE AND OBJECTIVE BOX
[facility] Division of Hospital Medicine  Jasper Wei DO  Pager (RAMONA, 8A-5P): [phone]    Patient is a 67y old  Female who presents with a chief complaint of severe hypoxia (07 Aug 2022 06:47)    SUBJECTIVE / OVERNIGHT EVENTS: Pt doing well, denies CP/SOB, breathing comfortably. Has good appetite, weaned off oxygen and ready to go to [facility].  States that she has no family around and she lives alone.     MEDICATIONS  (STANDING):  amantadine Syrup 100 milliGRAM(s) Oral two times a day  budesonide 160 MICROgram(s)/formoterol 4.5 MICROgram(s) Inhaler 2 Puff(s) Inhalation two times a day  clotrimazole 1% Cream 1 Application(s) Topical two times a day  dextrose 50% Injectable 25 Gram(s) IV Push once  dextrose 50% Injectable 25 Gram(s) IV Push once  enoxaparin Injectable 40 milliGRAM(s) SubCutaneous every 12 hours  insulin lispro (ADMELOG) corrective regimen sliding scale   SubCutaneous three times a day before meals  insulin lispro (ADMELOG) corrective regimen sliding scale   SubCutaneous at bedtime  petrolatum Ophthalmic Ointment 1 Application(s) Both EYES two times a day    MEDICATIONS  (PRN):  acetaminophen     Tablet .. 650 milliGRAM(s) Oral every 6 hours PRN Temp greater or equal to 38C (100.4F), Mild Pain (1 - 3), Moderate Pain (4 - 6)  ALBUTerol    90 MICROgram(s) HFA Inhaler 2 Puff(s) Inhalation every 6 hours PRN Shortness of Breath and/or Wheezing      CAPILLARY BLOOD GLUCOSE      POCT Blood Glucose.: 104 mg/dL (08 Aug 2022 08:30)  POCT Blood Glucose.: 133 mg/dL (07 Aug 2022 22:25)  POCT Blood Glucose.: 117 mg/dL (07 Aug 2022 16:42)    I&O's Summary    07 Aug 2022 07:01  -  08 Aug 2022 07:00  --------------------------------------------------------  IN: 200 mL / OUT: 200 mL / NET: 0 mL        PHYSICAL EXAM:  Vital Signs Last 24 Hrs  T(C): 37 (08 Aug 2022 05:35), Max: 37 (07 Aug 2022 16:35)  T(F): 98.6 (08 Aug 2022 05:35), Max: 98.6 (07 Aug 2022 16:35)  HR: 84 (08 Aug 2022 05:35) (84 - 93)  BP: 100/55 (08 Aug 2022 05:35) (100/55 - 142/75)  BP(mean): --  RR: 18 (08 Aug 2022 05:35) (16 - 18)  SpO2: 98% (08 Aug 2022 05:35) (98% - 100%)    Parameters below as of 08 Aug 2022 05:35  Patient On (Oxygen Delivery Method): room air    CONSTITUTIONAL: NAD, well-developed, well-groomed  EYES: PERRLA; chronic R eye lateral deviation   RESPIRATORY: Normal respiratory effort; lungs are clear to auscultation bilaterally, no wheezing   CARDIOVASCULAR: Regular rate and rhythm, normal S1 and S2, no murmur/rub/gallop; No lower extremity edema; Peripheral pulses are 2+ bilaterally  ABDOMEN: Nontender to palpation, normoactive bowel sounds, no rebound/guarding  MUSCLOSKELETAL:  No clubbing or cyanosis of digits; no joint swelling or tenderness to palpation  PSYCH: A+O to person, place, and time; affect appropriate  NEUROLOGY: CN 2-12 are intact and symmetric; no gross sensory deficits; has baseline tremors most prominent in RUE  SKIN: No rashes; no palpable lesions    LABS:                        11.7   13.59 )-----------( 263      ( 08 Aug 2022 06:36 )             39.2     08-08    132<L>  |  97<L>  |  13  ----------------------------<  115<H>  3.5   |  28  |  0.53    Ca    8.3<L>      08 Aug 2022 06:36  Phos  2.5     08-08  Mg     1.90     08-08

## 2022-08-08 NOTE — PROGRESS NOTE ADULT - PROBLEM SELECTOR PLAN 1
Pt found to be febrile, tachycardiac, tachypneic, hypoxic, with leukocytosis, positive COVID19, sepsis now resolved   - Dx: hypoxic respiratory failure 2/2 COVID-19, superimposed PNA, and COPD exacerbation  s/p intubation and MICU transfer on 7/26, successfully extubated 7/29 and transferred to floors 7/30.    - completed COVID treatment (decadron 7/24-8/1), (remdesevir 7/23-7/27)  - completed empiric abx after aspiration event on 7/26 with Zosyn   - c/w Symbicort, PRN albuterol ordered

## 2022-08-08 NOTE — PROGRESS NOTE ADULT - PROBLEM SELECTOR PLAN 2
- COPD likely due to chronic smoking history, no home oxygen need at home, not previously been intubated  - post extubation, resume Symbicort, c/w albuterol PRN  - Now breathing well >95% with room air   - Monitor O2 sat with a goal of > 88 and less than 93

## 2022-08-09 LAB — SARS-COV-2 RNA SPEC QL NAA+PROBE: DETECTED

## 2022-08-09 PROCEDURE — 99232 SBSQ HOSP IP/OBS MODERATE 35: CPT

## 2022-08-09 RX ADMIN — Medication 1 APPLICATION(S): at 04:48

## 2022-08-09 RX ADMIN — ENOXAPARIN SODIUM 40 MILLIGRAM(S): 100 INJECTION SUBCUTANEOUS at 04:44

## 2022-08-09 RX ADMIN — Medication 1 APPLICATION(S): at 04:57

## 2022-08-09 RX ADMIN — Medication 100 MILLIGRAM(S): at 04:46

## 2022-08-09 RX ADMIN — BUDESONIDE AND FORMOTEROL FUMARATE DIHYDRATE 2 PUFF(S): 160; 4.5 AEROSOL RESPIRATORY (INHALATION) at 09:10

## 2022-08-09 RX ADMIN — BUDESONIDE AND FORMOTEROL FUMARATE DIHYDRATE 2 PUFF(S): 160; 4.5 AEROSOL RESPIRATORY (INHALATION) at 21:56

## 2022-08-09 RX ADMIN — Medication 1 APPLICATION(S): at 18:08

## 2022-08-09 RX ADMIN — Medication 100 MILLIGRAM(S): at 18:07

## 2022-08-09 RX ADMIN — ENOXAPARIN SODIUM 40 MILLIGRAM(S): 100 INJECTION SUBCUTANEOUS at 18:07

## 2022-08-09 RX ADMIN — Medication 1 APPLICATION(S): at 18:07

## 2022-08-09 NOTE — PROGRESS NOTE ADULT - SUBJECTIVE AND OBJECTIVE BOX
[facility] Division of Hospital Medicine  Japser Wei DO  Pager (RAMONA, 3N-5P): [phone]    Patient is a 67y old  Female who presents with a chief complaint of severe hypoxia (08 Aug 2022 12:06)    SUBJECTIVE / OVERNIGHT EVENTS: Pt seen semi-upright trying to eat breakfast, she asked if I could help.  She staets she feels well, no SOB, no CP.  Tremors get worse as she tries to communicate with me.  HCP (family friend Radha) expressed concern about her being on a pureed diet because the pt does not like it and will not want to eat mushy food.     MEDICATIONS  (STANDING):  amantadine Syrup 100 milliGRAM(s) Oral two times a day  budesonide 160 MICROgram(s)/formoterol 4.5 MICROgram(s) Inhaler 2 Puff(s) Inhalation two times a day  clotrimazole 1% Cream 1 Application(s) Topical two times a day  dextrose 50% Injectable 25 Gram(s) IV Push once  dextrose 50% Injectable 25 Gram(s) IV Push once  enoxaparin Injectable 40 milliGRAM(s) SubCutaneous every 12 hours  insulin lispro (ADMELOG) corrective regimen sliding scale   SubCutaneous three times a day before meals  insulin lispro (ADMELOG) corrective regimen sliding scale   SubCutaneous at bedtime  petrolatum Ophthalmic Ointment 1 Application(s) Both EYES two times a day    MEDICATIONS  (PRN):  acetaminophen     Tablet .. 650 milliGRAM(s) Oral every 6 hours PRN Temp greater or equal to 38C (100.4F), Mild Pain (1 - 3), Moderate Pain (4 - 6)  ALBUTerol    90 MICROgram(s) HFA Inhaler 2 Puff(s) Inhalation every 6 hours PRN Shortness of Breath and/or Wheezing      CAPILLARY BLOOD GLUCOSE      POCT Blood Glucose.: 137 mg/dL (09 Aug 2022 12:09)  POCT Blood Glucose.: 109 mg/dL (09 Aug 2022 08:06)  POCT Blood Glucose.: 147 mg/dL (08 Aug 2022 22:34)  POCT Blood Glucose.: 100 mg/dL (08 Aug 2022 17:22)    I&O's Summary      PHYSICAL EXAM:  Vital Signs Last 24 Hrs  T(C): 36.8 (09 Aug 2022 11:11), Max: 36.8 (09 Aug 2022 11:11)  T(F): 98.3 (09 Aug 2022 11:11), Max: 98.3 (09 Aug 2022 11:11)  HR: 98 (09 Aug 2022 11:11) (87 - 100)  BP: 102/68 (09 Aug 2022 11:11) (102/68 - 132/49)  BP(mean): --  RR: 17 (09 Aug 2022 11:11) (16 - 17)  SpO2: 99% (09 Aug 2022 11:11) (94% - 99%)    Parameters below as of 09 Aug 2022 11:11  Patient On (Oxygen Delivery Method): room air      CONSTITUTIONAL: NAD, well-developed, well-groomed  EYES: PERRLA; conjunctiva and sclera clear, chronic lateral deviation of R eye   RESPIRATORY: Normal respiratory effort; lungs are clear to auscultation bilaterally  CARDIOVASCULAR: Regular rate and rhythm, normal S1 and S2, no murmur/rub/gallop; No lower extremity edema; Peripheral pulses are 2+ bilaterally  ABDOMEN: Nontender to palpation, normoactive bowel sounds, no rebound/guarding  MUSCLOSKELETAL:  No clubbing or cyanosis of digits; no joint swelling or tenderness to palpation  PSYCH: A+O to person, place; minimally conversant but does respond appropriately to questions  NEUROLOGY: CN 2-12 are intact and symmetric; no gross sensory deficits; b/l UE tremors at baseline  SKIN: L facial wound healing well     LABS:                        11.7   13.59 )-----------( 263      ( 08 Aug 2022 06:36 )             39.2     08-08    132<L>  |  97<L>  |  13  ----------------------------<  115<H>  3.5   |  28  |  0.53    Ca    8.3<L>      08 Aug 2022 06:36  Phos  2.5     08-08  Mg     1.90     08-08

## 2022-08-09 NOTE — PROGRESS NOTE ADULT - PROBLEM SELECTOR PLAN 11
- c/w lovenox ppx    - Pt passed cineesophagogram 08/04 and started puree diet with mildly thick fluid.  Spoke with HCP Radha 8/9 who is requesting a repeat evaluation and requesting to advance to a soft diet.        Aspiration Precautions      Reflux Precautions      Maintain Good Oral Hygiene Care    Medically stable for dc to [facility], choices selected by HCP 8/4, SW aware

## 2022-08-10 ENCOUNTER — TRANSCRIPTION ENCOUNTER (OUTPATIENT)
Age: 67
End: 2022-08-10

## 2022-08-10 VITALS
SYSTOLIC BLOOD PRESSURE: 112 MMHG | TEMPERATURE: 98 F | HEART RATE: 91 BPM | OXYGEN SATURATION: 97 % | DIASTOLIC BLOOD PRESSURE: 72 MMHG | RESPIRATION RATE: 18 BRPM

## 2022-08-10 LAB
ANION GAP SERPL CALC-SCNC: 10 MMOL/L — SIGNIFICANT CHANGE UP (ref 7–14)
BASOPHILS # BLD AUTO: 0.04 K/UL — SIGNIFICANT CHANGE UP (ref 0–0.2)
BASOPHILS NFR BLD AUTO: 0.4 % — SIGNIFICANT CHANGE UP (ref 0–2)
BUN SERPL-MCNC: 13 MG/DL — SIGNIFICANT CHANGE UP (ref 7–23)
CALCIUM SERPL-MCNC: 8.6 MG/DL — SIGNIFICANT CHANGE UP (ref 8.4–10.5)
CHLORIDE SERPL-SCNC: 101 MMOL/L — SIGNIFICANT CHANGE UP (ref 98–107)
CO2 SERPL-SCNC: 25 MMOL/L — SIGNIFICANT CHANGE UP (ref 22–31)
CREAT SERPL-MCNC: 0.49 MG/DL — LOW (ref 0.5–1.3)
EGFR: 105 ML/MIN/1.73M2 — SIGNIFICANT CHANGE UP
EOSINOPHIL # BLD AUTO: 0.14 K/UL — SIGNIFICANT CHANGE UP (ref 0–0.5)
EOSINOPHIL NFR BLD AUTO: 1.4 % — SIGNIFICANT CHANGE UP (ref 0–6)
GLUCOSE SERPL-MCNC: 129 MG/DL — HIGH (ref 70–99)
HCT VFR BLD CALC: 39.5 % — SIGNIFICANT CHANGE UP (ref 34.5–45)
HGB BLD-MCNC: 12.1 G/DL — SIGNIFICANT CHANGE UP (ref 11.5–15.5)
IANC: 5.01 K/UL — SIGNIFICANT CHANGE UP (ref 1.8–7.4)
IMM GRANULOCYTES NFR BLD AUTO: 0.3 % — SIGNIFICANT CHANGE UP (ref 0–1.5)
LYMPHOCYTES # BLD AUTO: 4.03 K/UL — HIGH (ref 1–3.3)
LYMPHOCYTES # BLD AUTO: 40.5 % — SIGNIFICANT CHANGE UP (ref 13–44)
MAGNESIUM SERPL-MCNC: 1.7 MG/DL — SIGNIFICANT CHANGE UP (ref 1.6–2.6)
MCHC RBC-ENTMCNC: 26.4 PG — LOW (ref 27–34)
MCHC RBC-ENTMCNC: 30.6 GM/DL — LOW (ref 32–36)
MCV RBC AUTO: 86.2 FL — SIGNIFICANT CHANGE UP (ref 80–100)
MONOCYTES # BLD AUTO: 0.7 K/UL — SIGNIFICANT CHANGE UP (ref 0–0.9)
MONOCYTES NFR BLD AUTO: 7 % — SIGNIFICANT CHANGE UP (ref 2–14)
NEUTROPHILS # BLD AUTO: 5.01 K/UL — SIGNIFICANT CHANGE UP (ref 1.8–7.4)
NEUTROPHILS NFR BLD AUTO: 50.4 % — SIGNIFICANT CHANGE UP (ref 43–77)
NRBC # BLD: 0 /100 WBCS — SIGNIFICANT CHANGE UP
NRBC # FLD: 0 K/UL — SIGNIFICANT CHANGE UP
PHOSPHATE SERPL-MCNC: 3.2 MG/DL — SIGNIFICANT CHANGE UP (ref 2.5–4.5)
PLATELET # BLD AUTO: 210 K/UL — SIGNIFICANT CHANGE UP (ref 150–400)
POTASSIUM SERPL-MCNC: 3.8 MMOL/L — SIGNIFICANT CHANGE UP (ref 3.5–5.3)
POTASSIUM SERPL-SCNC: 3.8 MMOL/L — SIGNIFICANT CHANGE UP (ref 3.5–5.3)
RBC # BLD: 4.58 M/UL — SIGNIFICANT CHANGE UP (ref 3.8–5.2)
RBC # FLD: 15.8 % — HIGH (ref 10.3–14.5)
SODIUM SERPL-SCNC: 136 MMOL/L — SIGNIFICANT CHANGE UP (ref 135–145)
WBC # BLD: 9.95 K/UL — SIGNIFICANT CHANGE UP (ref 3.8–10.5)
WBC # FLD AUTO: 9.95 K/UL — SIGNIFICANT CHANGE UP (ref 3.8–10.5)

## 2022-08-10 PROCEDURE — 99232 SBSQ HOSP IP/OBS MODERATE 35: CPT

## 2022-08-10 RX ORDER — MAGNESIUM SULFATE 500 MG/ML
2 VIAL (ML) INJECTION ONCE
Refills: 0 | Status: DISCONTINUED | OUTPATIENT
Start: 2022-08-10 | End: 2022-08-10

## 2022-08-10 RX ADMIN — Medication 100 MILLIGRAM(S): at 05:22

## 2022-08-10 RX ADMIN — ENOXAPARIN SODIUM 40 MILLIGRAM(S): 100 INJECTION SUBCUTANEOUS at 05:21

## 2022-08-10 RX ADMIN — Medication 1 APPLICATION(S): at 05:28

## 2022-08-10 RX ADMIN — Medication 1 APPLICATION(S): at 05:22

## 2022-08-10 RX ADMIN — BUDESONIDE AND FORMOTEROL FUMARATE DIHYDRATE 2 PUFF(S): 160; 4.5 AEROSOL RESPIRATORY (INHALATION) at 08:15

## 2022-08-10 NOTE — DISCHARGE NOTE NURSING/CASE MANAGEMENT/SOCIAL WORK - NSDCVIVACCINE_GEN_ALL_CORE_FT
Tdap; 10-Mar-2016 14:21; Kristy Beyer (RN); [employer]; [id number]; IntraMuscular; Deltoid Left.; 0.5 milliLiter(s); VIS (VIS Published: 09-May-2013, VIS Presented: 10-Mar-2016);   Tdap; 21-Dec-2019 20:33; Vaishnavi Gusman); [employer]; [id number] (Exp. Date: 21-Oct-2022); IntraMuscular; Deltoid Right.; 0.5 milliLiter(s); VIS (VIS Published: 09-May-2013, VIS Presented: 21-Dec-2019);

## 2022-08-10 NOTE — SWALLOW BEDSIDE ASSESSMENT ADULT - COMMENTS
Per Hospitalist Note 8/9/22: "64F with hx of Parkinsons, COPD, T2DM, COPD, Schizoaffective disorder BIBEMS after unwitnessed fall, found to be hypoxic to 70s with EMS, a/w sepsis and acute hypoxic respiratory failure likely multifactorial from COVID-19, superimposed PNA, and COPD exacerbation. Transiently in the MICU for AMS requiring intubation. Pt successfully extubated and stable for transfer back to floors on 7/30. Now pending rehab placement"    Patient is familiar to this department as the patient has been seen for 2 swallow evaluations and 2 cinesophagrams. Most recent cinesopahgram on 8/4/22 with recommendation of puree and mildly thick liquids. See consults for details.     Patient received semi-supine in bed, awake, alert and communicative with RN at bedside. RN assisted SLP in adjusting patient upright in bed to ~90 degrees. Patient denies current pain.

## 2022-08-10 NOTE — SWALLOW BEDSIDE ASSESSMENT ADULT - ASR SWALLOW RECOMMEND DIAG
Cinesophagram to objectively assess swallow function given concern for aspiration pneumonia and given patients history of Parkinson's disease
NOT warranted as patient currently overly tolerating PO trials
objective testing is NOT indicated given no overt s/sx of impaired airway protection and no recent chest imaging

## 2022-08-10 NOTE — PROGRESS NOTE ADULT - REASON FOR ADMISSION
severe hypoxia

## 2022-08-10 NOTE — SWALLOW BEDSIDE ASSESSMENT ADULT - SWALLOW EVAL: DIAGNOSIS
1. Functional oral stage for puree and moderately thick liquids marked by adequate oral acceptance, collection and transfer. 2. moderate/severe pharyngeal dysphagia for puree and moderately thick liquids marked by a suspected delayed pharyngeal swallow trigger with hyolaryngeal elevation upon digital palpation with increased clavicular breathing with patient stating she is short of breath, suggestive of airway penetration/aspiration.
Patient presents with oropharyngeal dysphagia given mildly thick liquids, puree, minced & moist and soft & bite sized solids marked by adequate bolus containment, adequate bolus manipulation, slowed mastication with adequate ability to break down solids and slowed anterior-posterior transport. mild right sided buccal residue noted for soft & bite sized solids though cleared with mildly thick liquid wash. Pharyngeal stage marked by observed initiation of the pharyngeal swallow trigger judged via laryngeal palpation. No clinically overt s/sx of impaired airway protection for all trials.
1. Patient presents with mild oral dysphagia with moderately thick and mildly thick liquids characterized by adequate bolus acceptance, slow bolus manipulation, slow A-P transit and adequate oral clearance. 2. Patient presents with mild-moderate oral dysphagia with puree characterized by adequate bolus acceptance, slow and reduced bolus manipulation, slow A-P transit and oral residue. 3. Patient presents with mild pharyngeal dysphagia with puree and moderately thick liquids characterized by suspected delay in pharyngeal swallow trigger and present hyolaryngeal elevation upon digital palpation. No overt signs or symptoms of aspiration/penetration noted. 4. Patient presents with moderate pharyngeal dysphagia with mildly thick liquids characterized by suspected delay in pharyngeal swallow trigger and present hyolaryngeal elevation upon digital palpation. Delayed cough noted post intake of mildly thick liquids, suggestive of airway aspiration/penetration.

## 2022-08-10 NOTE — PROGRESS NOTE ADULT - PROBLEM SELECTOR PLAN 11
- c/w lovenox ppx    - Pt passed cineesophagogram 08/04, S&S reevaluated today and advanced to soft bite sized diet with mildly thick fluids.   Medically stable for dc to [facility] [place], SW aware and arrangement placement  Spoke with Radha GAONA

## 2022-08-10 NOTE — SWALLOW BEDSIDE ASSESSMENT ADULT - SWALLOW EVAL: RECOMMENDED DIET
1. Downgrade patient's diet to puree with moderately thick liquids. 2. Strict aspiration precautions of: upright posture during PO intake, reduced rate, small bites/sips, alternate solids/liquids. 3. This service to follow up as schedule permits. 4. Results and recommendations discussed with RN on unit. Called out to team.
1) PO is contraindicated continue with short-term non-oral means 2) RD consult to manage tube feedings.
soft & bite sized with mildly thick liquids

## 2022-08-10 NOTE — DISCHARGE NOTE NURSING/CASE MANAGEMENT/SOCIAL WORK - PATIENT PORTAL LINK FT
You can access the FollowMyHealth Patient Portal offered by  by registering at the following website: [email or web address]. By joining [email or web address]’s FollowMyHealth portal, you will also be able to view your health information using other applications (apps) compatible with our system.

## 2022-08-10 NOTE — PROGRESS NOTE ADULT - NUTRITIONAL ASSESSMENT
This patient has been assessed with a concern for Malnutrition and has been determined to have a diagnosis/diagnoses of Moderate protein-calorie malnutrition.    This patient is being managed with:   Diet Pureed-  Mildly Thick Liquids (MILDTHICKLIQS)  Entered: Aug  4 2022  1:35PM    
This patient has been assessed with a concern for Malnutrition and has been determined to have a diagnosis/diagnoses of Moderate protein-calorie malnutrition.    This patient is being managed with:   Diet NPO with Tube Feed-  Tube Feeding Modality: Nasogastric  Glucerna 1.5 Krystian (GLUCERNA1.5RTH)  Total Volume for 24 Hours (mL): 960  Continuous  Starting Tube Feed Rate {mL per Hour}: 20  Increase Tube Feed Rate by (mL): 10     Every 4 hours  Until Goal Tube Feed Rate (mL per Hour): 40  Tube Feed Duration (in Hours): 24  Tube Feed Start Time: 23:00  Entered: Jul 29 2022 10:55PM    
This patient has been assessed with a concern for Malnutrition and has been determined to have a diagnosis/diagnoses of Moderate protein-calorie malnutrition.    This patient is being managed with:   Diet NPO with Tube Feed-  Tube Feeding Modality: Nasogastric  Glucerna 1.5 Krystian (GLUCERNA1.5RTH)  Total Volume for 24 Hours (mL): 960  Continuous  Starting Tube Feed Rate {mL per Hour}: 20  Increase Tube Feed Rate by (mL): 10     Every 4 hours  Until Goal Tube Feed Rate (mL per Hour): 40  Tube Feed Duration (in Hours): 24  Tube Feed Start Time: 23:00  Entered: Jul 29 2022 10:55PM    
This patient has been assessed with a concern for Malnutrition and has been determined to have a diagnosis/diagnoses of Moderate protein-calorie malnutrition.    This patient is being managed with:   Diet Soft and Bite Sized-  Consistent Carbohydrate {No Snacks} (CSTCHO)  Mildly Thick Liquids (MILDTHICKLIQS)  Entered: Aug 10 2022 12:06PM    
This patient has been assessed with a concern for Malnutrition and has been determined to have a diagnosis/diagnoses of Moderate protein-calorie malnutrition.    This patient is being managed with:   Diet NPO with Tube Feed-  Tube Feeding Modality: Nasogastric  Glucerna 1.5 Krystian (GLUCERNA1.5RTH)  Total Volume for 24 Hours (mL): 960  Continuous  Starting Tube Feed Rate {mL per Hour}: 20  Increase Tube Feed Rate by (mL): 10     Every 4 hours  Until Goal Tube Feed Rate (mL per Hour): 40  Tube Feed Duration (in Hours): 24  Tube Feed Start Time: 23:00  Entered: Jul 29 2022 10:55PM    
This patient has been assessed with a concern for Malnutrition and has been determined to have a diagnosis/diagnoses of Moderate protein-calorie malnutrition.    This patient is being managed with:   Diet Pureed-  Mildly Thick Liquids (MILDTHICKLIQS)  Entered: Aug  4 2022  1:35PM    
This patient has been assessed with a concern for Malnutrition and has been determined to have a diagnosis/diagnoses of Moderate protein-calorie malnutrition.    This patient is being managed with:   Diet NPO with Tube Feed-  Tube Feeding Modality: Nasogastric  Glucerna 1.5 Krystian (GLUCERNA1.5RTH)  Total Volume for 24 Hours (mL): 960  Continuous  Starting Tube Feed Rate {mL per Hour}: 20  Increase Tube Feed Rate by (mL): 10     Every 4 hours  Until Goal Tube Feed Rate (mL per Hour): 40  Tube Feed Duration (in Hours): 24  Tube Feed Start Time: 23:00  Entered: Jul 29 2022 10:55PM    
This patient has been assessed with a concern for Malnutrition and has been determined to have a diagnosis/diagnoses of Moderate protein-calorie malnutrition.    This patient is being managed with:   Diet Pureed-  Mildly Thick Liquids (MILDTHICKLIQS)  Entered: Aug  8 2022  4:12PM

## 2022-08-10 NOTE — SWALLOW BEDSIDE ASSESSMENT ADULT - ADDITIONAL RECOMMENDATIONS
1. monitor for diet tolerance and reconsult this department as indicated. 2. this department to follow up as schedule permits. 3. patient would benefit from swallow therapy pending discharge plans (rehab vs homecare vs outpatient)

## 2022-08-10 NOTE — PROGRESS NOTE ADULT - SUBJECTIVE AND OBJECTIVE BOX
[facility] Division of Hospital Medicine  Jasper Wei DO  Available via MS Teams  In house pager [phone]     SUBJECTIVE / OVERNIGHT EVENTS: Pt doing well, no acute complaints, no fevers/chills, no CP/SOB.  HCP asking for S&S reevaluation for which she did and now advanced to soft and bite sized diet.  Awaiting TEJAS       MEDICATIONS  (STANDING):  amantadine Syrup 100 milliGRAM(s) Oral two times a day  budesonide 160 MICROgram(s)/formoterol 4.5 MICROgram(s) Inhaler 2 Puff(s) Inhalation two times a day  clotrimazole 1% Cream 1 Application(s) Topical two times a day  dextrose 50% Injectable 25 Gram(s) IV Push once  dextrose 50% Injectable 25 Gram(s) IV Push once  enoxaparin Injectable 40 milliGRAM(s) SubCutaneous every 12 hours  insulin lispro (ADMELOG) corrective regimen sliding scale   SubCutaneous three times a day before meals  insulin lispro (ADMELOG) corrective regimen sliding scale   SubCutaneous at bedtime  magnesium sulfate  IVPB 2 Gram(s) IV Intermittent once  petrolatum Ophthalmic Ointment 1 Application(s) Both EYES two times a day    MEDICATIONS  (PRN):  acetaminophen     Tablet .. 650 milliGRAM(s) Oral every 6 hours PRN Temp greater or equal to 38C (100.4F), Mild Pain (1 - 3), Moderate Pain (4 - 6)  ALBUTerol    90 MICROgram(s) HFA Inhaler 2 Puff(s) Inhalation every 6 hours PRN Shortness of Breath and/or Wheezing      I&O's Summary      PHYSICAL EXAM:  Vital Signs Last 24 Hrs  T(C): 36.8 (10 Aug 2022 05:20), Max: 36.8 (09 Aug 2022 21:50)  T(F): 98.2 (10 Aug 2022 05:20), Max: 98.3 (09 Aug 2022 21:50)  HR: 90 (10 Aug 2022 05:20) (90 - 93)  BP: 121/82 (10 Aug 2022 05:20) (116/84 - 121/82)  BP(mean): --  RR: 17 (10 Aug 2022 05:20) (16 - 17)  SpO2: 93% (10 Aug 2022 05:20) (91% - 93%)    Parameters below as of 10 Aug 2022 08:09  Patient On (Oxygen Delivery Method): room air      CONSTITUTIONAL: NAD, well-developed, well-groomed  EYES: PERRLA; conjunctiva and sclera clear, chronic R lateral deviation of eye  RESPIRATORY: Normal respiratory effort; lungs are clear to auscultation bilaterally  CARDIOVASCULAR: Regular rate and rhythm, normal S1 and S2, no murmur/rub/gallop; No lower extremity edema; Peripheral pulses are 2+ bilaterally  ABDOMEN: Nontender to palpation, normoactive bowel sounds, no rebound/guarding  MUSCULOSKELETAL:  No clubbing or cyanosis of digits; no joint swelling or tenderness to palpation  PSYCH: A+O to person, place, and time; flat affect  NEUROLOGY: CN 2-12 are intact and symmetric; no gross sensory deficits, no motor deficits  SKIN: L cheek wound healing     LABS:                        12.1   9.95  )-----------( 210      ( 10 Aug 2022 10:04 )             39.5     08-10    136  |  101  |  13  ----------------------------<  129<H>  3.8   |  25  |  0.49<L>    Ca    8.6      10 Aug 2022 10:04  Phos  3.2     08-10  Mg     1.70     08-10      COVID-19 PCR: Detected (09 Aug 2022 18:25)  COVID-19 PCR: Detected (05 Aug 2022 13:34)  SARS-CoV-2: Detected (23 Jul 2022 10:55)

## 2022-08-10 NOTE — PROGRESS NOTE ADULT - PROVIDER SPECIALTY LIST ADULT
MICU
Internal Medicine
Infectious Disease
MICU
MICU
Infectious Disease
Infectious Disease
Internal Medicine
Internal Medicine
Hospitalist
Internal Medicine
Hospitalist
Internal Medicine
Hospitalist
Internal Medicine

## 2022-09-04 NOTE — H&P ADULT - NSHPPOADEEPVENOUSTHROMB_GEN_A_CORE
Problem: Potential for Falls  Goal: Patient will remain free of falls  Description: INTERVENTIONS:  - Educate patient/family on patient safety including physical limitations  - Instruct patient to call for assistance with activity   - Consult OT/PT to assist with strengthening/mobility   - Keep Call bell within reach  - Keep bed low and locked with side rails adjusted as appropriate  - Keep care items and personal belongings within reach  - Initiate and maintain comfort rounds  - Make Fall Risk Sign visible to staff  - Offer Toileting every  Hours, in advance of need  - Initiate/Maintain alarm  - Obtain necessary fall risk management equipment:   - Apply yellow socks and bracelet for high fall risk patients  - Consider moving patient to room near nurses station  Outcome: Progressing     Problem: METABOLIC, FLUID AND ELECTROLYTES - ADULT  Goal: Electrolytes maintained within normal limits  Description: INTERVENTIONS:  - Monitor labs and assess patient for signs and symptoms of electrolyte imbalances  - Administer electrolyte replacement as ordered  - Monitor response to electrolyte replacements, including repeat lab results as appropriate  - Instruct patient on fluid and nutrition as appropriate  Outcome: Progressing  Goal: Fluid balance maintained  Description: INTERVENTIONS:  - Monitor labs   - Monitor I/O and WT  - Instruct patient on fluid and nutrition as appropriate  - Assess for signs & symptoms of volume excess or deficit  Outcome: Progressing     Problem: SKIN/TISSUE INTEGRITY - ADULT  Goal: Skin Integrity remains intact(Skin Breakdown Prevention)  Description: Assess:  -Perform Rahat assessment every   -Clean and moisturize skin every   -Inspect skin when repositioning, toileting, and assisting with ADLS  -Assess under medical devices such as  every   -Assess extremities for adequate circulation and sensation     Bed Management:  -Have minimal linens on bed & keep smooth, unwrinkled  -Change linens as needed when moist or perspiring  -Avoid sitting or lying in one position for more than  hours while in bed  -Keep HOB at degrees     Toileting:  -Offer bedside commode  -Assess for incontinence every   -Use incontinent care products after each incontinent episode such as     Activity:  -Mobilize patient  times a day  -Encourage activity and walks on unit  -Encourage or provide ROM exercises   -Turn and reposition patient every  Hours  -Use appropriate equipment to lift or move patient in bed  -Instruct/ Assist with weight shifting every  when out of bed in chair  -Consider limitation of chair time hour intervals    Skin Care:  -Avoid use of baby powder, tape, friction and shearing, hot water or constrictive clothing  -Relieve pressure over bony prominences using   -Do not massage red bony areas    Next Steps:  -Teach patient strategies to minimize risks such as    -Consider consults to  interdisciplinary teams such as   Outcome: Progressing     Problem: SAFETY ADULT  Goal: Patient will remain free of falls  Description: INTERVENTIONS:  - Educate patient/family on patient safety including physical limitations  - Instruct patient to call for assistance with activity   - Consult OT/PT to assist with strengthening/mobility   - Keep Call bell within reach  - Keep bed low and locked with side rails adjusted as appropriate  - Keep care items and personal belongings within reach  - Initiate and maintain comfort rounds  - Make Fall Risk Sign visible to staff  - Offer Toileting every  Hours, in advance of need  - Initiate/Maintain alarm  - Obtain necessary fall risk management equipment:   - Apply yellow socks and bracelet for high fall risk patients  - Consider moving patient to room near nurses station  Outcome: Progressing  Goal: Maintain or return to baseline ADL function  Description: INTERVENTIONS:  -  Assess patient's ability to carry out ADLs; assess patient's baseline for ADL function and identify physical deficits which impact ability to perform ADLs (bathing, care of mouth/teeth, toileting, grooming, dressing, etc )  - Assess/evaluate cause of self-care deficits   - Assess range of motion  - Assess patient's mobility; develop plan if impaired  - Assess patient's need for assistive devices and provide as appropriate  - Encourage maximum independence but intervene and supervise when necessary  - Involve family in performance of ADLs  - Assess for home care needs following discharge   - Consider OT consult to assist with ADL evaluation and planning for discharge  - Provide patient education as appropriate  Outcome: Progressing  Goal: Maintains/Returns to pre admission functional level  Description: INTERVENTIONS:  - Perform BMAT or MOVE assessment daily    - Set and communicate daily mobility goal to care team and patient/family/caregiver  - Collaborate with rehabilitation services on mobility goals if consulted  - Perform Range of Motion  times a day  - Reposition patient every  hours    - Dangle patient  times a day  - Stand patient  times a day  - Ambulate patient  times a day  - Out of bed to chair  times a day   - Out of bed for meals  times a day  - Out of bed for toileting  - Record patient progress and toleration of activity level   Outcome: Progressing     Problem: DISCHARGE PLANNING  Goal: Discharge to home or other facility with appropriate resources  Description: INTERVENTIONS:  - Identify barriers to discharge w/patient and caregiver  - Arrange for needed discharge resources and transportation as appropriate  - Identify discharge learning needs (meds, wound care, etc )  - Arrange for interpretive services to assist at discharge as needed  - Refer to Case Management Department for coordinating discharge planning if the patient needs post-hospital services based on physician/advanced practitioner order or complex needs related to functional status, cognitive ability, or social support system  Outcome: Progressing Problem: Knowledge Deficit  Goal: Patient/family/caregiver demonstrates understanding of disease process, treatment plan, medications, and discharge instructions  Description: Complete learning assessment and assess knowledge base  Interventions:  - Provide teaching at level of understanding  - Provide teaching via preferred learning methods  Outcome: Progressing     Problem: MOBILITY - ADULT  Goal: Maintain or return to baseline ADL function  Description: INTERVENTIONS:  -  Assess patient's ability to carry out ADLs; assess patient's baseline for ADL function and identify physical deficits which impact ability to perform ADLs (bathing, care of mouth/teeth, toileting, grooming, dressing, etc )  - Assess/evaluate cause of self-care deficits   - Assess range of motion  - Assess patient's mobility; develop plan if impaired  - Assess patient's need for assistive devices and provide as appropriate  - Encourage maximum independence but intervene and supervise when necessary  - Involve family in performance of ADLs  - Assess for home care needs following discharge   - Consider OT consult to assist with ADL evaluation and planning for discharge  - Provide patient education as appropriate  Outcome: Progressing  Goal: Maintains/Returns to pre admission functional level  Description: INTERVENTIONS:  - Perform BMAT or MOVE assessment daily    - Set and communicate daily mobility goal to care team and patient/family/caregiver  - Collaborate with rehabilitation services on mobility goals if consulted  - Perform Range of Motion  times a day  - Reposition patient every  hours    - Dangle patient  times a day  - Stand patient  times a day  - Ambulate patient  times a day  - Out of bed to chair  times a day   - Out of bed for meals  times a day  - Out of bed for toileting  - Record patient progress and toleration of activity level   Outcome: Progressing     Problem: Prexisting or High Potential for Compromised Skin Integrity  Goal: Skin integrity is maintained or improved  Description: INTERVENTIONS:  - Identify patients at risk for skin breakdown  - Assess and monitor skin integrity  - Assess and monitor nutrition and hydration status  - Monitor labs   - Assess for incontinence   - Turn and reposition patient  - Assist with mobility/ambulation  - Relieve pressure over bony prominences  - Avoid friction and shearing  - Provide appropriate hygiene as needed including keeping skin clean and dry  - Evaluate need for skin moisturizer/barrier cream  - Collaborate with interdisciplinary team   - Patient/family teaching  - Consider wound care consult   Outcome: Progressing no

## 2024-10-29 NOTE — DISCHARGE NOTE PROVIDER - NSDCCONDITION_GEN_ALL_CORE
Physician Progress Note      PATIENT:               NEGRO CARO  CSN #:                  [id number]  :                       1933  ADMIT DATE:       10/11/2024 8:25 AM  DISCH DATE:        10/16/2024 5:55 PM  RESPONDING  PROVIDER #:        OLGA Zhang CNP          QUERY TEXT:    Dear Dr. Alford,,    Pt admitted with extensive right subdural hematoma s/p fall.  Noted   documentation of, 'Subdural hematoma with mild subarachnoid hemorrhage,' per   H/P dated 10/11,' and supported by Head CT-Right-sided and anterior   extra-axial hemorrhagic fluid collections with mild leftward midline shift and   mass effect on the occipital horn of the right lateral ventricle. Please   document as an addendum to discharge summary the significance:  The medical record reflects the following:  Risk Factors: 89 yo f, CAD HLD Afib CHF CVA  Clinical Indicators: CT showed a 1.4 cm subdural hematoma with mass effect   upon the right frontal parietal and temporal lobe with midline shift. pt   complained of worsening back pain, hallucinations with vomiting, seizure like   activities  Treatment: Neurosurgery consult, neurocritical care consult, PCC, Keppra, CT   Head Hold Eliquis, cont EEG    Thank you,  Tomeka Cronin RN BSN  [email or web address]   [email or web address]  Options provided:  -- brain compression/midline shift is clinically significant  -- brain compression/midline shift not clinically significant  -- Other - I will add my own diagnosis  -- Disagree - Not applicable / Not valid  -- Disagree - Clinically unable to determine / Unknown  -- Refer to Clinical Documentation Reviewer    PROVIDER RESPONSE TEXT:    This patient has clinically significant brain compression/midline shift***.    Query created by: Tomeka Cronin on 10/28/2024 2:25 PM      Electronically signed by:  OLGA Zhang CNP 10/28/2024 10:19 PM           Stable

## 2024-12-18 NOTE — PHYSICAL THERAPY INITIAL EVALUATION ADULT - PRECAUTIONS/LIMITATIONS, REHAB EVAL
12/18/2024        Rodrick Bauer  [address]  [place] [address]        Dear Rodrick Bauer    Cologuard was normal/negative, please repeat in 3 years.    Resulted Orders   Cologuard   Result Value Ref Range    Cologuard Negative Negative            Sincerely,        Mode Giron, DO  [address] [facility] - [address] [place] [id number]  
4L/min via NC; AIRBORNE/CONTACT isolation precautions for +COVID maintained throughout./aspiration precautions/cardiac precautions/fall precautions/oxygen therapy device and L/min

## 2025-05-15 NOTE — ED ADULT NURSE NOTE - DOES PATIENT HAVE ADVANCE DIRECTIVE
Rheumatology Consult Note    Amy Choudhury MRN# [id number]   Age: 65 year old YOB: 1960     Date of Admission: 5/6/2025    Reason for consult: Patient with extensive leptomeningeal enhancement, microhemorrhage; evaluating for possible vasculitis; please assist with recommendations regarding evaluation for vasculitis     Requesting Physician: Davi Greene MD    ASSESSMENT & PLAN      ASSESSMENT:  Amy Choudhury is a 65 year old female with a PMHx of DM2, anxiety, tobacco dependence, GERD, s/p gastric bypass that presents with several weeks of headaches, visual changes, hypertension and visual/auditory hallucinations. Rheumatology was consulted for recommendations regarding evaluation for vasculitis.     Problem List:  # Headache with vision disturbance  # Auditory and visual hallucination  # Intracerebral microbleeds with diffuse leptomeningeal enhancement   # Type 2 Diabetes Mellitus   # Hypertension  # Tobacco and Substance Use    Discussion:   Patient is presenting with several weeks of headaches, visual changes, confusion, hypertension, and visual/auditory hallucinations. Brain MRI (5/7) showed somewhat decreased cortical T2 hyperintense signal with increased conspicuity of leptomeningeal enhancement and numerous presumed microhemorrhages within the occipital, temporal, and parietal regions bilaterally. EEG on 5/7 was unremarkable. Lumbar puncture was done on 5/12, and cerebrospinal fluid analysis show elevated protein and glucose and lymphocytic pleocytosis, suggestive of an inflammatory process. Digital Subtraction Angiography is planned for tomorrow (5/16) to further evaluate for vascular abnormalities. Additionally, rheumatologic workup has been sent and is pending including: Scl-70, SSA, SSB, dsDNA, ANCA, MPO, and PR3. History and examination does not show clinical signs of a systemic rheumatic disease. Additionally CRP and ESR have been normal as well as CSF IL-6 levels.  Suspect a  vasculopathy is at play with some contribution likely from HTN, T2DM, tobacco use and substance use. Of note, she has had cocaine use documented in the past but currently denies use. If an autoimmune or inflammatory explanation becomes more probable we have considered differentials including drug-induced vasculitis, ANCA vasculitis, IgG-4 related disease, primary angiitis of the CNS, amyloid beta-related angiitis and sarcoidosis.     Plan:  -- No objection to treatment with glucocorticoids per neurology with IV Solumedrol 1g daily x 5 days   -- Following pending labs: Scl-70, SSA, SSB, dsDNA, ANCA, MPO, and PR3  -- Check IgG subclasses to evaluate for potential IgG-4 related disease  -- Check Hepatitis B/C, HIV, Cryoglobulins, and Quantiferon-TB Gold  -- Follow results from Digital Subtract Angiography    I discussed the findings and recommendations with the patient.  I communicated the assessment and plan to the consulting team.    Case seen and discussed with attending Rheumatologist Dr. Cannon. Rheumatology will continue to follow.    Thank you for involving Rheumatology in the care of this patient. Please contact us if there are any questions.     April Bales, MS4  May 15, 2025    Rheumatology Attending:    This patient was interviewed and examined in the presence of both the medical fellow and the medical student who acted as a scribe, and this note edited by me reflects our mutual impressions. My additional thoughts are as follows:    Likely multifactorial cerebral vasculopathy with poorly controlled HTN/PRES, DM, smoking, and evidence of chronic recurrent cerebral microhemorrhage. We will assess for any evidence of systemic autoimmunity.     Cj Cannon MD  Professor of Medicine  Director, Division of Rheumatic and Autoimmune Diseases    A total of 60 minutes was spent in face to face patient interaction and chart review on the day of service.        HISTORY OF PRESENT ILLNESS     Amy Choudhury  is a 65 year old female with a hx of DM2, anxiety, tobacco dependence, GERD, s/p gastric bypass who presented on 5/6 with worsening headache and blurred vision.     On 4/25/2025, she was previously admitted to [facility] with a left-side frontal headache, which had started beginning of April 2025. She was treated with migraine cocktail, improved and discharged home. On 4/28/2025 she again presented to the [facility] ED with a headache and was found to have blood pressures of 220s/120s. MRI brain w/without contrast at that time showed Interval development of patchy T2 FLAIR hyperintensity within the left posteroinferior cerebellum, with possible patchy enhancement, new areas of subcortical T2 FLAIR hyperintensity involving the posterior parieto-occipital regions, and progressed scattered associated susceptibility artifact, most notably right occipital lobe. She was treated as a case of posterior reversible encephalopathy syndrome. Her blood pressure gradually decreased and she was discharged with blood pressure medications. From 5/2 - 5/5/2025, she was admitted again for headache and hypertension. Repeat MRI was similar to prior MRIs with small microhemorrhages and superficial siderosis along with the signal normalities through posterior cerebral and cerebellar hemispheres with leptomeningeal enhancement. Following discharge she became very confused and got lost on her way home. The following morning she woke up with a persistent headache, confusion, and blurred/quadrupled/choppy vision.     Upon admission she was found to have mild leukocytosis and urine drug screen was positive for amphetamines, benzodiazepines, and cannabinoids. CRP and ESR have been within normal ranges.  Head CT (5/6) showed no acute findings. CTA of head and neck (5/6) showed no vascular injury or stenosis. Brain MRI (5/7) showed somewhat decreased cortical T2 hyperintense signal with increased conspicuity of leptomeningeal enhancement and numerous  "presumed microhemorrhages within the occipital, temporal parietal regions bilaterally. These findings are nonspecific, although may be seen with process or hematogenous malignancy such as melanoma. Findings are less likely related to cerebral amyloid  angiopathy given the distribution. The distribution could be seen with hypertensive angiopathy, however these are extremely great number, much more than expected. CT Chest/Abdomen/Pelvis (5/8) showed no evidence of primary malignancy. She had an EEG on 5/7 which was unremarkable. Repeat Head CT (5/11) was unremarkable. She had a lumbar puncture on 5/12. Cerebrospinal fluid analysis show elevated protein and glucose and lymphocytic pleocytosis.     She describes her headache as intermittent and migratory. She reports that it started on the left side of her head and she now feels it on both sides but it is worsen on the left. She reports the pain move around between her forehead, top of her head, temples, and back of her head. She additionally reports that her vision is not blurry, but she has been \"seeing things that are not there.\" She endorses visual and auditory hallucinations that are \"threatening.\"     ROS: Patient reports headaches, vision changes, hallucinations, weight gain, nausea, vomiting, eye pain (L > R), and morning stiffness in her knees. Patient denies any fevers, chills, weight loss, focal weakness or numbness, arthralgias, Raynaud's, myalgias, rash, photosensitivity, nasal or oral ulcers, ear fullness or drainage, cough or dyspnea, no hematuria, abdominal pain, diarrhea, constipation, and blood in stool .     Serology  Positive: N/A  Negative/Normal: Rheumatoid Factors  Pending: Scl70, SSA, SSB, dsDNA, ANCA, MPO, PR3     Treatment History  N/A    Other labs  HepBcore Ab: N/A  HepBsurfaceAg: Non reactive (2019)  HepC: Non reactive (2019)  HIV: Negative (2019)  Tb: N/A    HISTORY REVIEW:  Past Medical History:   Diagnosis Date    Anemia     Anxiety     " Axillary abscess     chronic, recurring    Backache     Benign neoplasm of adrenal gland 2012    Depressive disorder     Gastro-oesophageal reflux disease     bleeding ulcers    Hiatal hernia     NEWLY DIAGNOSISED    Hyperparathyroidism, unspecified 2012    Peptic ulcer, unspecified site, unspecified as acute or chronic, without mention of hemorrhage or perforation     Pneumonia     NOVEMBER    PONV (postoperative nausea and vomiting)     TMJ (temporomandibular joint syndrome) 2014    Vitamin D deficiency 2012     Past Surgical History:   Procedure Laterality Date    APPENDECTOMY      CHOLECYSTECTOMY      DILATION AND CURETTAGE, OPERATIVE HYSTEROSCOPY WITH MORCELLATOR, COMBINED  2012    Procedure: COMBINED DILATION AND CURETTAGE, OPERATIVE HYSTEROSCOPY WITH MORCELLATOR;  Hysteroscopy, Polypectomy, Dilation and Curettage  ;  Surgeon: Marta Montejo MD;  Location: [facility] OR    GI SURGERY      gastric bypass at age 31    ORTHOPEDIC SURGERY      back surgery at age 31    PARATHYROIDECTOMY Right 2015    Procedure: PARATHYROIDECTOMY;  Surgeon: Gregory Coleman MD;  Location: [facility]    UT MARSUP BARTHOLIN GLAND CYST      SPINE SURGERY       Family History   Problem Relation Age of Onset    Thyroid Disease Mother     Breast Cancer Mother 57         65 y.o.    Other - See Comments Mother         cystic acne    Cancer Brother     Diabetes Daughter         Type 1; insulin     Social History     Socioeconomic History    Marital status:      Spouse name: Not on file    Number of children: Not on file    Years of education: Not on file    Highest education level: Not on file   Occupational History    Not on file   Tobacco Use    Smoking status: Every Day     Current packs/day: 0.50     Average packs/day: 0.5 packs/day for 30.0 years (15.0 ttl pk-yrs)     Types: Cigarettes    Smokeless tobacco: Never   Substance and Sexual Activity    Alcohol use: Yes     Comment:  No occasionally    Drug use: No    Sexual activity: Yes     Partners: Male     Birth control/protection: None   Other Topics Concern    Parent/sibling w/ CABG, MI or angioplasty before 65F 55M? Not Asked   Social History Narrative    Not on file     Social Drivers of Health     Financial Resource Strain: Low Risk  (4/29/2025)    Received from [facility]    Financial Resource Strain     Difficulty of Paying Living Expenses: 3     Difficulty of Paying Living Expenses: Not on file   Food Insecurity: No Food Insecurity (5/4/2025)    Received from [facility]    Food Insecurity     Do you worry your food will run out before you are able to buy more?: 1   Transportation Needs: Unmet Transportation Needs (5/4/2025)    Received from [facility]    Transportation Needs     Does lack of transportation keep you from medical appointments?: 2     Does lack of transportation keep you from work, meetings or getting things that you need?: 2   Physical Activity: Not on file   Stress: Not on file   Social Connections: Socially Integrated (5/4/2025)    Received from [facility]    Social Connections     Do you often feel lonely or isolated from those around you?: 0   Interpersonal Safety: Low Risk  (5/10/2025)    Interpersonal Safety     Do you feel physically and emotionally safe where you currently live?: Yes     Within the past 12 months, have you been hit, slapped, kicked or otherwise physically hurt by someone?: No     Within the past 12 months, have you been humiliated or emotionally abused in other ways by your partner or ex-partner?: No   Housing Stability: Low Risk  (5/4/2025)    Received from [facility]    Housing Stability     What is your housing situation today?: 1     Patient Active Problem List   Diagnosis    Hyperparathyroidism    Anxiety    Obesity     "Status post gastric bypass for obesity    Microcytic anemia    Vitamin D deficiency    Benign neoplasm of adrenal gland    TMJ (temporomandibular joint syndrome)    Sciatica    Tobacco dependence syndrome    Adjustment disorder with mixed anxiety and depressed mood    Diabetes mellitus, type 2 (H)    Morbid obesity (H)    Suicidal ideation    Hypokalemia    Pneumonia due to 2019 novel coronavirus    Closed fracture of proximal end of right humerus, unspecified fracture morphology, initial encounter    Eczema    Menorrhagia    Panic attacks    Recurrent boils    Posterior reversible encephalopathy syndrome    Memory changes     Allergies   Allergen Reactions    Nsaids Other (See Comments), Nausea and Vomiting and GI Disturbance     History of GI bleeding and ulcers    Codeine Sulfate GI Disturbance         ROS     A 10 point ROS was performed with pertinent findings listed above.      OBJECTIVE     PHYSICAL EXAM  /85 (BP Location: Left arm)   Pulse 74   Temp 98  F (36.7  C) (Oral)   Resp 18   Ht 1.676 m (5' 6\")   Wt 104.6 kg (230 lb 8 oz)   SpO2 97%   BMI 37.20 kg/m    Wt Readings from Last 4 Encounters:   05/14/25 104.6 kg (230 lb 8 oz)   04/29/24 111.6 kg (246 lb)   04/24/24 112 kg (247 lb)   04/23/24 112.4 kg (247 lb 14.4 oz)     Constitutional: no acute distress  Eyes: nl EOM, PERRLA, conjunctiva, sclera  ENT: Poor dentition, nl external ears, hearing, throat, No mucous membrane lesions, normal saliva pool  Neck: no mass or thyroid enlargement  Resp: lungs clear to auscultation  CV: RRR, no murmurs, no edema  GI: no ABD mass or tenderness, no HSM  : not tested  MS: All neck, shoulder, elbow, wrist, MCP/PIP/DIP, spine, hip, knee, ankle, and foot MTP/IP joints were examined and  found normal. No active synovitis or deformity. Full ROM. Normal  strength  Skin: no nail pitting, alopecia, rash, nodules or lesions  Neuro: nl strength, sensation   Psych: endorses visual and auditory hallucinations and " "poor memory    INVESTIGATIONS:  CBC:  Recent Labs   Lab Test 05/15/25  0703 05/14/25  0650 05/13/25  0643   WBC 10.6 8.7 9.2   RBC 4.81 4.95 5.07   HGB 12.5 12.9 13.1   HCT 39.8 41.8 40.7   MCV 83 84 80   MCH 26.0* 26.1* 25.8*   MCHC 31.4* 30.9* 32.2   RDW 14.7 15.0 14.9    386 418       BMP:  Recent Labs   Lab Test 05/15/25  0748 05/15/25  0703 05/15/25  0150 05/14/25  0728 05/14/25  0650 05/13/25  1201 05/13/25  1044 05/13/25  0712 05/13/25  0643   NA  --  138  --   --  136  --  136  --  138  138   POTASSIUM  --  4.8  --   --  4.8  --   --   --  4.6   CHLORIDE  --  102  --   --  99  --   --   --  101   CO2  --  23  --   --  25  --   --   --  26   ANIONGAP  --  13  --   --  12  --   --   --  11   * 140* 151*   < > 265*   < >  --    < > 145*   BUN  --  14.6  --   --  17.9  --   --   --  23.5*   CR  --  0.63  --   --  0.70  --   --   --  0.72   GFRESTIMATED  --  >90  --   --  >90  --   --   --  >90   CRISTIAN  --  9.0  --   --  8.8  --   --   --  8.7*    < > = values in this interval not displayed.       LFT:  Recent Labs   Lab Test 05/12/25  0651 05/11/25  2151 05/06/25  1554 02/10/22  2313   PROTTOTAL 6.7 6.9 7.6 7.4   ALBUMIN 3.9 4.2 4.4 3.4   BILITOTAL 0.3  --  0.4 0.2   ALKPHOS 98  --  120 111   AST 21  --  24 13   ALT 21  --  19 22       No results found for: \"CKTOTAL\"  TSH   Date Value Ref Range Status   05/11/2025 0.67 0.30 - 4.20 uIU/mL Final   04/19/2021 2.58 0.40 - 4.00 mU/L Final     No results found for: \"URIC\"    Inflammatory markers  Lab Results   Component Value Date    CRP 16.4 09/06/2021    CRP 6.4 01/14/2014    CRP 11.3 12/14/2012     Lab Results   Component Value Date    SED 10 05/12/2025    SED 7 05/08/2025    SED 8 01/14/2014     Ferritin   Date Value Ref Range Status   03/11/2013 11 10 - 300 ng/mL Final   11/07/2012 9 (L) 10 - 300 ng/mL Final   03/29/2012 6 (L) 10 - 300 ng/mL Final       UA RESULTS:  Recent Labs   Lab Test 05/06/25  1608   COLOR Yellow   APPEARANCE Slightly Cloudy* " "  URINEGLC Negative   URINEBILI Negative   URINEKETONE Negative   SG 1.030   UBLD Negative   URINEPH 6.0   PROTEIN Negative   NITRITE Negative   LEUKEST Negative   RBCU 3*   WBCU 3         AUTOIMMUNITY LABS     Lab Results   Component Value Date    RHF <10 05/14/2025     No results found for: \"CCPIGG\"  No results found for: \"ANCA\"  No results found for: \"B6PMZGA\"  No results found for: \"L6FHLOA\"  No results found for: \"SHAYY\"  No results found for: \"DNA\"  No results found for: \"RNPIGG\", \"SMIGG\", \"SSAIGG\", \"SSBIGG\", \"SCLIGG\"    IMAGING:   Results for orders placed or performed during the hospital encounter of 05/06/25   Head CT w/o contrast    Impression    IMPRESSION:  1.  No acute findings. No acute intracranial hemorrhage. No acute calvarial fracture.   XR Chest 2 Views    Impression    IMPRESSION: Negative chest. Lungs clear.   CTA Head Neck w Contrast    Impression    IMPRESSION:     HEAD CTA:   No large vessel occlusion findings intracranially. No high-grade stenosis intracranially.    NECK CTA:  1.  No acute vascular injury in the neck. No high-grade stenosis in the neck.   MR Brain w/o & w Contrast    Impression    IMPRESSION:  1. There is somewhat decreased cortical T2 hyperintense signal with  increased conspicuity of leptomeningeal enhancement and numerous  presumed microhemorrhages within the occipital, temporal parietal  regions bilaterally. These findings are nonspecific, although may be  seen with process or hematogenous malignancy such as melanoma.  Findings are less likely related to cerebral amyloid  angiopathy given  the distribution. The distribution could be seen with hypertensive  angiopathy, however these are extremely great number, much more than  expected.   2. Interstitial siderosis within the right occipital lobe, likely  related to prior hemorrhage.  3. Chronic small vessel ischemic changes.    I have personally reviewed the examination and initial interpretation  and I agree with the " findings.    JERRY VASQUEZ MD         SYSTEM ID:  [id number]   CT Chest/Abdomen/Pelvis w Contrast    Impression    IMPRESSION:   1. No evidence of primary malignancy throughout the chest, abdomen or  pelvis.  2. Ill-defined 1.6 cm area of enhancement within the left inferior  thyroid lobe. Consider nonemergent thyroid ultrasound for further  assessment.  3. 5 mm solid nodule in the right upper lobe. Can consider optional  follow-up CT in 12 months to document stability.  4. Postsurgical changes of Joan-en-Y gastric bypass with inspissated  debris in the gastric pouch. The gastrojejunal anastomosis appears  patent, however, inspissated debris may suggest delayed  gastrointestinal transit.  5. Hepatic steatosis.  6. Additional chronic and incidental findings, as described above.     I have personally reviewed the examination and initial interpretation  and I agree with the findings.    PARADISE PERALTA MD         SYSTEM ID:  [id number]   CT Head w/o Contrast    Impression    IMPRESSION:  1.  No acute intracranial findings.  2.  Extensive microhemorrhages and leptomeningeal disease described on  prior MRI of 5/7/2025 are not visualized on CT exam.    I have personally reviewed the examination and initial interpretation  and I agree with the findings.    FLASH PEREZ MD         SYSTEM ID:  [id number]   XR Lumbar Puncture Spinal Tap Diag    Impression    Impression: Successful lumbar puncture without immediate complication.  Increased opening pressure of 25.5 cm CSF.    RACHEL GOMEZ MD         SYSTEM ID:  [id number]      Resident/Fellow Attestation   I, Slim Couch DO, was present with April Bales MS-4 who participated in the service and in the documentation of the note.  I have verified the history and personally performed the physical exam and medical decision making.  I agree with the assessment and plan of care as documented in the note.      Slim Couch DO  PGY5  Date of Service (when I saw the  patient): 05/15/25

## 2025-05-30 NOTE — ED ADULT NURSE NOTE - BREATH SOUNDS, RIGHT
It was a pleasure seeing you today. Please contact myself or my team with any questions.     To reach Dr. Deluca' office please call [phone] ([place]).   Fax: [phone]   To schedule an appointment call [phone]     If you have any questions or need cardiac medication refills, please call the Heart Failure office at [phone], option 6. You may also contact the  Heart Failure Nursing team via email at [email or web address] (Please include your name and date of birth).        1) Continue your current medications  2) We will admit you to the HF Failure unit early next week   wheezes